# Patient Record
Sex: MALE | Race: WHITE | Employment: OTHER | URBAN - METROPOLITAN AREA
[De-identification: names, ages, dates, MRNs, and addresses within clinical notes are randomized per-mention and may not be internally consistent; named-entity substitution may affect disease eponyms.]

---

## 2018-10-16 ENCOUNTER — HOSPITAL ENCOUNTER (EMERGENCY)
Age: 48
Discharge: HOME | End: 2018-10-17
Attending: EMERGENCY MEDICINE
Payer: MEDICARE

## 2018-10-16 DIAGNOSIS — S01.81XA FACIAL LACERATION, INITIAL ENCOUNTER: ICD-10-CM

## 2018-10-16 DIAGNOSIS — S09.90XA INJURY OF HEAD, INITIAL ENCOUNTER: Primary | ICD-10-CM

## 2018-10-16 DIAGNOSIS — F10.920 ACUTE ALCOHOLIC INTOXICATION WITHOUT COMPLICATION (HCC): ICD-10-CM

## 2018-10-16 PROCEDURE — 99285 EMERGENCY DEPT VISIT HI MDM: CPT

## 2018-10-16 PROCEDURE — 12013 RPR F/E/E/N/L/M 2.6-5.0 CM: CPT

## 2018-10-16 PROCEDURE — G0480 DRUG TEST DEF 1-7 CLASSES: HCPCS

## 2018-10-16 ASSESSMENT — PAIN DESCRIPTION - LOCATION: LOCATION: HEAD

## 2018-10-16 ASSESSMENT — PAIN DESCRIPTION - PAIN TYPE: TYPE: ACUTE PAIN

## 2018-10-16 ASSESSMENT — PAIN SCALES - GENERAL: PAINLEVEL_OUTOF10: 2

## 2018-10-17 ENCOUNTER — APPOINTMENT (OUTPATIENT)
Dept: CT IMAGING | Age: 48
End: 2018-10-17
Payer: MEDICARE

## 2018-10-17 VITALS
DIASTOLIC BLOOD PRESSURE: 91 MMHG | BODY MASS INDEX: 27.47 KG/M2 | HEIGHT: 67 IN | RESPIRATION RATE: 15 BRPM | HEART RATE: 76 BPM | WEIGHT: 175 LBS | TEMPERATURE: 99.2 F | OXYGEN SATURATION: 98 % | SYSTOLIC BLOOD PRESSURE: 145 MMHG

## 2018-10-17 LAB
ETHANOL PERCENT: 0.33 %
ETHANOL: 326 MG/DL

## 2018-10-17 PROCEDURE — 90715 TDAP VACCINE 7 YRS/> IM: CPT | Performed by: STUDENT IN AN ORGANIZED HEALTH CARE EDUCATION/TRAINING PROGRAM

## 2018-10-17 PROCEDURE — 70450 CT HEAD/BRAIN W/O DYE: CPT

## 2018-10-17 PROCEDURE — 90471 IMMUNIZATION ADMIN: CPT | Performed by: STUDENT IN AN ORGANIZED HEALTH CARE EDUCATION/TRAINING PROGRAM

## 2018-10-17 PROCEDURE — 72125 CT NECK SPINE W/O DYE: CPT

## 2018-10-17 PROCEDURE — 6360000002 HC RX W HCPCS: Performed by: STUDENT IN AN ORGANIZED HEALTH CARE EDUCATION/TRAINING PROGRAM

## 2018-10-17 RX ADMIN — TETANUS TOXOID, REDUCED DIPHTHERIA TOXOID AND ACELLULAR PERTUSSIS VACCINE, ADSORBED 0.5 ML: 5; 2.5; 8; 8; 2.5 SUSPENSION INTRAMUSCULAR at 00:15

## 2018-10-17 ASSESSMENT — ENCOUNTER SYMPTOMS
ABDOMINAL PAIN: 0
NAUSEA: 0
VOMITING: 0
SHORTNESS OF BREATH: 0

## 2018-10-17 NOTE — ED NOTES
Pt resting comfortably on stretcher  Resp noted as even and non labored, pt in NAD  Call light within reach, will continue to monitor     Sridhar Rey RN  10/17/18 8217

## 2018-10-17 NOTE — ED PROVIDER NOTES
[facility] ED  Emergency Department  Faculty Sign-Out Addendum     Care of Carlos Valladares was assumed from previous attending and is being seen for Fall (at home, from chair, hit head on cinder blocks, per family +loc ) and Alcohol Intoxication  . The patient's initial evaluation and plan have been discussed with the prior provider who initially evaluated the patient. EMERGENCY DEPARTMENT COURSE / MEDICAL DECISION MAKING:       MEDICATIONS GIVEN:  Orders Placed This Encounter   Medications    Tetanus-Diphth-Acell Pertussis (BOOSTRIX) injection 0.5 mL       LABS / RADIOLOGY:     Labs Reviewed   ETHANOL - Abnormal; Notable for the following:        Result Value    Ethanol 326 (*)     All other components within normal limits       Ct Head Wo Contrast    Result Date: 10/17/2018  EXAMINATION: CT OF THE HEAD WITHOUT CONTRAST  10/17/2018 12:28 am TECHNIQUE: CT of the head was performed without the administration of intravenous contrast. Dose modulation, iterative reconstruction, and/or weight based adjustment of the mA/kV was utilized to reduce the radiation dose to as low as reasonably achievable. COMPARISON: None. HISTORY: ORDERING SYSTEM PROVIDED HISTORY: fall TECHNOLOGIST PROVIDED HISTORY: FINDINGS: BRAIN/VENTRICLES: There is no acute intracranial hemorrhage, mass effect or midline shift. No abnormal extra-axial fluid collection. The gray-white differentiation is maintained without evidence of an acute infarct. There is prominence of the ventricles and sulci due to global parenchymal volume loss. There are nonspecific areas of hypoattenuation within the periventricular and subcortical white matter, which likely represent chronic microvascular ischemic change. ORBITS: The visualized portion of the orbits demonstrate no acute abnormality. SINUSES: Mild mucosal thickening within paranasal sinuses. No evidence of air-fluid levels. The mastoid air cells are clear.  SOFT TISSUES/SKULL: No acute
occasionally words are mis-transcribed.  Whenever words are used in this note in any gender, they shall be construed as though they were used in the gender appropriate to the circumstances; and whenever words are used in this note in the singular or plural form, they shall be construed as though they were used in the form appropriate to the circumstances.)    MD Garry Martel  Attending Emergency Medicine Physician           Alejandrina Moody MD  10/16/18 Veronica Wiggins MD  10/17/18 7042

## 2018-12-15 ENCOUNTER — HOSPITAL ENCOUNTER (EMERGENCY)
Age: 48
Discharge: PSYCHIATRIC OR SUBSTANCE USE FACILITY | End: 2018-12-15
Attending: EMERGENCY MEDICINE
Payer: MEDICARE

## 2018-12-15 ENCOUNTER — HOSPITAL ENCOUNTER (INPATIENT)
Age: 48
LOS: 2 days | Discharge: HOME | DRG: 753 | End: 2018-12-17
Attending: PSYCHIATRY & NEUROLOGY | Admitting: PSYCHIATRY & NEUROLOGY
Payer: MEDICARE

## 2018-12-15 VITALS
BODY MASS INDEX: 24.99 KG/M2 | RESPIRATION RATE: 18 BRPM | SYSTOLIC BLOOD PRESSURE: 123 MMHG | HEART RATE: 81 BPM | HEIGHT: 65 IN | DIASTOLIC BLOOD PRESSURE: 79 MMHG | WEIGHT: 150 LBS | OXYGEN SATURATION: 94 % | TEMPERATURE: 97 F

## 2018-12-15 DIAGNOSIS — R45.851 SUICIDAL IDEATION: Primary | ICD-10-CM

## 2018-12-15 DIAGNOSIS — F10.929 ACUTE ALCOHOLIC INTOXICATION WITH COMPLICATION (HCC): ICD-10-CM

## 2018-12-15 PROBLEM — F33.9 RECURRENT DEPRESSION (HCC): Status: ACTIVE | Noted: 2018-12-15

## 2018-12-15 LAB
ETHANOL PERCENT: 0.35 %
ETHANOL: 353 MG/DL

## 2018-12-15 PROCEDURE — 99285 EMERGENCY DEPT VISIT HI MDM: CPT

## 2018-12-15 PROCEDURE — G0480 DRUG TEST DEF 1-7 CLASSES: HCPCS

## 2018-12-15 PROCEDURE — 6370000000 HC RX 637 (ALT 250 FOR IP): Performed by: PSYCHIATRY & NEUROLOGY

## 2018-12-15 PROCEDURE — 1240000000 HC EMOTIONAL WELLNESS R&B

## 2018-12-15 RX ORDER — DIVALPROEX SODIUM 500 MG/1
500 TABLET, DELAYED RELEASE ORAL 2 TIMES DAILY
Status: DISCONTINUED | OUTPATIENT
Start: 2018-12-15 | End: 2018-12-17 | Stop reason: HOSPADM

## 2018-12-15 RX ORDER — ACETAMINOPHEN 325 MG/1
650 TABLET ORAL EVERY 4 HOURS PRN
Status: DISCONTINUED | OUTPATIENT
Start: 2018-12-15 | End: 2018-12-17 | Stop reason: HOSPADM

## 2018-12-15 RX ORDER — HYDROXYZINE HYDROCHLORIDE 25 MG/1
25 TABLET, FILM COATED ORAL 3 TIMES DAILY PRN
Status: DISCONTINUED | OUTPATIENT
Start: 2018-12-15 | End: 2018-12-17 | Stop reason: HOSPADM

## 2018-12-15 RX ORDER — MAGNESIUM HYDROXIDE/ALUMINUM HYDROXICE/SIMETHICONE 120; 1200; 1200 MG/30ML; MG/30ML; MG/30ML
20 SUSPENSION ORAL 3 TIMES DAILY PRN
Status: DISCONTINUED | OUTPATIENT
Start: 2018-12-15 | End: 2018-12-17 | Stop reason: HOSPADM

## 2018-12-15 RX ORDER — ZOLPIDEM TARTRATE 10 MG/1
10 TABLET ORAL NIGHTLY PRN
Status: ON HOLD | COMMUNITY
End: 2018-12-17 | Stop reason: HOSPADM

## 2018-12-15 RX ORDER — BENZTROPINE MESYLATE 1 MG/ML
2 INJECTION INTRAMUSCULAR; INTRAVENOUS DAILY PRN
Status: DISCONTINUED | OUTPATIENT
Start: 2018-12-15 | End: 2018-12-17 | Stop reason: HOSPADM

## 2018-12-15 RX ORDER — DIVALPROEX SODIUM 500 MG/1
500 TABLET, EXTENDED RELEASE ORAL 2 TIMES DAILY
Status: ON HOLD | COMMUNITY
End: 2018-12-17

## 2018-12-15 RX ORDER — ZOLPIDEM TARTRATE 10 MG/1
10 TABLET ORAL NIGHTLY PRN
Status: DISCONTINUED | OUTPATIENT
Start: 2018-12-15 | End: 2018-12-17 | Stop reason: HOSPADM

## 2018-12-15 RX ADMIN — HYDROXYZINE HYDROCHLORIDE 25 MG: 25 TABLET, FILM COATED ORAL at 21:42

## 2018-12-15 RX ADMIN — ZOLPIDEM TARTRATE 10 MG: 10 TABLET, FILM COATED ORAL at 21:42

## 2018-12-15 RX ADMIN — DIVALPROEX SODIUM 500 MG: 500 TABLET, DELAYED RELEASE ORAL at 21:39

## 2018-12-15 ASSESSMENT — PAIN - FUNCTIONAL ASSESSMENT: PAIN_FUNCTIONAL_ASSESSMENT: 0-10

## 2018-12-15 ASSESSMENT — ENCOUNTER SYMPTOMS
SHORTNESS OF BREATH: 0
CHEST TIGHTNESS: 0
VOMITING: 0
DIARRHEA: 0

## 2018-12-15 ASSESSMENT — SLEEP AND FATIGUE QUESTIONNAIRES
SLEEP PATTERN: DIFFICULTY FALLING ASLEEP;DISTURBED/INTERRUPTED SLEEP
DO YOU USE A SLEEP AID: YES
DIFFICULTY FALLING ASLEEP: YES
DIFFICULTY ARISING: NO
DO YOU HAVE DIFFICULTY SLEEPING: YES
AVERAGE NUMBER OF SLEEP HOURS: 3
DIFFICULTY STAYING ASLEEP: YES
RESTFUL SLEEP: NO

## 2018-12-15 ASSESSMENT — PAIN SCALES - GENERAL: PAINLEVEL_OUTOF10: 0

## 2018-12-15 ASSESSMENT — LIFESTYLE VARIABLES: HISTORY_ALCOHOL_USE: YES

## 2018-12-15 NOTE — ED PROVIDER NOTES
is alert. Skin: Skin is warm. He is not diaphoretic. Psychiatric: He exhibits a depressed mood. He expresses suicidal ideation. He expresses suicidal plans. Nursing note and vitals reviewed. DIFFERENTIAL  DIAGNOSIS     PLAN (LABS / IMAGING / EKG):  Orders Placed This Encounter   Procedures    ETHANOL    Suicide precautions       MEDICATIONS ORDERED:  No orders of the defined types were placed in this encounter. DDX: alcohol intoxication, SI    DIAGNOSTIC RESULTS / EMERGENCY DEPARTMENT COURSE / MDM     LABS:  Results for orders placed or performed during the hospital encounter of 12/15/18   ETHANOL   Result Value Ref Range    Ethanol 353 (HH) <10 mg/dL    Ethanol percent 0.353 %       IMPRESSION: Patient presents with suicide ideations with plan to jump off a window. Has had a prior attempt in the past.  We'll check an ethanol level and if normal will medically clear the patient for transfer. RADIOLOGY:  None    EKG  None    All EKG's are interpreted by the Emergency Department Physician who either signs or Co-signs this chart in the absence of a cardiologist.    EMERGENCY DEPARTMENT COURSE:  Ethanol 353. PROCEDURES:  None    CONSULTS:  None    CRITICAL CARE:  None    FINAL IMPRESSION      1. Suicidal ideation    2. Acute alcoholic intoxication with complication ([facility] [address]          DISPOSITION / PLAN     DISPOSITION Decision To Transfer 12/15/2018 03:19:26 AM      PATIENT REFERRED TO:  No follow-up provider specified. DISCHARGE MEDICATIONS:  New Prescriptions    No medications on file       Brianne Noble DO  Emergency Medicine Resident    (Please note that portions of thisnote were completed with a voice recognition program.  Efforts were made to edit the dictations but occasionally words are mis-transcribed. )       Brianne Noble DO  Resident  12/15/18 1335
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
## 2018-12-15 NOTE — ED NOTES
Patient resting on cot even respirations unlabored no signs of distress. Patient denies pain currently. No questions or concerns updated on plan will continue to monitor.      [id number] Momo Mosqueda RN  12/15/18 0254

## 2018-12-15 NOTE — ED NOTES
Patient was given a box lunch and isaiah mist about an hour ago. Ate about 1/2 of sandwich repeatedly asking \"how much longer. \"      Jing Parra RN  12/15/18 0764

## 2018-12-16 PROBLEM — F33.9 MAJOR DEPRESSION, RECURRENT (HCC): Status: ACTIVE | Noted: 2018-12-16

## 2018-12-16 PROCEDURE — 6370000000 HC RX 637 (ALT 250 FOR IP): Performed by: PSYCHIATRY & NEUROLOGY

## 2018-12-16 PROCEDURE — 1240000000 HC EMOTIONAL WELLNESS R&B

## 2018-12-16 RX ADMIN — ZOLPIDEM TARTRATE 10 MG: 10 TABLET, FILM COATED ORAL at 21:42

## 2018-12-16 RX ADMIN — NICOTINE POLACRILEX 2 MG: 2 GUM, CHEWING BUCCAL at 14:28

## 2018-12-16 RX ADMIN — DIVALPROEX SODIUM 500 MG: 500 TABLET, DELAYED RELEASE ORAL at 21:42

## 2018-12-16 RX ADMIN — DIVALPROEX SODIUM 500 MG: 500 TABLET, DELAYED RELEASE ORAL at 09:26

## 2018-12-16 RX ADMIN — HYDROXYZINE HYDROCHLORIDE 25 MG: 25 TABLET, FILM COATED ORAL at 21:42

## 2018-12-16 ASSESSMENT — ENCOUNTER SYMPTOMS
ABDOMINAL DISTENTION: 0
RHINORRHEA: 0
EYE DISCHARGE: 0
CONSTIPATION: 0
EYE PAIN: 0
BACK PAIN: 1
SHORTNESS OF BREATH: 0
COUGH: 0
EYE REDNESS: 0
ALLERGIC/IMMUNOLOGIC NEGATIVE: 1
SINUS PRESSURE: 0
NAUSEA: 0

## 2018-12-16 ASSESSMENT — SLEEP AND FATIGUE QUESTIONNAIRES
DIFFICULTY STAYING ASLEEP: YES
DO YOU HAVE DIFFICULTY SLEEPING: YES
AVERAGE NUMBER OF SLEEP HOURS: 3
DIFFICULTY ARISING: YES
RESTFUL SLEEP: NO
SLEEP PATTERN: DIFFICULTY FALLING ASLEEP;DISTURBED/INTERRUPTED SLEEP;EARLY AWAKENING;RESTLESSNESS;INSOMNIA
DIFFICULTY FALLING ASLEEP: YES
DO YOU USE A SLEEP AID: YES

## 2018-12-16 ASSESSMENT — LIFESTYLE VARIABLES: HISTORY_ALCOHOL_USE: YES

## 2018-12-16 ASSESSMENT — PATIENT HEALTH QUESTIONNAIRE - PHQ9: SUM OF ALL RESPONSES TO PHQ QUESTIONS 1-9: 15
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
## 2018-12-16 NOTE — PLAN OF CARE
Problem: Altered Mood, Depressive Behavior:  Goal: Able to verbalize and/or display a decrease in depressive symptoms  Able to verbalize and/or display a decrease in depressive symptoms   Outcome: Ongoing  Pt did not attend Community Meeting at 0900 d/t resting in room despite staff invitation to attend.

## 2018-12-16 NOTE — PLAN OF CARE
Problem: Altered Mood, Depressive Behavior:  Goal: Able to verbalize and/or display a decrease in depressive symptoms  Able to verbalize and/or display a decrease in depressive symptoms   Outcome: Ongoing  Patient is medication compliant and in behavioral control. Patient has attended groups and been social with peers in the common areas of the unit. Patient admits to fleeting suicidal ideation. Patient denies auditory and visual hallucinations. Patient has engaged in ADL's. Patient has consumed meals and snacks this shift. Patient reports improved sleep. Patient has remained free from harm. Every 15 minute and spontaneous safety checks have been maintained. Goal: Absence of self-harm  Absence of self-harm   Outcome: Ongoing  Patient has remained free from harm. Every 15 minute and spontaneous safety checks have been maintained.   Goal: Participates in care planning  Participates in care planning   Outcome: Ongoing
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
## 2018-12-16 NOTE — PLAN OF CARE
Problem: Altered Mood, Depressive Behavior:  Goal: Able to verbalize and/or display a decrease in depressive symptoms  Able to verbalize and/or display a decrease in depressive symptoms   Outcome: Ongoing  Pt did not attend RT group at 1330 d/t resting in room despite staff invitation to attend.

## 2018-12-17 VITALS
DIASTOLIC BLOOD PRESSURE: 87 MMHG | SYSTOLIC BLOOD PRESSURE: 133 MMHG | TEMPERATURE: 98.4 F | RESPIRATION RATE: 14 BRPM | HEIGHT: 67 IN | WEIGHT: 162.5 LBS | BODY MASS INDEX: 25.51 KG/M2 | HEART RATE: 71 BPM

## 2018-12-17 PROCEDURE — 6370000000 HC RX 637 (ALT 250 FOR IP): Performed by: PSYCHIATRY & NEUROLOGY

## 2018-12-17 PROCEDURE — 5130000000 HC BRIDGE APPOINTMENT

## 2018-12-17 RX ORDER — DIVALPROEX SODIUM 500 MG/1
500 TABLET, EXTENDED RELEASE ORAL 2 TIMES DAILY
Qty: 60 TABLET | Refills: 0 | Status: ON HOLD | OUTPATIENT
Start: 2018-12-17 | End: 2019-06-26 | Stop reason: SDUPTHER

## 2018-12-17 RX ORDER — HYDROXYZINE HYDROCHLORIDE 25 MG/1
25 TABLET, FILM COATED ORAL 2 TIMES DAILY PRN
Qty: 60 TABLET | Refills: 0 | Status: ON HOLD | OUTPATIENT
Start: 2018-12-17 | End: 2019-06-26 | Stop reason: SDUPTHER

## 2018-12-17 RX ADMIN — NICOTINE POLACRILEX 2 MG: 2 GUM, CHEWING BUCCAL at 09:45

## 2018-12-17 RX ADMIN — DIVALPROEX SODIUM 500 MG: 500 TABLET, DELAYED RELEASE ORAL at 09:21

## 2018-12-17 ASSESSMENT — PAIN SCALES - GENERAL: PAINLEVEL_OUTOF10: 0
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
## 2018-12-17 NOTE — BH NOTE
Patient given tobacco quitline number 72850901809 at this time, refusing to call at this time, states \" I just dont want to quit now\"- patient given information as to the dangers of long term tobacco use. Continue to reinforce the importance of tobacco cessation.

## 2018-12-17 NOTE — PLAN OF CARE
Problem: Altered Mood, Depressive Behavior:  Goal: Able to verbalize and/or display a decrease in depressive symptoms  Able to verbalize and/or display a decrease in depressive symptoms   Outcome: Ongoing  Patient voices depression because of life stressors  Goal: Absence of self-harm  Absence of self-harm   Outcome: Ongoing  Pt denies thoughts of self harm and is agreeable to seeking out should thoughts of self harm arise. Safe environment maintained. Q15 minute checks for safety cont per unit policy. Will cont to monitor for safety and provides support and reassurance as needed.

## 2019-01-03 NOTE — PROGRESS NOTES
Presenting Evaluation:  Vero Finch is a 50 y.o. male who was admitted from the ED where he presented intoxicated, reported that he was feeling suicidal with a plan to jump out of a window. He reports that his ex-gf [place] and now has a new boyfriend which is triggering patient's agitation, anxiety. He reports that he has been drinking more, recognizes this as unhealthy numbing but denies any withdrawal symptoms. He states that he feels much better today, no longer having SI. States he has been partially compliant with Depakote. Denies hallucinations or paranoia    Diagnostic Impression     Bipolar affective disorder without psychosis. Alcohol abuse      After discussion with patient about potential risks, benefits, side effects, decided to order Depakote, Vistaril prn. He was doing fairly well at the time of discharge and was not in any distress. Thought process was organized and showed insight into compliance with treatment. Patient had been making rational and realistic plans so he was discharged. Patient had been bright, reactive, and interacting appropriately with staff and peers. There had been multiple consecutive days without any thoughts of suicide or other safety concerns. Patient was tolerating medication changes without any adverse side effects. He will follow up at [facility]. Medication List      CONTINUE taking these medications    divalproex 500 MG extended release tablet  Commonly known as:  DEPAKOTE ER  Take 1 tablet by mouth 2 times daily  Notes to patient: To help stabilize mood     hydrOXYzine 25 MG tablet  Commonly known as:  ATARAX  Take 1 tablet by mouth 2 times daily as needed for Anxiety  Notes to patient:   To help with anxiety        STOP taking these medications    diphenhydrAMINE 50 MG tablet  Commonly known as:  BENADRYL     lurasidone 40 MG Tabs tablet  Commonly known as:  LATUDA     traZODone 100 MG tablet  Commonly known as:  [place] Notice

## 2019-06-23 ENCOUNTER — APPOINTMENT (OUTPATIENT)
Dept: GENERAL RADIOLOGY | Age: 49
DRG: 753 | End: 2019-06-23
Payer: MEDICARE

## 2019-06-23 ENCOUNTER — HOSPITAL ENCOUNTER (INPATIENT)
Age: 49
LOS: 4 days | Discharge: HOME | DRG: 753 | End: 2019-06-27
Attending: EMERGENCY MEDICINE | Admitting: PSYCHIATRY & NEUROLOGY
Payer: MEDICARE

## 2019-06-23 DIAGNOSIS — F10.929 ALCOHOLIC INTOXICATION WITH COMPLICATION (HCC): ICD-10-CM

## 2019-06-23 DIAGNOSIS — F32.A DEPRESSION WITH SUICIDAL IDEATION: Primary | ICD-10-CM

## 2019-06-23 DIAGNOSIS — F31.31 BIPOLAR AFFECTIVE DISORDER, CURRENTLY DEPRESSED, MILD (HCC): ICD-10-CM

## 2019-06-23 DIAGNOSIS — R45.851 DEPRESSION WITH SUICIDAL IDEATION: Primary | ICD-10-CM

## 2019-06-23 LAB
ABSOLUTE EOS #: 0.2 K/UL (ref 0–0.4)
ABSOLUTE IMMATURE GRANULOCYTE: ABNORMAL K/UL (ref 0–0.3)
ABSOLUTE LYMPH #: 2 K/UL (ref 1–4.8)
ABSOLUTE MONO #: 0.6 K/UL (ref 0.1–1.3)
ACETAMINOPHEN LEVEL: <5 UG/ML (ref 10–30)
ALBUMIN SERPL-MCNC: 4.6 G/DL (ref 3.5–5.2)
ALBUMIN/GLOBULIN RATIO: ABNORMAL (ref 1–2.5)
ALP BLD-CCNC: 79 U/L (ref 40–129)
ALT SERPL-CCNC: 76 U/L (ref 5–41)
AMPHETAMINE SCREEN URINE: NEGATIVE
ANION GAP SERPL CALCULATED.3IONS-SCNC: 15 MMOL/L (ref 9–17)
AST SERPL-CCNC: 68 U/L
BARBITURATE SCREEN URINE: NEGATIVE
BASOPHILS # BLD: 1 % (ref 0–2)
BASOPHILS ABSOLUTE: 0.1 K/UL (ref 0–0.2)
BENZODIAZEPINE SCREEN, URINE: NEGATIVE
BILIRUB SERPL-MCNC: 0.21 MG/DL (ref 0.3–1.2)
BUN BLDV-MCNC: 10 MG/DL (ref 6–20)
BUN/CREAT BLD: ABNORMAL (ref 9–20)
BUPRENORPHINE URINE: ABNORMAL
CALCIUM SERPL-MCNC: 9.1 MG/DL (ref 8.6–10.4)
CANNABINOID SCREEN URINE: POSITIVE
CHLORIDE BLD-SCNC: 104 MMOL/L (ref 98–107)
CO2: 21 MMOL/L (ref 20–31)
COCAINE METABOLITE, URINE: NEGATIVE
CREAT SERPL-MCNC: 0.8 MG/DL (ref 0.7–1.2)
DIFFERENTIAL TYPE: ABNORMAL
EOSINOPHILS RELATIVE PERCENT: 4 % (ref 0–4)
ETHANOL PERCENT: 0.29 %
ETHANOL: 287 MG/DL
GFR AFRICAN AMERICAN: >60 ML/MIN
GFR NON-AFRICAN AMERICAN: >60 ML/MIN
GFR SERPL CREATININE-BSD FRML MDRD: ABNORMAL ML/MIN/{1.73_M2}
GFR SERPL CREATININE-BSD FRML MDRD: ABNORMAL ML/MIN/{1.73_M2}
GLUCOSE BLD-MCNC: 107 MG/DL (ref 70–99)
HCT VFR BLD CALC: 47.1 % (ref 41–53)
HEMOGLOBIN: 16 G/DL (ref 13.5–17.5)
IMMATURE GRANULOCYTES: ABNORMAL %
LYMPHOCYTES # BLD: 40 % (ref 24–44)
MCH RBC QN AUTO: 35 PG (ref 26–34)
MCHC RBC AUTO-ENTMCNC: 34 G/DL (ref 31–37)
MCV RBC AUTO: 103 FL (ref 80–100)
MDMA URINE: ABNORMAL
METHADONE SCREEN, URINE: NEGATIVE
METHAMPHETAMINE, URINE: ABNORMAL
MONOCYTES # BLD: 12 % (ref 1–7)
NRBC AUTOMATED: ABNORMAL PER 100 WBC
OPIATES, URINE: NEGATIVE
OXYCODONE SCREEN URINE: NEGATIVE
PDW BLD-RTO: 13.8 % (ref 11.5–14.9)
PHENCYCLIDINE, URINE: NEGATIVE
PLATELET # BLD: 245 K/UL (ref 150–450)
PLATELET ESTIMATE: ABNORMAL
PMV BLD AUTO: 7.3 FL (ref 6–12)
POTASSIUM SERPL-SCNC: 4.1 MMOL/L (ref 3.7–5.3)
PROPOXYPHENE, URINE: ABNORMAL
RBC # BLD: 4.57 M/UL (ref 4.5–5.9)
RBC # BLD: ABNORMAL 10*6/UL
SALICYLATE LEVEL: <1 MG/DL (ref 3–10)
SEG NEUTROPHILS: 43 % (ref 36–66)
SEGMENTED NEUTROPHILS ABSOLUTE COUNT: 2.1 K/UL (ref 1.3–9.1)
SODIUM BLD-SCNC: 140 MMOL/L (ref 135–144)
TEST INFORMATION: ABNORMAL
TOTAL PROTEIN: 8.2 G/DL (ref 6.4–8.3)
TOXIC TRICYCLIC SC,BLOOD: ABNORMAL
TRICYCLIC ANTIDEP,URINE: NEGATIVE
TRICYCLIC ANTIDEPRESSANTS, UR: ABNORMAL
WBC # BLD: 4.9 K/UL (ref 3.5–11)
WBC # BLD: ABNORMAL 10*3/UL

## 2019-06-23 PROCEDURE — 85025 COMPLETE CBC W/AUTO DIFF WBC: CPT

## 2019-06-23 PROCEDURE — 80307 DRUG TEST PRSMV CHEM ANLYZR: CPT

## 2019-06-23 PROCEDURE — 71046 X-RAY EXAM CHEST 2 VIEWS: CPT

## 2019-06-23 PROCEDURE — 36415 COLL VENOUS BLD VENIPUNCTURE: CPT

## 2019-06-23 PROCEDURE — G0480 DRUG TEST DEF 1-7 CLASSES: HCPCS

## 2019-06-23 PROCEDURE — 1240000000 HC EMOTIONAL WELLNESS R&B

## 2019-06-23 PROCEDURE — 80053 COMPREHEN METABOLIC PANEL: CPT

## 2019-06-23 PROCEDURE — 6370000000 HC RX 637 (ALT 250 FOR IP): Performed by: PSYCHIATRY & NEUROLOGY

## 2019-06-23 PROCEDURE — 99285 EMERGENCY DEPT VISIT HI MDM: CPT

## 2019-06-23 RX ORDER — ACETAMINOPHEN 325 MG/1
650 TABLET ORAL EVERY 4 HOURS PRN
Status: DISCONTINUED | OUTPATIENT
Start: 2019-06-23 | End: 2019-06-27 | Stop reason: HOSPADM

## 2019-06-23 RX ORDER — HYDROXYZINE HYDROCHLORIDE 25 MG/1
25 TABLET, FILM COATED ORAL 3 TIMES DAILY PRN
Status: DISCONTINUED | OUTPATIENT
Start: 2019-06-23 | End: 2019-06-27 | Stop reason: HOSPADM

## 2019-06-23 RX ORDER — DIVALPROEX SODIUM 500 MG/1
500 TABLET, EXTENDED RELEASE ORAL 2 TIMES DAILY
Status: DISCONTINUED | OUTPATIENT
Start: 2019-06-23 | End: 2019-06-26

## 2019-06-23 RX ORDER — MAGNESIUM HYDROXIDE/ALUMINUM HYDROXICE/SIMETHICONE 120; 1200; 1200 MG/30ML; MG/30ML; MG/30ML
20 SUSPENSION ORAL EVERY 6 HOURS PRN
Status: DISCONTINUED | OUTPATIENT
Start: 2019-06-23 | End: 2019-06-27 | Stop reason: HOSPADM

## 2019-06-23 RX ORDER — NICOTINE 21 MG/24HR
1 PATCH, TRANSDERMAL 24 HOURS TRANSDERMAL DAILY
Status: DISCONTINUED | OUTPATIENT
Start: 2019-06-23 | End: 2019-06-27 | Stop reason: HOSPADM

## 2019-06-23 RX ORDER — TRAZODONE HYDROCHLORIDE 50 MG/1
50 TABLET ORAL NIGHTLY PRN
Status: DISCONTINUED | OUTPATIENT
Start: 2019-06-23 | End: 2019-06-27 | Stop reason: HOSPADM

## 2019-06-23 RX ORDER — BENZTROPINE MESYLATE 1 MG/ML
2 INJECTION INTRAMUSCULAR; INTRAVENOUS 2 TIMES DAILY PRN
Status: DISCONTINUED | OUTPATIENT
Start: 2019-06-23 | End: 2019-06-27 | Stop reason: HOSPADM

## 2019-06-23 RX ADMIN — DIVALPROEX SODIUM 500 MG: 500 TABLET, EXTENDED RELEASE ORAL at 22:16

## 2019-06-23 RX ADMIN — HYDROXYZINE HYDROCHLORIDE 25 MG: 25 TABLET, FILM COATED ORAL at 22:16

## 2019-06-23 RX ADMIN — TRAZODONE HYDROCHLORIDE 50 MG: 50 TABLET ORAL at 22:16

## 2019-06-23 SDOH — HEALTH STABILITY: MENTAL HEALTH: HOW OFTEN DO YOU HAVE A DRINK CONTAINING ALCOHOL?: MONTHLY OR LESS

## 2019-06-23 SDOH — HEALTH STABILITY: MENTAL HEALTH: HOW MANY STANDARD DRINKS CONTAINING ALCOHOL DO YOU HAVE ON A TYPICAL DAY?: 10 OR MORE

## 2019-06-23 ASSESSMENT — SLEEP AND FATIGUE QUESTIONNAIRES
SLEEP PATTERN: DIFFICULTY FALLING ASLEEP;DIFFICULTY ARISING;DISTURBED/INTERRUPTED SLEEP;RESTLESSNESS;EARLY AWAKENING
DIFFICULTY ARISING: YES
DIFFICULTY FALLING ASLEEP: YES
DIFFICULTY STAYING ASLEEP: YES
DO YOU HAVE DIFFICULTY SLEEPING: YES
SLEEP PATTERN: DIFFICULTY FALLING ASLEEP;DIFFICULTY ARISING
AVERAGE NUMBER OF SLEEP HOURS: 3
DIFFICULTY ARISING: YES
RESTFUL SLEEP: NO
RESTFUL SLEEP: NO
DO YOU USE A SLEEP AID: NO
DO YOU USE A SLEEP AID: NO
DO YOU HAVE DIFFICULTY SLEEPING: YES
AVERAGE NUMBER OF SLEEP HOURS: 5
DIFFICULTY FALLING ASLEEP: YES
DIFFICULTY STAYING ASLEEP: YES

## 2019-06-23 ASSESSMENT — LIFESTYLE VARIABLES
HISTORY_ALCOHOL_USE: YES
HISTORY_ALCOHOL_USE: NO

## 2019-06-23 ASSESSMENT — ENCOUNTER SYMPTOMS
NAUSEA: 0
ABDOMINAL PAIN: 0
VOMITING: 0
COUGH: 0

## 2019-06-23 ASSESSMENT — PATIENT HEALTH QUESTIONNAIRE - PHQ9: SUM OF ALL RESPONSES TO PHQ QUESTIONS 1-9: 0

## 2019-06-23 ASSESSMENT — PAIN SCALES - GENERAL
PAINLEVEL_OUTOF10: 0
PAINLEVEL_OUTOF10: 3

## 2019-06-23 ASSESSMENT — PAIN DESCRIPTION - PAIN TYPE: TYPE: CHRONIC PAIN

## 2019-06-23 NOTE — ED NOTES
Pt escorted to the [facility] via [facility] staff. Pt belongings went with him.

## 2019-06-23 NOTE — BH NOTE
Patient given tobacco quitline number 71221808871 at this time, refusing to call at this time, states \" I just dont want to quit now\"- patient given information as to the dangers of long term tobacco use. Continue to reinforce the importance of tobacco cessation.
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
## 2019-06-23 NOTE — ED NOTES
Voluntary and bed request faxed to the [facility] and the [facility]. Admission RN notified.
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
## 2019-06-23 NOTE — ED NOTES
Patient is a 45-year-old  male who was brought to the [facility] emergency center on a voluntary status by [facility] with concerns for his increase in homicidal and suicidal ideation. Patient reports that his homicidal thoughts towards every [facility]. He reports the trigger was someone said they didnt like him and doors slamming. Patient reports suicidal ideation. Patient states, his plan is to walk into traffic. Patient is unable identify specific triggers to an increase in his suicidal symptoms; he repeatedly states that everything is building up.  Patient reports that he has been compliant with his medication. Patient reports that he sleeps 3 hours a night. Patient denies any changes in his appetite. Patient denies auditory and visual hallucinations. Patient describes himself as an alcoholic. He reports drinking twice a week. Patient reports, drinking 3 tall boy beers with his last drink 4 hours ago. Patient will be assessed after result of blood alcohol level.

## 2019-06-23 NOTE — BH NOTE
Patient arrived to unit ambulatory from the [facility] AN [facility]FFILIATE OF [facility] with all belongings.

## 2019-06-24 PROCEDURE — 6370000000 HC RX 637 (ALT 250 FOR IP): Performed by: PSYCHIATRY & NEUROLOGY

## 2019-06-24 PROCEDURE — 1240000000 HC EMOTIONAL WELLNESS R&B

## 2019-06-24 RX ORDER — PROPRANOLOL HYDROCHLORIDE 20 MG/1
10 TABLET ORAL 3 TIMES DAILY
Status: DISCONTINUED | OUTPATIENT
Start: 2019-06-24 | End: 2019-06-27 | Stop reason: HOSPADM

## 2019-06-24 RX ORDER — ZOLPIDEM TARTRATE 10 MG/1
10 TABLET ORAL NIGHTLY
Status: DISCONTINUED | OUTPATIENT
Start: 2019-06-24 | End: 2019-06-27 | Stop reason: HOSPADM

## 2019-06-24 RX ORDER — DULOXETIN HYDROCHLORIDE 60 MG/1
60 CAPSULE, DELAYED RELEASE ORAL DAILY
Status: DISCONTINUED | OUTPATIENT
Start: 2019-06-24 | End: 2019-06-27 | Stop reason: HOSPADM

## 2019-06-24 RX ADMIN — DIVALPROEX SODIUM 500 MG: 500 TABLET, EXTENDED RELEASE ORAL at 08:01

## 2019-06-24 RX ADMIN — PROPRANOLOL HYDROCHLORIDE 10 MG: 20 TABLET ORAL at 22:02

## 2019-06-24 RX ADMIN — PROPRANOLOL HYDROCHLORIDE 10 MG: 20 TABLET ORAL at 14:58

## 2019-06-24 RX ADMIN — DIVALPROEX SODIUM 500 MG: 500 TABLET, EXTENDED RELEASE ORAL at 22:02

## 2019-06-24 RX ADMIN — ZOLPIDEM TARTRATE 10 MG: 10 TABLET ORAL at 22:02

## 2019-06-24 RX ADMIN — HYDROXYZINE HYDROCHLORIDE 25 MG: 25 TABLET, FILM COATED ORAL at 22:02

## 2019-06-24 RX ADMIN — DULOXETINE HYDROCHLORIDE 60 MG: 60 CAPSULE, DELAYED RELEASE ORAL at 14:59

## 2019-06-24 ASSESSMENT — ENCOUNTER SYMPTOMS
SINUS PAIN: 0
SHORTNESS OF BREATH: 1
EYE REDNESS: 0
EYE PAIN: 0
EYE DISCHARGE: 0
COLOR CHANGE: 0
GASTROINTESTINAL NEGATIVE: 1
PHOTOPHOBIA: 0
RHINORRHEA: 0

## 2019-06-24 ASSESSMENT — LIFESTYLE VARIABLES: HISTORY_ALCOHOL_USE: COMMENT

## 2019-06-24 NOTE — GROUP NOTE
Group Therapy Note    Date: June 24    Group Start Time: 0900  Group End Time: 0930  Group Topic: Community Meeting    [address]    Patient did not attend Goal Setting and [employer] after encouragement from staff. 1:1 talk time offered but patient refused.       Signature:  Anum Vanegas

## 2019-06-24 NOTE — PLAN OF CARE
1:1 with pt x ten minutes. Pt encouraged to attend unit programming and interact with peers and staff. Pt also encouraged to tend to hygiene and ADLs. Pt encouraged to discuss feelings with staff and feedback and reassurance provided. Pt denies thoughts of self harm and is agreeable to seeking out should thoughts of self harm arise. Safe environment maintained. Q15 minute checks for safety cont per unit policy. Will cont to monitor for safety and provides support and reassurance as needed. Pt admits to having depression and some anxiety. Pt is compliant with medications and shows no side effects. Refuses groups and is seclusive to self.

## 2019-06-24 NOTE — GROUP NOTE
Group Therapy Note    Date: June 24    Group Start Time: 1430  Group End Time: 4351  Group Topic: Recovery    [facility]    BRIAN Fabian, JORDANW    patient refused to attend Recovery group at 2:30 pm after encouragement from staff.   1:1 talk time provided as alternative to group session      Signature:  BRIAN Fabian, LISSETH

## 2019-06-24 NOTE — GROUP NOTE
Group Therapy Note    Date: June 24    Group Start Time: 1000  Group End Time: 5847  Group Topic: Recreational    [address], [place]    Patient refused to attend Recreational Therapy Group at 1000 after encouragement from staff. 1:1 talk time offered but patient refused.      Signature:  Darvin Cline

## 2019-06-24 NOTE — H&P
Social History     Socioeconomic History    Marital status:      Spouse name: None    Number of children: None    Years of education: None    Highest education level: None   Occupational History    None   Social Needs    Financial resource strain: None    Food insecurity:     Worry: None     Inability: None    Transportation needs:     Medical: None     Non-medical: None   Tobacco Use    Smoking status: Current Some Day Smoker     Packs/day: 1.00     Types: Cigarettes    Smokeless tobacco: Never Used    Tobacco comment: pt accepting of nicotine replacement   Substance and Sexual Activity    Alcohol use: Yes     Alcohol/week: 3.6 oz     Types: 6 Cans of beer per week     Frequency: Monthly or less     Drinks per session: 10 or more     Binge frequency: Less than monthly    Drug use: Yes     Types: Marijuana     Comment: patient denies recent use but tox screen is positive for marijuana    Sexual activity: Not Currently     Partners: Female   Lifestyle    Physical activity:     Days per week: None     Minutes per session: None    Stress: None   Relationships    Social connections:     Talks on phone: None     Gets together: None     Attends Rastafari service: None     Active member of club or organization: None     Attends meetings of clubs or organizations: None     Relationship status: None    Intimate partner violence:     Fear of current or ex partner: None     Emotionally abused: None     Physically abused: None     Forced sexual activity: None   Other Topics Concern    None   Social History Narrative    None           REVIEW OF SYSTEMS      Review of Systems   Constitutional: Negative for activity change, chills and diaphoresis. HENT: Negative for ear pain, rhinorrhea and sinus pain. Eyes: Negative for photophobia, pain, discharge and redness. Respiratory: Positive for shortness of breath. Lots of wheezing and coughing   Gastrointestinal: Negative.     Endocrine: Smokes PPD or more,  Does not use aerosols at home   Abdominal: Soft. Bowel sounds are normal. He exhibits no mass. There is no guarding. Musculoskeletal: Normal range of motion. Remote femur fx with ORIF   Neurological: He is alert. He displays normal reflexes. A sensory deficit is present. No cranial nerve deficit. He exhibits normal muscle tone. Coordination normal.   Skin: Skin is warm. Capillary refill takes less than 2 seconds. Psychiatric:   Depressed     See HPI. Depression   Aniscoria  Depression   PROVISIONAL DIAGNOSES:    Depresson  Anisoria   Active Problems:    Depression, acute  Resolved Problems:    * No resolved hospital problems.  MIKE Ambrosio - CNP on 6/24/2019 at 1:34 PM
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
## 2019-06-24 NOTE — GROUP NOTE
Group Therapy Note    Date: June 24    Group Start Time: 1330  Group End Time: 6185  Group Topic: Cognitive Skills    RENEE HAYES    [place], [place]    Patient refused to attend Recreational Therapy Group at 1330 after encouragement from staff. 1:1 talk time offered but patient refused.      Signature:  Darvin Cline

## 2019-06-24 NOTE — GROUP NOTE
Group Therapy Note    Date: June 24    Group Start Time: 1600  Group End Time: 1640  Group Topic: Healthy Living/Wellness    RENEE Millard        Group Therapy Note    Attendees: 12/18         Patient's Goal:  Communication Skills    Notes:   Pt was attentive and actively participated throughout group    Status After Intervention:  Improved    Participation Level:  Active Listener and Interactive    Participation Quality: Appropriate, Attentive and Sharing      Speech:  normal      Thought Process/Content: Logical      Affective Functioning: Congruent      Mood: appropriate      Level of consciousness:  Alert, Oriented x4 and Attentive      Response to Learning: Able to verbalize current knowledge/experience and Progressing to goal      Endings: None Reported    Modes of Intervention: Support, Socialization, Exploration and Activity      Discipline Responsible: [address], [employer]      Signature:  Pradeep Millard

## 2019-06-24 NOTE — GROUP NOTE
Group Therapy Note    Date: June 24    Group Start Time: 1600  Group End Time: 1640  Group Topic: Healthy Living/Wellness    [facility]        Group Therapy Note    Attendees: 12/18         Pt did not attend group despite encouragement from staff. Pt offered 1:1 talk time as alternative.  Pt declined      Discipline Responsible: [address], [facility]      Signature:  Romie Galvez

## 2019-06-24 NOTE — PLAN OF CARE
Pt remains in bed throughout evening, Denies SI HI and hallucinations. Pt displays flat affect and does not verbalize depressive symptoms. Pt states this is a miss understanding he had simply drank to much. Pt appears tremulous and sweaty.

## 2019-06-25 PROCEDURE — 6370000000 HC RX 637 (ALT 250 FOR IP): Performed by: PSYCHIATRY & NEUROLOGY

## 2019-06-25 PROCEDURE — 1240000000 HC EMOTIONAL WELLNESS R&B

## 2019-06-25 RX ADMIN — PROPRANOLOL HYDROCHLORIDE 10 MG: 20 TABLET ORAL at 21:25

## 2019-06-25 RX ADMIN — DULOXETINE HYDROCHLORIDE 60 MG: 60 CAPSULE, DELAYED RELEASE ORAL at 09:30

## 2019-06-25 RX ADMIN — PROPRANOLOL HYDROCHLORIDE 10 MG: 20 TABLET ORAL at 09:29

## 2019-06-25 RX ADMIN — DIVALPROEX SODIUM 500 MG: 500 TABLET, EXTENDED RELEASE ORAL at 21:27

## 2019-06-25 RX ADMIN — DIVALPROEX SODIUM 500 MG: 500 TABLET, EXTENDED RELEASE ORAL at 09:30

## 2019-06-25 RX ADMIN — HYDROXYZINE HYDROCHLORIDE 25 MG: 25 TABLET, FILM COATED ORAL at 21:26

## 2019-06-25 RX ADMIN — PROPRANOLOL HYDROCHLORIDE 10 MG: 20 TABLET ORAL at 15:13

## 2019-06-25 RX ADMIN — ZOLPIDEM TARTRATE 10 MG: 10 TABLET ORAL at 21:26

## 2019-06-25 NOTE — GROUP NOTE
Group Therapy Note    Date: June 25    Group Start Time: 1000  Group End Time: 3778  Group Topic: Recreational    [address] [place], [place]    Patient refused to attend Recreational Therapy Group at 1000 after encouragement from staff. 1:1 talk time offered but patient refused.      Signature:  Rosa Maria Byrd

## 2019-06-25 NOTE — GROUP NOTE
Group Therapy Note    Date: June 25    Group Start Time: 1330  Group End Time: 4433  Group Topic: Cognitive Skills    RENEE Torres, CTRS    Patient did not attend Cognitive Skills Group after encouragement from staff. 1:1 talk time offered but patient refused.       Signature:  Tu Rodriguez

## 2019-06-25 NOTE — PLAN OF CARE
Preoccupations  Hallucinations: None  Delusions: No  Memory:Normal: Yes  Insight and Judgment: No  Insight and Judgment: Poor Insight  Present Suicidal Ideation: No  Present Homicidal Ideation: No    Daily Assessment Last Entry:             Patient Currently in Pain: Denies       Patient Monitoring:  Frequency of Checks: 4 times per hour, close    Psychiatric Symptoms:   Depression Symptoms  Depression Symptoms: Feelings of hopelessess, Feelings of helplessness  Anxiety Symptoms  Anxiety Symptoms: Generalized  Mare Symptoms  Mare Symptoms: No problems reported or observed. Psychosis Symptoms  Delusion Type: No problems reported or observed. Suicide Risk CSSR-S:  1) Within the past month, have you wished you were dead or wished you could go to sleep and not wake up? : Yes  2) Have you actually had any thoughts of killing yourself? : No  3) Have you been thinking about how you might kill yourself? : No  5) Have you started to work out or worked out the details of how to kill yourself? Do you intend to carry out this plan? : No  6) Have you ever done anything, started to do anything, or prepared to do anything to end your life?: No  Change in Result:  no Change in Plan of care:  no      EDUCATION:   Learner Progress Toward Treatment Goals:   Reviewed results and recommendations of this team, Reviewed group plan and strategies, Reviewed signs, symptoms and risk of self harm and violent behavior, Reviewed goals and plan of care    Method:  small group, individual verbal education    Outcome:   Verbalized by patient but needs reinforcement to obtain goals    PATIENT GOALS:  Short term:  Stabilize on medications  Long term:   Follow up with [facility], stay on medications     PLAN/TREATMENT RECOMMENDATIONS UPDATE:  continue with group therapies, increased socialization, continue planning for after discharge goals, continue with medication compliance    SHORT-TERM GOALS UPDATE:   Time frame for Short-Term Goals:  5-7 days    LONG-TERM GOALS UPDATE:   Time frame for Long-Term Goals:  6 months    Members Present in Team Meeting:     Abelardo May, [address]
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
## 2019-06-25 NOTE — PLAN OF CARE
Problem: Altered Mood, Depressive Behavior:  Goal: Able to verbalize acceptance of life and situations over which he or she has no control  Description  Able to verbalize acceptance of life and situations over which he or she has no control  Outcome: Ongoing     Problem: Altered Mood, Depressive Behavior:  Goal: Absence of self-harm  Description  Absence of self-harm  Outcome: Ongoing     Pt remains free from self harm this shift. Pt denies wanting to cause harm to self or others at this time. Pt encouraged to seek nursing staff at anytime if he felt at danger to themselves or others. Pt states understanding. Patient is complaining of anxiety at this time. Stating that they feel restless and are having trouble sleeping and claming down in order to rest this evening. Medication was given as prescribed for increased anxiety. Pt denies suicidal ideations at this time. Pt agreed to seek staff at anytime he felt like any urges to harm self would arise. Safety checks maintained [id number]. Pt remained aloof most of the evening. Denies SI/HI. Reports generalized depression and anxiety rating them both at a 7 out of 10. Pt states that life in general and generalized stressors are keeping him stressed. Nurse offered positive coping skills to deal with increased anxiety and depression at this time. Pt accepting of information. Pt did not attend evening groups. Pt is medication compliant this evening.

## 2019-06-25 NOTE — GROUP NOTE
Group Therapy Note    Date: June 25    Group Start Time: 1600  Group End Time: 1640  Group Topic: Healthy Living/Wellness    [facility]    Veldon Mantle        Group Therapy Note    Attendees: 7/17         Patient did not attend group despite encouragement from staff. 1:1 talk time offered as alternative, pt refused.        Discipline Responsible: Behavorial Health Tech      Signature:  Zulema Dent

## 2019-06-25 NOTE — GROUP NOTE
Group Therapy Note    Date: June 25    Group Start Time: 1100  Group End Time: 9664  Group Topic: Psychotherapy    Via Sommer Payne, MSW, JORDANW        Group Therapy Note    Attendees: 6/9    Pt declined to attend psychotherapy at 1100 am despite encouragement. Pt offered 1:1 and refused. Signature:   BRIAN Nevarez, LISSETH

## 2019-06-25 NOTE — PLAN OF CARE
Problem: Suicide risk  Goal: Provide patient with safe environment  Description  Provide patient with safe environment  6/25/2019 1001 by LAURY Trevino  Outcome: Ongoing  Pt states that he feels safe on the unit. 15 min rounds are being done and the patient states that he will inform the staff if he begins to feel unsafe. Problem: Altered Mood, Depressive Behavior:  Goal: Able to verbalize and/or display a decrease in depressive symptoms  Description  Able to verbalize and/or display a decrease in depressive symptoms  6/25/2019 1001 by LAURY Trevino  Outcome: Ongoing   Pt does still admit to some depression but is eager to learn new ways to cope with everyday stressors that cause his stress. Problem: Altered Mood, Depressive Behavior:  Goal: Absence of self-harm  Description  Absence of self-harm  6/25/2019 1001 by LAURY Trevino  Outcome: Ongoing   Pt denies thoughts of self harm and is agreeable to seeking out staff should thoughts of self harm arise. Safe environment maintained. 15 minute checks for safety continued per unit policy. Will continue to monitor for safety and provide support and reassurance as needed.

## 2019-06-26 PROBLEM — F31.9 BIPOLAR AFFECTIVE DISORDER (HCC): Status: ACTIVE | Noted: 2019-06-23

## 2019-06-26 LAB
VALPROIC ACID LEVEL: 47 UG/ML (ref 50–125)
VALPROIC DATE LAST DOSE: ABNORMAL
VALPROIC DOSE AMOUNT: ABNORMAL
VALPROIC TIME LAST DOSE: 2127

## 2019-06-26 PROCEDURE — 36415 COLL VENOUS BLD VENIPUNCTURE: CPT

## 2019-06-26 PROCEDURE — 5130000000 HC BRIDGE APPOINTMENT

## 2019-06-26 PROCEDURE — 1240000000 HC EMOTIONAL WELLNESS R&B

## 2019-06-26 PROCEDURE — 6370000000 HC RX 637 (ALT 250 FOR IP): Performed by: PSYCHIATRY & NEUROLOGY

## 2019-06-26 PROCEDURE — 80164 ASSAY DIPROPYLACETIC ACD TOT: CPT

## 2019-06-26 RX ORDER — DIVALPROEX SODIUM 500 MG/1
TABLET, EXTENDED RELEASE ORAL
Qty: 90 TABLET | Refills: 0 | Status: ON HOLD | OUTPATIENT
Start: 2019-06-26 | End: 2019-10-26 | Stop reason: SDUPTHER

## 2019-06-26 RX ORDER — PROPRANOLOL HYDROCHLORIDE 10 MG/1
10 TABLET ORAL 3 TIMES DAILY
Qty: 90 TABLET | Refills: 0 | Status: ON HOLD | OUTPATIENT
Start: 2019-06-26 | End: 2019-10-26 | Stop reason: SDUPTHER

## 2019-06-26 RX ORDER — DULOXETIN HYDROCHLORIDE 60 MG/1
60 CAPSULE, DELAYED RELEASE ORAL DAILY
Qty: 30 CAPSULE | Refills: 0 | Status: ON HOLD | OUTPATIENT
Start: 2019-06-27 | End: 2019-10-26 | Stop reason: SDUPTHER

## 2019-06-26 RX ORDER — ZOLPIDEM TARTRATE 10 MG/1
10 TABLET ORAL NIGHTLY
Qty: 30 TABLET | Refills: 0 | Status: SHIPPED | OUTPATIENT
Start: 2019-06-26 | End: 2019-07-26

## 2019-06-26 RX ORDER — DIVALPROEX SODIUM 500 MG/1
1000 TABLET, EXTENDED RELEASE ORAL NIGHTLY
Status: DISCONTINUED | OUTPATIENT
Start: 2019-06-26 | End: 2019-06-27 | Stop reason: HOSPADM

## 2019-06-26 RX ORDER — DIVALPROEX SODIUM 500 MG/1
500 TABLET, EXTENDED RELEASE ORAL DAILY
Status: DISCONTINUED | OUTPATIENT
Start: 2019-06-27 | End: 2019-06-27 | Stop reason: HOSPADM

## 2019-06-26 RX ORDER — HYDROXYZINE HYDROCHLORIDE 25 MG/1
25 TABLET, FILM COATED ORAL 2 TIMES DAILY PRN
Qty: 60 TABLET | Refills: 0 | Status: ON HOLD | OUTPATIENT
Start: 2019-06-26 | End: 2019-10-26 | Stop reason: SDUPTHER

## 2019-06-26 RX ADMIN — DIVALPROEX SODIUM 500 MG: 500 TABLET, EXTENDED RELEASE ORAL at 08:25

## 2019-06-26 RX ADMIN — ZOLPIDEM TARTRATE 10 MG: 10 TABLET ORAL at 21:44

## 2019-06-26 RX ADMIN — PROPRANOLOL HYDROCHLORIDE 10 MG: 20 TABLET ORAL at 13:16

## 2019-06-26 RX ADMIN — DIVALPROEX SODIUM 1000 MG: 500 TABLET, EXTENDED RELEASE ORAL at 21:44

## 2019-06-26 RX ADMIN — HYDROXYZINE HYDROCHLORIDE 25 MG: 25 TABLET, FILM COATED ORAL at 21:44

## 2019-06-26 RX ADMIN — DULOXETINE HYDROCHLORIDE 60 MG: 60 CAPSULE, DELAYED RELEASE ORAL at 08:26

## 2019-06-26 RX ADMIN — PROPRANOLOL HYDROCHLORIDE 10 MG: 20 TABLET ORAL at 08:27

## 2019-06-26 RX ADMIN — PROPRANOLOL HYDROCHLORIDE 10 MG: 20 TABLET ORAL at 21:45

## 2019-06-26 RX ADMIN — HYDROXYZINE HYDROCHLORIDE 25 MG: 25 TABLET, FILM COATED ORAL at 08:31

## 2019-06-26 RX ADMIN — TRAZODONE HYDROCHLORIDE 50 MG: 50 TABLET ORAL at 21:45

## 2019-06-26 ASSESSMENT — PAIN SCALES - GENERAL: PAINLEVEL_OUTOF10: 0

## 2019-06-26 NOTE — PLAN OF CARE
Problem: Suicide risk  Goal: Provide patient with safe environment  Description  Provide patient with safe environment  Outcome: Ongoing   Pt states that he feel safe on the unit. 15 min checks are being sone and will be continued. Pt also states that he will inform staff if he begins to feel unsafe. Problem: Altered Mood, Depressive Behavior:  Goal: Able to verbalize and/or display a decrease in depressive symptoms  Description  Able to verbalize and/or display a decrease in depressive symptoms  6/26/2019 1036 by LAURY Trevino  Outcome: Ongoing  Pt states that he is always depressed and that he feels that his depression is at his baseline at this moment. He states that he feels better and is ready to be discharged. Problem: Altered Mood, Depressive Behavior:  Goal: Absence of self-harm  Description  Absence of self-harm  6/26/2019 1036 by LAURY Trevino  Outcome: Ongoing   Pt denies thoughts of self harm and is agreeable to seeking out staff should thoughts of self harm arise. Safe environment maintained. 15 minute checks for safety continued per unit policy. Will continue to monitor for safety and provide support and reassurance as needed.

## 2019-06-26 NOTE — GROUP NOTE
Group Therapy Note    Date: June 26    Group Start Time: 1330  Group End Time: 8788  Group Topic: Psychoeducation    RENEE MOREI ABIGAIL Queen    Patient refused to attend cognitive skills/ wellness education group at 1330 after encouragement from staff. 1:1 talk time provided as alternative to group session.      Signature:  David Queen

## 2019-06-26 NOTE — GROUP NOTE
Group Therapy Note    Date: June 26    Group Start Time: 0900  Group End Time: 9160  Group Topic: Community Meeting    RENEE HAYES    Emily Otto    Patient's Goal:  To demonstrate increased interpersonal interaction     Notes:      Status After Intervention:  Improved    Participation Level:  Active Listener and Interactive    Participation Quality: Appropriate, Attentive and Sharing      Speech:  normal      Thought Process/Content: Logical  Linear      Affective Functioning: Congruent      Mood: euthymic      Level of consciousness:  Alert, Oriented x4 and Attentive      Response to Learning: Able to verbalize current knowledge/experience, Able to verbalize/acknowledge new learning, Able to retain information and Progressing to goal      Endings: None Reported    Modes of Intervention: Education, Support, Socialization, Exploration, Clarifying and Problem-solving      Discipline Responsible: Psychoeducational Specialist      Signature:  Emilyedouard Otto

## 2019-06-26 NOTE — GROUP NOTE
Group Therapy Note    Date: June 26    Group Start Time: 1100  Group End Time: 9791  Group Topic: Psychotherapy    Via BRIAN Carlos LSW        Group Therapy Note    Attendees: 5/9    Patient declined to attend 11:00 am psychotherapy group even when encouraged by clinician. Signature:   BRIAN Golden LSW

## 2019-06-27 VITALS
TEMPERATURE: 98 F | DIASTOLIC BLOOD PRESSURE: 82 MMHG | HEART RATE: 52 BPM | SYSTOLIC BLOOD PRESSURE: 119 MMHG | OXYGEN SATURATION: 99 % | RESPIRATION RATE: 14 BRPM | BODY MASS INDEX: 27.47 KG/M2 | WEIGHT: 175 LBS | HEIGHT: 67 IN

## 2019-06-27 PROCEDURE — 5130000000 HC BRIDGE APPOINTMENT

## 2019-06-27 PROCEDURE — 6370000000 HC RX 637 (ALT 250 FOR IP): Performed by: PSYCHIATRY & NEUROLOGY

## 2019-06-27 RX ADMIN — PROPRANOLOL HYDROCHLORIDE 10 MG: 20 TABLET ORAL at 08:22

## 2019-06-27 RX ADMIN — DULOXETINE HYDROCHLORIDE 60 MG: 60 CAPSULE, DELAYED RELEASE ORAL at 08:23

## 2019-06-27 RX ADMIN — DIVALPROEX SODIUM 500 MG: 500 TABLET, EXTENDED RELEASE ORAL at 08:22

## 2019-06-27 NOTE — PLAN OF CARE
Problem: Altered Mood, Depressive Behavior:  Goal: Able to verbalize and/or display a decrease in depressive symptoms  Description  Able to verbalize and/or display a decrease in depressive symptoms  6/26/2019 2222 by Marixa Lorenz LPN  Outcome: Ongoing     Problem: Altered Mood, Depressive Behavior:  Goal: Absence of self-harm  Description  Absence of self-harm  6/26/2019 2222 by Marixa Lorenz LPN  Outcome: Ongoing   Denies SI and remains free from harm att. Reports continued depression improving. Isolates to room t/o evening. States is ready for discharge.

## 2019-06-27 NOTE — BH NOTE
[facility]  Discharge Note    Pt discharged with followings belongings:   Dentures: None  Vision - Corrective Lenses: None  Hearing Aid: None  Jewelry: None  Clothing: Footwear, Pants, Shirt, Sweater, Socks, Other (Comment)(tank tops)  Were All Patient Medications Collected?: Not Applicable  Other Valuables: Cell phone, Money (Comment), Ruddy Naas, Other (Comment)(5.00; lighter)   Valuables sent home with patient. Valuables retrieved from safe, Security envelope number:  [id number] and returned to patient. Patient left department with Departure Mode: By self, In cab via Mobility at Departure: Ambulatory, discharged to Discharged to: Private Residence. Patient education on aftercare instructions: yes  Information faxed to [facility] by rn Patient verbalize understanding of AVS:  yes.     Status EXAM upon discharge:  Status and Exam  Normal: No  Facial Expression: Flat  Affect: Appropriate  Level of Consciousness: Alert  Mood:Normal: No  Mood: Depressed  Motor Activity:Normal: Yes  Motor Activity: Decreased  Interview Behavior: Cooperative  Preception: [place] to Person, [place]in [place] to Time, [place] to Place, [place] to Situation  Attention:Normal: Yes  Attention: Distractible  Thought Processes: Circumstantial  Thought Content:Normal: Yes  Thought Content: Poverty of Content  Hallucinations: None  Delusions: No  Memory:Normal: Yes  Insight and Judgment: Yes  Insight and Judgment: Poor Judgment, Poor Insight  Present Suicidal Ideation: No  Present Homicidal Ideation: No      Metabolic Screening:    No results found for: LABA1C    No results found for: CHOL  No results found for: TRIG  No results found for: HDL  No components found for: LDLCAL  No results found for: Mariama Guidry RN

## 2019-06-27 NOTE — CARE COORDINATION
Name: Carlene Estrada    : 1970    Discharge Date: 2019    Primary Auth/Cert #: [id number]    Discharge Medications:      Medication List      START taking these medications    DULoxetine 60 MG extended release capsule  Commonly known as:  CYMBALTA  Take 1 capsule by mouth daily  Notes to patient:  For depression     propranolol 10 MG tablet  Commonly known as:  INDERAL  Take 1 tablet by mouth 3 times daily  Notes to patient:  For high blood pressure     zolpidem 10 MG tablet  Commonly known as:  AMBIEN  Take 1 tablet by mouth nightly for 30 days. Notes to patient:  For sleep        CHANGE how you take these medications    divalproex 500 MG extended release tablet  Commonly known as:  DEPAKOTE ER  Take one(500 mg) PO qam and two(1000 mg) PO qhs  What changed:    · how much to take  · how to take this  · when to take this  · additional instructions  Notes to patient: For mood         CONTINUE taking these medications    hydrOXYzine 25 MG tablet  Commonly known as:  ATARAX  Take 1 tablet by mouth 2 times daily as needed for Anxiety           Where to Get Your Medications      You can get these medications from any pharmacy    Bring a paper prescription for each of these medications  · divalproex 500 MG extended release tablet  · DULoxetine 60 MG extended release capsule  · hydrOXYzine 25 MG tablet  · propranolol 10 MG tablet  · zolpidem 10 MG tablet         Follow Up Appointment: [facility] [place] [address] [facility] [facility] [place] [address]  [phone]    Go on 2019  Hospital discharge @ 1:00 pm with Isaura Jones

## 2019-06-27 NOTE — BH NOTE
Patient given tobacco quitline number 52423559208 at this time, refusing to call at this time, states \" I just dont want to quit now\"- patient given information as to the dangers of long term tobacco use. Continue to reinforce the importance of tobacco cessation.

## 2019-07-29 ENCOUNTER — HOSPITAL ENCOUNTER (INPATIENT)
Age: 49
LOS: 3 days | Discharge: HOME | DRG: 753 | End: 2019-08-01
Attending: EMERGENCY MEDICINE | Admitting: PSYCHIATRY & NEUROLOGY
Payer: MEDICARE

## 2019-07-29 DIAGNOSIS — F10.920 ACUTE ALCOHOLIC INTOXICATION WITHOUT COMPLICATION (HCC): Primary | ICD-10-CM

## 2019-07-29 DIAGNOSIS — F31.31 BIPOLAR AFFECTIVE DISORDER, CURRENTLY DEPRESSED, MILD (HCC): ICD-10-CM

## 2019-07-29 LAB
ABSOLUTE EOS #: 0.11 K/UL (ref 0–0.4)
ABSOLUTE IMMATURE GRANULOCYTE: ABNORMAL K/UL (ref 0–0.3)
ABSOLUTE LYMPH #: 1.54 K/UL (ref 1–4.8)
ABSOLUTE MONO #: 0.42 K/UL (ref 0.1–1.3)
ACETAMINOPHEN LEVEL: <5 UG/ML (ref 10–30)
ALBUMIN SERPL-MCNC: 4.2 G/DL (ref 3.5–5.2)
ALBUMIN/GLOBULIN RATIO: ABNORMAL (ref 1–2.5)
ALP BLD-CCNC: 71 U/L (ref 40–129)
ALT SERPL-CCNC: 36 U/L (ref 5–41)
AMPHETAMINE SCREEN URINE: NEGATIVE
ANION GAP SERPL CALCULATED.3IONS-SCNC: 16 MMOL/L (ref 9–17)
AST SERPL-CCNC: 33 U/L
BARBITURATE SCREEN URINE: NEGATIVE
BASOPHILS # BLD: 2 % (ref 0–2)
BASOPHILS ABSOLUTE: 0.11 K/UL (ref 0–0.2)
BENZODIAZEPINE SCREEN, URINE: NEGATIVE
BILIRUB SERPL-MCNC: 0.2 MG/DL (ref 0.3–1.2)
BUN BLDV-MCNC: 11 MG/DL (ref 6–20)
BUN/CREAT BLD: ABNORMAL (ref 9–20)
BUPRENORPHINE URINE: ABNORMAL
CALCIUM SERPL-MCNC: 9.2 MG/DL (ref 8.6–10.4)
CANNABINOID SCREEN URINE: POSITIVE
CHLORIDE BLD-SCNC: 104 MMOL/L (ref 98–107)
CO2: 18 MMOL/L (ref 20–31)
COCAINE METABOLITE, URINE: NEGATIVE
CREAT SERPL-MCNC: 0.96 MG/DL (ref 0.7–1.2)
DIFFERENTIAL TYPE: ABNORMAL
EOSINOPHILS RELATIVE PERCENT: 2 % (ref 0–4)
ETHANOL PERCENT: 0.17 %
ETHANOL: 172 MG/DL
GFR AFRICAN AMERICAN: >60 ML/MIN
GFR NON-AFRICAN AMERICAN: >60 ML/MIN
GFR SERPL CREATININE-BSD FRML MDRD: ABNORMAL ML/MIN/{1.73_M2}
GFR SERPL CREATININE-BSD FRML MDRD: ABNORMAL ML/MIN/{1.73_M2}
GLUCOSE BLD-MCNC: 83 MG/DL (ref 70–99)
HCT VFR BLD CALC: 44.2 % (ref 41–53)
HEMOGLOBIN: 15.3 G/DL (ref 13.5–17.5)
IMMATURE GRANULOCYTES: ABNORMAL %
LYMPHOCYTES # BLD: 29 % (ref 24–44)
MCH RBC QN AUTO: 35.6 PG (ref 26–34)
MCHC RBC AUTO-ENTMCNC: 34.7 G/DL (ref 31–37)
MCV RBC AUTO: 102.7 FL (ref 80–100)
MDMA URINE: ABNORMAL
METHADONE SCREEN, URINE: NEGATIVE
METHAMPHETAMINE, URINE: ABNORMAL
MONOCYTES # BLD: 8 % (ref 1–7)
MORPHOLOGY: NORMAL
MYELOCYTES ABSOLUTE COUNT: 0.11 K/UL
MYELOCYTES: 2 %
NRBC AUTOMATED: ABNORMAL PER 100 WBC
OPIATES, URINE: NEGATIVE
OXYCODONE SCREEN URINE: NEGATIVE
PDW BLD-RTO: 14.8 % (ref 11.5–14.9)
PHENCYCLIDINE, URINE: NEGATIVE
PLATELET # BLD: 285 K/UL (ref 150–450)
PLATELET ESTIMATE: ABNORMAL
PMV BLD AUTO: 7.2 FL (ref 6–12)
POTASSIUM SERPL-SCNC: 3.7 MMOL/L (ref 3.7–5.3)
PROPOXYPHENE, URINE: ABNORMAL
RBC # BLD: 4.31 M/UL (ref 4.5–5.9)
RBC # BLD: ABNORMAL 10*6/UL
SALICYLATE LEVEL: <1 MG/DL (ref 3–10)
SEG NEUTROPHILS: 57 % (ref 36–66)
SEGMENTED NEUTROPHILS ABSOLUTE COUNT: 3.01 K/UL (ref 1.3–9.1)
SODIUM BLD-SCNC: 138 MMOL/L (ref 135–144)
TEST INFORMATION: ABNORMAL
TOTAL PROTEIN: 7.4 G/DL (ref 6.4–8.3)
TOXIC TRICYCLIC SC,BLOOD: ABNORMAL
TRICYCLIC ANTIDEP,URINE: NEGATIVE
TRICYCLIC ANTIDEPRESSANTS, UR: ABNORMAL
WBC # BLD: 5.3 K/UL (ref 3.5–11)
WBC # BLD: ABNORMAL 10*3/UL

## 2019-07-29 PROCEDURE — 99285 EMERGENCY DEPT VISIT HI MDM: CPT

## 2019-07-29 PROCEDURE — 36415 COLL VENOUS BLD VENIPUNCTURE: CPT

## 2019-07-29 PROCEDURE — G0480 DRUG TEST DEF 1-7 CLASSES: HCPCS

## 2019-07-29 PROCEDURE — 85025 COMPLETE CBC W/AUTO DIFF WBC: CPT

## 2019-07-29 PROCEDURE — 1240000000 HC EMOTIONAL WELLNESS R&B

## 2019-07-29 PROCEDURE — 80053 COMPREHEN METABOLIC PANEL: CPT

## 2019-07-29 PROCEDURE — 80307 DRUG TEST PRSMV CHEM ANLYZR: CPT

## 2019-07-29 ASSESSMENT — PAIN SCALES - GENERAL: PAINLEVEL_OUTOF10: 5

## 2019-07-29 ASSESSMENT — PAIN DESCRIPTION - DESCRIPTORS: DESCRIPTORS: ACHING

## 2019-07-29 ASSESSMENT — PAIN DESCRIPTION - LOCATION: LOCATION: FACE

## 2019-07-29 ASSESSMENT — PAIN DESCRIPTION - FREQUENCY: FREQUENCY: INTERMITTENT

## 2019-07-29 ASSESSMENT — PAIN DESCRIPTION - ORIENTATION: ORIENTATION: RIGHT

## 2019-07-29 ASSESSMENT — PAIN DESCRIPTION - PAIN TYPE: TYPE: CHRONIC PAIN

## 2019-07-29 NOTE — ED PROVIDER NOTES
reports that he drinks about 6.0 standard drinks of alcohol per week. He reports that he has current or past drug history. Drug: Marijuana. Family History: Noncontributory at this time  Psychiatric History: See PMH  Allergies:is allergic to tomato. PHYSICAL EXAM     INITIAL VITALS: BP: 123/78  Pulse: 83  Resp: 16  Temp: 98.1 °F (36.7 °C) SpO2: 98 %     Constitutional:  Well developed, no acute distress   Eyes:  Pupils equal and readily reactive to light  HENT:  Atraumatic, external ears normal, nose normal, oropharynx moist. Neck- supple    Respiratory:  Clear to auscultation bilaterally with good air exchange  Cardiovascular:  RRR with normal S1 and S2  GI:  Soft, nondistended and nontender   Musculoskeletal:  No edema, no tenderness, no deformities  Integument:  No rash  Neurologic:  Alert & oriented x 4, no focal deficits noted   Psychiatric: Normal mood and affect      EMERGENCY DEPARTMENT COURSE     57-year-old male presents with suicidal ideations with a plan to jump out of window. He is afebrile and nontoxic. Vital signs are normal.  He is not in any acute distress. Patient does acknowledge drinking several beers today. We will check blood work including ethanol level. Will reevaluate again when patient is sober. FINAL IMPRESSION     1. Acute alcoholic intoxication without complication ([facility] [address]          DISPOSITION:  DISPOSITION      Pending lab work, reevaluation    PATIENT REFERRED TO:  No follow-up provider specified. DISCHARGE MEDICATIONS:  New Prescriptions    No medications on file       (Please note that portions of this note were completed with a voice recognition program. Efforts were made to edit the dictations but occasionally words are mis-transcribed.  Whenever words are used in this note in any gender, they shall be construed as though they were used in the gender appropriate to the circumstances; and whenever words are used in this note in the singular or plural form,

## 2019-07-30 PROBLEM — F31.9 BIPOLAR DISORDER (HCC): Status: ACTIVE | Noted: 2019-07-30

## 2019-07-30 LAB
BILIRUBIN URINE: NEGATIVE
COLOR: YELLOW
COMMENT UA: NORMAL
GLUCOSE URINE: NEGATIVE
KETONES, URINE: NEGATIVE
LEUKOCYTE ESTERASE, URINE: NEGATIVE
NITRITE, URINE: NEGATIVE
PH UA: 5 (ref 5–8)
PROTEIN UA: NEGATIVE
SPECIFIC GRAVITY UA: 1.02 (ref 1–1.03)
TURBIDITY: CLEAR
URINE HGB: NEGATIVE
UROBILINOGEN, URINE: NORMAL

## 2019-07-30 PROCEDURE — 1240000000 HC EMOTIONAL WELLNESS R&B

## 2019-07-30 PROCEDURE — 81003 URINALYSIS AUTO W/O SCOPE: CPT

## 2019-07-30 PROCEDURE — 6370000000 HC RX 637 (ALT 250 FOR IP): Performed by: PSYCHIATRY & NEUROLOGY

## 2019-07-30 RX ORDER — DIVALPROEX SODIUM 500 MG/1
1000 TABLET, EXTENDED RELEASE ORAL NIGHTLY
Status: DISCONTINUED | OUTPATIENT
Start: 2019-07-30 | End: 2019-08-01 | Stop reason: HOSPADM

## 2019-07-30 RX ORDER — DIPHENHYDRAMINE HCL 25 MG
25 TABLET ORAL NIGHTLY PRN
Status: DISCONTINUED | OUTPATIENT
Start: 2019-07-30 | End: 2019-07-30

## 2019-07-30 RX ORDER — HYDROXYZINE HYDROCHLORIDE 25 MG/1
25 TABLET, FILM COATED ORAL 2 TIMES DAILY PRN
Status: DISCONTINUED | OUTPATIENT
Start: 2019-07-30 | End: 2019-08-01 | Stop reason: HOSPADM

## 2019-07-30 RX ORDER — TRAZODONE HYDROCHLORIDE 50 MG/1
50 TABLET ORAL NIGHTLY PRN
Status: DISCONTINUED | OUTPATIENT
Start: 2019-07-30 | End: 2019-07-30

## 2019-07-30 RX ORDER — ACETAMINOPHEN 325 MG/1
650 TABLET ORAL EVERY 4 HOURS PRN
Status: DISCONTINUED | OUTPATIENT
Start: 2019-07-30 | End: 2019-08-01 | Stop reason: HOSPADM

## 2019-07-30 RX ORDER — DULOXETIN HYDROCHLORIDE 60 MG/1
60 CAPSULE, DELAYED RELEASE ORAL DAILY
Status: DISCONTINUED | OUTPATIENT
Start: 2019-07-30 | End: 2019-08-01 | Stop reason: HOSPADM

## 2019-07-30 RX ORDER — PROPRANOLOL HYDROCHLORIDE 20 MG/1
10 TABLET ORAL 3 TIMES DAILY
Status: DISCONTINUED | OUTPATIENT
Start: 2019-07-30 | End: 2019-08-01 | Stop reason: HOSPADM

## 2019-07-30 RX ORDER — MAGNESIUM HYDROXIDE/ALUMINUM HYDROXICE/SIMETHICONE 120; 1200; 1200 MG/30ML; MG/30ML; MG/30ML
30 SUSPENSION ORAL PRN
Status: DISCONTINUED | OUTPATIENT
Start: 2019-07-30 | End: 2019-08-01 | Stop reason: HOSPADM

## 2019-07-30 RX ORDER — NICOTINE 21 MG/24HR
1 PATCH, TRANSDERMAL 24 HOURS TRANSDERMAL DAILY
Status: DISCONTINUED | OUTPATIENT
Start: 2019-07-30 | End: 2019-08-01 | Stop reason: HOSPADM

## 2019-07-30 RX ORDER — ZOLPIDEM TARTRATE 10 MG/1
10 TABLET ORAL NIGHTLY PRN
Status: DISCONTINUED | OUTPATIENT
Start: 2019-07-30 | End: 2019-08-01 | Stop reason: HOSPADM

## 2019-07-30 RX ORDER — DIVALPROEX SODIUM 500 MG/1
500 TABLET, EXTENDED RELEASE ORAL DAILY
Status: DISCONTINUED | OUTPATIENT
Start: 2019-07-30 | End: 2019-08-01 | Stop reason: HOSPADM

## 2019-07-30 RX ORDER — BENZTROPINE MESYLATE 1 MG/ML
2 INJECTION INTRAMUSCULAR; INTRAVENOUS 2 TIMES DAILY PRN
Status: DISCONTINUED | OUTPATIENT
Start: 2019-07-30 | End: 2019-08-01 | Stop reason: HOSPADM

## 2019-07-30 RX ADMIN — HYDROXYZINE HYDROCHLORIDE 25 MG: 25 TABLET, FILM COATED ORAL at 21:32

## 2019-07-30 RX ADMIN — DIVALPROEX SODIUM 500 MG: 500 TABLET, FILM COATED, EXTENDED RELEASE ORAL at 14:37

## 2019-07-30 RX ADMIN — DULOXETINE 60 MG: 60 CAPSULE, DELAYED RELEASE ORAL at 14:41

## 2019-07-30 RX ADMIN — PROPRANOLOL HYDROCHLORIDE 10 MG: 20 TABLET ORAL at 14:37

## 2019-07-30 RX ADMIN — DIVALPROEX SODIUM 1000 MG: 500 TABLET, FILM COATED, EXTENDED RELEASE ORAL at 21:31

## 2019-07-30 RX ADMIN — ZOLPIDEM TARTRATE 10 MG: 10 TABLET ORAL at 21:33

## 2019-07-30 ASSESSMENT — SLEEP AND FATIGUE QUESTIONNAIRES
DO YOU USE A SLEEP AID: NO
DIFFICULTY ARISING: YES
RESTFUL SLEEP: NO
SLEEP PATTERN: DIFFICULTY FALLING ASLEEP;INSOMNIA
DIFFICULTY STAYING ASLEEP: YES
AVERAGE NUMBER OF SLEEP HOURS: 3
DIFFICULTY FALLING ASLEEP: YES
DO YOU HAVE DIFFICULTY SLEEPING: YES

## 2019-07-30 ASSESSMENT — PAIN - FUNCTIONAL ASSESSMENT: PAIN_FUNCTIONAL_ASSESSMENT: 0-10

## 2019-07-30 ASSESSMENT — PAIN SCALES - GENERAL: PAINLEVEL_OUTOF10: 0

## 2019-07-30 ASSESSMENT — PATIENT HEALTH QUESTIONNAIRE - PHQ9: SUM OF ALL RESPONSES TO PHQ QUESTIONS 1-9: 20

## 2019-07-30 ASSESSMENT — LIFESTYLE VARIABLES: HISTORY_ALCOHOL_USE: NO

## 2019-07-30 NOTE — ED TRIAGE NOTES
Patient's personal belongings secured and patient changed into blue gown by [employer] Patient ambulated to bathroom without assistance or difficulty; gait even and steady. C= \"Have you ever felt that you should Cut down on your drinking? \"  No  A= \"Have people Annoyed you by criticizing your drinking? \"  No  G= \"Have you ever felt bad or Guilty about your drinking? \"  Yes  E= \"Have you ever had a drink as an Eye-opener first thing in the morning to steady your nerves or to help a hangover? \"  No      Deferred []      Reason for deferring: N/A    *If yes to two or more: probable alcohol abuse. *

## 2019-07-30 NOTE — BH NOTE
Patient given tobacco quitline number 28577558301 at this time, refusing to call at this time, states \" I just dont want to quit now\"- patient given information as to the dangers of long term tobacco use. Continue to reinforce the importance of tobacco cessation.
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
## 2019-07-30 NOTE — GROUP NOTE
Date: July 30    Group Start Time: 1600  Group End Time: 1630  Group Topic: Healthy Living/Wellness    [facility]    Suha Moura LPN               Patient refused to attend group at 1600 after encouragement from staff. 1:1 talk time offered but refused.     Signature:  Suha Moura LPN
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
## 2019-07-31 PROCEDURE — 6370000000 HC RX 637 (ALT 250 FOR IP): Performed by: PSYCHIATRY & NEUROLOGY

## 2019-07-31 PROCEDURE — 1240000000 HC EMOTIONAL WELLNESS R&B

## 2019-07-31 RX ORDER — ZOLPIDEM TARTRATE 10 MG/1
10 TABLET ORAL NIGHTLY PRN
Qty: 30 TABLET | Refills: 0
Start: 2019-07-31 | End: 2019-08-30

## 2019-07-31 RX ADMIN — PROPRANOLOL HYDROCHLORIDE 10 MG: 20 TABLET ORAL at 21:10

## 2019-07-31 RX ADMIN — DIVALPROEX SODIUM 500 MG: 500 TABLET, FILM COATED, EXTENDED RELEASE ORAL at 08:17

## 2019-07-31 RX ADMIN — HYDROXYZINE HYDROCHLORIDE 25 MG: 25 TABLET, FILM COATED ORAL at 21:10

## 2019-07-31 RX ADMIN — PROPRANOLOL HYDROCHLORIDE 10 MG: 20 TABLET ORAL at 08:17

## 2019-07-31 RX ADMIN — ZOLPIDEM TARTRATE 10 MG: 10 TABLET ORAL at 21:10

## 2019-07-31 RX ADMIN — DULOXETINE 60 MG: 60 CAPSULE, DELAYED RELEASE ORAL at 08:17

## 2019-07-31 RX ADMIN — DIVALPROEX SODIUM 1000 MG: 500 TABLET, FILM COATED, EXTENDED RELEASE ORAL at 21:10

## 2019-07-31 ASSESSMENT — PAIN SCALES - GENERAL: PAINLEVEL_OUTOF10: 0

## 2019-07-31 NOTE — PLAN OF CARE
Problem: Depressive Behavior With or Without Suicide Precautions:  Goal: Absence of self-harm  Description  STG Absence of self-harm   7/30/2019 2153 by Zheng Augustine LPN  Outcome: Ongoing  Note:   Patient denies suicidal ideation. No self-harm at this time. 15 minute visual safety checks maintained. Staff will continue to monitor patient and provide support as needed.

## 2019-07-31 NOTE — GROUP NOTE
Group Therapy Note    Date:  7/31/19     Group Start Time: 900AM  Group End Time: 925am    Group Topic: Community Meeting    RENEE Ibarra, CTRS        Group Therapy Note    Pt did not participate in Community Meeting/Goals Group at 900am when encouraged by RT. Pt was up in day room and polite on approach but declined to attend group or engage in talk time offered as alternative .  Pt will be encouraged to attend next group at 1000am     Discipline Responsible: Psychoeducational Specialist      Signature:  Christiano Denis

## 2019-08-01 VITALS
HEIGHT: 66 IN | RESPIRATION RATE: 14 BRPM | BODY MASS INDEX: 28.61 KG/M2 | WEIGHT: 178 LBS | HEART RATE: 66 BPM | TEMPERATURE: 98.9 F | OXYGEN SATURATION: 98 % | SYSTOLIC BLOOD PRESSURE: 131 MMHG | DIASTOLIC BLOOD PRESSURE: 77 MMHG

## 2019-08-01 PROCEDURE — 5130000000 HC BRIDGE APPOINTMENT

## 2019-08-01 PROCEDURE — 6370000000 HC RX 637 (ALT 250 FOR IP): Performed by: PSYCHIATRY & NEUROLOGY

## 2019-08-01 RX ADMIN — DIVALPROEX SODIUM 500 MG: 500 TABLET, FILM COATED, EXTENDED RELEASE ORAL at 08:33

## 2019-08-01 RX ADMIN — PROPRANOLOL HYDROCHLORIDE 10 MG: 20 TABLET ORAL at 08:33

## 2019-08-01 RX ADMIN — DULOXETINE 60 MG: 60 CAPSULE, DELAYED RELEASE ORAL at 08:33

## 2019-08-01 NOTE — PLAN OF CARE
Problem: Depressive Behavior With or Without Suicide Precautions:  Goal: Able to verbalize and/or display a decrease in depressive symptoms  Description  LTG Able to verbalize and/or display a decrease in depressive symptoms   8/1/2019 0859 by Nasir Olivo RN  Outcome: Ongoing     Problem: Depressive Behavior With or Without Suicide Precautions:  Goal: Absence of self-harm  Description  STG Absence of self-harm   8/1/2019 0859 by Nasir Olivo RN  Outcome: Ongoing    Patient absent of self harm. Patient denies depression. Patient denies any thougths of self harm or to harm others. Patient cooperative on the unit. Patient remains brightened and ready for discharge. Patient denies any hallucinations.
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
## 2019-10-02 ENCOUNTER — APPOINTMENT (OUTPATIENT)
Dept: GENERAL RADIOLOGY | Age: 49
End: 2019-10-02
Payer: MEDICARE

## 2019-10-02 ENCOUNTER — HOSPITAL ENCOUNTER (EMERGENCY)
Age: 49
Discharge: HOME | End: 2019-10-02
Attending: EMERGENCY MEDICINE
Payer: MEDICARE

## 2019-10-02 VITALS
HEIGHT: 66 IN | BODY MASS INDEX: 25.71 KG/M2 | HEART RATE: 85 BPM | RESPIRATION RATE: 16 BRPM | WEIGHT: 160 LBS | OXYGEN SATURATION: 95 % | DIASTOLIC BLOOD PRESSURE: 78 MMHG | TEMPERATURE: 97.8 F | SYSTOLIC BLOOD PRESSURE: 131 MMHG

## 2019-10-02 DIAGNOSIS — M79.89 LEG SWELLING: Primary | ICD-10-CM

## 2019-10-02 PROCEDURE — 99283 EMERGENCY DEPT VISIT LOW MDM: CPT

## 2019-10-02 PROCEDURE — 73590 X-RAY EXAM OF LOWER LEG: CPT

## 2019-10-02 ASSESSMENT — ENCOUNTER SYMPTOMS
EYE REDNESS: 0
RHINORRHEA: 0
COUGH: 0
WHEEZING: 0
TROUBLE SWALLOWING: 0
BLOOD IN STOOL: 0
BACK PAIN: 0
EYE DISCHARGE: 0
FACIAL SWELLING: 0
SORE THROAT: 0
SINUS PRESSURE: 0
EYE PAIN: 0
NAUSEA: 0
VOMITING: 0
DIARRHEA: 0
CHEST TIGHTNESS: 0
ABDOMINAL PAIN: 0
COLOR CHANGE: 0
CONSTIPATION: 0
SHORTNESS OF BREATH: 0

## 2019-10-19 ENCOUNTER — HOSPITAL ENCOUNTER (INPATIENT)
Age: 49
LOS: 8 days | Discharge: HOME | DRG: 753 | End: 2019-10-28
Attending: EMERGENCY MEDICINE | Admitting: PSYCHIATRY & NEUROLOGY
Payer: MEDICARE

## 2019-10-19 DIAGNOSIS — F32.A DEPRESSION, UNSPECIFIED DEPRESSION TYPE: Primary | ICD-10-CM

## 2019-10-19 PROCEDURE — 99285 EMERGENCY DEPT VISIT HI MDM: CPT

## 2019-10-20 PROBLEM — F33.9 DEPRESSION, MAJOR, RECURRENT (HCC): Status: ACTIVE | Noted: 2019-10-20

## 2019-10-20 LAB
AMPHETAMINE SCREEN URINE: NEGATIVE
BARBITURATE SCREEN URINE: NEGATIVE
BENZODIAZEPINE SCREEN, URINE: NEGATIVE
BILIRUBIN URINE: NEGATIVE
BUPRENORPHINE URINE: ABNORMAL
CANNABINOID SCREEN URINE: POSITIVE
COCAINE METABOLITE, URINE: NEGATIVE
COLOR: YELLOW
COMMENT UA: NORMAL
GLUCOSE URINE: NEGATIVE
KETONES, URINE: NEGATIVE
LEUKOCYTE ESTERASE, URINE: NEGATIVE
MDMA URINE: ABNORMAL
METHADONE SCREEN, URINE: NEGATIVE
METHAMPHETAMINE, URINE: ABNORMAL
NITRITE, URINE: NEGATIVE
OPIATES, URINE: NEGATIVE
OXYCODONE SCREEN URINE: NEGATIVE
PH UA: 5 (ref 5–8)
PHENCYCLIDINE, URINE: NEGATIVE
PROPOXYPHENE, URINE: ABNORMAL
PROTEIN UA: NEGATIVE
SPECIFIC GRAVITY UA: 1.02 (ref 1–1.03)
TEST INFORMATION: ABNORMAL
TRICYCLIC ANTIDEPRESSANTS, UR: ABNORMAL
TURBIDITY: CLEAR
URINE HGB: NEGATIVE
UROBILINOGEN, URINE: NORMAL

## 2019-10-20 PROCEDURE — 80307 DRUG TEST PRSMV CHEM ANLYZR: CPT

## 2019-10-20 PROCEDURE — 6370000000 HC RX 637 (ALT 250 FOR IP): Performed by: PSYCHIATRY & NEUROLOGY

## 2019-10-20 PROCEDURE — 81003 URINALYSIS AUTO W/O SCOPE: CPT

## 2019-10-20 PROCEDURE — 1240000000 HC EMOTIONAL WELLNESS R&B

## 2019-10-20 RX ORDER — HYDROXYZINE HYDROCHLORIDE 25 MG/1
25 TABLET, FILM COATED ORAL 2 TIMES DAILY PRN
Status: DISCONTINUED | OUTPATIENT
Start: 2019-10-20 | End: 2019-10-28 | Stop reason: HOSPADM

## 2019-10-20 RX ORDER — NICOTINE 21 MG/24HR
1 PATCH, TRANSDERMAL 24 HOURS TRANSDERMAL DAILY
Status: DISCONTINUED | OUTPATIENT
Start: 2019-10-20 | End: 2019-10-28 | Stop reason: HOSPADM

## 2019-10-20 RX ORDER — DULOXETIN HYDROCHLORIDE 60 MG/1
60 CAPSULE, DELAYED RELEASE ORAL DAILY
Status: DISCONTINUED | OUTPATIENT
Start: 2019-10-20 | End: 2019-10-28 | Stop reason: HOSPADM

## 2019-10-20 RX ORDER — RISPERIDONE 0.5 MG/1
0.5 TABLET, FILM COATED ORAL DAILY
Status: ON HOLD | COMMUNITY
End: 2019-10-26 | Stop reason: HOSPADM

## 2019-10-20 RX ORDER — DIVALPROEX SODIUM 500 MG/1
500 TABLET, EXTENDED RELEASE ORAL DAILY
Status: DISCONTINUED | OUTPATIENT
Start: 2019-10-20 | End: 2019-10-28 | Stop reason: HOSPADM

## 2019-10-20 RX ORDER — ACETAMINOPHEN 325 MG/1
650 TABLET ORAL EVERY 4 HOURS PRN
Status: DISCONTINUED | OUTPATIENT
Start: 2019-10-20 | End: 2019-10-28 | Stop reason: HOSPADM

## 2019-10-20 RX ORDER — PROPRANOLOL HYDROCHLORIDE 20 MG/1
10 TABLET ORAL 3 TIMES DAILY
Status: DISCONTINUED | OUTPATIENT
Start: 2019-10-20 | End: 2019-10-26

## 2019-10-20 RX ORDER — RISPERIDONE 1 MG/1
1 TABLET, FILM COATED ORAL NIGHTLY
Status: ON HOLD | COMMUNITY
End: 2019-10-26 | Stop reason: HOSPADM

## 2019-10-20 RX ORDER — DIPHENHYDRAMINE HCL 25 MG
25 TABLET ORAL NIGHTLY PRN
Status: DISCONTINUED | OUTPATIENT
Start: 2019-10-20 | End: 2019-10-28 | Stop reason: HOSPADM

## 2019-10-20 RX ORDER — TRAZODONE HYDROCHLORIDE 150 MG/1
150 TABLET ORAL NIGHTLY PRN
Status: ON HOLD | COMMUNITY
End: 2019-10-26 | Stop reason: SDUPTHER

## 2019-10-20 RX ORDER — TRAZODONE HYDROCHLORIDE 150 MG/1
150 TABLET ORAL NIGHTLY PRN
Status: DISCONTINUED | OUTPATIENT
Start: 2019-10-20 | End: 2019-10-28 | Stop reason: HOSPADM

## 2019-10-20 RX ORDER — MAGNESIUM HYDROXIDE/ALUMINUM HYDROXICE/SIMETHICONE 120; 1200; 1200 MG/30ML; MG/30ML; MG/30ML
30 SUSPENSION ORAL PRN
Status: DISCONTINUED | OUTPATIENT
Start: 2019-10-20 | End: 2019-10-28 | Stop reason: HOSPADM

## 2019-10-20 RX ORDER — BENZTROPINE MESYLATE 1 MG/ML
2 INJECTION INTRAMUSCULAR; INTRAVENOUS 2 TIMES DAILY PRN
Status: DISCONTINUED | OUTPATIENT
Start: 2019-10-20 | End: 2019-10-28 | Stop reason: HOSPADM

## 2019-10-20 RX ORDER — DIVALPROEX SODIUM 500 MG/1
1000 TABLET, EXTENDED RELEASE ORAL NIGHTLY
Status: DISCONTINUED | OUTPATIENT
Start: 2019-10-20 | End: 2019-10-28 | Stop reason: HOSPADM

## 2019-10-20 RX ADMIN — DULOXETINE HYDROCHLORIDE 60 MG: 60 CAPSULE, DELAYED RELEASE ORAL at 14:22

## 2019-10-20 RX ADMIN — PROPRANOLOL HYDROCHLORIDE 10 MG: 20 TABLET ORAL at 14:22

## 2019-10-20 RX ADMIN — DIVALPROEX SODIUM 1000 MG: 500 TABLET, FILM COATED, EXTENDED RELEASE ORAL at 21:56

## 2019-10-20 RX ADMIN — HYDROXYZINE HYDROCHLORIDE 25 MG: 25 TABLET, FILM COATED ORAL at 21:57

## 2019-10-20 RX ADMIN — DIVALPROEX SODIUM 500 MG: 500 TABLET, FILM COATED, EXTENDED RELEASE ORAL at 14:22

## 2019-10-20 RX ADMIN — TRAZODONE HYDROCHLORIDE 150 MG: 150 TABLET ORAL at 21:57

## 2019-10-20 RX ADMIN — PROPRANOLOL HYDROCHLORIDE 10 MG: 20 TABLET ORAL at 21:56

## 2019-10-20 ASSESSMENT — SLEEP AND FATIGUE QUESTIONNAIRES
SLEEP PATTERN: DIFFICULTY FALLING ASLEEP;DISTURBED/INTERRUPTED SLEEP;RESTLESSNESS
DO YOU USE A SLEEP AID: YES
AVERAGE NUMBER OF SLEEP HOURS: 5
AVERAGE NUMBER OF SLEEP HOURS: 5
DIFFICULTY FALLING ASLEEP: YES
DO YOU USE A SLEEP AID: YES
RESTFUL SLEEP: NO
DIFFICULTY FALLING ASLEEP: YES
DIFFICULTY ARISING: NO
DIFFICULTY STAYING ASLEEP: YES
RESTFUL SLEEP: NO
SLEEP PATTERN: DIFFICULTY FALLING ASLEEP;DISTURBED/INTERRUPTED SLEEP
DIFFICULTY STAYING ASLEEP: YES
DO YOU HAVE DIFFICULTY SLEEPING: YES
DO YOU HAVE DIFFICULTY SLEEPING: YES
DIFFICULTY ARISING: NO

## 2019-10-20 ASSESSMENT — LIFESTYLE VARIABLES
HISTORY_ALCOHOL_USE: NO
HISTORY_ALCOHOL_USE: YES

## 2019-10-20 ASSESSMENT — PATIENT HEALTH QUESTIONNAIRE - PHQ9
SUM OF ALL RESPONSES TO PHQ QUESTIONS 1-9: 16
SUM OF ALL RESPONSES TO PHQ QUESTIONS 1-9: 16

## 2019-10-21 LAB
ABSOLUTE EOS #: 0.13 K/UL (ref 0–0.4)
ABSOLUTE IMMATURE GRANULOCYTE: ABNORMAL K/UL (ref 0–0.3)
ABSOLUTE LYMPH #: 1.19 K/UL (ref 1–4.8)
ABSOLUTE MONO #: 0.48 K/UL (ref 0.1–1.3)
ALBUMIN SERPL-MCNC: 3.6 G/DL (ref 3.5–5.2)
ALBUMIN/GLOBULIN RATIO: ABNORMAL (ref 1–2.5)
ALP BLD-CCNC: 58 U/L (ref 40–129)
ALT SERPL-CCNC: 25 U/L (ref 5–41)
ANION GAP SERPL CALCULATED.3IONS-SCNC: 10 MMOL/L (ref 9–17)
AST SERPL-CCNC: 17 U/L
BASOPHILS # BLD: 0 % (ref 0–2)
BASOPHILS ABSOLUTE: 0 K/UL (ref 0–0.2)
BILIRUB SERPL-MCNC: 0.26 MG/DL (ref 0.3–1.2)
BUN BLDV-MCNC: 14 MG/DL (ref 6–20)
BUN/CREAT BLD: ABNORMAL (ref 9–20)
CALCIUM SERPL-MCNC: 9.3 MG/DL (ref 8.6–10.4)
CHLORIDE BLD-SCNC: 107 MMOL/L (ref 98–107)
CHOLESTEROL/HDL RATIO: 3.7
CHOLESTEROL: 184 MG/DL
CO2: 23 MMOL/L (ref 20–31)
CREAT SERPL-MCNC: 0.9 MG/DL (ref 0.7–1.2)
DIFFERENTIAL TYPE: ABNORMAL
EOSINOPHILS RELATIVE PERCENT: 3 % (ref 0–4)
GFR AFRICAN AMERICAN: >60 ML/MIN
GFR NON-AFRICAN AMERICAN: >60 ML/MIN
GFR SERPL CREATININE-BSD FRML MDRD: ABNORMAL ML/MIN/{1.73_M2}
GFR SERPL CREATININE-BSD FRML MDRD: ABNORMAL ML/MIN/{1.73_M2}
GLUCOSE BLD-MCNC: 98 MG/DL (ref 70–99)
HCT VFR BLD CALC: 44.9 % (ref 41–53)
HDLC SERPL-MCNC: 50 MG/DL
HEMOGLOBIN: 15.1 G/DL (ref 13.5–17.5)
IMMATURE GRANULOCYTES: ABNORMAL %
LDL CHOLESTEROL: 116 MG/DL (ref 0–130)
LYMPHOCYTES # BLD: 27 % (ref 24–44)
MCH RBC QN AUTO: 34.8 PG (ref 26–34)
MCHC RBC AUTO-ENTMCNC: 33.6 G/DL (ref 31–37)
MCV RBC AUTO: 103.7 FL (ref 80–100)
MONOCYTES # BLD: 11 % (ref 1–7)
MORPHOLOGY: ABNORMAL
NRBC AUTOMATED: ABNORMAL PER 100 WBC
PDW BLD-RTO: 13.7 % (ref 11.5–14.9)
PLATELET # BLD: 333 K/UL (ref 150–450)
PLATELET ESTIMATE: ABNORMAL
PMV BLD AUTO: 7.2 FL (ref 6–12)
POTASSIUM SERPL-SCNC: 4.8 MMOL/L (ref 3.7–5.3)
RBC # BLD: 4.33 M/UL (ref 4.5–5.9)
RBC # BLD: ABNORMAL 10*6/UL
SEG NEUTROPHILS: 59 % (ref 36–66)
SEGMENTED NEUTROPHILS ABSOLUTE COUNT: 2.6 K/UL (ref 1.3–9.1)
SODIUM BLD-SCNC: 140 MMOL/L (ref 135–144)
TOTAL PROTEIN: 6.8 G/DL (ref 6.4–8.3)
TRIGL SERPL-MCNC: 90 MG/DL
VLDLC SERPL CALC-MCNC: NORMAL MG/DL (ref 1–30)
WBC # BLD: 4.4 K/UL (ref 3.5–11)
WBC # BLD: ABNORMAL 10*3/UL

## 2019-10-21 PROCEDURE — G0008 ADMIN INFLUENZA VIRUS VAC: HCPCS | Performed by: PSYCHIATRY & NEUROLOGY

## 2019-10-21 PROCEDURE — 80053 COMPREHEN METABOLIC PANEL: CPT

## 2019-10-21 PROCEDURE — 6360000002 HC RX W HCPCS: Performed by: PSYCHIATRY & NEUROLOGY

## 2019-10-21 PROCEDURE — 6370000000 HC RX 637 (ALT 250 FOR IP): Performed by: PSYCHIATRY & NEUROLOGY

## 2019-10-21 PROCEDURE — 36415 COLL VENOUS BLD VENIPUNCTURE: CPT

## 2019-10-21 PROCEDURE — 85025 COMPLETE CBC W/AUTO DIFF WBC: CPT

## 2019-10-21 PROCEDURE — 80061 LIPID PANEL: CPT

## 2019-10-21 PROCEDURE — 1240000000 HC EMOTIONAL WELLNESS R&B

## 2019-10-21 PROCEDURE — 90686 IIV4 VACC NO PRSV 0.5 ML IM: CPT | Performed by: PSYCHIATRY & NEUROLOGY

## 2019-10-21 RX ADMIN — DIVALPROEX SODIUM 1000 MG: 500 TABLET, FILM COATED, EXTENDED RELEASE ORAL at 21:47

## 2019-10-21 RX ADMIN — DIVALPROEX SODIUM 500 MG: 500 TABLET, FILM COATED, EXTENDED RELEASE ORAL at 08:46

## 2019-10-21 RX ADMIN — PROPRANOLOL HYDROCHLORIDE 10 MG: 20 TABLET ORAL at 21:47

## 2019-10-21 RX ADMIN — INFLUENZA A VIRUS A/BRISBANE/02/2018 IVR-190 (H1N1) ANTIGEN (PROPIOLACTONE INACTIVATED), INFLUENZA A VIRUS A/KANSAS/14/2017 X-327 (H3N2) ANTIGEN (PROPIOLACTONE INACTIVATED), INFLUENZA B VIRUS B/MARYLAND/15/2016 ANTIGEN (PROPIOLACTONE INACTIVATED), INFLUENZA B VIRUS B/PHUKET/3073/2013 BVR-1B ANTIGEN (PROPIOLACTONE INACTIVATED) 0.5 ML: 15; 15; 15; 15 INJECTION, SUSPENSION INTRAMUSCULAR at 09:35

## 2019-10-21 RX ADMIN — MAGNESIUM HYDROXIDE 30 ML: 400 SUSPENSION ORAL at 09:34

## 2019-10-21 RX ADMIN — PROPRANOLOL HYDROCHLORIDE 10 MG: 20 TABLET ORAL at 14:25

## 2019-10-21 RX ADMIN — TRAZODONE HYDROCHLORIDE 150 MG: 150 TABLET ORAL at 21:48

## 2019-10-21 RX ADMIN — HYDROXYZINE HYDROCHLORIDE 25 MG: 25 TABLET, FILM COATED ORAL at 21:47

## 2019-10-21 RX ADMIN — DIPHENHYDRAMINE HCL 25 MG: 25 TABLET ORAL at 21:48

## 2019-10-21 RX ADMIN — DULOXETINE HYDROCHLORIDE 60 MG: 60 CAPSULE, DELAYED RELEASE ORAL at 08:46

## 2019-10-21 RX ADMIN — PROPRANOLOL HYDROCHLORIDE 10 MG: 20 TABLET ORAL at 08:46

## 2019-10-22 PROCEDURE — 1240000000 HC EMOTIONAL WELLNESS R&B

## 2019-10-22 PROCEDURE — 6370000000 HC RX 637 (ALT 250 FOR IP): Performed by: PSYCHIATRY & NEUROLOGY

## 2019-10-22 PROCEDURE — 99253 IP/OBS CNSLTJ NEW/EST LOW 45: CPT | Performed by: PHYSICIAN ASSISTANT

## 2019-10-22 RX ADMIN — DIVALPROEX SODIUM 1000 MG: 500 TABLET, FILM COATED, EXTENDED RELEASE ORAL at 20:39

## 2019-10-22 RX ADMIN — DIVALPROEX SODIUM 500 MG: 500 TABLET, FILM COATED, EXTENDED RELEASE ORAL at 08:30

## 2019-10-22 RX ADMIN — TRAZODONE HYDROCHLORIDE 150 MG: 150 TABLET ORAL at 20:38

## 2019-10-22 RX ADMIN — PROPRANOLOL HYDROCHLORIDE 10 MG: 20 TABLET ORAL at 20:39

## 2019-10-22 RX ADMIN — PROPRANOLOL HYDROCHLORIDE 10 MG: 20 TABLET ORAL at 13:21

## 2019-10-22 RX ADMIN — DULOXETINE HYDROCHLORIDE 60 MG: 60 CAPSULE, DELAYED RELEASE ORAL at 08:30

## 2019-10-22 RX ADMIN — PROPRANOLOL HYDROCHLORIDE 10 MG: 20 TABLET ORAL at 08:30

## 2019-10-22 RX ADMIN — MAGNESIUM HYDROXIDE 30 ML: 400 SUSPENSION ORAL at 21:44

## 2019-10-22 RX ADMIN — DIPHENHYDRAMINE HCL 25 MG: 25 TABLET ORAL at 20:38

## 2019-10-23 LAB
VALPROIC ACID LEVEL: 53 UG/ML (ref 50–125)
VALPROIC DATE LAST DOSE: NORMAL
VALPROIC DOSE AMOUNT: NORMAL
VALPROIC TIME LAST DOSE: 2039

## 2019-10-23 PROCEDURE — 6370000000 HC RX 637 (ALT 250 FOR IP): Performed by: PSYCHIATRY & NEUROLOGY

## 2019-10-23 PROCEDURE — 80164 ASSAY DIPROPYLACETIC ACD TOT: CPT

## 2019-10-23 PROCEDURE — 1240000000 HC EMOTIONAL WELLNESS R&B

## 2019-10-23 PROCEDURE — 36415 COLL VENOUS BLD VENIPUNCTURE: CPT

## 2019-10-23 RX ADMIN — DULOXETINE HYDROCHLORIDE 60 MG: 60 CAPSULE, DELAYED RELEASE ORAL at 08:38

## 2019-10-23 RX ADMIN — PROPRANOLOL HYDROCHLORIDE 10 MG: 20 TABLET ORAL at 20:44

## 2019-10-23 RX ADMIN — DIPHENHYDRAMINE HCL 25 MG: 25 TABLET ORAL at 20:44

## 2019-10-23 RX ADMIN — TRAZODONE HYDROCHLORIDE 150 MG: 150 TABLET ORAL at 20:44

## 2019-10-23 RX ADMIN — DIVALPROEX SODIUM 500 MG: 500 TABLET, FILM COATED, EXTENDED RELEASE ORAL at 08:38

## 2019-10-23 RX ADMIN — PROPRANOLOL HYDROCHLORIDE 10 MG: 20 TABLET ORAL at 16:18

## 2019-10-23 RX ADMIN — PROPRANOLOL HYDROCHLORIDE 10 MG: 20 TABLET ORAL at 08:37

## 2019-10-23 RX ADMIN — DIVALPROEX SODIUM 1000 MG: 500 TABLET, FILM COATED, EXTENDED RELEASE ORAL at 20:44

## 2019-10-24 PROCEDURE — 1240000000 HC EMOTIONAL WELLNESS R&B

## 2019-10-24 PROCEDURE — 6370000000 HC RX 637 (ALT 250 FOR IP): Performed by: PSYCHIATRY & NEUROLOGY

## 2019-10-24 RX ADMIN — HYDROXYZINE HYDROCHLORIDE 25 MG: 25 TABLET, FILM COATED ORAL at 08:27

## 2019-10-24 RX ADMIN — DIVALPROEX SODIUM 500 MG: 500 TABLET, FILM COATED, EXTENDED RELEASE ORAL at 08:27

## 2019-10-24 RX ADMIN — HYDROXYZINE HYDROCHLORIDE 25 MG: 25 TABLET, FILM COATED ORAL at 20:59

## 2019-10-24 RX ADMIN — DULOXETINE HYDROCHLORIDE 60 MG: 60 CAPSULE, DELAYED RELEASE ORAL at 08:27

## 2019-10-24 RX ADMIN — PROPRANOLOL HYDROCHLORIDE 10 MG: 20 TABLET ORAL at 20:58

## 2019-10-24 RX ADMIN — DIVALPROEX SODIUM 1000 MG: 500 TABLET, FILM COATED, EXTENDED RELEASE ORAL at 20:58

## 2019-10-24 RX ADMIN — PROPRANOLOL HYDROCHLORIDE 10 MG: 20 TABLET ORAL at 08:27

## 2019-10-24 RX ADMIN — TRAZODONE HYDROCHLORIDE 150 MG: 150 TABLET ORAL at 20:59

## 2019-10-24 RX ADMIN — PROPRANOLOL HYDROCHLORIDE 10 MG: 20 TABLET ORAL at 15:00

## 2019-10-24 RX ADMIN — LURASIDONE HYDROCHLORIDE 40 MG: 40 TABLET, FILM COATED ORAL at 17:55

## 2019-10-25 PROCEDURE — 6370000000 HC RX 637 (ALT 250 FOR IP): Performed by: PSYCHIATRY & NEUROLOGY

## 2019-10-25 PROCEDURE — 1240000000 HC EMOTIONAL WELLNESS R&B

## 2019-10-25 RX ADMIN — HYDROXYZINE HYDROCHLORIDE 25 MG: 25 TABLET, FILM COATED ORAL at 20:36

## 2019-10-25 RX ADMIN — DIVALPROEX SODIUM 1000 MG: 500 TABLET, FILM COATED, EXTENDED RELEASE ORAL at 20:36

## 2019-10-25 RX ADMIN — LURASIDONE HYDROCHLORIDE 40 MG: 40 TABLET, FILM COATED ORAL at 17:33

## 2019-10-25 RX ADMIN — HYDROXYZINE HYDROCHLORIDE 25 MG: 25 TABLET, FILM COATED ORAL at 08:47

## 2019-10-25 RX ADMIN — DIVALPROEX SODIUM 500 MG: 500 TABLET, FILM COATED, EXTENDED RELEASE ORAL at 08:47

## 2019-10-25 RX ADMIN — PROPRANOLOL HYDROCHLORIDE 10 MG: 20 TABLET ORAL at 20:36

## 2019-10-25 RX ADMIN — DULOXETINE HYDROCHLORIDE 60 MG: 60 CAPSULE, DELAYED RELEASE ORAL at 08:47

## 2019-10-25 RX ADMIN — TRAZODONE HYDROCHLORIDE 150 MG: 150 TABLET ORAL at 20:36

## 2019-10-26 PROCEDURE — 6370000000 HC RX 637 (ALT 250 FOR IP): Performed by: PSYCHIATRY & NEUROLOGY

## 2019-10-26 PROCEDURE — 1240000000 HC EMOTIONAL WELLNESS R&B

## 2019-10-26 RX ORDER — TRAZODONE HYDROCHLORIDE 150 MG/1
150 TABLET ORAL NIGHTLY PRN
Qty: 30 TABLET | Refills: 0 | Status: ON HOLD | OUTPATIENT
Start: 2019-10-26 | End: 2020-09-18 | Stop reason: HOSPADM

## 2019-10-26 RX ORDER — DULOXETIN HYDROCHLORIDE 60 MG/1
60 CAPSULE, DELAYED RELEASE ORAL DAILY
Qty: 30 CAPSULE | Refills: 0 | Status: ON HOLD | OUTPATIENT
Start: 2019-10-26 | End: 2020-09-18 | Stop reason: HOSPADM

## 2019-10-26 RX ORDER — DIVALPROEX SODIUM 500 MG/1
TABLET, EXTENDED RELEASE ORAL
Qty: 90 TABLET | Refills: 0 | Status: ON HOLD | OUTPATIENT
Start: 2019-10-26 | End: 2020-09-18 | Stop reason: HOSPADM

## 2019-10-26 RX ORDER — PROPRANOLOL HYDROCHLORIDE 10 MG/1
10 TABLET ORAL 3 TIMES DAILY
Qty: 90 TABLET | Refills: 0 | Status: ON HOLD | OUTPATIENT
Start: 2019-10-26 | End: 2022-07-27 | Stop reason: SDUPTHER

## 2019-10-26 RX ORDER — HYDROXYZINE HYDROCHLORIDE 25 MG/1
25 TABLET, FILM COATED ORAL 2 TIMES DAILY PRN
Qty: 60 TABLET | Refills: 0 | Status: ON HOLD | OUTPATIENT
Start: 2019-10-26 | End: 2022-07-27 | Stop reason: SDUPTHER

## 2019-10-26 RX ADMIN — HYDROXYZINE HYDROCHLORIDE 25 MG: 25 TABLET, FILM COATED ORAL at 08:34

## 2019-10-26 RX ADMIN — TRAZODONE HYDROCHLORIDE 150 MG: 150 TABLET ORAL at 20:52

## 2019-10-26 RX ADMIN — DULOXETINE HYDROCHLORIDE 60 MG: 60 CAPSULE, DELAYED RELEASE ORAL at 08:34

## 2019-10-26 RX ADMIN — DIVALPROEX SODIUM 500 MG: 500 TABLET, FILM COATED, EXTENDED RELEASE ORAL at 08:34

## 2019-10-26 RX ADMIN — DIVALPROEX SODIUM 1000 MG: 500 TABLET, FILM COATED, EXTENDED RELEASE ORAL at 20:51

## 2019-10-26 RX ADMIN — HYDROXYZINE HYDROCHLORIDE 25 MG: 25 TABLET, FILM COATED ORAL at 20:52

## 2019-10-27 PROCEDURE — 1240000000 HC EMOTIONAL WELLNESS R&B

## 2019-10-27 PROCEDURE — 6370000000 HC RX 637 (ALT 250 FOR IP): Performed by: PSYCHIATRY & NEUROLOGY

## 2019-10-27 RX ADMIN — TRAZODONE HYDROCHLORIDE 150 MG: 150 TABLET ORAL at 20:33

## 2019-10-27 RX ADMIN — HYDROXYZINE HYDROCHLORIDE 25 MG: 25 TABLET, FILM COATED ORAL at 20:33

## 2019-10-27 RX ADMIN — DIPHENHYDRAMINE HCL 25 MG: 25 TABLET ORAL at 20:33

## 2019-10-27 RX ADMIN — LURASIDONE HYDROCHLORIDE 40 MG: 40 TABLET, FILM COATED ORAL at 17:23

## 2019-10-27 RX ADMIN — DIVALPROEX SODIUM 1000 MG: 500 TABLET, FILM COATED, EXTENDED RELEASE ORAL at 20:34

## 2019-10-27 RX ADMIN — DIVALPROEX SODIUM 500 MG: 500 TABLET, FILM COATED, EXTENDED RELEASE ORAL at 08:13

## 2019-10-27 RX ADMIN — DULOXETINE HYDROCHLORIDE 60 MG: 60 CAPSULE, DELAYED RELEASE ORAL at 08:13

## 2019-10-28 VITALS
DIASTOLIC BLOOD PRESSURE: 81 MMHG | OXYGEN SATURATION: 98 % | HEART RATE: 68 BPM | HEIGHT: 66 IN | SYSTOLIC BLOOD PRESSURE: 119 MMHG | TEMPERATURE: 97.6 F | WEIGHT: 160 LBS | RESPIRATION RATE: 14 BRPM | BODY MASS INDEX: 25.71 KG/M2

## 2019-10-28 PROCEDURE — 6370000000 HC RX 637 (ALT 250 FOR IP): Performed by: PSYCHIATRY & NEUROLOGY

## 2019-10-28 RX ADMIN — DULOXETINE HYDROCHLORIDE 60 MG: 60 CAPSULE, DELAYED RELEASE ORAL at 08:02

## 2019-10-28 RX ADMIN — DIVALPROEX SODIUM 500 MG: 500 TABLET, FILM COATED, EXTENDED RELEASE ORAL at 08:03

## 2020-03-14 ENCOUNTER — HOSPITAL ENCOUNTER (EMERGENCY)
Age: 50
Discharge: HOME | End: 2020-03-15
Attending: EMERGENCY MEDICINE
Payer: MEDICARE

## 2020-03-14 ENCOUNTER — APPOINTMENT (OUTPATIENT)
Dept: GENERAL RADIOLOGY | Age: 50
End: 2020-03-14
Payer: MEDICARE

## 2020-03-14 VITALS
TEMPERATURE: 98.2 F | DIASTOLIC BLOOD PRESSURE: 92 MMHG | HEART RATE: 113 BPM | SYSTOLIC BLOOD PRESSURE: 137 MMHG | OXYGEN SATURATION: 96 % | RESPIRATION RATE: 16 BRPM

## 2020-03-14 LAB
DIRECT EXAM: NORMAL
Lab: NORMAL
SPECIMEN DESCRIPTION: NORMAL

## 2020-03-14 PROCEDURE — 87804 INFLUENZA ASSAY W/OPTIC: CPT

## 2020-03-14 PROCEDURE — 71046 X-RAY EXAM CHEST 2 VIEWS: CPT

## 2020-03-14 PROCEDURE — 0100U HC RESPIRPTHGN MULT REV TRANS & AMP PRB TECH 21 TRGT: CPT

## 2020-03-14 PROCEDURE — 99285 EMERGENCY DEPT VISIT HI MDM: CPT

## 2020-03-14 PROCEDURE — 93005 ELECTROCARDIOGRAM TRACING: CPT | Performed by: EMERGENCY MEDICINE

## 2020-03-14 ASSESSMENT — PAIN SCALES - GENERAL: PAINLEVEL_OUTOF10: 5

## 2020-03-15 LAB
ABSOLUTE EOS #: 0.2 K/UL (ref 0–0.4)
ABSOLUTE IMMATURE GRANULOCYTE: ABNORMAL K/UL (ref 0–0.3)
ABSOLUTE LYMPH #: 1.9 K/UL (ref 1–4.8)
ABSOLUTE MONO #: 0.8 K/UL (ref 0.1–1.3)
ADENOVIRUS PCR: NOT DETECTED
ALBUMIN SERPL-MCNC: 4.2 G/DL (ref 3.5–5.2)
ALBUMIN/GLOBULIN RATIO: ABNORMAL (ref 1–2.5)
ALP BLD-CCNC: 79 U/L (ref 40–129)
ALT SERPL-CCNC: 45 U/L (ref 5–41)
ANION GAP SERPL CALCULATED.3IONS-SCNC: 15 MMOL/L (ref 9–17)
AST SERPL-CCNC: 43 U/L
BASOPHILS # BLD: 1 % (ref 0–2)
BASOPHILS ABSOLUTE: 0.1 K/UL (ref 0–0.2)
BILIRUB SERPL-MCNC: 0.23 MG/DL (ref 0.3–1.2)
BNP INTERPRETATION: NORMAL
BORDETELLA PARAPERTUSSIS: NOT DETECTED
BORDETELLA PERTUSSIS PCR: NOT DETECTED
BUN BLDV-MCNC: 8 MG/DL (ref 6–20)
BUN/CREAT BLD: ABNORMAL (ref 9–20)
CALCIUM SERPL-MCNC: 9.1 MG/DL (ref 8.6–10.4)
CHLAMYDIA PNEUMONIAE BY PCR: NOT DETECTED
CHLORIDE BLD-SCNC: 102 MMOL/L (ref 98–107)
CO2: 22 MMOL/L (ref 20–31)
CORONAVIRUS 229E PCR: NOT DETECTED
CORONAVIRUS HKU1 PCR: NOT DETECTED
CORONAVIRUS NL63 PCR: NOT DETECTED
CORONAVIRUS OC43 PCR: NOT DETECTED
CREAT SERPL-MCNC: 0.84 MG/DL (ref 0.7–1.2)
D-DIMER QUANTITATIVE: 0.52 MG/L FEU (ref 0–0.59)
DIFFERENTIAL TYPE: ABNORMAL
EOSINOPHILS RELATIVE PERCENT: 2 % (ref 0–4)
ETHANOL PERCENT: 0.28 %
ETHANOL: 280 MG/DL
GFR AFRICAN AMERICAN: >60 ML/MIN
GFR NON-AFRICAN AMERICAN: >60 ML/MIN
GFR SERPL CREATININE-BSD FRML MDRD: ABNORMAL ML/MIN/{1.73_M2}
GFR SERPL CREATININE-BSD FRML MDRD: ABNORMAL ML/MIN/{1.73_M2}
GLUCOSE BLD-MCNC: 86 MG/DL (ref 70–99)
HCT VFR BLD CALC: 45.4 % (ref 41–53)
HEMOGLOBIN: 15.8 G/DL (ref 13.5–17.5)
HUMAN METAPNEUMOVIRUS PCR: NOT DETECTED
IMMATURE GRANULOCYTES: ABNORMAL %
INFLUENZA A BY PCR: NOT DETECTED
INFLUENZA A H1 (2009) PCR: NORMAL
INFLUENZA A H1 PCR: NORMAL
INFLUENZA A H3 PCR: NORMAL
INFLUENZA B BY PCR: NOT DETECTED
LYMPHOCYTES # BLD: 31 % (ref 24–44)
MCH RBC QN AUTO: 34.8 PG (ref 26–34)
MCHC RBC AUTO-ENTMCNC: 34.8 G/DL (ref 31–37)
MCV RBC AUTO: 100 FL (ref 80–100)
MONOCYTES # BLD: 12 % (ref 1–7)
MYCOPLASMA PNEUMONIAE PCR: NOT DETECTED
NRBC AUTOMATED: ABNORMAL PER 100 WBC
PARAINFLUENZA 1 PCR: NOT DETECTED
PARAINFLUENZA 2 PCR: NOT DETECTED
PARAINFLUENZA 3 PCR: NOT DETECTED
PARAINFLUENZA 4 PCR: NOT DETECTED
PDW BLD-RTO: 13.1 % (ref 11.5–14.9)
PLATELET # BLD: 262 K/UL (ref 150–450)
PLATELET ESTIMATE: ABNORMAL
PMV BLD AUTO: 7.3 FL (ref 6–12)
POTASSIUM SERPL-SCNC: 3.9 MMOL/L (ref 3.7–5.3)
PRO-BNP: 34 PG/ML
RBC # BLD: 4.53 M/UL (ref 4.5–5.9)
RBC # BLD: ABNORMAL 10*6/UL
RESP SYNCYTIAL VIRUS PCR: NOT DETECTED
RHINO/ENTEROVIRUS PCR: NOT DETECTED
SEG NEUTROPHILS: 54 % (ref 36–66)
SEGMENTED NEUTROPHILS ABSOLUTE COUNT: 3.4 K/UL (ref 1.3–9.1)
SODIUM BLD-SCNC: 139 MMOL/L (ref 135–144)
SPECIMEN DESCRIPTION: NORMAL
TOTAL PROTEIN: 8 G/DL (ref 6.4–8.3)
TROPONIN INTERP: NORMAL
TROPONIN INTERP: NORMAL
TROPONIN T: NORMAL NG/ML
TROPONIN T: NORMAL NG/ML
TROPONIN, HIGH SENSITIVITY: 7 NG/L (ref 0–22)
TROPONIN, HIGH SENSITIVITY: 9 NG/L (ref 0–22)
WBC # BLD: 6.3 K/UL (ref 3.5–11)
WBC # BLD: ABNORMAL 10*3/UL

## 2020-03-15 PROCEDURE — 84484 ASSAY OF TROPONIN QUANT: CPT

## 2020-03-15 PROCEDURE — 83880 ASSAY OF NATRIURETIC PEPTIDE: CPT

## 2020-03-15 PROCEDURE — 85025 COMPLETE CBC W/AUTO DIFF WBC: CPT

## 2020-03-15 PROCEDURE — G0480 DRUG TEST DEF 1-7 CLASSES: HCPCS

## 2020-03-15 PROCEDURE — U0002 COVID-19 LAB TEST NON-CDC: HCPCS

## 2020-03-15 PROCEDURE — 80053 COMPREHEN METABOLIC PANEL: CPT

## 2020-03-15 PROCEDURE — 36415 COLL VENOUS BLD VENIPUNCTURE: CPT

## 2020-03-15 PROCEDURE — 85379 FIBRIN DEGRADATION QUANT: CPT

## 2020-03-15 RX ORDER — ALBUTEROL SULFATE 90 UG/1
2 AEROSOL, METERED RESPIRATORY (INHALATION) EVERY 6 HOURS PRN
Qty: 1 INHALER | Refills: 3 | Status: ON HOLD | OUTPATIENT
Start: 2020-03-15 | End: 2022-07-27 | Stop reason: SDUPTHER

## 2020-03-15 NOTE — ED PROVIDER NOTES
allergic to tomato. FAMILY HISTORY     He reported the following about his mother: Comitted suicide- jumping from bridge. SOCIAL HISTORY       Social History     Tobacco Use    Smoking status: Current Some Day Smoker     Packs/day: 1.00     Years: 29.00     Pack years: 29.00     Types: Cigarettes    Smokeless tobacco: Never Used    Tobacco comment: pt accepting of nicotine replacement   Substance Use Topics    Alcohol use:  Yes     Alcohol/week: 6.0 standard drinks     Types: 6 Cans of beer per week     Frequency: Monthly or less     Drinks per session: 10 or more     Binge frequency: Less than monthly     Comment: not very often    Drug use: Not Currently     Comment: patient denies recent use but tox screen is positive for marijuana     PHYSICAL EXAM     INITIAL VITALS: BP (!) 137/92   Pulse 113   Temp 98.2 °F (36.8 °C) (Oral)   Resp 16   SpO2 96%    Physical Exam    MEDICAL DECISION MAKING:            Labs Reviewed   CBC WITH AUTO DIFFERENTIAL - Abnormal; Notable for the following components:       Result Value    MCH 34.8 (*)     Monocytes 12 (*)     All other components within normal limits   COMPREHENSIVE METABOLIC PANEL W/ REFLEX TO MG FOR LOW K - Abnormal; Notable for the following components:    ALT 45 (*)     AST 43 (*)     Total Bilirubin 0.23 (*)     All other components within normal limits   ETHANOL - Abnormal; Notable for the following components:    Ethanol 280 (*)     All other components within normal limits   RAPID INFLUENZA A/B ANTIGENS   RESPIRATORY VIRUS PCR PANEL   BRAIN NATRIURETIC PEPTIDE   TROPONIN   D-DIMER, QUANTITATIVE   TROPONIN   COVID-19, PCR     EMERGENCY DEPARTMENTCOURSE:         Vitals:    Vitals:    03/14/20 2323 03/14/20 2325   BP:  (!) 137/92   Pulse: 113    Resp: 16    Temp: 98.2 °F (36.8 °C)    TempSrc: Oral    SpO2: 96%        The patient was given the following medications while in the emergency department:  No orders of the defined types were placed in this

## 2020-03-16 LAB
EKG ATRIAL RATE: 104 BPM
EKG P AXIS: 56 DEGREES
EKG P-R INTERVAL: 148 MS
EKG Q-T INTERVAL: 332 MS
EKG QRS DURATION: 88 MS
EKG QTC CALCULATION (BAZETT): 436 MS
EKG R AXIS: -9 DEGREES
EKG T AXIS: 52 DEGREES
EKG VENTRICULAR RATE: 104 BPM

## 2020-03-16 PROCEDURE — 93010 ELECTROCARDIOGRAM REPORT: CPT | Performed by: INTERNAL MEDICINE

## 2020-03-23 LAB
SARS-COV-2, NAA: NOT DETECTED
SOURCE: NORMAL

## 2020-09-12 ENCOUNTER — HOSPITAL ENCOUNTER (INPATIENT)
Age: 50
LOS: 5 days | Discharge: HOME | DRG: 753 | End: 2020-09-18
Attending: STUDENT IN AN ORGANIZED HEALTH CARE EDUCATION/TRAINING PROGRAM
Payer: MEDICARE

## 2020-09-12 PROCEDURE — G0480 DRUG TEST DEF 1-7 CLASSES: HCPCS

## 2020-09-12 PROCEDURE — 36415 COLL VENOUS BLD VENIPUNCTURE: CPT

## 2020-09-12 PROCEDURE — 99285 EMERGENCY DEPT VISIT HI MDM: CPT

## 2020-09-13 PROBLEM — F33.9 MAJOR DEPRESSIVE DISORDER, RECURRENT (HCC): Status: ACTIVE | Noted: 2020-09-13

## 2020-09-13 LAB
ETHANOL PERCENT: 0.19 %
ETHANOL: 189 MG/DL
SARS-COV-2, RAPID: NOT DETECTED
SARS-COV-2: NORMAL
SARS-COV-2: NORMAL
SOURCE: NORMAL

## 2020-09-13 PROCEDURE — 99223 1ST HOSP IP/OBS HIGH 75: CPT | Performed by: PSYCHIATRY & NEUROLOGY

## 2020-09-13 PROCEDURE — 6370000000 HC RX 637 (ALT 250 FOR IP): Performed by: PSYCHIATRY & NEUROLOGY

## 2020-09-13 PROCEDURE — 6370000000 HC RX 637 (ALT 250 FOR IP): Performed by: STUDENT IN AN ORGANIZED HEALTH CARE EDUCATION/TRAINING PROGRAM

## 2020-09-13 PROCEDURE — U0002 COVID-19 LAB TEST NON-CDC: HCPCS

## 2020-09-13 PROCEDURE — 1240000000 HC EMOTIONAL WELLNESS R&B

## 2020-09-13 RX ORDER — PROPRANOLOL HYDROCHLORIDE 20 MG/1
10 TABLET ORAL 3 TIMES DAILY
Status: DISCONTINUED | OUTPATIENT
Start: 2020-09-13 | End: 2020-09-18 | Stop reason: HOSPADM

## 2020-09-13 RX ORDER — TRAZODONE HYDROCHLORIDE 50 MG/1
50 TABLET ORAL NIGHTLY PRN
Status: DISCONTINUED | OUTPATIENT
Start: 2020-09-13 | End: 2020-09-13

## 2020-09-13 RX ORDER — ACETAMINOPHEN 325 MG/1
650 TABLET ORAL EVERY 4 HOURS PRN
Status: DISCONTINUED | OUTPATIENT
Start: 2020-09-13 | End: 2020-09-18 | Stop reason: HOSPADM

## 2020-09-13 RX ORDER — HYDROXYZINE 50 MG/1
50 TABLET, FILM COATED ORAL 3 TIMES DAILY PRN
Status: DISCONTINUED | OUTPATIENT
Start: 2020-09-13 | End: 2020-09-18 | Stop reason: HOSPADM

## 2020-09-13 RX ORDER — IBUPROFEN 400 MG/1
400 TABLET ORAL EVERY 6 HOURS PRN
Status: DISCONTINUED | OUTPATIENT
Start: 2020-09-13 | End: 2020-09-18 | Stop reason: HOSPADM

## 2020-09-13 RX ORDER — POLYETHYLENE GLYCOL 3350 17 G/17G
17 POWDER, FOR SOLUTION ORAL DAILY
Status: DISCONTINUED | OUTPATIENT
Start: 2020-09-13 | End: 2020-09-18 | Stop reason: HOSPADM

## 2020-09-13 RX ORDER — ALBUTEROL SULFATE 2.5 MG/3ML
2.5 SOLUTION RESPIRATORY (INHALATION) EVERY 6 HOURS PRN
Status: DISCONTINUED | OUTPATIENT
Start: 2020-09-13 | End: 2020-09-18 | Stop reason: HOSPADM

## 2020-09-13 RX ORDER — NICOTINE 21 MG/24HR
1 PATCH, TRANSDERMAL 24 HOURS TRANSDERMAL DAILY
Status: DISCONTINUED | OUTPATIENT
Start: 2020-09-13 | End: 2020-09-13

## 2020-09-13 RX ORDER — MAGNESIUM HYDROXIDE/ALUMINUM HYDROXICE/SIMETHICONE 120; 1200; 1200 MG/30ML; MG/30ML; MG/30ML
30 SUSPENSION ORAL EVERY 6 HOURS PRN
Status: DISCONTINUED | OUTPATIENT
Start: 2020-09-13 | End: 2020-09-18 | Stop reason: HOSPADM

## 2020-09-13 RX ORDER — DULOXETIN HYDROCHLORIDE 60 MG/1
60 CAPSULE, DELAYED RELEASE ORAL DAILY
Status: DISCONTINUED | OUTPATIENT
Start: 2020-09-13 | End: 2020-09-18 | Stop reason: HOSPADM

## 2020-09-13 RX ORDER — TRAZODONE HYDROCHLORIDE 150 MG/1
150 TABLET ORAL NIGHTLY PRN
Status: DISCONTINUED | OUTPATIENT
Start: 2020-09-13 | End: 2020-09-14

## 2020-09-13 RX ORDER — NICOTINE 21 MG/24HR
1 PATCH, TRANSDERMAL 24 HOURS TRANSDERMAL ONCE
Status: COMPLETED | OUTPATIENT
Start: 2020-09-13 | End: 2020-09-14

## 2020-09-13 RX ADMIN — LURASIDONE HYDROCHLORIDE 60 MG: 60 TABLET, FILM COATED ORAL at 17:37

## 2020-09-13 RX ADMIN — DULOXETINE 60 MG: 60 CAPSULE, DELAYED RELEASE ORAL at 08:52

## 2020-09-13 RX ADMIN — PROPRANOLOL HYDROCHLORIDE 10 MG: 20 TABLET ORAL at 22:02

## 2020-09-13 RX ADMIN — PROPRANOLOL HYDROCHLORIDE 10 MG: 20 TABLET ORAL at 08:52

## 2020-09-13 RX ADMIN — PROPRANOLOL HYDROCHLORIDE 10 MG: 20 TABLET ORAL at 14:29

## 2020-09-13 ASSESSMENT — ENCOUNTER SYMPTOMS
COUGH: 0
BACK PAIN: 0
FACIAL SWELLING: 0
EYE REDNESS: 0
VOMITING: 0
SHORTNESS OF BREATH: 0
RHINORRHEA: 0
SORE THROAT: 0
NAUSEA: 0
ABDOMINAL PAIN: 0
DIARRHEA: 0
EYE PAIN: 0
COLOR CHANGE: 0

## 2020-09-13 ASSESSMENT — SLEEP AND FATIGUE QUESTIONNAIRES
DO YOU USE A SLEEP AID: NO
AVERAGE NUMBER OF SLEEP HOURS: 7
DO YOU HAVE DIFFICULTY SLEEPING: NO

## 2020-09-13 ASSESSMENT — LIFESTYLE VARIABLES
HISTORY_ALCOHOL_USE: YES
HISTORY_ALCOHOL_USE: NO

## 2020-09-13 ASSESSMENT — PAIN SCALES - GENERAL
PAINLEVEL_OUTOF10: 0
PAINLEVEL_OUTOF10: 0

## 2020-09-13 ASSESSMENT — PATIENT HEALTH QUESTIONNAIRE - PHQ9: SUM OF ALL RESPONSES TO PHQ QUESTIONS 1-9: 9
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
## 2020-09-13 NOTE — GROUP NOTE
Group Therapy Note    Date: 9/13/2020    Group Start Time: 1000  Group End Time: 1890  Group Topic: Group Therapy    [facility] G    BRAIN Sampson, LISSETH    Pt declined to attend psychotherapy at 1000 am despite encouragement. Pt offered 1:1 and refused.       Group Therapy Note    Attendees: 7           Signature:  BRIAN Sampson LSW

## 2020-09-13 NOTE — BH NOTE
Patient given tobacco quitline number 48562490569 at this time, refusing to call at this time, states \" I just dont want to quit now\"- patient given information as to the dangers of long term tobacco use. Continue to reinforce the importance of tobacco cessation.
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
## 2020-09-13 NOTE — BH NOTE
After much encouragement, pt did not attend Health and wellness at 1600 due to lack of interest; Patient refused 1:1 talk time, remained isolative to self.

## 2020-09-13 NOTE — CARE COORDINATION
BHI Biopsychosocial Assessment    Current Level of Psychosocial Functioning     Independent: xx  Dependent:    Minimal Assist:       Psychosocial High Risk Factors (check all that apply)    Unable to obtain meds:    Chronic illness/pain:     Substance abuse: xx  Lack of Family Support: xx  Financial stress: xx  Isolation: xx  Inadequate Community Resources: xx  Suicide attempt(s):    Not taking medications: xx   Victim of crime: xx  Developmental Delay:    Unable to manage personal needs:    Age 72 or older:    Homeless:    No transportation:    Readmission within 30 days:    Unemployment:    Traumatic Event:      Psychiatric Advanced Directives: None reported    Family to Involve in Treatment: None reported    Sexual Orientation: EDNA    Patient Strengths: Patient is linked with [employer]    Patient Barriers: alcohol use, non compliance with medication     Opiate Education: patient denies substance use    CMHC/mental health history:     Plan of Care   medication management, group/individual therapies, family meetings, psycho -education, treatment team meetings to assist with stabilization    Initial Discharge Plan: Patient to stabilize with medication, return home, continue outpatient treatment       Clinical Summary:      Pt is a 48year old  male who presented to the [facility] via [facility] ED with reported suicidal ideation. Patient reports an increase in depressive thoughts and suicidal ideation over the past 1-2 weeks. Patient reports he stopped taking medication about a week ago. Patient lives on his own, is linked with [facility]. Patient reports alcohol use, denies need for treatment at this time. Patient reports no suicidal ideation, no homicidal ideation at this time. Patient denies auditory or visual hallucinations at this time. Destiny denies self harm behavior, reports past suicide attempt 17 years ago.
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
## 2020-09-13 NOTE — GROUP NOTE
Group Therapy Note    Date: 9/13/2020    Group Start Time: 1330  Group End Time: 8732  Group Topic: Recreational    [facility]    Robbin Day    patient refused to attend recreational therapy group at 1330 after encouragement from staff.   1:1 talk time provided as alternative to group session       Signature:  Fred Newby

## 2020-09-13 NOTE — PLAN OF CARE
Problem: Depressive Behavior With or Without Suicide Precautions:  Goal: Ability to disclose and discuss suicidal ideas will improve  Description: Ability to disclose and discuss suicidal ideas will improve  9/13/2020 1939 by Chayo Zacarias RN  Outcome: Ongoing  Note: Patient stated he was not suicidal at this time     Problem: Substance Abuse:  Goal: Absence of drug withdrawal signs and symptoms  Description: Absence of drug withdrawal signs and symptoms  9/13/2020 1939 by Chayo Zacarias RN  Outcome: Ongoing  Note: Patient did not voice withdrawal symptoms
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
## 2020-09-14 PROCEDURE — 99232 SBSQ HOSP IP/OBS MODERATE 35: CPT | Performed by: PSYCHIATRY & NEUROLOGY

## 2020-09-14 PROCEDURE — 1240000000 HC EMOTIONAL WELLNESS R&B

## 2020-09-14 PROCEDURE — 6370000000 HC RX 637 (ALT 250 FOR IP): Performed by: PSYCHIATRY & NEUROLOGY

## 2020-09-14 RX ORDER — NICOTINE 21 MG/24HR
1 PATCH, TRANSDERMAL 24 HOURS TRANSDERMAL DAILY
Status: DISCONTINUED | OUTPATIENT
Start: 2020-09-14 | End: 2020-09-18 | Stop reason: HOSPADM

## 2020-09-14 RX ORDER — TRAZODONE HYDROCHLORIDE 100 MG/1
200 TABLET ORAL NIGHTLY PRN
Status: DISCONTINUED | OUTPATIENT
Start: 2020-09-14 | End: 2020-09-16

## 2020-09-14 RX ADMIN — PROPRANOLOL HYDROCHLORIDE 10 MG: 20 TABLET ORAL at 14:49

## 2020-09-14 RX ADMIN — PROPRANOLOL HYDROCHLORIDE 10 MG: 20 TABLET ORAL at 21:05

## 2020-09-14 RX ADMIN — DULOXETINE 60 MG: 60 CAPSULE, DELAYED RELEASE ORAL at 08:29

## 2020-09-14 RX ADMIN — PROPRANOLOL HYDROCHLORIDE 10 MG: 20 TABLET ORAL at 08:29

## 2020-09-14 RX ADMIN — LURASIDONE HYDROCHLORIDE 60 MG: 60 TABLET, FILM COATED ORAL at 17:54

## 2020-09-14 NOTE — PROGRESS NOTES
Smoker     Packs/day: 1.00     Years: 29.00     Pack years: 29.00     Types: Cigarettes    Smokeless tobacco: Never Used    Tobacco comment: pt accepting of nicotine replacement   Substance and Sexual Activity    Alcohol use: Yes     Alcohol/week: 6.0 standard drinks     Types: 6 Cans of beer per week     Frequency: Monthly or less     Drinks per session: 10 or more     Binge frequency: Less than monthly     Comment: not very often    Drug use: Not Currently     Comment: patient denies recent use but tox screen is positive for marijuana    Sexual activity: Not Currently     Partners: Female   Lifestyle    Physical activity     Days per week: Not on file     Minutes per session: Not on file    Stress: Not on file   Relationships    Social connections     Talks on phone: Not on file     Gets together: Not on file     Attends Christian service: Not on file     Active member of club or organization: Not on file     Attends meetings of clubs or organizations: Not on file     Relationship status: Not on file    Intimate partner violence     Fear of current or ex partner: Not on file     Emotionally abused: Not on file     Physically abused: Not on file     Forced sexual activity: Not on file   Other Topics Concern    Not on file   Social History Narrative    Not on file           ROS:  [x] All negative/unchanged except if checked.  Explain positive(checked items) below:  [] Constitutional  [] Eyes  [] Ear/Nose/Mouth/Throat  [] Respiratory  [] CV  [] GI  []   [] Musculoskeletal  [] Skin/Breast  [] Neurological  [] Endocrine  [] Heme/Lymph  [] Allergic/Immunologic    Explanation:     MEDICATIONS:    Current Facility-Administered Medications:     nicotine (NICODERM CQ) 14 MG/24HR 1 patch, 1 patch, Transdermal, Daily, Jonatan Lora MD    traZODone (DESYREL) tablet 200 mg, 200 mg, Oral, Nightly PRN, Jonatan Lora MD    acetaminophen (TYLENOL) tablet 650 mg, 650 mg, Oral, Q4H PRN, MD Shakeel Daugherty aluminum & magnesium hydroxide-simethicone (MAALOX) 200-200-20 MG/5ML suspension 30 mL, 30 mL, Oral, Q6H PRN, Nelly Hood MD    hydrOXYzine (ATARAX) tablet 50 mg, 50 mg, Oral, TID PRN, Nelly Hood MD    ibuprofen (ADVIL;MOTRIN) tablet 400 mg, 400 mg, Oral, Q6H PRN, Nelly Hood MD    polyethylene glycol (GLYCOLAX) packet 17 g, 17 g, Oral, Daily, Nelly Hood MD    albuterol (PROVENTIL) nebulizer solution 2.5 mg, 2.5 mg, Nebulization, Q6H PRN, Nelly Hood MD    DULoxetine (CYMBALTA) extended release capsule 60 mg, 60 mg, Oral, Daily, Nelly Hood MD, 60 mg at 09/14/20 0829    propranolol (INDERAL) tablet 10 mg, 10 mg, Oral, TID, Nelly Hood MD, 10 mg at 09/14/20 1449    lurasidone (LATUDA) tablet 60 mg, 60 mg, Oral, Dinner, Nelly Hood MD, 60 mg at 09/14/20 1754      Examination:  /83   Pulse 65   Temp 97.9 °F (36.6 °C) (Oral)   Resp 14   Ht 5' 4\" (1.626 m)   Wt 175 lb (79.4 kg)   SpO2 97%   BMI 30.04 kg/m²   Gait - steady  Medication side effects(SE): none    Mental Status Examination:    Level of consciousness:  within normal limits   Appearance:  fair grooming and fair hygiene  Behavior/Motor:  no abnormalities noted  Attitude toward examiner:  cooperative  Speech:  spontaneous   Mood: anxious and depressed  Affect:  anxious  Thought processes:  linear, goal directed and coherent   Thought content:  Homicidal ideation - none  Suicidal Ideation:  Active  Delusions:  no evidence of delusions  Perceptual Disturbance:  denies any perceptual disturbance  Cognition:  oriented to person, place, and time   Concentration intact  Insight fair   Judgement fair     ASSESSMENT:   Patient symptoms are:  [] Well controlled  [x] Improving  [] Worsening  [] No change      Diagnosis:   Active Problems:    Major depressive disorder[facility] ([facility]  Resolved Problems:    * No resolved hospital problems.  *      LABS:    No results for input(s): WBC, HGB, PLT in the last 72 hours. No results for input(s): NA, K, CL, CO2, BUN, CREATININE, GLUCOSE in the last 72 hours. No results for input(s): BILITOT, ALKPHOS, AST, ALT in the last 72 hours. Lab Results   Component Value Date    BARBSCNU NEGATIVE 10/20/2019    LABBENZ NEGATIVE 10/20/2019    LABMETH NEGATIVE 10/20/2019    PPXUR NOT REPORTED 10/20/2019     No results found for: TSH, FREET4  No results found for: LITHIUM  Lab Results   Component Value Date    VALPROATE 53 10/23/2019       RISK ASSESSMENT: Moderate risk of suicide. Harm to others    Treatment Plan:  ttrazodone increased to 200mg qhs. Risks, benefits, side effects, drug-to-drug interactions and alternatives to treatment were discussed. The patient  consented to treatment. Encourage patient to attend group and other milieu activities. Discharge planning discussed with the patient and treatment team.    PSYCHOTHERAPY/COUNSELING:  [] Therapeutic interview  [x] Supportive  [] CBT  [] Ongoing  [] Other    [x] Patient continues to need, on a daily basis, active treatment furnished directly by or requiring the supervision of inpatient psychiatric personnel      Anticipated Length of stay: 4 to 6 days                                         Rachelle Schwab is a 48 y.o. male being evaluated face to face.     --Irma Velez MD on 9/14/2020 at 6:32 PM    An electronic signature was used to authenticate this note. **This report has been created using voice recognition software. It may contain minor errors which are inherent in voice recognition technology. **

## 2020-09-14 NOTE — PLAN OF CARE
Problem: Depressive Behavior With or Without Suicide Precautions:  Goal: Ability to disclose and discuss suicidal ideas will improve  Description: Ability to disclose and discuss suicidal ideas will improve  Outcome: Ongoing     Problem: Depressive Behavior With or Without Suicide Precautions:  Goal: Absence of self-harm  Description: Absence of self-harm  Outcome: Ongoing   Patient denies thoughts of self harm and is agreeable to seeking out staff should thoughts of self harm arise. Safe environment maintained. 15 minute checks for safety continued per unit policy. Patient is also able to disclose suicidal ideations which he currently denies , Will continue to monitor for safety and provide support and reassurance as needed.
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
## 2020-09-14 NOTE — GROUP NOTE
Group Therapy Note    Date: 9/14/2020    Group Start Time: 1000  Group End Time: 3475  Group Topic: Psychotherapy    [address], L[place]    patient refused to attend psychotherapy group at [facility] after encouragement from staff.   1:1 talk time provided as alternative to group session

## 2020-09-14 NOTE — GROUP NOTE
Group Therapy Note    Date: 9/14/2020    Group Start Time: 4903  Group End Time: 8240  Group Topic: Community Meeting    [address] Deisi Deloris        Group Therapy Note    Attendees: 5/22      patient refused to attend community meeting and goal setting  group at 1004-3251 after encouragement from staff. 1:1 talk time provided as alternative to group session.        Signature:  Deloris Moreno

## 2020-09-14 NOTE — GROUP NOTE
Group Therapy Note    Date: 9/14/2020    Group Start Time: 1330  Group End Time: 3259  Group Topic: Music Therapy    RENEE Cantrell        Group Therapy Note    Pt did not attend music therapy group d/t resting in room despite staff invitation to attend. 1:1 talk time offered as alternative to group session, pt declined.

## 2020-09-15 PROCEDURE — 6370000000 HC RX 637 (ALT 250 FOR IP): Performed by: INTERNAL MEDICINE

## 2020-09-15 PROCEDURE — 99222 1ST HOSP IP/OBS MODERATE 55: CPT | Performed by: INTERNAL MEDICINE

## 2020-09-15 PROCEDURE — 6370000000 HC RX 637 (ALT 250 FOR IP): Performed by: PSYCHIATRY & NEUROLOGY

## 2020-09-15 PROCEDURE — 99232 SBSQ HOSP IP/OBS MODERATE 35: CPT | Performed by: PSYCHIATRY & NEUROLOGY

## 2020-09-15 PROCEDURE — 1240000000 HC EMOTIONAL WELLNESS R&B

## 2020-09-15 RX ORDER — FAMOTIDINE 20 MG/1
20 TABLET, FILM COATED ORAL 2 TIMES DAILY
Status: DISCONTINUED | OUTPATIENT
Start: 2020-09-15 | End: 2020-09-18 | Stop reason: HOSPADM

## 2020-09-15 RX ADMIN — TRAZODONE HYDROCHLORIDE 200 MG: 100 TABLET ORAL at 21:20

## 2020-09-15 RX ADMIN — LURASIDONE HYDROCHLORIDE 60 MG: 60 TABLET, FILM COATED ORAL at 18:01

## 2020-09-15 RX ADMIN — PROPRANOLOL HYDROCHLORIDE 10 MG: 20 TABLET ORAL at 10:24

## 2020-09-15 RX ADMIN — PROPRANOLOL HYDROCHLORIDE 10 MG: 20 TABLET ORAL at 13:52

## 2020-09-15 RX ADMIN — DULOXETINE 60 MG: 60 CAPSULE, DELAYED RELEASE ORAL at 10:26

## 2020-09-15 RX ADMIN — FAMOTIDINE 20 MG: 20 TABLET, FILM COATED ORAL at 13:54

## 2020-09-15 RX ADMIN — PROPRANOLOL HYDROCHLORIDE 10 MG: 20 TABLET ORAL at 21:19

## 2020-09-15 RX ADMIN — POLYETHYLENE GLYCOL 3350 17 G: 17 POWDER, FOR SOLUTION ORAL at 10:27

## 2020-09-15 RX ADMIN — FAMOTIDINE 20 MG: 20 TABLET, FILM COATED ORAL at 21:20

## 2020-09-15 NOTE — PLAN OF CARE
Problem: Depressive Behavior With or Without Suicide Precautions:  Goal: Ability to disclose and discuss suicidal ideas will improve  Description: Ability to disclose and discuss suicidal ideas will improve  9/15/2020 1221 by Ish Rosas  Outcome: Ongoing     Problem: Depressive Behavior With or Without Suicide Precautions:  Goal: Absence of self-harm  Description: Absence of self-harm  9/15/2020 1221 by Aryan Aldana  Outcome: Ongoing     Problem: Substance Abuse:  Goal: Absence of drug withdrawal signs and symptoms  Description: Absence of drug withdrawal signs and symptoms  9/15/2020 1221 by Aryan Aldana  Outcome: Ongoing   Pt currently denies any thoughts of self harm reports normal sleep and appetite reports meds are working for elevated blood pressure selectively social out in the milieu calm controlled cooperative with treatment.

## 2020-09-15 NOTE — GROUP NOTE
Group Therapy Note    Date: 9/15/2020    Group Start Time: 1330  Group End Time: 2038  Group Topic: Music Therapy    [facility]    Wash Late        Group Therapy Note    Pt did not attend music and emotions group d/t resting in room despite staff invitation to attend. 1:1 talk time offered as alternative to group session, pt declined.

## 2020-09-15 NOTE — PLAN OF CARE
Problem: Depressive Behavior With or Without Suicide Precautions:  Goal: Ability to disclose and discuss suicidal ideas will improve  Description: Ability to disclose and discuss suicidal ideas will improve  Outcome: Ongoing     Pt denies suicidal ideation at this time. Pt reports feelings of depression related to financial and housing issues. Pt is medication compliant this shift. Problem: Depressive Behavior With or Without Suicide Precautions:  Goal: Absence of self-harm  Description: Absence of self-harm  Outcome: Ongoing     Pt denies thoughts of self harm and is agreeable to seeking out should thoughts of self harm arise. Safe environment maintained. Q15 minute checks for safety cont per unit policy. Will cont to monitor for safety and provide support and reassurance as needed. Problem: Substance Abuse:  Goal: Absence of drug withdrawal signs and symptoms  Description: Absence of drug withdrawal signs and symptoms  Outcome: Ongoing     Pt denies withdrawal symptoms at this time.

## 2020-09-15 NOTE — GROUP NOTE
Group Therapy Note    Date: 9/15/2020    Group Start Time: 9674  Group End Time: 3988  Group Topic: Psychotherapy    [facility], LSW    patient refused to attend psychotherapy group at [address] [place] [address] after encouragement from staff.   1:1 talk time provided as alternative to group session

## 2020-09-15 NOTE — PROGRESS NOTES
BUN, CREATININE, GLUCOSE in the last 72 hours. No results for input(s): BILITOT, ALKPHOS, AST, ALT in the last 72 hours. Lab Results   Component Value Date    BARBSCNU NEGATIVE 10/20/2019    LABBENZ NEGATIVE 10/20/2019    LABMETH NEGATIVE 10/20/2019    PPXUR NOT REPORTED 10/20/2019     No results found for: TSH, FREET4  No results found for: LITHIUM  Lab Results   Component Value Date    VALPROATE 53 10/23/2019       RISK ASSESSMENT: Moderate risk of suicide. Harm to others    Treatment Plan:  No changes to medications listed above  Risks, benefits, side effects, drug-to-drug interactions and alternatives to treatment were discussed. The patient  consented to treatment. Encourage patient to attend group and other milieu activities. Discharge planning discussed with the patient and treatment team.    PSYCHOTHERAPY/COUNSELING:  [] Therapeutic interview  [x] Supportive  [] CBT  [] Ongoing  [] Other    [x] Patient continues to need, on a daily basis, active treatment furnished directly by or requiring the supervision of inpatient psychiatric personnel      Anticipated Length of stay: 2-3 days                                         Caryle Pair is a 48 y.o. male being evaluated face to face.     --Kevin Mccullough MD on 9/15/2020 at 5:27 PM    An electronic signature was used to authenticate this note. **This report has been created using voice recognition software. It may contain minor errors which are inherent in voice recognition technology. **

## 2020-09-15 NOTE — GROUP NOTE
Group Therapy Note    Date: 9/15/2020    Group Start Time: 0900  Group End Time: 6381  Group Topic: [id number] [facility]        Group Therapy Note    Pt did not attend community meeting group d/t resting in room despite staff invitation to attend. 1:1 talk time offered as alternative to group session, pt declined.

## 2020-09-15 NOTE — GROUP NOTE
Group Therapy Note    Date: 9/15/2020    Group Start Time: 1430  Group End Time: 1245  Group Topic: Healthy Living/Wellness    [address]        Group Therapy Note    Attendees: 6/19        patient refused to attend self esteem and coping skills group at [phone] after encouragement from staff. 1:1 talk time provided as alternative to group session.        Signature:  Estefani Sullivan [place]

## 2020-09-16 PROCEDURE — 6370000000 HC RX 637 (ALT 250 FOR IP): Performed by: PSYCHIATRY & NEUROLOGY

## 2020-09-16 PROCEDURE — 1240000000 HC EMOTIONAL WELLNESS R&B

## 2020-09-16 PROCEDURE — 6370000000 HC RX 637 (ALT 250 FOR IP): Performed by: INTERNAL MEDICINE

## 2020-09-16 PROCEDURE — 99232 SBSQ HOSP IP/OBS MODERATE 35: CPT | Performed by: PSYCHIATRY & NEUROLOGY

## 2020-09-16 RX ORDER — MIRTAZAPINE 15 MG/1
15 TABLET, FILM COATED ORAL NIGHTLY
Status: DISCONTINUED | OUTPATIENT
Start: 2020-09-16 | End: 2020-09-18 | Stop reason: HOSPADM

## 2020-09-16 RX ADMIN — MIRTAZAPINE 15 MG: 15 TABLET, FILM COATED ORAL at 20:50

## 2020-09-16 RX ADMIN — PROPRANOLOL HYDROCHLORIDE 10 MG: 20 TABLET ORAL at 08:58

## 2020-09-16 RX ADMIN — LURASIDONE HYDROCHLORIDE 60 MG: 60 TABLET, FILM COATED ORAL at 18:19

## 2020-09-16 RX ADMIN — PROPRANOLOL HYDROCHLORIDE 10 MG: 20 TABLET ORAL at 20:50

## 2020-09-16 RX ADMIN — FAMOTIDINE 20 MG: 20 TABLET, FILM COATED ORAL at 08:58

## 2020-09-16 RX ADMIN — FAMOTIDINE 20 MG: 20 TABLET, FILM COATED ORAL at 20:50

## 2020-09-16 RX ADMIN — DULOXETINE 60 MG: 60 CAPSULE, DELAYED RELEASE ORAL at 08:58

## 2020-09-16 RX ADMIN — PROPRANOLOL HYDROCHLORIDE 10 MG: 20 TABLET ORAL at 14:34

## 2020-09-16 RX ADMIN — POLYETHYLENE GLYCOL 3350 17 G: 17 POWDER, FOR SOLUTION ORAL at 20:50

## 2020-09-16 RX ADMIN — HYDROXYZINE HYDROCHLORIDE 50 MG: 50 TABLET, FILM COATED ORAL at 20:50

## 2020-09-16 NOTE — GROUP NOTE
Group Therapy Note    Date: 9/16/2020    Group Start Time: 0900  Group End Time: 0920  Group Topic: Community Meeting    [facility]    Patient refused to attend community meeting and goal setting group at 0900 after encouragement from staff. 1:1 talk time offered by staff as alternative to group session.

## 2020-09-16 NOTE — GROUP NOTE
Group Therapy Note    Date: 9/16/2020    Group Start Time: 1330  Group End Time: 6964  Group Topic: Cognitive Skills    RENEE Senior    Patient refused to attend leisure/ cognitive skills group at 1330 after encouragement from staff. 1:1 talk time provided by staff.        Signature:  Antony Senior
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
## 2020-09-16 NOTE — PLAN OF CARE
Problem: Depressive Behavior With or Without Suicide Precautions:  Goal: Ability to disclose and discuss suicidal ideas will improve  Description: Ability to disclose and discuss suicidal ideas will improve  Outcome: Ongoing  Pt states that he is feeling better. He denied current suicidal ideations. Problem: Depressive Behavior With or Without Suicide Precautions:  Goal: Absence of self-harm  Description: Absence of self-harm  Outcome: Ongoing  He did not have any episodes of self harm this shift. Problem: Pain:  Goal: Pain level will decrease  Description: Pain level will decrease  Outcome: Ongoing  Pt denies current pain. Problem: Pain:  Goal: Control of acute pain  Description: Control of acute pain  Outcome: Ongoing  Pt denies current pain. Problem: Pain:  Goal: Control of chronic pain  Description: Control of chronic pain  Outcome: Ongoing   Pt denies current pain.

## 2020-09-16 NOTE — GROUP NOTE
Group Therapy Note    Date: 9/16/2020    Group Start Time: 1100  Group End Time: 1479  Group Topic: Psychoeducation    PATI Burns    Patient refused to attend socialization skills group at 1100 after encouragement from staff. 1:1 talk time offered by staff as alternative to group session.

## 2020-09-16 NOTE — PLAN OF CARE
Problem: Depressive Behavior With or Without Suicide Precautions:  Goal: Ability to disclose and discuss suicidal ideas will improve  Description: Ability to disclose and discuss suicidal ideas will improve  9/15/2020 2231 by Lianne Black LPN  Outcome: Ongoing  Note: Patient denies suicidal ideation at this time. Problem: Depressive Behavior With or Without Suicide Precautions:  Goal: Absence of self-harm  Description: Absence of self-harm  9/15/2020 2231 by Lianne Black LPN  Outcome: Ongoing  Note: Patient denies thoughts of self-harm at this time. Problem: Substance Abuse:  Goal: Absence of drug withdrawal signs and symptoms  Description: Absence of drug withdrawal signs and symptoms  9/15/2020 2231 by Lianne Black LPN  Outcome: Ongoing  Note: Patient has no drug withdrawal signs and symptoms at this time. Problem: Pain:  Goal: Pain level will decrease  Description: Pain level will decrease  Outcome: Ongoing  Note: Patient admits that her pain level has decreased at this time.

## 2020-09-17 PROCEDURE — 6370000000 HC RX 637 (ALT 250 FOR IP): Performed by: PSYCHIATRY & NEUROLOGY

## 2020-09-17 PROCEDURE — 99232 SBSQ HOSP IP/OBS MODERATE 35: CPT | Performed by: PSYCHIATRY & NEUROLOGY

## 2020-09-17 PROCEDURE — 99231 SBSQ HOSP IP/OBS SF/LOW 25: CPT | Performed by: INTERNAL MEDICINE

## 2020-09-17 PROCEDURE — 1240000000 HC EMOTIONAL WELLNESS R&B

## 2020-09-17 PROCEDURE — 6370000000 HC RX 637 (ALT 250 FOR IP): Performed by: INTERNAL MEDICINE

## 2020-09-17 RX ORDER — OLANZAPINE 5 MG/1
2.5 TABLET ORAL NIGHTLY
Status: DISCONTINUED | OUTPATIENT
Start: 2020-09-17 | End: 2020-09-18 | Stop reason: HOSPADM

## 2020-09-17 RX ADMIN — PROPRANOLOL HYDROCHLORIDE 10 MG: 20 TABLET ORAL at 13:26

## 2020-09-17 RX ADMIN — MIRTAZAPINE 15 MG: 15 TABLET, FILM COATED ORAL at 23:21

## 2020-09-17 RX ADMIN — OLANZAPINE 2.5 MG: 5 TABLET, FILM COATED ORAL at 23:21

## 2020-09-17 RX ADMIN — HYDROXYZINE HYDROCHLORIDE 50 MG: 50 TABLET, FILM COATED ORAL at 23:21

## 2020-09-17 RX ADMIN — FAMOTIDINE 20 MG: 20 TABLET, FILM COATED ORAL at 08:24

## 2020-09-17 RX ADMIN — DULOXETINE 60 MG: 60 CAPSULE, DELAYED RELEASE ORAL at 08:24

## 2020-09-17 RX ADMIN — PROPRANOLOL HYDROCHLORIDE 10 MG: 20 TABLET ORAL at 08:24

## 2020-09-17 RX ADMIN — PROPRANOLOL HYDROCHLORIDE 10 MG: 20 TABLET ORAL at 23:22

## 2020-09-17 RX ADMIN — FAMOTIDINE 20 MG: 20 TABLET, FILM COATED ORAL at 23:21

## 2020-09-17 RX ADMIN — LURASIDONE HYDROCHLORIDE 60 MG: 60 TABLET, FILM COATED ORAL at 17:44
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
## 2020-09-17 NOTE — PLAN OF CARE
Pediatric Well Child Exam: 11-12 Years of age    Chief Complaint   Patient presents with   • Well Adolescent     12 yr wce       The past medical, surgical, and family histories, problem list, immunization records, recent labs, medications, and allergies were all reviewed carefully prior to the visit.     SUBJECTIVE:  Dennys Flores is a 12 year old male who presents for a well child exam.  Patient presents  with Mother.    CONCERNS RAISED TODAY: football and basketball.    Diagnosed with ADHD in Dec 2014 but has not been treated with medication.   The answers to the Preparticipation Physical Evaluation History Form were reviewed and were all noncontributory or addressed below.      DIET/GI:  Calcium Source: 1/d   Servings of fruits and vegetables: no veggies, some fruit  Eats meat regularly: Yes   Beverages other than water: Yes  Juice 3 x 16 oz / d   Problems with bowel movements: Yes problems if he drinks milk or has cheese  If BMI is > 85 th percentile, frequency of eating outside the home: in the past year, was about daily but in the past month, have been cutting back     NEED FOR LIPID SCREENING:   Criteria for full fasting lipid panel:  Family Hx: (First- and second-degree relatives include parents, sibling, aunts, uncles, and grandparents) of premature coronary artery disease (ie, heart attack, treated angina, interventions for coronary artery disease, stroke, or sudden cardiac death) in a first- or second-degree male relative <55 years or female relative <65 years, or known dyslipidemia (eg, familial hypercholesterolemia) or total cholesterol (TC) >240 mg/dL (6.2 mmol/L) in either parent): No  Personal Risk Factors (Hypertension, BMI > 95 th percentile, Kawasaki disease,  Chronic inflammatory disease (eg, systemic lupus erythematosus, juvenile idiopathic arthritis), HIV infection, Diabetes (type 1 or type 2), Chronic kidney disease/end-stage renal disease/post-renal transplant, Post-orthotopic heart  Problem: Depressive Behavior With or Without Suicide Precautions:  Goal: Ability to disclose and discuss suicidal ideas will improve  Description: Ability to disclose and discuss suicidal ideas will improve  Outcome: Ongoing  Patient denies suicidal ideations. Agreeable to talking with staff if thoughts to harm self arise. Q15min checks maintained for safety. Problem: Depressive Behavior With or Without Suicide Precautions:  Goal: Absence of self-harm  Description: Absence of self-harm  Outcome: Ongoing  Safe environment maintained and patient remains free from self-harm. Agreeable to seeking staff should thoughts to harm self or others arise. Q15min checks for safety. transplant, or Nephrotic syndrome): No    PHYSICAL ACTIVITY        Playtime (60 min/day):Yes         Electronic Screen time more than 2 hours per day? Yes 3 - 4 hrs/ d      SLEEP PATTERN:  (recommended sleep duration for this age = 9 - 11 hrs/ night according to the National Sleep Foundation)  Usual bedtime is 10 pm (on school days).  Problems falling asleep?: Yes but the ears are ringing a lot since approximately 12/2016, takes 20 minutes to fall asleep, TV is on when falling asleep (recommended that this be out of the room)  Usually wakes up at 6 am (on school days).  Taking naps: Yes 2 hours daily  Snoring: No    DENTAL:   Drinking fluoridated water: No  - (I recommended using bottled water that contains fluoride)   Brushing teeth: once every 2 weeks  Has seen dentist in the last year: No 2 years ago     SCHOOL HISTORY:  Facility Type: Attending Public School - .  Grade in school: Seventh Grade    ATHLETIC SCREENING:   Family history of sudden death under the age of 50 yrs: No  Chest pain, palpitations or syncope during exercise: No  History of Concussion/ Head trauma: No  History of Fracture or severe sprain: No    VISION SCREENING:   No exam data present     ANTICIPATORY GUIDANCE:       Topics discussed at this visit:   Seatbelt use: [x]  YES     []  NO  Wears sometimes only  Helmet use: [x]  YES     []  NO  Smoke & Carbon Monoxide detectors in the home: [x]  YES     [] NO  Guns in the home locked up: [x]  YES     []  NO  Trampoline use: [x]  YES     []  NO  Diet: [x]  YES     []  NO  Dental care: [x]  YES     []  NO  Sleep hygiene/ quantity: [x]  YES     []  NO  Appropriate screen time: [x]  YES     []  NO  Adequate exercise: [x]  YES     []  NO  Obesity prevention: [x]  YES     []  NO  Protection against the Sun: []  YES     [x]  NO    DEVELOPMENT:   [x]  YES    []  NO      []  UNKNOWN    Has success in school?  [x]  YES    []  NO      []  UNKNOWN    Has friends/does well socially?  [x]  YES    []  NO      []   UNKNOWN    Participates in after school/outside activities?  [x]  YES    []  NO      []  UNKNOWN    Gets along with family?  []  YES    [x]  NO      []  UNKNOWN    Does chores when asked?  Needs to be asked several times  [x]  YES    []  NO      []  UNKNOWN    Given chances to make own decisions?  [x]  YES    []  NO      []  UNKNOWN    Feels good about self/generally happy?    MEDICATIONS:  none      No outpatient prescriptions have been marked as taking for the 17 encounter (Office Visit) with Soniya Hdez MD.       PROVIDER ONLY QUESTIONS:     Substance use:   Smoking: No  Alcohol: No  Illicit Drugs: No    Sex Activity: No    Suicide/Mental Health:   Dennys rates his mood as a \"10\"on a 1 - 10 scale, 10 being \"the happiest person in the world\" and 1 being \"the saddest person in the world.\"   Suicidal ideation: No  Bullied or abused: No  Problems making or keeping friends: No     PHYSICAL EXAM:    VITAL SIGNS:   Visit Vitals  /72 (BP Location: [facility], Patient Position: Sitting, Cuff Size: Regular)   Pulse 108   Temp 98.2 °F (36.8 °C) (Oral)   Resp 18   Ht 5' 7.5\" (1.715 m)   Wt 67.8 kg   BMI 23.06 kg/m²    97 %ile (Z= 1.83) based on CDC 2-20 Years weight-for-age data using vitals from 2017.   Blood pressure percentiles are 77 % systolic and 74 % diastolic based on NHBPEP's 4th Report. Blood pressure percentile targets: 90: 126/80, 95: 130/84, 99 + 5 mmH/97.   General: Healthy appearing.  Normally nourished.  No excessive body fat  Eyes: Conjunctivae without discharge or erythema.  Lids appear normal.  Gaze is conjugate.  Normal red reflex. Alternate cover testing is normal.    Ears:  Right: External structures are normal.  Canal without erythema or swelling.    TM is normal. No middle ear effusion.    Left: External structures are normal.  Canal without erythema or swelling.    TM is normal. No middle ear effusion.   Nose: No drainage or obstruction.   Oral cavity: Mucosa is normal.  Pharynx  is normal. Teeth are in good condition.  Neck: No restriction of movement.  No thyromegaly.   Lymph nodes: Supraclavicular nodes are not palpable. Cervical nodes are normal in size and are not tender.   Breasts: Rod stage I.  Lungs: No stridor.  No retractions.  Normal respiratory effort.  Breath sounds are equal and clear to auscultation bilaterally.    Heart: Regular rate and rhythm, S1 and S2 are normal.  No click, rub, gallop or other abnormal sounds.  No murmur.  Pedal pulse is palpable.   Abdomen:  No hepatosplenomegaly.  Soft and nontender.  No masses.   Skin: No rashes or lesions.  No abnormal nevi.  Neurologic: Active, alert and interacting with examiner appropriately for age.  Symmetrical muscle mass, strength all extremities.  Appropriate affect. Normal speech.  Musculoskeletal: No swollen, warm or deformed joints.  No limitation of movement with normal activity. NL sports examination.    Genitourinary:  4 - 5     Testes are down bilaterally. I asked Dennys if he would prefer to have a parent or nurse in the room for the genital examination and he asked to have his mother return to the room.   Back: Spine straight.  Permission was obtained, from the child, prior to examination of the entire skin, breasts, buttocks & pubic region and before lifting the shirt for the cardiac and lung examination.      Assessment/ PLAN:     1. Encounter for routine child health examination with abnormal findings  Normal growth and development.  See above for the anticipatory guidance and other issues that were discussed.  The Bright Futures handout from the American Academy of Pediatrics was provided.    - CHOLESTEROL; Future  - CHOLESTEROL HDL; Future    2. Need for vaccination  - HPV VACCINE 9 VALENT; Standing  - HPV VACCINE 9 VALENT    3. BMI (body mass index), pediatric, 85% to less than 95% for age  I discussed that the BMI was elevated but this was not due to excessive body fat.      4. Tinnitus of both ears  This is  quite an unusual complaint for this age and should be evaluated.  I asked them to keep a detailed diary of the occurrences prior to the ENT visit.    - SERVICE TO ENT    5. Inappropriate diet and eating habits  His eating and lifestyle habits are not healthy so I provided the family with the \"Eat at Least Five Fruits and Vegetables, Limit Recreational Screen time to Two Hours or Less, Get at Least One Hour of Physical Activity Every Day, and 0 Sugary drinks...Drink Water\" brochures from the \"[employer] Let's Go!\" web site.  The handout \"Do You Think That Juice is a Healthy Choice or an Occasional Treat?\" handout from the \"[employer] Let's Go!\" web site was given and The \"Calcium and You\" handout from the American Academy of Pediatrics was provided.      6. Attention deficit hyperactivity disorder (ADHD), unspecified ADHD type  Not currently treated and is reportedly doing well in school.      Soniya Hdez MD

## 2020-09-17 NOTE — GROUP NOTE
Group Therapy Note    Date: 9/17/2020    Group Start Time: 1005  Group End Time: 3150  Group Topic: Psychoeducation    RENEE Etienne, CTRS    Patient refused to attend creative expression skills group at 1005 after encouragement from staff. 1:1 talk time offered by staff as alternative to group session.
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
## 2020-09-17 NOTE — GROUP NOTE
Group Therapy Note    Date: 9/17/2020    Group Start Time: 1430  Group End Time: 1520  Group Topic: Recreational    [address] [facility]      Patient declined to attend recreation group at [phone] despite encouragement from staff. 1:1 talk time offered by staff as alternative to group session.       Signature:  Johan Pereyra

## 2020-09-17 NOTE — GROUP NOTE
Wellness Group Note  Group Topic: Positive Coping Skills    Date: September 17, 2020  [address] [facility]    Patient declined to attend wellness group despite staff encouragement. 1:1 talk time was offered by other staff as an alternative. Staff will continue to encourage patient to attend unit programming.     Signature:  Reine Jeans

## 2020-09-17 NOTE — PLAN OF CARE
Problem: Depressive Behavior With or Without Suicide Precautions:  Goal: Ability to disclose and discuss suicidal ideas will improve  Description: Ability to disclose and discuss suicidal ideas will improve  9/16/2020 2157 by Sourav Crespo LPN  Outcome: Ongoing  Note: Patient denies suicidal ideations at this time. Patient agrees to seek out staff if they begin having suicidal ideations or need to talk. Q15min safety checks continue      Problem: Depressive Behavior With or Without Suicide Precautions:  Goal: Absence of self-harm  Description: Absence of self-harm  9/16/2020 2157 by Sourav Crespo LPN  Outcome: Ongoing  Note: Patient denies thoughts of self harm at this time. Patient agrees to seek out staff if they begin having thoughts of harming self or they need to talk. He reports increased depression and anxiety 8/10. He has been out in the milieu, selectively social and calm.  Q15min safety checks continue
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
## 2020-09-17 NOTE — GROUP NOTE
Group Therapy Note    Date: 9/17/2020    Group Start Time: 1330  Group End Time: 4067  Group Topic: Psychoeducation    RENEE Martinez    Patient declined to attend coping skills group at 1330 despite encouragement from staff. 1:1 talk time offered by staff as alternative to group session.       Signature:  Gisselle Saunders

## 2020-09-17 NOTE — GROUP NOTE
Group Therapy Note    Date: 9/17/2020    Group Start Time: 0900  Group End Time: 0915  Group Topic: Community Meeting    [address] [facility]    Patient refused to attend community meeting and goal setting group at 0900 after encouragement from staff. 1:1 talk time offered by staff as alternative to group session.

## 2020-09-18 VITALS
SYSTOLIC BLOOD PRESSURE: 123 MMHG | TEMPERATURE: 97.6 F | OXYGEN SATURATION: 99 % | HEART RATE: 60 BPM | WEIGHT: 175 LBS | HEIGHT: 64 IN | BODY MASS INDEX: 29.88 KG/M2 | RESPIRATION RATE: 14 BRPM | DIASTOLIC BLOOD PRESSURE: 86 MMHG

## 2020-09-18 PROCEDURE — 6370000000 HC RX 637 (ALT 250 FOR IP): Performed by: PSYCHIATRY & NEUROLOGY

## 2020-09-18 PROCEDURE — 6370000000 HC RX 637 (ALT 250 FOR IP): Performed by: INTERNAL MEDICINE

## 2020-09-18 PROCEDURE — 99239 HOSP IP/OBS DSCHRG MGMT >30: CPT | Performed by: PSYCHIATRY & NEUROLOGY

## 2020-09-18 RX ORDER — MIRTAZAPINE 15 MG/1
15 TABLET, FILM COATED ORAL NIGHTLY
Qty: 30 TABLET | Refills: 3 | Status: ON HOLD | OUTPATIENT
Start: 2020-09-18 | End: 2022-07-27 | Stop reason: HOSPADM

## 2020-09-18 RX ORDER — DULOXETIN HYDROCHLORIDE 60 MG/1
60 CAPSULE, DELAYED RELEASE ORAL DAILY
Qty: 30 CAPSULE | Refills: 3 | Status: ON HOLD | OUTPATIENT
Start: 2020-09-19 | End: 2022-07-27 | Stop reason: HOSPADM

## 2020-09-18 RX ORDER — FAMOTIDINE 20 MG/1
20 TABLET, FILM COATED ORAL 2 TIMES DAILY
Qty: 60 TABLET | Refills: 3 | Status: ON HOLD | OUTPATIENT
Start: 2020-09-18 | End: 2022-07-27 | Stop reason: SDUPTHER

## 2020-09-18 RX ORDER — OLANZAPINE 2.5 MG/1
2.5 TABLET ORAL NIGHTLY
Qty: 30 TABLET | Refills: 3 | Status: ON HOLD | OUTPATIENT
Start: 2020-09-18 | End: 2022-07-27 | Stop reason: HOSPADM

## 2020-09-18 RX ADMIN — DULOXETINE 60 MG: 60 CAPSULE, DELAYED RELEASE ORAL at 08:11

## 2020-09-18 RX ADMIN — PROPRANOLOL HYDROCHLORIDE 10 MG: 20 TABLET ORAL at 14:00

## 2020-09-18 RX ADMIN — FAMOTIDINE 20 MG: 20 TABLET, FILM COATED ORAL at 08:06

## 2020-09-18 RX ADMIN — PROPRANOLOL HYDROCHLORIDE 10 MG: 20 TABLET ORAL at 08:06

## 2020-09-18 ASSESSMENT — PAIN SCALES - GENERAL: PAINLEVEL_OUTOF10: 7

## 2020-09-18 NOTE — GROUP NOTE
Group Therapy Note    Date: 9/18/2020    Group Start Time: 0900  Group End Time: 0920  Group Topic: Community Meeting    [facility]    Patient refused to attend community meeting and goal setting group at 0900 after encouragement from staff. 1:1 talk time offered by staff as alternative to group session.

## 2020-09-18 NOTE — GROUP NOTE
Group Therapy Note    Date: 9/18/2020    Group Start Time: 1000  Group End Time: 1020  Group Topic: Psychotherapy    [facility]    BRIAN Broussard, LISSETH        Group Therapy Note    patient refused to attend psychotherapy group at 10am after encouragement from staff.          Signature:  BRIAN Broussard, LISSETH

## 2020-09-18 NOTE — DISCHARGE INSTR - COC
Continuity of Care Form    Patient Name: Shiva Lambert   :  1970  MRN:  [id number]    Admit date:  2020  Discharge date:  ***    Code Status Order: Full Code   Advance Directives:   Advance Care Flowsheet Documentation     Date/Time Healthcare Directive Type of Healthcare Directive Copy in [address] Agent's Name Healthcare Agent's Phone Number    20 [phone]  No, patient does not have an advance directive for healthcare treatment -- -- -- -- --          Admitting Physician:  No admitting provider for patient encounter. PCP: No primary care provider on file.     Discharging Nurse: [facility] Unit/Room#: 0128/0128-02  Discharging Unit Phone Number: ***    Emergency Contact:   Extended Emergency Contact Information  Primary Emergency Contact: None,None   [address] Phone: [phone]  Relation: Other    Past Surgical History:  Past Surgical History:   Procedure Laterality Date    FRACTURE SURGERY         Immunization History:   Immunization History   Administered Date(s) Administered    Influenza, Quadv, IM, PF (6 mo and older Fluzone, Flulaval, Fluarix, and 3 yrs and older Afluria) 10/21/2019    Tdap (Boostrix, Adacel) 10/17/2018       Active Problems:  Patient Active Problem List   Diagnosis Code    Recurrent depression ([facility] F33.9    Major depression, recurrent ([facility] F33.9    Bipolar affective disorder ([address] F31.9    Bipolar disorder ([facility] F31.9    Depression, major, recurrent ([facility] F33.9    Major depressive disorder, recurrent ([facility] F33.9       Isolation/Infection:   Isolation          No Isolation        Patient Infection Status     Infection Onset Added Last Indicated Last Indicated By Review Planned Expiration Resolved Resolved By    None active    Resolved    COVID-19 Rule Out 20 COVID-19 (Ordered)   20 Rule-Out Test Resulted          Nurse Assessment:  Last Vital Signs: /86   Pulse 60 Temp 97.6 °F (36.4 °C) (Oral)   Resp 14   Ht 5' 4\" (1.626 m)   Wt 175 lb (79.4 kg)   SpO2 99%   BMI 30.04 kg/m²     Last documented pain score (0-10 scale): Pain Level: 7  Last Weight:   Wt Readings from Last 1 Encounters:   20 175 lb (79.4 kg)     Mental Status:  {IP PT MENTAL STATUS:}    IV Access:  { SOFY IV ACCESS:274076269}    Nursing Mobility/ADLs:  Walking   {CHP DME XPOP:955551381}  Transfer  {CHP DME HMKE:696641250}  Bathing  {CHP DME SXSU:383875095}  Dressing  {CHP DME USIA:631871741}  Toileting  {CHP DME LTDZ:380566500}  Feeding  {CHP DME GOIO:838913917}  Med Admin  {[facility] DME LUVC:501814192}  Med Delivery   {[facility] MED Delivery:417193962}    Wound Care Documentation and Therapy:        Elimination:  Continence:   · Bowel: {YES / ZF:97971}  · Bladder: {YES / LR:20326}  Urinary Catheter: {Urinary Catheter:611526567}   Colostomy/Ileostomy/Ileal Conduit: {YES / HB:19291}       Date of Last BM: ***  No intake or output data in the 24 hours ending 20 1429  No intake/output data recorded.     Safety Concerns:     [address] Safety Concerns:059855481}    Impairments/Disabilities:      [address] Impairments/Disabilities:754110271}    Nutrition Therapy:  Current Nutrition Therapy:   [address] Diet List:636296865}    Routes of Feeding: {CHP DME Other Feedings:039315914}  Liquids: {Slp liquid thickness:97717}  Daily Fluid Restriction: {CHP DME Yes amt example:136168640}  Last Modified Barium Swallow with Video (Video Swallowing Test): {Done Not Done YRHX:443170996}    Treatments at the Time of Hospital Discharge:   Respiratory Treatments: ***  Oxygen Therapy:  {Therapy; copd oxygen:95452}  Ventilator:    { CC Vent NANJ:648520951}    Rehab Therapies: {THERAPEUTIC INTERVENTION:8801498137}  Weight Bearing Status/Restrictions: [address]A Weight Bearin}  Other Medical Equipment (for information only, NOT a DME order):  {EQUIPMENT:845489329}  Other Treatments: ***    Patient's personal belongings (please select all that are sent with patient):  {CHTHOMAS DME Belongings:866836147}    RN SIGNATURE:  {Esignature:055478841}    CASE MANAGEMENT/SOCIAL WORK SECTION    Inpatient Status Date: ***    Readmission Risk Assessment Score:  Readmission Risk              Risk of Unplanned Readmission:        6           Discharging to Facility/ Agency   · Name:   · Address:  · Phone:  · Fax:    Dialysis Facility (if applicable)   · Name:  · Address:  · Dialysis Schedule:  · Phone:  · Fax:    / signature: {Esignature:955957742}    PHYSICIAN SECTION    Prognosis: {Prognosis:5836009504}    Condition at Discharge: [address] [place] [address] Patient Condition:672979846}    Rehab Potential (if transferring to Rehab): {Prognosis:1059716305}    Recommended Labs or Other Treatments After Discharge: ***    Physician Certification: I certify the above information and transfer of Isaias Reed  is necessary for the continuing treatment of the diagnosis listed and that he requires {Admit to Appropriate Level of Care:38299} for {GREATER/LESS:133630548} 30 days.      Update Admission H&P: {CHP DME Changes in KIW:573995826}    PHYSICIAN SIGNATURE:  {Esignature:253700958}

## 2020-09-18 NOTE — GROUP NOTE
Group Therapy Note    Date: 9/18/2020    Group Start Time: 1330  Group End Time: 8700  Group Topic: Psychoeducation    RENEE Hill, CTRS    Patient refused to attend socialization group at 1330 after encouragement from staff. 1:1 talk time offered by staff as alternative to group session.

## 2020-09-18 NOTE — GROUP NOTE
Group Therapy Note    Date: 9/18/2020    Group Start Time: 1430  Group End Time: 9732  Group Topic: Recreational    [facility] MAYUR Worley, CTRS        Group Therapy Note    Attendees: 8/17    patient refused to attend decision making group at  after encouragement from staff. 1:1 talk time provided as alternative to group session.            Signature:  Anupama Worley, [address]

## 2020-09-18 NOTE — BH NOTE
Patient given tobacco quitline number 56896719225 at this time, refusing to call at this time, states \" I just dont want to quit now\"- patient given information as to the dangers of long term tobacco use. Continue to reinforce the importance of tobacco cessation.

## 2020-09-18 NOTE — PLAN OF CARE
Problem: Depressive Behavior With or Without Suicide Precautions:  Goal: Ability to disclose and discuss suicidal ideas will improve  Description: Ability to disclose and discuss suicidal ideas will improve  9/17/2020 2136 by Rea Oshea  Outcome: Ongoing  Note: Patient has denied suicidal ideations this evening, patient admits to depression and anxiety. Patient denied visual and auditory hallucination at this time. Patient has been isolated to room. Patient has been calm, cooperative this evening. Q 15 minutes' safety checks maintained for safety. Problem: Depressive Behavior With or Without Suicide Precautions:  Goal: Absence of self-harm  Description: Absence of self-harm  9/17/2020 2136 by Rea Oshea  Outcome: Ongoing  Note: Patient has denied suicidal ideations this evening, patient admits to depression and anxiety. Patient denied visual and auditory hallucination at this time. Patient has been isolated to room. Patient has been calm, cooperative this evening. Q 15 minutes' safety checks maintained for safety.

## 2020-09-22 NOTE — CARE COORDINATION
Name: Patricia Mayen    : 1970    Discharge Date: 2020    Primary Auth/Cert #: [id number]    Discharge Medications:      Medication List      START taking these medications    famotidine 20 MG tablet  Commonly known as:  PEPCID  Take 1 tablet by mouth 2 times daily  Notes to patient: For reflux     mirtazapine 15 MG tablet  Commonly known as:  REMERON  Take 1 tablet by mouth nightly  Notes to patient: For mood/sleep     OLANZapine 2.5 MG tablet  Commonly known as:  ZYPREXA  Take 1 tablet by mouth nightly  Notes to patient: To help clear thoughts         CHANGE how you take these medications    lurasidone 60 MG Tabs tablet  Commonly known as:  LATUDA  Take 1 tablet by mouth Daily with supper  What changed:    · medication strength  · how much to take  Notes to patient: To help stabilize mood         CONTINUE taking these medications    albuterol sulfate  (90 Base) MCG/ACT inhaler  Commonly known as:  Ventolin HFA  Inhale 2 puffs into the lungs every 6 hours as needed for Wheezing  Notes to patient:   For wheezing     DULoxetine 60 MG extended release capsule  Commonly known as:  CYMBALTA  Take 1 capsule by mouth daily  Notes to patient:  For depression     hydrOXYzine 25 MG tablet  Commonly known as:  ATARAX  Take 1 tablet by mouth 2 times daily as needed for Anxiety  Notes to patient:  For anxiety      propranolol 10 MG tablet  Commonly known as:  INDERAL  Take 1 tablet by mouth 3 times daily  Notes to patient:  For high blood pressure        STOP taking these medications    divalproex 500 MG extended release tablet  Commonly known as:  DEPAKOTE ER     traZODone 150 MG tablet  Commonly known as:  DESYREL           Where to Get Your Medications      These medications were sent to [address] [address]    Phone:  [phone]   · DULoxetine 60 MG extended release capsule  · famotidine 20 MG tablet  · lurasidone 60 MG Tabs tablet  · mirtazapine 15 MG tablet  · OLANZapine 2.5 MG tablet         Follow Up Appointment: [address] [facility]   [address]  [address]  [phone]    Go on 9/25/2020  9:15am with Martina Canela (please wear a mask into the office)

## 2022-07-22 ENCOUNTER — HOSPITAL ENCOUNTER (INPATIENT)
Age: 52
LOS: 5 days | Discharge: HOME | DRG: 885 | End: 2022-07-27
Attending: EMERGENCY MEDICINE | Admitting: PSYCHIATRY & NEUROLOGY
Payer: MEDICARE

## 2022-07-22 DIAGNOSIS — F32.A DEPRESSION WITH SUICIDAL IDEATION: Primary | ICD-10-CM

## 2022-07-22 DIAGNOSIS — R45.851 DEPRESSION WITH SUICIDAL IDEATION: Primary | ICD-10-CM

## 2022-07-22 LAB
ABSOLUTE EOS #: 0.1 K/UL (ref 0–0.4)
ABSOLUTE LYMPH #: 1 K/UL (ref 1–4.8)
ABSOLUTE MONO #: 0.7 K/UL (ref 0.1–1.3)
ACETAMINOPHEN LEVEL: <5 UG/ML (ref 10–30)
ALBUMIN SERPL-MCNC: 3.9 G/DL (ref 3.5–5.2)
ALP BLD-CCNC: 78 U/L (ref 40–129)
ALT SERPL-CCNC: 44 U/L (ref 5–41)
ANION GAP SERPL CALCULATED.3IONS-SCNC: 9 MMOL/L (ref 9–17)
AST SERPL-CCNC: 32 U/L
BASOPHILS # BLD: 2 % (ref 0–2)
BASOPHILS ABSOLUTE: 0.1 K/UL (ref 0–0.2)
BILIRUB SERPL-MCNC: 0.34 MG/DL (ref 0.3–1.2)
BUN BLDV-MCNC: 11 MG/DL (ref 6–20)
CALCIUM SERPL-MCNC: 10 MG/DL (ref 8.6–10.4)
CHLORIDE BLD-SCNC: 98 MMOL/L (ref 98–107)
CO2: 26 MMOL/L (ref 20–31)
CREAT SERPL-MCNC: 0.86 MG/DL (ref 0.7–1.2)
EOSINOPHILS RELATIVE PERCENT: 1 % (ref 0–4)
ETHANOL PERCENT: <0.01 %
ETHANOL: <10 MG/DL
GFR AFRICAN AMERICAN: >60 ML/MIN
GFR NON-AFRICAN AMERICAN: >60 ML/MIN
GFR SERPL CREATININE-BSD FRML MDRD: ABNORMAL ML/MIN/{1.73_M2}
GLUCOSE BLD-MCNC: 97 MG/DL (ref 70–99)
HCT VFR BLD CALC: 46.8 % (ref 41–53)
HEMOGLOBIN: 15.9 G/DL (ref 13.5–17.5)
LYMPHOCYTES # BLD: 15 % (ref 24–44)
MCH RBC QN AUTO: 34.7 PG (ref 26–34)
MCHC RBC AUTO-ENTMCNC: 33.9 G/DL (ref 31–37)
MCV RBC AUTO: 102.3 FL (ref 80–100)
MONOCYTES # BLD: 11 % (ref 1–7)
PDW BLD-RTO: 13.8 % (ref 11.5–14.9)
PLATELET # BLD: 323 K/UL (ref 150–450)
PMV BLD AUTO: 6.8 FL (ref 6–12)
POTASSIUM SERPL-SCNC: 4.7 MMOL/L (ref 3.7–5.3)
RBC # BLD: 4.57 M/UL (ref 4.5–5.9)
SALICYLATE LEVEL: <1 MG/DL (ref 3–10)
SEG NEUTROPHILS: 71 % (ref 36–66)
SEGMENTED NEUTROPHILS ABSOLUTE COUNT: 4.8 K/UL (ref 1.3–9.1)
SODIUM BLD-SCNC: 133 MMOL/L (ref 135–144)
TOTAL PROTEIN: 7.3 G/DL (ref 6.4–8.3)
WBC # BLD: 6.7 K/UL (ref 3.5–11)

## 2022-07-22 PROCEDURE — 80143 DRUG ASSAY ACETAMINOPHEN: CPT

## 2022-07-22 PROCEDURE — 6370000000 HC RX 637 (ALT 250 FOR IP): Performed by: EMERGENCY MEDICINE

## 2022-07-22 PROCEDURE — 80179 DRUG ASSAY SALICYLATE: CPT

## 2022-07-22 PROCEDURE — 99285 EMERGENCY DEPT VISIT HI MDM: CPT

## 2022-07-22 PROCEDURE — 6370000000 HC RX 637 (ALT 250 FOR IP): Performed by: PSYCHIATRY & NEUROLOGY

## 2022-07-22 PROCEDURE — 6370000000 HC RX 637 (ALT 250 FOR IP)

## 2022-07-22 PROCEDURE — 80053 COMPREHEN METABOLIC PANEL: CPT

## 2022-07-22 PROCEDURE — 36415 COLL VENOUS BLD VENIPUNCTURE: CPT

## 2022-07-22 PROCEDURE — 85025 COMPLETE CBC W/AUTO DIFF WBC: CPT

## 2022-07-22 PROCEDURE — G0480 DRUG TEST DEF 1-7 CLASSES: HCPCS

## 2022-07-22 PROCEDURE — 1240000000 HC EMOTIONAL WELLNESS R&B

## 2022-07-22 RX ORDER — LORAZEPAM 1 MG/1
2 TABLET ORAL EVERY 6 HOURS PRN
Status: DISCONTINUED | OUTPATIENT
Start: 2022-07-22 | End: 2022-07-27 | Stop reason: HOSPADM

## 2022-07-22 RX ORDER — TRAZODONE HYDROCHLORIDE 50 MG/1
50 TABLET ORAL NIGHTLY PRN
Status: DISCONTINUED | OUTPATIENT
Start: 2022-07-22 | End: 2022-07-27 | Stop reason: HOSPADM

## 2022-07-22 RX ORDER — PROPRANOLOL HYDROCHLORIDE 20 MG/1
TABLET ORAL
Status: COMPLETED
Start: 2022-07-22 | End: 2022-07-22

## 2022-07-22 RX ORDER — FAMOTIDINE 20 MG/1
20 TABLET, FILM COATED ORAL 2 TIMES DAILY
Status: DISCONTINUED | OUTPATIENT
Start: 2022-07-22 | End: 2022-07-27 | Stop reason: HOSPADM

## 2022-07-22 RX ORDER — MAGNESIUM HYDROXIDE/ALUMINUM HYDROXICE/SIMETHICONE 120; 1200; 1200 MG/30ML; MG/30ML; MG/30ML
30 SUSPENSION ORAL EVERY 6 HOURS PRN
Status: DISCONTINUED | OUTPATIENT
Start: 2022-07-22 | End: 2022-07-27 | Stop reason: HOSPADM

## 2022-07-22 RX ORDER — HYDROXYZINE 50 MG/1
50 TABLET, FILM COATED ORAL 3 TIMES DAILY PRN
Status: DISCONTINUED | OUTPATIENT
Start: 2022-07-22 | End: 2022-07-27 | Stop reason: HOSPADM

## 2022-07-22 RX ORDER — ALBUTEROL SULFATE 90 UG/1
2 AEROSOL, METERED RESPIRATORY (INHALATION) EVERY 6 HOURS PRN
Status: DISCONTINUED | OUTPATIENT
Start: 2022-07-22 | End: 2022-07-27 | Stop reason: HOSPADM

## 2022-07-22 RX ORDER — IBUPROFEN 400 MG/1
400 TABLET ORAL EVERY 6 HOURS PRN
Status: DISCONTINUED | OUTPATIENT
Start: 2022-07-22 | End: 2022-07-27 | Stop reason: HOSPADM

## 2022-07-22 RX ORDER — POLYETHYLENE GLYCOL 3350 17 G/17G
17 POWDER, FOR SOLUTION ORAL DAILY PRN
Status: DISCONTINUED | OUTPATIENT
Start: 2022-07-22 | End: 2022-07-27 | Stop reason: HOSPADM

## 2022-07-22 RX ORDER — PROPRANOLOL HYDROCHLORIDE 20 MG/1
10 TABLET ORAL 3 TIMES DAILY
Status: DISCONTINUED | OUTPATIENT
Start: 2022-07-22 | End: 2022-07-27 | Stop reason: HOSPADM

## 2022-07-22 RX ORDER — DULOXETIN HYDROCHLORIDE 60 MG/1
60 CAPSULE, DELAYED RELEASE ORAL DAILY
Status: DISCONTINUED | OUTPATIENT
Start: 2022-07-22 | End: 2022-07-27 | Stop reason: HOSPADM

## 2022-07-22 RX ORDER — NICOTINE 21 MG/24HR
1 PATCH, TRANSDERMAL 24 HOURS TRANSDERMAL DAILY
Status: DISCONTINUED | OUTPATIENT
Start: 2022-07-22 | End: 2022-07-27 | Stop reason: HOSPADM

## 2022-07-22 RX ORDER — MIRTAZAPINE 15 MG/1
15 TABLET, FILM COATED ORAL NIGHTLY
Status: DISCONTINUED | OUTPATIENT
Start: 2022-07-22 | End: 2022-07-24

## 2022-07-22 RX ORDER — ACETAMINOPHEN 325 MG/1
650 TABLET ORAL EVERY 6 HOURS PRN
Status: DISCONTINUED | OUTPATIENT
Start: 2022-07-22 | End: 2022-07-27 | Stop reason: HOSPADM

## 2022-07-22 RX ORDER — OLANZAPINE 5 MG/1
2.5 TABLET ORAL NIGHTLY
Status: DISCONTINUED | OUTPATIENT
Start: 2022-07-22 | End: 2022-07-24

## 2022-07-22 RX ORDER — HALOPERIDOL 5 MG
5 TABLET ORAL EVERY 6 HOURS PRN
Status: DISCONTINUED | OUTPATIENT
Start: 2022-07-22 | End: 2022-07-27 | Stop reason: HOSPADM

## 2022-07-22 RX ADMIN — PROPRANOLOL HYDROCHLORIDE 10 MG: 20 TABLET ORAL at 20:39

## 2022-07-22 RX ADMIN — OLANZAPINE 2.5 MG: 5 TABLET, FILM COATED ORAL at 20:37

## 2022-07-22 RX ADMIN — DULOXETINE 60 MG: 60 CAPSULE, DELAYED RELEASE ORAL at 17:16

## 2022-07-22 RX ADMIN — FAMOTIDINE 20 MG: 20 TABLET ORAL at 20:38

## 2022-07-22 RX ADMIN — MIRTAZAPINE 15 MG: 15 TABLET, FILM COATED ORAL at 20:38

## 2022-07-22 RX ADMIN — LURASIDONE HYDROCHLORIDE 60 MG: 60 TABLET, FILM COATED ORAL at 17:16

## 2022-07-22 ASSESSMENT — LIFESTYLE VARIABLES
HOW OFTEN DO YOU HAVE A DRINK CONTAINING ALCOHOL: PATIENT DECLINED
HOW MANY STANDARD DRINKS CONTAINING ALCOHOL DO YOU HAVE ON A TYPICAL DAY: PATIENT DOES NOT DRINK
HOW MANY STANDARD DRINKS CONTAINING ALCOHOL DO YOU HAVE ON A TYPICAL DAY: PATIENT DECLINED
HOW OFTEN DO YOU HAVE A DRINK CONTAINING ALCOHOL: NEVER

## 2022-07-22 ASSESSMENT — ENCOUNTER SYMPTOMS
ABDOMINAL PAIN: 0
SHORTNESS OF BREATH: 0
COUGH: 0

## 2022-07-22 ASSESSMENT — SLEEP AND FATIGUE QUESTIONNAIRES
SLEEP PATTERN: RESTLESSNESS
DO YOU HAVE DIFFICULTY SLEEPING: YES
AVERAGE NUMBER OF SLEEP HOURS: 6
DO YOU USE A SLEEP AID: NO

## 2022-07-22 ASSESSMENT — PATIENT HEALTH QUESTIONNAIRE - PHQ9: SUM OF ALL RESPONSES TO PHQ QUESTIONS 1-9: 15
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
## 2022-07-22 NOTE — CARE COORDINATION
BHI Biopsychosocial Assessment    Current Level of Psychosocial Functioning      Independent   Dependent  X  Minimal Assist      Comments:   Patient has a provisional diagnosis of Depression with suicidal ideations, which is the condition established to be chiefly responsible for the admission until more information is gathered during assessments, treatment teams, and 1:1 meetings. Psychosocial High-Risk Factors (check all that apply)     Unable to obtain meds   Chronic illness/pain    Not taking medications X  Substance abuse X  Lack of Family Support X  Addictive Behaviors X  Financial stress   Isolation X  Inadequate Community Resources X  Intentional suicide  Suicidal ideations X  Homicidal ideations  Self-mutilation  Victim of crime   Legal issues  Developmental Delay  Unable to manage personal needs    Age 72 or older   Homeless  No transportation   Readmission within 30 days   Unemployment X  Traumatic Event X    Psychiatric Advanced Directives:   Nothing reported     Family to Involve in Treatment:  Family not involved in treatment     Sexual Orientation:   Single     Patient Strengths:  Housing, SSI income, health insurance, linked     Patient Barriers:   Alcohol abuse, isolates    Trauma and Abuse/History of Violence:  Nothing reported     Opiate/AOD Referral and/or Education Provided:   Long history of alcohol abuse     CMHC/mental health history:   [facility]     Plan of Care:  Medication management, group/individual therapies, family meetings, psychoeducation, 1:1 counseling, treatment team meetings to assist with stabilization      Initial Discharge Plan:  Stabilize mood and medications; explore social support systems within community to increase socialization; provide 24/7 local and national hotline numbers for additional support; safety plan to be completed; disclose/discuss suicidal ideas will improve, decrease depressive symptoms, absence of self-harm.     Discharge Location:  D/C to apartment and address on face-sheet; follow-up with [facility]; provide information on AA meetings and IOP/PCP offered at [address] [place] [address] Way Summary:   Harry Aparicio is a 77-year-old male who presents to the ED for suicidal ideation with a plan to obtain a gun and shoot himself. Patient states his good friend shot and killed himself about 1 week ago. Patient reports increase in depression and suicidal thoughts since then. Patient states \"I have had enough of life. I can't blame my friend because I get it. \" Patient states he has been off his medications for the last week. Patient states he has not left his house for the last week and states he has been isolating himself and crying every day. Patient repots poor appetite. Patient linked with mental health services at [facility]. Patient displays no abnormal movements, speech rate, or tone. Articulations were within normal limits. Patient's memory was intact. Thought form was linear. Patient denies obsessions or compulsions. Patient denies homicidal ideation. Patient denies auditory and visual hallucinations. Patient states his mother  by suicide when pt was 3years old, states his sister then raised him, and sister  a few years ago after she had a seizure then fell down stairs. Patient reports he is isolated [place] lot,\" states he has 1 brother with whom he has minimal contact and no extended relatives. Patient reports he has a positive relationship with his  who assisted him in obtaining social security and supportive/subsidized housing at [facility].

## 2022-07-22 NOTE — BH NOTE
RT unable to meet with pt during this shift to complete assessment due to pt meeting with another staff member. RT will seek out pt to complete assessment during next shift on 7/23/2022.
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
## 2022-07-22 NOTE — ED NOTES
Safeguard in [facility] AN AFFILIATE OF [facility] for patient watch. Safeguard informed that they need to stay with the patient at all time, must be present in the room and if they need a break or relief to let the nurse know so they can be replaced. Safeguard verbalizes understanding. Belongings and patient checked by security. Belongings locked up. Pt in blue gown.       Frederick Resendez RN  07/22/22 3415

## 2022-07-22 NOTE — BH NOTE
Patient given tobacco quitline number [phone] at this time. With nurse observation patient called number for information and follow up. Continue to reinforce the dangers of long term tobacco use and why tobacco cessation is important to patient.
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
## 2022-07-22 NOTE — BH NOTE
Pt arrived to [facility] room #226-2 at 1420 ambulatory with 2 [facility] staff.

## 2022-07-22 NOTE — ED TRIAGE NOTES
Mode of arrival (squad #, walk in, police, etc) : walk in        Chief complaint(s): Suicidal Ideation        Arrival Note (brief scenario, treatment PTA, etc). : Pt states he has been thinking about killing himself, pt states his plan is to shoot himself. C= \"Have you ever felt that you should Cut down on your drinking? \"  No  A= \"Have people Annoyed you by criticizing your drinking? \"  No  G= \"Have you ever felt bad or Guilty about your drinking? \"  No  E= \"Have you ever had a drink as an Eye-opener first thing in the morning to steady your nerves or to help a hangover? \"  No      Deferred []      Reason for deferring: N/A    *If yes to two or more: probable alcohol abuse. *

## 2022-07-22 NOTE — BH NOTE
On call provider, Dr. Yasmeen Gaytan, notified of best practice advisory suggesting to place patient on suicide precautions.  Provider to discontinue the order as patient does not meet criteria for suicide precautions at this time

## 2022-07-23 PROBLEM — F32.A DEPRESSION WITH SUICIDAL IDEATION: Status: ACTIVE | Noted: 2018-12-15

## 2022-07-23 PROBLEM — F33.2 SEVERE RECURRENT MAJOR DEPRESSION WITHOUT PSYCHOTIC FEATURES (HCC): Status: ACTIVE | Noted: 2022-07-23

## 2022-07-23 PROBLEM — R45.851 DEPRESSION WITH SUICIDAL IDEATION: Status: ACTIVE | Noted: 2018-12-15

## 2022-07-23 PROCEDURE — 99223 1ST HOSP IP/OBS HIGH 75: CPT | Performed by: PSYCHIATRY & NEUROLOGY

## 2022-07-23 PROCEDURE — 6370000000 HC RX 637 (ALT 250 FOR IP): Performed by: PSYCHIATRY & NEUROLOGY

## 2022-07-23 PROCEDURE — 1240000000 HC EMOTIONAL WELLNESS R&B

## 2022-07-23 PROCEDURE — 6370000000 HC RX 637 (ALT 250 FOR IP)

## 2022-07-23 PROCEDURE — APPSS60 APP SPLIT SHARED TIME 46-60 MINUTES: Performed by: PSYCHIATRY & NEUROLOGY

## 2022-07-23 PROCEDURE — 6370000000 HC RX 637 (ALT 250 FOR IP): Performed by: EMERGENCY MEDICINE

## 2022-07-23 RX ORDER — OLANZAPINE 5 MG/1
TABLET ORAL
Status: COMPLETED
Start: 2022-07-23 | End: 2022-07-23

## 2022-07-23 RX ORDER — PROPRANOLOL HYDROCHLORIDE 20 MG/1
TABLET ORAL
Status: COMPLETED
Start: 2022-07-23 | End: 2022-07-23

## 2022-07-23 RX ADMIN — PROPRANOLOL HYDROCHLORIDE 10 MG: 20 TABLET ORAL at 20:48

## 2022-07-23 RX ADMIN — OLANZAPINE 2.5 MG: 5 TABLET, FILM COATED ORAL at 20:47

## 2022-07-23 RX ADMIN — PROPRANOLOL HYDROCHLORIDE 10 MG: 20 TABLET ORAL at 08:26

## 2022-07-23 RX ADMIN — FAMOTIDINE 20 MG: 20 TABLET ORAL at 08:26

## 2022-07-23 RX ADMIN — FAMOTIDINE 20 MG: 20 TABLET ORAL at 20:49

## 2022-07-23 RX ADMIN — LURASIDONE HYDROCHLORIDE 60 MG: 60 TABLET, FILM COATED ORAL at 21:28

## 2022-07-23 RX ADMIN — PROPRANOLOL HYDROCHLORIDE 10 MG: 20 TABLET ORAL at 15:35

## 2022-07-23 RX ADMIN — DULOXETINE 60 MG: 60 CAPSULE, DELAYED RELEASE ORAL at 08:26

## 2022-07-23 NOTE — GROUP NOTE
Group Therapy Note    Date: 7/23/2022    Group Start Time: 0900  Group End Time: 0915  Group Topic: Community Meeting    [address] [place] [address] Meeting Group Note        Date: July 23, 2022 Start Time: 9am  End Time: 10am      Number of Participants in Group & Unit Census:  18/18    Topic: Goals Group    Goal of Group: To establish a goal for the day. Comments:     Patient did not participate in [employer] group, despite staff encouragement and explanation of benefits. Patient remain seclusive to self. Q15 minute safety checks maintained for patient safety and will continue to encourage patient to attend unit programming.         Discipline Responsible: Behavorial Health Tech      Signature:  Nola Garrison
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
## 2022-07-23 NOTE — PLAN OF CARE
Problem: Depression  Goal: Will be euthymic at discharge  Description: INTERVENTIONS:  1. Administer medication as ordered  2. Provide emotional support via 1:1 interaction with staff  3. Encourage involvement in milieu/groups/activities  4. Monitor for social isolation  7/23/2022 1044 by Sulma Hyman  Outcome: Progressing   Pt isolative to room only out for medication, requests, and meals. Pt was cooperative and friendly during assessment.

## 2022-07-23 NOTE — GROUP NOTE
Group Therapy Note    Date: 7/23/2022    Group Start Time: 1335  Group End Time: 2124  Group Topic: Cognitive Skills    [facility]    Jonatan Carroll    Cognitive Skills Group Note        Date: July 23, 2022 Start Time:  1335   End Time:  7698      Number of Participants in [address]:  5/18    Topic: cognitive skills     Goal of Group: improve decision making skills by participating in activity       Comments:     Patient did not participate in Cognitive Skills group, despite staff encouragement and explanation of benefits. Patient remain seclusive to self. Q15 minute safety checks maintained for patient safety and will continue to encourage patient to attend unit programming.          Signature:  Jonatan Carroll
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
## 2022-07-23 NOTE — PLAN OF CARE
Problem: Self Harm/Suicidality  Goal: Will have no self-injury during hospital stay  Description: INTERVENTIONS:  1. Q 30 MINUTES: Routine safety checks  2. Q SHIFT & PRN: Assess risk to determine if routine checks are adequate to maintain patient safety  Outcome: Progressing     Problem: Depression  Goal: Will be euthymic at discharge  Description: INTERVENTIONS:  1. Administer medication as ordered  2. Provide emotional support via 1:1 interaction with staff  3. Encourage involvement in milieu/groups/activities  4. Monitor for social isolation  Outcome: Progressing     Patient remains free from self harm and injury. Patient denies active suicidal thoughts during assessment and interview. Patient verbally contracts for safety. Patient encouraged to seek out staff if thoughts of self harm arise. A safe environment and Q 15 min checks maintained. Patient endorses feeling depressed. Patient encouraged to attend unit programming. Patient encouraged to participate in ADLs. Patient did perform oral care. Patient has been in behavioral control and compliant. Writer will continue to provide emotional support. Artem Dobbins will continue to monitor the patients physical and mental status. Writer will continue to provide presence and reassurance.

## 2022-07-23 NOTE — GROUP NOTE
Group Therapy Note    Date: 7/22/2022    Group Start Time: 2030  Group End Time: 2050  Group Topic: Relaxation    [facility]    Fara Jackson RN        Group Therapy Note    Attendees: 7/16       Status After Intervention:  Improved    Participation Level:  Active Listener    Participation Quality: Appropriate      Speech:  normal      Thought Process/Content: Logical      Affective Functioning: Flat      Mood: anxious      Level of consciousness:  Alert      Response to Learning: Able to verbalize current knowledge/experience      Endings: None Reported    Modes of Intervention: Education      Discipline Responsible: Behavorial Health Tech      Signature:  Fara Jackson RN

## 2022-07-24 PROCEDURE — 99232 SBSQ HOSP IP/OBS MODERATE 35: CPT | Performed by: PSYCHIATRY & NEUROLOGY

## 2022-07-24 PROCEDURE — 6370000000 HC RX 637 (ALT 250 FOR IP): Performed by: PSYCHIATRY & NEUROLOGY

## 2022-07-24 PROCEDURE — 6370000000 HC RX 637 (ALT 250 FOR IP)

## 2022-07-24 PROCEDURE — 6370000000 HC RX 637 (ALT 250 FOR IP): Performed by: INTERNAL MEDICINE

## 2022-07-24 PROCEDURE — 6370000000 HC RX 637 (ALT 250 FOR IP): Performed by: EMERGENCY MEDICINE

## 2022-07-24 PROCEDURE — 1240000000 HC EMOTIONAL WELLNESS R&B

## 2022-07-24 PROCEDURE — 99223 1ST HOSP IP/OBS HIGH 75: CPT | Performed by: INTERNAL MEDICINE

## 2022-07-24 RX ORDER — LISINOPRIL 20 MG/1
20 TABLET ORAL DAILY
Status: DISCONTINUED | OUTPATIENT
Start: 2022-07-24 | End: 2022-07-27 | Stop reason: HOSPADM

## 2022-07-24 RX ORDER — PROPRANOLOL HYDROCHLORIDE 20 MG/1
TABLET ORAL
Status: COMPLETED
Start: 2022-07-24 | End: 2022-07-24

## 2022-07-24 RX ADMIN — FAMOTIDINE 20 MG: 20 TABLET ORAL at 20:59

## 2022-07-24 RX ADMIN — DULOXETINE 60 MG: 60 CAPSULE, DELAYED RELEASE ORAL at 07:46

## 2022-07-24 RX ADMIN — PROPRANOLOL HYDROCHLORIDE 10 MG: 20 TABLET ORAL at 07:46

## 2022-07-24 RX ADMIN — FAMOTIDINE 20 MG: 20 TABLET ORAL at 07:46

## 2022-07-24 RX ADMIN — PROPRANOLOL HYDROCHLORIDE 10 MG: 20 TABLET ORAL at 21:00

## 2022-07-24 RX ADMIN — LURASIDONE HYDROCHLORIDE 60 MG: 60 TABLET, FILM COATED ORAL at 17:27

## 2022-07-24 RX ADMIN — PROPRANOLOL HYDROCHLORIDE 10 MG: 20 TABLET ORAL at 15:14

## 2022-07-24 RX ADMIN — LISINOPRIL 20 MG: 20 TABLET ORAL at 17:27

## 2022-07-24 NOTE — GROUP NOTE
Group Therapy Note    Date: 7/23/2022    Group Start Time: 2030  Group End Time: 2100  Group Topic: Wrap-Up    [facility]    [facility]    Wrap-Up and Relaxation Group Note        Date: July 23, 2022 Start Time:  2030   End Time:  2100      Number of Participants in [address]:  6/16    Topic: Wrap-Up and Relaxation    Goal of Group:Patients completed evening wrap-up and relaxation group. Organic coping skills to cope with hospitalization and other stressors were discussed such as television, coloring, puzzles, and reaching out to supportive friends and family members. Comments:     Patient did not participate in Wrap-Up and Relaxation group, despite staff encouragement and explanation of benefits. Patient remain seclusive to self. Q15 minute safety checks maintained for patient safety and will continue to encourage patient to attend unit programming.        Signature:  [facility]

## 2022-07-24 NOTE — GROUP NOTE
Group Therapy Note    Date: 7/24/2022    Group Start Time: 7910  Group End Time: 4495  Group Topic: Cognitive Skills    [facility]    Karyle Crook, [place]    Cognitive Skills Group Note        Date: July 24, 2022 Start Time: 1:45 pm  End Time: 2:20 pm      Number of Participants in Group & Unit Census:  3/15    Topic: Cognitive skills    Goal of Group: To increase cognitive thinking, concentration and decision making skill as evidenced by participating in the group activity. To increase interpersonal interactions through communicating with peers. Comments:     Patient did not participate in Cognitive Skills group, despite staff encouragement and explanation of benefits. Patient remain seclusive to self. Q15 minute safety checks maintained for patient safety and will continue to encourage patient to attend unit programming.         Signature:  Karyle Crook, [place]

## 2022-07-24 NOTE — GROUP NOTE
Group Start Time: 0900  Group End Time: 0915  Group Topic: [address] [facility]    [id number] [facility] [address] Meeting Group Note        Date: July 24, 2022 Start Time: 9am  End Time: 10am      Number of Participants in Group & Unit Census:  13/17    Topic: Goals Group    Goal of Group: To establish a goal for the day. Comments:     Patient did not participate in [employer] group, despite staff encouragement and explanation of benefits. Patient remain seclusive to self. Q15 minute safety checks maintained for patient safety and will continue to encourage patient to attend unit programming.               Discipline Responsible: Behavorial Health Tech      Signature:  Clark Chávez

## 2022-07-24 NOTE — PLAN OF CARE
Problem: Self Harm/Suicidality  Goal: Will have no self-injury during hospital stay  Description: INTERVENTIONS:  1. Q 30 MINUTES: Routine safety checks  2. Q SHIFT & PRN: Assess risk to determine if routine checks are adequate to maintain patient safety  7/24/2022 1222 by Ama Denson  Outcome: Progressing    Pt denies suicidal ideations and thoughts of self harm. Pt isolative to room, out for requests, meals and medications. Cooperative with staff and medication compliant. Poor ADLs, encouraged patient to shower.

## 2022-07-24 NOTE — PROGRESS NOTES
Daily Progress Note  7/24/2022    Patient Name: Shantel Santana: Depression with suicidal ideation and plan to shoot self         SUBJECTIVE:    Nursing staff report the patient has maintained medication adherence and has not exhibited acute behavioral changes since his admission yesterday. Seema Can is agreeable to assessment in the privacy of [employer] where he reports his mood as \"very nervous\". He explains that he is aware that his blood pressure has been somewhat elevated and is concerned that he too may have a heart attack just as his father did when he was young. We reviewed his historical documentation since admission regarding his vital signs and he is aware that internal medicine will complete a history and physical as well. At this time patient denies headache or dizziness but does report some numbness to his left hand that has been present several months on and off particularly when he is laying in bed. We discussed the possibility of a pinched nerve and he confirms some neck discomfort and is encouraged to discuss with internal medicine team.    Patient continues to endorse suicidal ideation as it relates to his recent loss of a friend who completed suicide. He has remained isolative overall today and witer encouraged patient to attend groups on the unit however he reports he is very uncomfortable around strangers. He is offered assurance that he may leave the group programming at any time if it becomes difficult for him to manage. At this time, the patient is not appropriate for a lower level of care. There is risk of decompensation and patient warrants further hospitalization for safety and stabilization.     Appetite:  [] Normal/Adequate/Unchanged  [] Increased  [x] Decreased      Sleep:       [] Normal/Adequate/Unchanged  [x] Fair  [] Poor      Group Attendance on Unit:   [] Yes  [] Selectively    [x] No    Medication Side Effects: Patient denies any medication side effects at the time of assessment. Mental Status Exam  Level of consciousness: Alert and awake. Appearance: Appropriate attire for setting, seated in chair, with poor grooming and hygiene. Behavior/Motor: Approachable, no psychomotor abnormalities. Attitude toward examiner: Cooperative, attentive, good eye contact. Speech: Normal rate, normal volume, normal tone. Mood:  Patient reports \" down and nervous\". Affect: Congruent, blunted  Thought processes: Linear coherent and slow. Thought content: Denies homicidal ideation. Remains very focused on the death of a friend  Suicidal Ideation: Endorses active suicidal ideations, with a current plan or intent, contracts for safety on the unit. Delusions: No evidence of delusions endorses paranoia. Perceptual Disturbance: Patient does not appear to be responding to internal stimuli. Denies auditory hallucinations. Denies visual hallucinations. Cognition: Oriented to self location and situation. Memory: Impaired currently. Insight & Judgement: Fair as he is seeking help. Data   height is 5' 7\" (1.702 m) and weight is 170 lb (77.1 kg). His oral temperature is 97.7 °F (36.5 °C). His blood pressure is 130/92 (abnormal) and his pulse is 50. His respiration is 14 and oxygen saturation is 98%.    Labs:   Admission on 07/22/2022   Component Date Value Ref Range Status    WBC 07/22/2022 6.7  3.5 - 11.0 k/uL Final    RBC 07/22/2022 4.57  4.5 - 5.9 m/uL Final    Hemoglobin 07/22/2022 15.9  13.5 - 17.5 g/dL Final    Hematocrit 07/22/2022 46.8  41 - 53 % Final    MCV 07/22/2022 102.3 (A) 80 - 100 fL Final    MCH 07/22/2022 34.7 (A) 26 - 34 pg Final    MCHC 07/22/2022 33.9  31 - 37 g/dL Final    RDW 07/22/2022 13.8  11.5 - 14.9 % Final    Platelets 06/04/0336 323  150 - 450 k/uL Final    MPV 07/22/2022 6.8  6.0 - 12.0 fL Final    Seg Neutrophils 07/22/2022 71 (A) 36 - 66 % Final    Lymphocytes 07/22/2022 15 (A) 24 - 44 % Final    Monocytes 07/22/2022 11 (A) 1 - 7 % Final    Eosinophils % 07/22/2022 1  0 - 4 % Final    Basophils 07/22/2022 2  0 - 2 % Final    Segs Absolute 07/22/2022 4.80  1.3 - 9.1 k/uL Final    Absolute Lymph # 07/22/2022 1.00  1.0 - 4.8 k/uL Final    Absolute Mono # 07/22/2022 0.70  0.1 - 1.3 k/uL Final    Absolute Eos # 07/22/2022 0.10  0.0 - 0.4 k/uL Final    Basophils Absolute 07/22/2022 0.10  0.0 - 0.2 k/uL Final    Glucose 07/22/2022 97  70 - 99 mg/dL Final    BUN 07/22/2022 11  6 - 20 mg/dL Final    Creatinine 07/22/2022 0.86  0.70 - 1.20 mg/dL Final    Calcium 07/22/2022 10.0  8.6 - 10.4 mg/dL Final    Sodium 07/22/2022 133 (A) 135 - 144 mmol/L Final    Potassium 07/22/2022 4.7  3.7 - 5.3 mmol/L Final    Chloride 07/22/2022 98  98 - 107 mmol/L Final    CO2 07/22/2022 26  20 - 31 mmol/L Final    Anion Gap 07/22/2022 9  9 - 17 mmol/L Final    Alkaline Phosphatase 07/22/2022 78  40 - 129 U/L Final    ALT 07/22/2022 44 (A) 5 - 41 U/L Final    AST 07/22/2022 32  <40 U/L Final    Total Bilirubin 07/22/2022 0.34  0.3 - 1.2 mg/dL Final    Total Protein 07/22/2022 7.3  6.4 - 8.3 g/dL Final    Albumin 07/22/2022 3.9  3.5 - 5.2 g/dL Final    GFR Non- 07/22/2022 >60  >60 mL/min Final    GFR  07/22/2022 >60  >60 mL/min Final    GFR Comment 07/22/2022        Final    Comment: Average GFR for 52-63 years old:   80 mL/min/1.73sq m  Chronic Kidney Disease:   <60 mL/min/1.73sq m  Kidney failure:   <15 mL/min/1.73sq m              eGFR calculated using average adult body mass. Additional eGFR calculator available at:        [facility]            Ethanol 07/22/2022 <10  <10 mg/dL Final    Ethanol percent 07/22/2022 <0.010  % Final    Acetaminophen Level 07/22/2022 <5 (A) 10 - 30 ug/mL Final    Salicylate Lvl 09/14/8871 <1 (A) 3 - 10 mg/dL Final         Reviewed patient's current plan of care and vital signs with nursing staff.     Labs reviewed: [x] Yes  Last EKG in EMR reviewed: [x] Yes  QTc: 436    Medications  Current Facility-Administered Medications: nicotine (NICODERM CQ) 21 MG/24HR 1 patch, 1 patch, TransDERmal, Daily  propranolol (INDERAL) tablet 10 mg, 10 mg, Oral, TID  albuterol sulfate HFA (PROVENTIL;VENTOLIN;PROAIR) 108 (90 Base) MCG/ACT inhaler 2 puff, 2 puff, Inhalation, Q6H PRN  lurasidone (LATUDA) tablet 60 mg, 60 mg, Oral, Dinner  famotidine (PEPCID) tablet 20 mg, 20 mg, Oral, BID  DULoxetine (CYMBALTA) extended release capsule 60 mg, 60 mg, Oral, Daily  acetaminophen (TYLENOL) tablet 650 mg, 650 mg, Oral, Q6H PRN  ibuprofen (ADVIL;MOTRIN) tablet 400 mg, 400 mg, Oral, Q6H PRN  hydrOXYzine HCl (ATARAX) tablet 50 mg, 50 mg, Oral, TID PRN  traZODone (DESYREL) tablet 50 mg, 50 mg, Oral, Nightly PRN  polyethylene glycol (GLYCOLAX) packet 17 g, 17 g, Oral, Daily PRN  aluminum & magnesium hydroxide-simethicone (MAALOX) 200-200-20 MG/5ML suspension 30 mL, 30 mL, Oral, Q6H PRN  haloperidol (HALDOL) tablet 5 mg, 5 mg, Oral, Q6H PRN **AND** LORazepam (ATIVAN) tablet 2 mg, 2 mg, Oral, Q6H PRN    ASSESSMENT  Severe recurrent major depression without psychotic features ([facility] [address]         HANDOFF  Patient symptoms remain unstable  Medications as determined by attending physician  Pending internal medicine H&P with requests for evaluation of hypertension  Encourage participation in groups and milieu. Probable discharge is to be determined by MD    Electronically signed by MIKE Hazel CNP on 7/24/2022 at 4:18 PM    **This report has been created using voice recognition software. It may contain minor errors which are inherent in voice recognition technology. **  I independently saw and evaluated the patient. I reviewed the  documentation above. Any additional comments or changes to the   documentation are stated below otherwise agree with assessment. The patient reports getting palpitations after taking his nighttime medication. He is reporting worsening anxiety with this.   We will stop his nighttime olanzapine. PLAN  Medications as noted above  Attempt to develop insight  Psycho-education conducted. Estimated Length of Stay is 3-5 days  Supportive Therapy conducted.   Follow-up daily while on inpatient unit    Electronically signed by Gissel Lee MD on 7/24/22 at 4:42 PM EDT
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
## 2022-07-24 NOTE — GROUP NOTE
Group Therapy Note    Date: 7/24/2022    Group Start Time: 1025  Group End Time: 1030  Group Topic: Psychoeducation    [facility]    BRIAN Sanchez, LISSETH        Group Therapy Note    Attendees:0/17     Patient refused to attend psychoeducation group at 10:25 am after encouragement from staff. 1:1 talk time provided as alternative to group session.       Discipline Responsible: /Counselor      Signature:  BRIAN Sanchez, LISSETH
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
## 2022-07-24 NOTE — PLAN OF CARE
Problem: Self Harm/Suicidality  Goal: Will have no self-injury during hospital stay  Description: INTERVENTIONS:  1. Q 30 MINUTES: Routine safety checks  2. Q SHIFT & PRN: Assess risk to determine if routine checks are adequate to maintain patient safety  7/23/2022 2105 by Parvin Navas RN  Outcome: Progressing     Problem: Depression  Goal: Will be euthymic at discharge  Description: INTERVENTIONS:  1. Administer medication as ordered  2. Provide emotional support via 1:1 interaction with staff  3. Encourage involvement in milieu/groups/activities  4. Monitor for social isolation  7/23/2022 2105 by Parvin Navas RN  Outcome: Progressing   Patient remains free from self harm and injury. Patient denies active suicidal thoughts during assessment and interview. Patient verbally contracts for safety. Patient encouraged to seek out staff if thoughts of self harm arise. A safe environment and Q 15 min checks maintained. Patient endorses feeling depressed. Patient encouraged to attend unit programming. Patient has been in behavioral control and compliant. Patient does report having nightmares yesterday evening and refused to take his Remeron. Patient believes the Remeron was the cause of the nightmares. Writer will continue to provide emotional support. Amrit Rapp will continue to monitor the patients physical and mental status. Writer will continue to provide presence and reassurance.

## 2022-07-25 PROCEDURE — 99232 SBSQ HOSP IP/OBS MODERATE 35: CPT | Performed by: PSYCHIATRY & NEUROLOGY

## 2022-07-25 PROCEDURE — 6370000000 HC RX 637 (ALT 250 FOR IP): Performed by: INTERNAL MEDICINE

## 2022-07-25 PROCEDURE — 6370000000 HC RX 637 (ALT 250 FOR IP): Performed by: PSYCHIATRY & NEUROLOGY

## 2022-07-25 PROCEDURE — 6370000000 HC RX 637 (ALT 250 FOR IP): Performed by: EMERGENCY MEDICINE

## 2022-07-25 PROCEDURE — 1240000000 HC EMOTIONAL WELLNESS R&B

## 2022-07-25 PROCEDURE — APPSS30 APP SPLIT SHARED TIME 16-30 MINUTES: Performed by: PSYCHIATRY & NEUROLOGY

## 2022-07-25 RX ADMIN — FAMOTIDINE 20 MG: 20 TABLET ORAL at 21:03

## 2022-07-25 RX ADMIN — PROPRANOLOL HYDROCHLORIDE 10 MG: 20 TABLET ORAL at 08:23

## 2022-07-25 RX ADMIN — PROPRANOLOL HYDROCHLORIDE 10 MG: 20 TABLET ORAL at 21:02

## 2022-07-25 RX ADMIN — PROPRANOLOL HYDROCHLORIDE 10 MG: 20 TABLET ORAL at 13:42

## 2022-07-25 RX ADMIN — FAMOTIDINE 20 MG: 20 TABLET ORAL at 08:23

## 2022-07-25 RX ADMIN — LISINOPRIL 20 MG: 20 TABLET ORAL at 08:24

## 2022-07-25 RX ADMIN — DULOXETINE 60 MG: 60 CAPSULE, DELAYED RELEASE ORAL at 08:23

## 2022-07-25 RX ADMIN — LURASIDONE HYDROCHLORIDE 60 MG: 60 TABLET, FILM COATED ORAL at 16:58

## 2022-07-25 ASSESSMENT — LIFESTYLE VARIABLES
HOW MANY STANDARD DRINKS CONTAINING ALCOHOL DO YOU HAVE ON A TYPICAL DAY: PATIENT DECLINED
HOW OFTEN DO YOU HAVE A DRINK CONTAINING ALCOHOL: PATIENT DECLINED

## 2022-07-25 NOTE — PLAN OF CARE
Problem: Self Harm/Suicidality  Goal: Will have no self-injury during hospital stay  Outcome: Progressing Towards Goal     Problem: Depression  Goal: Will be euthymic at discharge  Outcome: Progressing Towards Goal     Pt is calm, controlled upon approach, cooperative with treatment. Pt attends groups, is social and pleasant within the milieu. Pt is med compliant, completes ADLs without prompting, and reports good sleep patterns as well as a good appetite. Denies SI, HI, AVH at this time. Q15min checks maintained for safety.

## 2022-07-25 NOTE — GROUP NOTE
Group Therapy Note    Date: 7/25/2022    Group Start Time: 1430  Group End Time: 8006  Group Topic: Cognitive Skills    [facility]    Ana Krishnamurthy, [address]        Group Therapy Note    Attendees: 0/15     Cognitive Skills Group Note     Date: 7/25/2022          Start Time: 14:30            End Time: 15:15     Number of Participants in Group & Unit Census:   0/17        Topic: Self Esteem Boosters and Busters: explore positive personal qualities, skills, activities to maintain positive self esteem. Goal of Group: To increase social interaction and to practice self expression, explore positive personal qualities, skills, activities to maintain positive self esteem. Discussion to examine boosters/ways to maintain positive self esteem, and limit self esteem busters. .       Comments: Patient did not participate in Cognitive Skills group, despite staff encouragement and explanation of benefits. Patient remains seclusive to self. Q15 minute safety checks maintained for patient safety and will continue to encourage patient to attend unit programming. RT did round x3 and offer 1:1 discussion r/t topic, and waited in group area to provide group for any late arrivals for 30 mins after approaching pt's in rooms.         Discipline Responsible: Psychoeducational Specialist        Signature:  An Marte

## 2022-07-25 NOTE — GROUP NOTE
Group Therapy Note    Date: 7/25/2022    Group Start Time: 1100  Group End Time: 1150  Group Topic: Cognitive Skills    [facility]    Jeanette Xavier, [address]        Group Therapy Note    Attendees: 4/17       Cognitive Skills Group Note     Date: 7/25/2022          Start Time: 11:00am                      End Time: 11:50am     Number of Participants in Group & Unit Census:   4/17        Topic: Decision making, weighing pros and cons of choices, and communication skills. Goal of Group: To increase social interaction and to practice decision making, weighing pros and cons of choices, and communication skills. Comments: Patient did not participate in Cognitive Skills group, despite staff encouragement and explanation of benefits. Patient remains seclusive to self. Q15 minute safety checks maintained for patient safety and will continue to encourage patient to attend unit programming.            Discipline Responsible: Psychoeducational Specialist        Signature:  Danika Mcintosh

## 2022-07-25 NOTE — GROUP NOTE
Group Therapy Note    Date: 7/25/2022    Group Start Time: 0900  Group End Time: 0930  Group Topic: Group Documentation    [facility]    Robby Parsons, RN    Community Meeting Group Note        Date: July 25, 2022 Start Time: 9am  End Time: 09:30      Number of Participants in Group & Unit Census:  4/17    Topic: Goal Group-    Goal of Group:to set up long and short term goals       Comments:     Patient did not participate in [employer] group, despite staff encouragement and explanation of benefits. Patient remain seclusive to self. Q15 minute safety checks maintained for patient safety and will continue to encourage patient to attend unit programming.

## 2022-07-25 NOTE — PLAN OF CARE
Problem: Self Harm/Suicidality  Goal: Will have no self-injury during hospital stay  Description: INTERVENTIONS:  1. Q 30 MINUTES: Routine safety checks  2. Q SHIFT & PRN: Assess risk to determine if routine checks are adequate to maintain patient safety  7/24/2022 2352 by Jeanette Grimm RN  Outcome: Progressing     Problem: Depression  Goal: Will be euthymic at discharge  Description: INTERVENTIONS:  1. Administer medication as ordered  2. Provide emotional support via 1:1 interaction with staff  3. Encourage involvement in milieu/groups/activities  4. Monitor for social isolation  7/24/2022 2352 by Jeanette Grimm RN  Outcome: Progressing    Patient remains free from self harm and injury. Patient denies active suicidal thoughts during assessment and interview. Patient verbally contracts for safety. Patient encouraged to seek out staff if thoughts of self harm arise. A safe environment and Q 15 min checks maintained. Patient endorses feeling depressed. Patient encouraged to attend unit programming. Patient has been in behavioral control and compliant. Writer will continue to provide emotional support. Jose Antonio Sanders will continue to monitor the patients physical and mental status. Writer will continue to provide presence and reassurance.

## 2022-07-25 NOTE — GROUP NOTE
Group Therapy Note    Date: 7/25/2022    Group Start Time: 1000  Group End Time: 1030  Group Topic: Psychotherapy    [facility]EL[facility] Alon[facility]        Group Therapy Note         Patient refused to attend psychotherapy group after encouragement from staff. 1:1 talk time offered but refused. Signature:   Khanh Alonzo

## 2022-07-25 NOTE — PROGRESS NOTES
Daily Progress Note  7/25/2022    Patient Name: Lamont Ash: Depression with suicidal ideation and plan to shoot self         SUBJECTIVE:    Nursing staff report the patient has maintained medication adherence and has not exhibited acute behavioral changes since his admission yesterday. Patient is seen for a follow-up assessment. Patient reports he feels depressed. He rates his depression a 9 on a scale of 0-10 scale with 10 being the worst. He reports anxiety as a 9 as well on the same scale. He states he does not feel ready for discharge and reports having fleeting suicidal ideations. He states that his friend shot himself and this makes him feel as if he should do the same. He states his nephew and niece are protective factors and help stop him from acting on it. Patient states he has had good appetite but states it is \"probably out of boredom. \" Patient states he is sleeping [place]. \" Patient denies any homicidal ideations and states \"he would have to be in danger to want to harm someone else\" and continued to state he \"would prefer to harm himself instead of someone else harming him. \" Patient contacts for safety while inpatient but is unable to contact for safety in the community. Patient endorses feeling as if people are talking about him or out to get him and has to remind himself that this is likely not true. Patient denies any medical concerns during assessment and states since his blood pressure was taken care of. Patient reports being in the process of moving but is still on a waiting list. He states paperwork should be mailed to him discussing the status of his new housing. Appetite:  [x] Normal/Adequate/Unchanged  [] Increased  [] Decreased      Sleep:       [x] Normal/Adequate/Unchanged  [] Fair  [] Poor      Group Attendance on Unit:   [] Yes  [] Selectively    [x] No    Medication Side Effects: Patient denies any medication side effects at the time of assessment. Mental Status Exam  Level of consciousness: Alert and awake. Appearance: Appropriate attire for setting, seated in chair, with fair  grooming and hygiene. Behavior/Motor: Approachable, no psychomotor abnormalities. Attitude toward examiner: Cooperative, attentive, fair eye contact. Speech: Normal rate, normal volume, normal tone. Mood:  Patient reports \"depressed\". Affect: Congruent, flat  Thought processes: Linear coherent and slow. Thought content: Denies homicidal ideation. Remains very focused on the death of a friend  Suicidal Ideation: Endorses fleeting suicidal ideations, without a current plan or intent, contracts for safety on the unit. Delusions: No evidence of delusions endorses paranoia. Perceptual Disturbance: Patient does not appear to be responding to internal stimuli. Denies auditory hallucinations. Denies visual hallucinations. Cognition: Oriented to self location and situation. Memory: intact. Insight & Judgement: Fair     Data   height is 5' 7\" (1.702 m) and weight is 170 lb (77.1 kg). His temporal temperature is 97.9 °F (36.6 °C). His blood pressure is 112/78 and his pulse is 98. His respiration is 14 and oxygen saturation is 98%.    Labs:   Admission on 07/22/2022   Component Date Value Ref Range Status    WBC 07/22/2022 6.7  3.5 - 11.0 k/uL Final    RBC 07/22/2022 4.57  4.5 - 5.9 m/uL Final    Hemoglobin 07/22/2022 15.9  13.5 - 17.5 g/dL Final    Hematocrit 07/22/2022 46.8  41 - 53 % Final    MCV 07/22/2022 102.3 (A) 80 - 100 fL Final    MCH 07/22/2022 34.7 (A) 26 - 34 pg Final    MCHC 07/22/2022 33.9  31 - 37 g/dL Final    RDW 07/22/2022 13.8  11.5 - 14.9 % Final    Platelets 73/65/3863 323  150 - 450 k/uL Final    MPV 07/22/2022 6.8  6.0 - 12.0 fL Final    Seg Neutrophils 07/22/2022 71 (A) 36 - 66 % Final    Lymphocytes 07/22/2022 15 (A) 24 - 44 % Final    Monocytes 07/22/2022 11 (A) 1 - 7 % Final    Eosinophils % 07/22/2022 1  0 - 4 % Final    Basophils 07/22/2022 2 0 - 2 % Final    Segs Absolute 07/22/2022 4.80  1.3 - 9.1 k/uL Final    Absolute Lymph # 07/22/2022 1.00  1.0 - 4.8 k/uL Final    Absolute Mono # 07/22/2022 0.70  0.1 - 1.3 k/uL Final    Absolute Eos # 07/22/2022 0.10  0.0 - 0.4 k/uL Final    Basophils Absolute 07/22/2022 0.10  0.0 - 0.2 k/uL Final    Glucose 07/22/2022 97  70 - 99 mg/dL Final    BUN 07/22/2022 11  6 - 20 mg/dL Final    Creatinine 07/22/2022 0.86  0.70 - 1.20 mg/dL Final    Calcium 07/22/2022 10.0  8.6 - 10.4 mg/dL Final    Sodium 07/22/2022 133 (A) 135 - 144 mmol/L Final    Potassium 07/22/2022 4.7  3.7 - 5.3 mmol/L Final    Chloride 07/22/2022 98  98 - 107 mmol/L Final    CO2 07/22/2022 26  20 - 31 mmol/L Final    Anion Gap 07/22/2022 9  9 - 17 mmol/L Final    Alkaline Phosphatase 07/22/2022 78  40 - 129 U/L Final    ALT 07/22/2022 44 (A) 5 - 41 U/L Final    AST 07/22/2022 32  <40 U/L Final    Total Bilirubin 07/22/2022 0.34  0.3 - 1.2 mg/dL Final    Total Protein 07/22/2022 7.3  6.4 - 8.3 g/dL Final    Albumin 07/22/2022 3.9  3.5 - 5.2 g/dL Final    GFR Non- 07/22/2022 >60  >60 mL/min Final    GFR  07/22/2022 >60  >60 mL/min Final    GFR Comment 07/22/2022        Final    Comment: Average GFR for 52-63 years old:   80 mL/min/1.73sq m  Chronic Kidney Disease:   <60 mL/min/1.73sq m  Kidney failure:   <15 mL/min/1.73sq m              eGFR calculated using average adult body mass. Additional eGFR calculator available at:        [employer]            Ethanol 07/22/2022 <10  <10 mg/dL Final    Ethanol percent 07/22/2022 <0.010  % Final    Acetaminophen Level 07/22/2022 <5 (A) 10 - 30 ug/mL Final    Salicylate Lvl 13/78/6257 <1 (A) 3 - 10 mg/dL Final         Reviewed patient's current plan of care and vital signs with nursing staff.     Labs reviewed: [x] Yes  Last EKG in EMR reviewed: [x] Yes  QTc: 436    Medications  Current Facility-Administered Medications: lisinopril (PRINIVIL;ZESTRIL) tablet 20 mg, 20 mg, Oral, Daily  nicotine (NICODERM CQ) 21 MG/24HR 1 patch, 1 patch, TransDERmal, Daily  propranolol (INDERAL) tablet 10 mg, 10 mg, Oral, TID  albuterol sulfate HFA (PROVENTIL;VENTOLIN;PROAIR) 108 (90 Base) MCG/ACT inhaler 2 puff, 2 puff, Inhalation, Q6H PRN  lurasidone (LATUDA) tablet 60 mg, 60 mg, Oral, Dinner  famotidine (PEPCID) tablet 20 mg, 20 mg, Oral, BID  DULoxetine (CYMBALTA) extended release capsule 60 mg, 60 mg, Oral, Daily  acetaminophen (TYLENOL) tablet 650 mg, 650 mg, Oral, Q6H PRN  ibuprofen (ADVIL;MOTRIN) tablet 400 mg, 400 mg, Oral, Q6H PRN  hydrOXYzine HCl (ATARAX) tablet 50 mg, 50 mg, Oral, TID PRN  traZODone (DESYREL) tablet 50 mg, 50 mg, Oral, Nightly PRN  polyethylene glycol (GLYCOLAX) packet 17 g, 17 g, Oral, Daily PRN  aluminum & magnesium hydroxide-simethicone (MAALOX) 200-200-20 MG/5ML suspension 30 mL, 30 mL, Oral, Q6H PRN  haloperidol (HALDOL) tablet 5 mg, 5 mg, Oral, Q6H PRN **AND** LORazepam (ATIVAN) tablet 2 mg, 2 mg, Oral, Q6H PRN    ASSESSMENT  Severe recurrent major depression without psychotic features ([facility] [address]         HANDOFF  Patient symptoms remain unstable  Medications as determined by attending physician  Consider consult for ECT  Encourage participation in groups and milieu. Probable discharge is to be determined by MD    Electronically signed by Maureen MCKEON, PMHNP-BC & [facility], [address] on 7/25/2022 at 2:27 PM    **This report has been created using voice recognition software. It may contain minor errors which are inherent in voice recognition technology. **    I independently saw and evaluated the patient. I reviewed the  documentation above. Any additional comments or changes to the   documentation are stated below otherwise agree with assessment. The patient has slept better last night after his olanzapine was discontinued. This is helped his mood and anxiety a little bit.   He continues to be ruminative and had some suicidal thoughts. PLAN  Medications as noted above  Attempt to develop insight  Psycho-education conducted. Estimated Length of Stay is 2-5 days  Supportive Therapy conducted.   Follow-up daily while on inpatient unit    Electronically signed by Caterina Floyd MD on 7/25/22 at 9:53 PM EDT

## 2022-07-26 PROCEDURE — 99232 SBSQ HOSP IP/OBS MODERATE 35: CPT | Performed by: PSYCHIATRY & NEUROLOGY

## 2022-07-26 PROCEDURE — 6370000000 HC RX 637 (ALT 250 FOR IP): Performed by: INTERNAL MEDICINE

## 2022-07-26 PROCEDURE — 6370000000 HC RX 637 (ALT 250 FOR IP): Performed by: PSYCHIATRY & NEUROLOGY

## 2022-07-26 PROCEDURE — 1240000000 HC EMOTIONAL WELLNESS R&B

## 2022-07-26 PROCEDURE — APPSS15 APP SPLIT SHARED TIME 0-15 MINUTES: Performed by: NURSE PRACTITIONER

## 2022-07-26 PROCEDURE — 6370000000 HC RX 637 (ALT 250 FOR IP): Performed by: EMERGENCY MEDICINE

## 2022-07-26 RX ADMIN — FAMOTIDINE 20 MG: 20 TABLET ORAL at 20:34

## 2022-07-26 RX ADMIN — FAMOTIDINE 20 MG: 20 TABLET ORAL at 08:29

## 2022-07-26 RX ADMIN — LURASIDONE HYDROCHLORIDE 60 MG: 60 TABLET, FILM COATED ORAL at 17:28

## 2022-07-26 RX ADMIN — HYDROXYZINE HYDROCHLORIDE 50 MG: 50 TABLET, FILM COATED ORAL at 08:32

## 2022-07-26 RX ADMIN — PROPRANOLOL HYDROCHLORIDE 10 MG: 20 TABLET ORAL at 08:29

## 2022-07-26 RX ADMIN — LISINOPRIL 20 MG: 20 TABLET ORAL at 08:30

## 2022-07-26 RX ADMIN — DULOXETINE 60 MG: 60 CAPSULE, DELAYED RELEASE ORAL at 08:29

## 2022-07-26 ASSESSMENT — PAIN SCALES - GENERAL: PAINLEVEL_OUTOF10: 0

## 2022-07-26 NOTE — GROUP NOTE
Group Therapy Note    Date: 7/26/2022    Group Start Time: 1100  Group End Time: 0030  Group Topic: Relaxation    PATI Rosario      Relaxation Group Note        Date: 7/26/2022  Start Time: 11am  End Time: 11:45am      Number of Participants in Group & Unit Census:  4/16    Topic: relaxation using art     Goal of Group: to improve relaxation using art      Comments:     Patient did not participate in Relaxation group, despite staff encouragement and explanation of benefits. Patient remain seclusive to self. Q15 minute safety checks maintained for patient safety and will continue to encourage patient to attend unit programming.               Signature:  Maral Irizarry

## 2022-07-26 NOTE — PLAN OF CARE
Problem: Self Harm/Suicidality  Goal: Will have no self-injury during hospital stay  Description: INTERVENTIONS:  1. Q 30 MINUTES: Routine safety checks  2. Q SHIFT & PRN: Assess risk to determine if routine checks are adequate to maintain patient safety  Outcome: Progressing Towards Goal  Note: No self harm noted this shift. Patient denies any thoughts of harm to self or others and denies hallucinations. Patient agrees to seek staff out if negative thoughts arise. Will continue to monitor Q15 minute and intermittently. Problem: Depression  Goal: Will be euthymic at discharge  Description: INTERVENTIONS:  1. Administer medication as ordered  2. Provide emotional support via 1:1 interaction with staff  3. Encourage involvement in milieu/groups/activities  4. Monitor for social isolation  Outcome: Not Met  Note: Patient reports both depression and anxiety at an 8 out of 10. Patient reports poor sleep stating he \"tossed and turned all night\". Patient is isolative to room, compliant with meds and behavior is controlled.

## 2022-07-26 NOTE — GROUP NOTE
Group Therapy Note    Date: 7/26/2022    Group Start Time: 1330  Group End Time: 5151  Group Topic: Healthy Living/Wellness    [facility] Fabio Gottron, [facility]    Health/Wellness Group Note        Date: 7/26/2022  Start Time: 1:30pm  End Time:  230pm      Number of Participants in Group & Unit Census:  2/15      Topic: to improve communication and wellness     Goal of Group:       Comments:     Patient did not participate in Health/Wellness group, despite staff encouragement and explanation of benefits. Patient remain seclusive to self. Q15 minute safety checks maintained for patient safety and will continue to encourage patient to attend unit programming.               Signature:  Claudell Bran

## 2022-07-26 NOTE — BH NOTE
Relaxation Group Note        Date: July 25, 2022 Start Time: 2030  End Time: 2050      Number of Participants in [address]:  10/16     Topic: Relaxation     Goal of Group:Relaxation      Comments:     Patient did not participate in Relaxation group, despite staff encouragement and explanation of benefits. Patient remain seclusive to self. Q15 minute safety checks maintained for patient safety and will continue to encourage patient to attend unit programming.

## 2022-07-26 NOTE — PROGRESS NOTES
Daily Progress Note  7/26/2022    Patient Name: Susy Agent: Depression with suicidal ideation and plan to shoot self         SUBJECTIVE:      Cydney Layton is interviewed today bedside, he remains isolative and reports that he does not like attending group activities. He continues to endorse depression though reports that he is feeling \"a little better\". He reports that he has been struggling with depression for quite some time and plans to follow-up with [facility] outpatient. At present he is tolerating the changes to his medication regimen. He feels improvement in suicidal ideation and plans to return to his apartment upon discharge. He reports that he lives alone though states \"I like it that way\". He is starting to show some stability in symptoms. Appetite:  [x] Normal/Adequate/Unchanged  [] Increased  [] Decreased      Sleep:       [x] Normal/Adequate/Unchanged  [] Fair  [] Poor      Group Attendance on Unit:   [] Yes  [] Selectively    [x] No    Medication Side Effects: Patient denies any medication side effects at the time of assessment. Mental Status Exam  Level of consciousness: Alert and awake. Appearance: Appropriate attire for setting, seated in chair, with fair  grooming and hygiene. Behavior/Motor: Approachable, engages with interviewer  Attitude toward examiner: Cooperative, attentive, fair eye contact. Speech: Normal rate, normal volume, normal tone. Mood:  Patient reports \"depressed\". Affect: Congruent, isolative  Thought processes: Linear coherent and slow. Thought content: Denies homicidal ideation. Suicidal Ideation: Endorses improving suicidal ideations, without a current plan or intent, contracts for safety on the unit. Delusions: No evidence of delusions endorses paranoia. Perceptual Disturbance: Patient does not appear to be responding to internal stimuli. Denies auditory hallucinations. Denies visual hallucinations.    Cognition: Oriented to self location and situation. Memory: intact. Insight & Judgement: Fair     Data   height is 5' 7\" (1.702 m) and weight is 170 lb (77.1 kg). His oral temperature is 98.3 °F (36.8 °C). His blood pressure is 112/78 and his pulse is 59. His respiration is 14 and oxygen saturation is 98%.    Labs:   Admission on 07/22/2022   Component Date Value Ref Range Status    WBC 07/22/2022 6.7  3.5 - 11.0 k/uL Final    RBC 07/22/2022 4.57  4.5 - 5.9 m/uL Final    Hemoglobin 07/22/2022 15.9  13.5 - 17.5 g/dL Final    Hematocrit 07/22/2022 46.8  41 - 53 % Final    MCV 07/22/2022 102.3 (A) 80 - 100 fL Final    MCH 07/22/2022 34.7 (A) 26 - 34 pg Final    MCHC 07/22/2022 33.9  31 - 37 g/dL Final    RDW 07/22/2022 13.8  11.5 - 14.9 % Final    Platelets 87/94/1826 323  150 - 450 k/uL Final    MPV 07/22/2022 6.8  6.0 - 12.0 fL Final    Seg Neutrophils 07/22/2022 71 (A) 36 - 66 % Final    Lymphocytes 07/22/2022 15 (A) 24 - 44 % Final    Monocytes 07/22/2022 11 (A) 1 - 7 % Final    Eosinophils % 07/22/2022 1  0 - 4 % Final    Basophils 07/22/2022 2  0 - 2 % Final    Segs Absolute 07/22/2022 4.80  1.3 - 9.1 k/uL Final    Absolute Lymph # 07/22/2022 1.00  1.0 - 4.8 k/uL Final    Absolute Mono # 07/22/2022 0.70  0.1 - 1.3 k/uL Final    Absolute Eos # 07/22/2022 0.10  0.0 - 0.4 k/uL Final    Basophils Absolute 07/22/2022 0.10  0.0 - 0.2 k/uL Final    Glucose 07/22/2022 97  70 - 99 mg/dL Final    BUN 07/22/2022 11  6 - 20 mg/dL Final    Creatinine 07/22/2022 0.86  0.70 - 1.20 mg/dL Final    Calcium 07/22/2022 10.0  8.6 - 10.4 mg/dL Final    Sodium 07/22/2022 133 (A) 135 - 144 mmol/L Final    Potassium 07/22/2022 4.7  3.7 - 5.3 mmol/L Final    Chloride 07/22/2022 98  98 - 107 mmol/L Final    CO2 07/22/2022 26  20 - 31 mmol/L Final    Anion Gap 07/22/2022 9  9 - 17 mmol/L Final    Alkaline Phosphatase 07/22/2022 78  40 - 129 U/L Final    ALT 07/22/2022 44 (A) 5 - 41 U/L Final    AST 07/22/2022 32  <40 U/L Final    Total Bilirubin 07/22/2022 0.34  0.3 - 1.2 mg/dL Final    Total Protein 07/22/2022 7.3  6.4 - 8.3 g/dL Final    Albumin 07/22/2022 3.9  3.5 - 5.2 g/dL Final    GFR Non- 07/22/2022 >60  >60 mL/min Final    GFR  07/22/2022 >60  >60 mL/min Final    GFR Comment 07/22/2022        Final    Comment: Average GFR for 52-63 years old:   80 mL/min/1.73sq m  Chronic Kidney Disease:   <60 mL/min/1.73sq m  Kidney failure:   <15 mL/min/1.73sq m              eGFR calculated using average adult body mass. Additional eGFR calculator available at:        [employer]            Ethanol 07/22/2022 <10  <10 mg/dL Final    Ethanol percent 07/22/2022 <0.010  % Final    Acetaminophen Level 07/22/2022 <5 (A) 10 - 30 ug/mL Final    Salicylate Lvl 01/71/2373 <1 (A) 3 - 10 mg/dL Final         Reviewed patient's current plan of care and vital signs with nursing staff.     Labs reviewed: [x] Yes  Last EKG in EMR reviewed: [x] Yes  QTc: 436    Medications  Current Facility-Administered Medications: lisinopril (PRINIVIL;ZESTRIL) tablet 20 mg, 20 mg, Oral, Daily  nicotine (NICODERM CQ) 21 MG/24HR 1 patch, 1 patch, TransDERmal, Daily  propranolol (INDERAL) tablet 10 mg, 10 mg, Oral, TID  albuterol sulfate HFA (PROVENTIL;VENTOLIN;PROAIR) 108 (90 Base) MCG/ACT inhaler 2 puff, 2 puff, Inhalation, Q6H PRN  lurasidone (LATUDA) tablet 60 mg, 60 mg, Oral, Dinner  famotidine (PEPCID) tablet 20 mg, 20 mg, Oral, BID  DULoxetine (CYMBALTA) extended release capsule 60 mg, 60 mg, Oral, Daily  acetaminophen (TYLENOL) tablet 650 mg, 650 mg, Oral, Q6H PRN  ibuprofen (ADVIL;MOTRIN) tablet 400 mg, 400 mg, Oral, Q6H PRN  hydrOXYzine HCl (ATARAX) tablet 50 mg, 50 mg, Oral, TID PRN  traZODone (DESYREL) tablet 50 mg, 50 mg, Oral, Nightly PRN  polyethylene glycol (GLYCOLAX) packet 17 g, 17 g, Oral, Daily PRN  aluminum & magnesium hydroxide-simethicone (MAALOX) 200-200-20 MG/5ML suspension 30 mL, 30 mL, Oral, Q6H PRN  haloperidol (HALDOL) tablet 5 mg, 5 mg, Oral, Q6H PRN **AND** LORazepam (ATIVAN) tablet 2 mg, 2 mg, Oral, Q6H PRN    ASSESSMENT  Severe recurrent major depression without psychotic features [facility])         HANDOFF  Patient symptoms slowly improving  Medications as determined by attending physician  Encourage participation in groups and milieu. Probable discharge is to be determined by MD    Electronically signed by Savita Rosa CNP on 7/26/2022 at 2:27 PM    **This report has been created using voice recognition software. It may contain minor errors which are inherent in voice recognition technology. **    I independently saw and evaluated the patient. I reviewed the  documentation above. Any additional comments or changes to the   documentation are stated below otherwise agree with assessment. The patient reports that his mood is stable. He continues to be depressed and has some suicidal thoughts. He has been taking his medication. He has been somewhat isolative and has been spending his time in bed. PLAN  Medications as noted above  Attempt to develop insight  Psycho-education conducted. Estimated Length of Stay is 1-3 days  Supportive Therapy conducted.   Follow-up daily while on inpatient unit    Electronically signed by Em Fields MD on 7/26/22 at 8:51 PM EDT

## 2022-07-27 VITALS
WEIGHT: 170 LBS | TEMPERATURE: 97.7 F | RESPIRATION RATE: 14 BRPM | BODY MASS INDEX: 26.68 KG/M2 | DIASTOLIC BLOOD PRESSURE: 84 MMHG | HEIGHT: 67 IN | SYSTOLIC BLOOD PRESSURE: 109 MMHG | HEART RATE: 57 BPM | OXYGEN SATURATION: 98 %

## 2022-07-27 PROCEDURE — 6370000000 HC RX 637 (ALT 250 FOR IP): Performed by: INTERNAL MEDICINE

## 2022-07-27 PROCEDURE — 99238 HOSP IP/OBS DSCHRG MGMT 30/<: CPT | Performed by: PSYCHIATRY & NEUROLOGY

## 2022-07-27 PROCEDURE — 6370000000 HC RX 637 (ALT 250 FOR IP): Performed by: PSYCHIATRY & NEUROLOGY

## 2022-07-27 PROCEDURE — 6370000000 HC RX 637 (ALT 250 FOR IP): Performed by: EMERGENCY MEDICINE

## 2022-07-27 RX ORDER — PROPRANOLOL HYDROCHLORIDE 10 MG/1
10 TABLET ORAL 3 TIMES DAILY
Qty: 90 TABLET | Refills: 0 | Status: SHIPPED | OUTPATIENT
Start: 2022-07-27

## 2022-07-27 RX ORDER — HYDROXYZINE HYDROCHLORIDE 25 MG/1
25 TABLET, FILM COATED ORAL 2 TIMES DAILY PRN
Qty: 60 TABLET | Refills: 0 | Status: SHIPPED | OUTPATIENT
Start: 2022-07-27

## 2022-07-27 RX ORDER — LISINOPRIL 20 MG/1
20 TABLET ORAL DAILY
Qty: 30 TABLET | Refills: 3 | Status: SHIPPED | OUTPATIENT
Start: 2022-07-28

## 2022-07-27 RX ORDER — FAMOTIDINE 20 MG/1
20 TABLET, FILM COATED ORAL 2 TIMES DAILY
Qty: 60 TABLET | Refills: 3 | Status: SHIPPED | OUTPATIENT
Start: 2022-07-27

## 2022-07-27 RX ORDER — DULOXETIN HYDROCHLORIDE 60 MG/1
60 CAPSULE, DELAYED RELEASE ORAL DAILY
Qty: 30 CAPSULE | Refills: 3 | Status: SHIPPED | OUTPATIENT
Start: 2022-07-28

## 2022-07-27 RX ORDER — ALBUTEROL SULFATE 90 UG/1
2 AEROSOL, METERED RESPIRATORY (INHALATION) EVERY 6 HOURS PRN
Qty: 1 EACH | Refills: 3 | Status: SHIPPED | OUTPATIENT
Start: 2022-07-27

## 2022-07-27 RX ADMIN — FAMOTIDINE 20 MG: 20 TABLET ORAL at 08:03

## 2022-07-27 RX ADMIN — LISINOPRIL 20 MG: 20 TABLET ORAL at 08:03

## 2022-07-27 RX ADMIN — PROPRANOLOL HYDROCHLORIDE 10 MG: 20 TABLET ORAL at 08:03

## 2022-07-27 RX ADMIN — DULOXETINE 60 MG: 60 CAPSULE, DELAYED RELEASE ORAL at 08:03

## 2022-07-27 NOTE — GROUP NOTE
Group Therapy Note    Date: 7/27/2022    Group Start Time: 1000  Group End Time: 1030  Group Topic: Psychotherapy    [facility] DANIELLE Villafana  Patient refused to attend psychotherapy group after encouragement from staff. 1:1 talk time offered but refused. Group Therapy Note    Attendees:4/17       Signature:   Meron Villafana

## 2022-07-27 NOTE — GROUP NOTE
Group Therapy Note    Date: 7/27/2022    Group Start Time: 1100  Group End Time: 1130  Group Topic: Healthy Living/Wellness    [facility] D    PATI Schroeder    Health/Wellness Group Note        Date: July 27, 2022 Start Time: 11am  End Time: 11:30am      Number of Participants in Group & Unit Census:  6/14    Topic: healthy living/ wellness     Goal of Group: to improve decision making skills       Comments:     Patient did not participate in [employer] and Health/Wellness group, despite staff encouragement and explanation of benefits. Patient remain seclusive to self. Q15 minute safety checks maintained for patient safety and will continue to encourage patient to attend unit programming.             Signature:  Chanel Pennington

## 2022-07-27 NOTE — BH NOTE
Community Meeting Group Note        Date: July 22, 2022 Start Time: 9am  End Time: 1920      Number of Participants in Group & Unit Census:  5/17        Goal of Group:To discuss daily goal      Comments:     Patient did not participate in [employer] group, despite staff encouragement and explanation of benefits. Patient remain seclusive to self. Q15 minute safety checks maintained for patient safety and will continue to encourage patient to attend unit programming.

## 2022-07-27 NOTE — PROGRESS NOTES
Patient given tobacco quitline number 6-404-337-708-453-4366 at this time, refusing to call at this time, states \" I just dont want to quit now\"- patient given information as to the dangers of long term tobacco use. Continue to reinforce the importance of tobacco cessation.

## 2022-07-27 NOTE — DISCHARGE INSTRUCTIONS
Information:  Medications:   Medication summary provided   I understand that I should take only the medications on my list.     -why and when I need to take each medicine.     -which side effects to watch for.     -that I should carry my medication information at all times in case of     Emergency situations. I will take all of my medicines to follow up appointments.     -check with my physician or pharmacist before taking any new    Medication, over the counter product or drink alcohol.    -Ask about food, drug or dietary supplement interactions.    -discard old lists and update records with medication providers. Notify Physician:  Notify physician if you notice:   Always call 911 if you feel your life is in danger  In case of an emergency call 911 immediately! If 911 is not available call your local emergency medical system for help    Behavioral Health Follow Up:  Original Referral Source:er  Discharge Diagnosis: Depression with suicidal ideation [F32. A, R45.851]  Recommendations for Level of Care: follow up  Patient status at discharge: stable  My hospital  was: [facility]  Aftercare plan faxed: [facility] [place]   -faxed by: Carmen Wheeler   -date: 7/27/2022   -time: 1200  Prescriptions: escribed to own [facility] ([phone])  What is coronavirus (COVID-19)? COVID-19 is a disease caused by a type of coronavirus. This illness was firstfound in December 2019. It has since spread worldwide. Coronaviruses are a large group of viruses. They cause the common cold. They also cause more serious illnesses like [place] respiratory syndrome (MERS) and severe acute respiratory syndrome (SARS). COVID-19 is caused by a novelcoronavirus. That means it's a new type that has not been seen in people before. What are the symptoms? COVID-19 symptoms may include:  Fever. Cough. Trouble breathing. Chills or repeated shaking with chills. Muscle and body aches. Headache. Sore throat.   New loss of taste or smell. Vomiting. Diarrhea. In severe cases, COVID-19 can cause pneumonia and make it hard to breathewithout help from a machine. It can cause death. How is it diagnosed? COVID-19 is diagnosed with a viral test. This may also be called a PCR test or antigen test. It looks for evidence of the virus in your breathing passages orlungs (respiratory system). The test is most often done on a sample from the nose, throat, or lungs. It's sometimes done on a sample of saliva. One way a sample is collected is byputting a long swab into the back of your nose. If you have questions about COVID-19 testing, ask your doctor or go to cdc.govto use the COVID-19 Viral Testing Tool. How is it treated? Mild cases of COVID-19 can be treated at home. Serious cases need treatment in the hospital. Treatment may include medicines, plus breathing support such as oxygen therapy or a ventilator. Some people may be placed on their belly tohelp their oxygen levels. Treatments that may help people who have COVID-19 include:  Antiviral medicines. These medicines treat viral infections. Immune-based therapy. These medicines help the immune system fight COVID-19. Examples include monoclonal antibodies. Blood thinners. These medicines help prevent blood clots. People with severe illness are at risk for blood clots. How can you protect yourself and others? Stay up to date on your COVID-19 vaccines. Avoid sick people, and stay away from others if you are sick. Stay at least 6 feet away from other people. Avoid crowds, especially inside. Get tested for COVID-19 before you have an indoor visit with people who don't live with you. Improve the airflow when you spend time indoors with people who don't live with you. If you can, open windows and doors. Or you can use a fan to blow air away from people and out a window. Cover your mouth with a tissue when you cough or sneeze.   Wash your hands often, especially after you cough or sneeze. Use soap and water, and scrub for at least 20 seconds. If soap and water aren't available, use an alcohol-based hand . Avoid touching your mouth, nose, and eyes. Check the CDC website at cdc.gov for the most current information on how to protect yourself. And if you have questions, ask your doctor or go to cdc.govto use the COVID-19 Quarantine and Isolation Calculator. Here are some other steps you may need to take. If you are not up to date on your COVID-19 vaccines:  Wear a mask with the best fit, protection, and comfort for you. A mask can protect you even when others aren't wearing one. This might be especially important if you:  Have certain health conditions. Live with someone who has a compromised immune system. Live with someone who is not up to date on their COVID-19 vaccines. If you have been exposed to the virus AND are not up to date on your COVID-19 vaccines:  Talk to your doctor as soon as you can. Your doctor might have you take medicine to help prevent serious illness. Get a COVID-19 test. You may need to be tested more than once. And if your test is positive, follow the instructions below. Stay home. Try to separate from other people where you live. Don't go to school, work, or public areas. Wear a well-fitting mask around other people for a full 10 days. Avoid travel, and stay away from people at high risk for serious illness. Watch for symptoms. If you have been exposed AND either tested positive for COVID-19 in the last 90 days and have recovered or you are up to date on your COVID-19 vaccines:  Talk to your doctor as soon as you can. Your doctor may have you take medicine to help prevent serious illness. Get a COVID-19 test. Wait 5 days after you were last exposed. You may need to be tested more than once. And if your test is positive, follow the instructions below. Wear a well-fitting mask around other people for a full 10 days.   Avoid travel and stay away from people at high risk for serious illness. Watch for symptoms. If you tested positive for COVID-19 in the last 90 days and have not recovered, another COVID-19 test may not be needed. If you're sick or test positive for COVID-19:  Talk to your doctor as soon as you can. Your doctor may have you take medicine to help prevent serious illness. Get a COVID-19 test unless you have already been tested. You may need to be tested more than once. Stay home. Leave only if you need to get medical care. If you were seriously ill or if you have a weakened immune system, you may need to isolate for several weeks. For a full 10 days, wear a well-fitting mask whenever you're around other people. Avoid travel and stay away from people at high risk for serious illness. Limit contact with pets and people in your home. If possible, stay in a separate bedroom and use a separate bathroom. Clean and disinfect your home every day. Use household  and disinfectant wipes or sprays. Take special care to clean things that you touch with your hands. How can you self-isolate when you have COVID-19? If you have COVID-19, there are things you can do to help avoid spreading thevirus to others. Stay home, and avoid contact with other people. Limit contact with people in your home. If possible, stay in a separate bedroom and use a separate bathroom. Wear a well-fitting mask when you are around other people. Avoid contact with pets and other animals. Cover your mouth and nose with a tissue when you cough or sneeze. Then throw it in the trash right away. Wash your hands often, especially after you cough or sneeze. Use soap and water, and scrub for at least 20 seconds. If soap and water aren't available, use an alcohol-based hand . Don't share personal household items. These include bedding, towels, cups and glasses, and eating utensils.   [address] laundry in the warmest water allowed for the fabric type, and dry it completely. It's okay to wash other people's laundry with yours. Clean and disinfect your home. Use household  and disinfectant wipes or sprays. If you have questions, visit cdc.gov to check the Quarantine and IsolationCalculator. When should you call for help? Call 911 anytime you think you may need emergency care. For example, call if you have life-threatening symptoms, such as: You have severe trouble breathing. (You can't talk at all.)     You have constant chest pain or pressure. You are severely dizzy or lightheaded. You are confused or can't think clearly. You have pale, gray, or blue-colored skin or lips. You pass out (lose consciousness) or are very hard to wake up. You have loss of balance or trouble walking. You have trouble seeing out of one or both eyes. You have weakness or drooping on one side of the face. You have weakness or numbness in an arm or a leg. You have trouble speaking. You have a severe headache. You have a seizure. Call your doctor now or seek immediate medical care if:    You have moderate trouble breathing. (You can't speak a full sentence.)     You are coughing up blood. You have signs of low blood pressure. These include feeling lightheaded; being too weak to stand; and having cold, pale, clammy skin. Watch closely for changes in your health, and be sure to contact your doctor if:    Your symptoms get worse. You are not getting better as expected. You have new or worse symptoms of anxiety, depression, nightmares, or flashbacks. Call before you go to the doctor's office. Follow their instructions. And wear a mask. Where can you learn more? Go to [email or web address].[email or web address]. org and sign in to your [employer] account. Enter C008 in the RxApps box to learn more about \"Learning About Coronavirus (COVID-19). \"     If you do not have an account, please click on the \"Sign Up Now\" link.  Current as of: May 28, 2022               Content Version: 13.3  © 2006-2022 Healthwise, [employer]. Care instructions adapted under license by [facility]. If you have questions about a medical condition or this instruction, always ask your healthcare professional. [address] any warranty or liability for your use of this information. Smoking: Quit Smoking. Call the [facility]'s smoking quitline at 0-605-48X-QUIT  Know the signs of a heart attack   If you have any of the following symptoms call 911 immediately, do not wait more    Than five minutes. 1. Pressure, fullness and/ or squeezing in the center of the chest spreading to    The jaw, neck or shoulder. 2. Chest discomfort with light headedness, fainting, sweating, nausea or    Shortness of breath. 3. Upper abdominal pressure or discomfort. 4. Lower chest pain, back pain, unusual fatigue, shortness of breath, nausea   Or dizziness.      General Information:   Questions regarding your bill: Call HELP program [phone]     Suicide Hotline ([place] [address])  (548) 848-4800      Recovery Help line- 401.765.4237      To obtain results of pending studies call Medical Records at: [phone]     For emergencies and 24 hour/7 days a week contact information:  [phone]

## 2022-07-27 NOTE — PROGRESS NOTES
[facility]  Discharge Note    Pt discharged with followings belongings:   Dental Appliances: None  Vision - Corrective Lenses: None  Hearing Aid: None  Jewelry: None  Body Piercings Removed: No  Clothing: Footwear, Pants, Shirt, Shorts, Socks, Undergarments  Other Valuables: Lu Nghia, Lighter/Matches, Cigarettes   Valuables sent home with pt or returned to patient. Patient education on aftercare instructions: yes  Information faxed to [facility] Ambassador Samara Miguel by Brian Kaba  at 12:33 PM .Patient verbalize understanding of AVS:  yes. Status EXAM upon discharge:  Mental Status and Behavioral Exam  Normal: Yes  Level of Assistance: Independent/Self  Facial Expression: Brightened  Affect: Appropriate  Level of Consciousness: Alert  Frequency of Checks: 4 times per hour, close  Mood:Normal: Yes  Mood: Anxious, Depressed  Motor Activity:Normal: Yes  Motor Activity: Decreased  Eye Contact: Good  Observed Behavior: Cooperative  Sexual Misconduct History: Current - no  Preception: [place] to person, [place] to place, [place] to time, [place] to situation  Attention:Normal: Yes  Attention: Distractible  Thought Processes: Other (comment) (linear, coherent)  Thought Content:Normal: Yes  Thought Content: Poverty of content  Depression Symptoms: No problems reported or observed. Anxiety Symptoms: No problems reported or observed. Mare Symptoms: No problems reported or observed.   Hallucinations: None  Delusions: No  Memory:Normal: Yes  Insight and Judgment: Yes  Insight and Judgment: Poor judgment    Tobacco Screening:  Practical Counseling, on admission, ibeth X, if applicable and completed (first 3 are required if patient doesn't refuse):            ( ) Recognizing danger situations (included triggers and roadblocks)                    ( ) Coping skills (new ways to manage stress,relaxation techniques, changing routine, distraction)                                                           ( ) Basic information about quitting (benefits of quitting, techniques in how to quit, available resources  ( ) Referral for counseling faxed to [facility]                                                                                                                   ( X) Patient refused counseling  ( ) Patient refused referral  ( ) Patient refused prescription upon discharge  ( ) Patient has not smoked in the last 30 days    Metabolic Screening:    No results found for: LABA1C    Lab Results   Component Value Date    CHOL 184 10/21/2019     Lab Results   Component Value Date    TRIG 90 10/21/2019     Lab Results   Component Value Date    HDL 50 10/21/2019     No components found for: LDLCAL  No results found for: LABVLDL    Pt discharged to home with own car. All belongings et valuables sent with pt. Medications filled et sent with pt. Pt verbalizes all discharge instructions.      Yvonne Olivia RN
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
## 2022-07-27 NOTE — PROGRESS NOTES
CLINICAL PHARMACY NOTE: MEDS TO BEDS    Total # of Prescriptions Filled: 6   The following medications were delivered to the patient:  Latua 60mg  Hydroxyzine HCL 25mg  Propranolol HCL 10mg  Famotidine 20mg  Albuterol Sulfate    Lisinopril 20mg    Additional Documentation:  Delivered Medication to Nurses Station     Refill(s) Too Soon + Profiled Rx(s)   - Duloxetine 8/17    Short-Filled Rx - All we had in stock    - Propranolol #50

## 2022-07-29 NOTE — CARE COORDINATION
Name: Elijah Gonzales    : 1970    Discharge Date: 2022    Primary Auth/Cert #: [id number]    Destination: Private residence    Discharge Medications:      Medication List        START taking these medications      lisinopril 20 MG tablet  Commonly known as: PRINIVIL;ZESTRIL  Take 1 tablet by mouth in the morning. Notes to patient: Blood pressure            CONTINUE taking these medications      albuterol sulfate  (90 Base) MCG/ACT inhaler  Commonly known as: Ventolin HFA  Inhale 2 puffs into the lungs every 6 hours as needed for Wheezing  Notes to patient: Breathing aid     DULoxetine 60 MG extended release capsule  Commonly known as: CYMBALTA  Take 1 capsule by mouth in the morning. Notes to patient: depression     famotidine 20 MG tablet  Commonly known as: PEPCID  Take 1 tablet by mouth in the morning and 1 tablet before bedtime. Notes to patient: Stomach health     hydrOXYzine HCl 25 MG tablet  Commonly known as: ATARAX  Take 1 tablet by mouth 2 times daily as needed for Anxiety  Notes to patient: anxiety     lurasidone 60 MG Tabs tablet  Commonly known as: LATUDA  Take 1 tablet by mouth Daily with supper  Notes to patient: Clear thoughts     propranolol 10 MG tablet  Commonly known as: INDERAL  Take 1 tablet by mouth in the morning and 1 tablet at noon and 1 tablet before bedtime.   Notes to patient: Anxiety            STOP taking these medications      mirtazapine 15 MG tablet  Commonly known as: REMERON     OLANZapine 2.5 MG tablet  Commonly known as: ZYPREXA               Where to Get Your Medications        These medications were sent to [facility] [address] [address]      Phone: [phone]   albuterol sulfate  (90 Base) MCG/ACT inhaler  DULoxetine 60 MG extended release capsule  famotidine 20 MG tablet  hydrOXYzine HCl 25 MG tablet  lisinopril 20 MG tablet  lurasidone 60 MG Tabs tablet  propranolol 10 MG tablet         Follow Up Appointment: [place] [address] [address], [address]  Phone: [phone]  Fax: [phone]  Go on 8/2/2022  You have a hospital discharge appointment with Jordin Fontanez on Tuesday, August 2 at 9:00 am    Discharge to home address  [address]  Go on 7/27/2022  Please send home by Erica Alva and [facility] service

## 2024-01-16 ENCOUNTER — HOSPITAL ENCOUNTER (INPATIENT)
Age: 54
LOS: 5 days | Discharge: HOME | DRG: 885 | End: 2024-01-22
Attending: EMERGENCY MEDICINE | Admitting: PSYCHIATRY & NEUROLOGY
Payer: MEDICARE

## 2024-01-16 DIAGNOSIS — F10.929 ACUTE ALCOHOLIC INTOXICATION WITH COMPLICATION (HCC): ICD-10-CM

## 2024-01-16 DIAGNOSIS — R45.851 DEPRESSION WITH SUICIDAL IDEATION: Primary | ICD-10-CM

## 2024-01-16 DIAGNOSIS — F32.A DEPRESSION WITH SUICIDAL IDEATION: Primary | ICD-10-CM

## 2024-01-16 LAB
ALBUMIN SERPL-MCNC: 4.4 G/DL (ref 3.5–5.2)
ALP SERPL-CCNC: 92 U/L (ref 40–129)
ALT SERPL-CCNC: 119 U/L (ref 5–41)
AMPHET UR QL SCN: NEGATIVE
ANION GAP SERPL CALCULATED.3IONS-SCNC: 11 MMOL/L (ref 9–17)
APAP SERPL-MCNC: <5 UG/ML (ref 10–30)
AST SERPL-CCNC: 77 U/L
BARBITURATES UR QL SCN: NEGATIVE
BASOPHILS # BLD: 0.1 K/UL (ref 0–0.2)
BASOPHILS NFR BLD: 1 % (ref 0–2)
BENZODIAZ UR QL: NEGATIVE
BILIRUB SERPL-MCNC: 0.2 MG/DL (ref 0.3–1.2)
BUN SERPL-MCNC: 9 MG/DL (ref 6–20)
CALCIUM SERPL-MCNC: 10.2 MG/DL (ref 8.6–10.4)
CANNABINOIDS UR QL SCN: POSITIVE
CHLORIDE SERPL-SCNC: 102 MMOL/L (ref 98–107)
CO2 SERPL-SCNC: 25 MMOL/L (ref 20–31)
COCAINE UR QL SCN: NEGATIVE
CREAT SERPL-MCNC: 0.8 MG/DL (ref 0.7–1.2)
EOSINOPHIL # BLD: 0.1 K/UL (ref 0–0.4)
EOSINOPHILS RELATIVE PERCENT: 2 % (ref 0–4)
ERYTHROCYTE [DISTWIDTH] IN BLOOD BY AUTOMATED COUNT: 13.1 % (ref 11.5–14.9)
ETHANOL PERCENT: 0.2 %
ETHANOLAMINE SERPL-MCNC: 200 MG/DL
FENTANYL UR QL: NEGATIVE
GFR SERPL CREATININE-BSD FRML MDRD: >60 ML/MIN/1.73M2
GLUCOSE SERPL-MCNC: 88 MG/DL (ref 70–99)
HCT VFR BLD AUTO: 47.9 % (ref 41–53)
HGB BLD-MCNC: 16.9 G/DL (ref 13.5–17.5)
LYMPHOCYTES NFR BLD: 1.5 K/UL (ref 1–4.8)
LYMPHOCYTES RELATIVE PERCENT: 24 % (ref 24–44)
MAGNESIUM SERPL-MCNC: 2.3 MG/DL (ref 1.6–2.6)
MCH RBC QN AUTO: 37 PG (ref 26–34)
MCHC RBC AUTO-ENTMCNC: 35.3 G/DL (ref 31–37)
MCV RBC AUTO: 104.8 FL (ref 80–100)
METHADONE UR QL: NEGATIVE
MONOCYTES NFR BLD: 0.6 K/UL (ref 0.1–1.3)
MONOCYTES NFR BLD: 10 % (ref 1–7)
NEUTROPHILS NFR BLD: 63 % (ref 36–66)
NEUTS SEG NFR BLD: 3.9 K/UL (ref 1.3–9.1)
OPIATES UR QL SCN: NEGATIVE
OXYCODONE UR QL SCN: NEGATIVE
PCP UR QL SCN: NEGATIVE
PLATELET # BLD AUTO: 252 K/UL (ref 150–450)
PMV BLD AUTO: 7.2 FL (ref 6–12)
POTASSIUM SERPL-SCNC: 4 MMOL/L (ref 3.7–5.3)
PROT SERPL-MCNC: 7.6 G/DL (ref 6.4–8.3)
RBC # BLD AUTO: 4.58 M/UL (ref 4.5–5.9)
SALICYLATES SERPL-MCNC: <1 MG/DL (ref 3–10)
SODIUM SERPL-SCNC: 138 MMOL/L (ref 135–144)
TEST INFORMATION: ABNORMAL
WBC OTHER # BLD: 6.2 K/UL (ref 3.5–11)

## 2024-01-16 PROCEDURE — 83735 ASSAY OF MAGNESIUM: CPT

## 2024-01-16 PROCEDURE — G0480 DRUG TEST DEF 1-7 CLASSES: HCPCS

## 2024-01-16 PROCEDURE — 36415 COLL VENOUS BLD VENIPUNCTURE: CPT

## 2024-01-16 PROCEDURE — 85025 COMPLETE CBC W/AUTO DIFF WBC: CPT

## 2024-01-16 PROCEDURE — 80179 DRUG ASSAY SALICYLATE: CPT

## 2024-01-16 PROCEDURE — 80053 COMPREHEN METABOLIC PANEL: CPT

## 2024-01-16 PROCEDURE — 99285 EMERGENCY DEPT VISIT HI MDM: CPT

## 2024-01-16 PROCEDURE — 80307 DRUG TEST PRSMV CHEM ANLYZR: CPT

## 2024-01-16 PROCEDURE — 80143 DRUG ASSAY ACETAMINOPHEN: CPT

## 2024-01-16 ASSESSMENT — LIFESTYLE VARIABLES
HOW MANY STANDARD DRINKS CONTAINING ALCOHOL DO YOU HAVE ON A TYPICAL DAY: PATIENT DOES NOT DRINK
HOW OFTEN DO YOU HAVE A DRINK CONTAINING ALCOHOL: NEVER

## 2024-01-16 ASSESSMENT — PAIN - FUNCTIONAL ASSESSMENT: PAIN_FUNCTIONAL_ASSESSMENT: NONE - DENIES PAIN

## 2024-01-17 PROCEDURE — 1240000000 HC EMOTIONAL WELLNESS R&B

## 2024-01-17 PROCEDURE — 6370000000 HC RX 637 (ALT 250 FOR IP): Performed by: PSYCHIATRY & NEUROLOGY

## 2024-01-17 PROCEDURE — 6370000000 HC RX 637 (ALT 250 FOR IP): Performed by: INTERNAL MEDICINE

## 2024-01-17 PROCEDURE — APPSS60 APP SPLIT SHARED TIME 46-60 MINUTES

## 2024-01-17 PROCEDURE — 99222 1ST HOSP IP/OBS MODERATE 55: CPT | Performed by: INTERNAL MEDICINE

## 2024-01-17 RX ORDER — HALOPERIDOL 5 MG/1
5 TABLET ORAL EVERY 4 HOURS PRN
Status: DISCONTINUED | OUTPATIENT
Start: 2024-01-17 | End: 2024-01-22 | Stop reason: HOSPADM

## 2024-01-17 RX ORDER — HALOPERIDOL 5 MG/ML
5 INJECTION INTRAMUSCULAR EVERY 4 HOURS PRN
Status: DISCONTINUED | OUTPATIENT
Start: 2024-01-17 | End: 2024-01-22 | Stop reason: HOSPADM

## 2024-01-17 RX ORDER — LORAZEPAM 1 MG/1
2 TABLET ORAL EVERY 4 HOURS PRN
Status: DISCONTINUED | OUTPATIENT
Start: 2024-01-17 | End: 2024-01-22 | Stop reason: HOSPADM

## 2024-01-17 RX ORDER — IBUPROFEN 400 MG/1
400 TABLET ORAL EVERY 6 HOURS PRN
Status: DISCONTINUED | OUTPATIENT
Start: 2024-01-17 | End: 2024-01-22 | Stop reason: HOSPADM

## 2024-01-17 RX ORDER — MAGNESIUM HYDROXIDE/ALUMINUM HYDROXICE/SIMETHICONE 120; 1200; 1200 MG/30ML; MG/30ML; MG/30ML
30 SUSPENSION ORAL EVERY 6 HOURS PRN
Status: DISCONTINUED | OUTPATIENT
Start: 2024-01-17 | End: 2024-01-22 | Stop reason: HOSPADM

## 2024-01-17 RX ORDER — HYDROXYZINE 50 MG/1
50 TABLET, FILM COATED ORAL 3 TIMES DAILY PRN
Status: DISCONTINUED | OUTPATIENT
Start: 2024-01-17 | End: 2024-01-22 | Stop reason: HOSPADM

## 2024-01-17 RX ORDER — POLYETHYLENE GLYCOL 3350 17 G/17G
17 POWDER, FOR SOLUTION ORAL DAILY PRN
Status: DISCONTINUED | OUTPATIENT
Start: 2024-01-17 | End: 2024-01-22 | Stop reason: HOSPADM

## 2024-01-17 RX ORDER — LISINOPRIL 20 MG/1
20 TABLET ORAL DAILY
Status: DISCONTINUED | OUTPATIENT
Start: 2024-01-17 | End: 2024-01-22 | Stop reason: HOSPADM

## 2024-01-17 RX ORDER — ACETAMINOPHEN 325 MG/1
650 TABLET ORAL EVERY 4 HOURS PRN
Status: DISCONTINUED | OUTPATIENT
Start: 2024-01-17 | End: 2024-01-22 | Stop reason: HOSPADM

## 2024-01-17 RX ORDER — NICOTINE 21 MG/24HR
1 PATCH, TRANSDERMAL 24 HOURS TRANSDERMAL DAILY
Status: DISCONTINUED | OUTPATIENT
Start: 2024-01-17 | End: 2024-01-22 | Stop reason: HOSPADM

## 2024-01-17 RX ORDER — DIPHENHYDRAMINE HYDROCHLORIDE 50 MG/ML
50 INJECTION INTRAMUSCULAR; INTRAVENOUS EVERY 4 HOURS PRN
Status: DISCONTINUED | OUTPATIENT
Start: 2024-01-17 | End: 2024-01-22 | Stop reason: HOSPADM

## 2024-01-17 RX ORDER — LORAZEPAM 2 MG/ML
2 INJECTION INTRAMUSCULAR EVERY 4 HOURS PRN
Status: DISCONTINUED | OUTPATIENT
Start: 2024-01-17 | End: 2024-01-22 | Stop reason: HOSPADM

## 2024-01-17 RX ORDER — SERTRALINE HYDROCHLORIDE 25 MG/1
25 TABLET, FILM COATED ORAL DAILY
Status: DISCONTINUED | OUTPATIENT
Start: 2024-01-17 | End: 2024-01-18

## 2024-01-17 RX ORDER — TRAZODONE HYDROCHLORIDE 50 MG/1
50 TABLET ORAL NIGHTLY PRN
Status: DISCONTINUED | OUTPATIENT
Start: 2024-01-17 | End: 2024-01-22 | Stop reason: HOSPADM

## 2024-01-17 RX ORDER — POLYETHYLENE GLYCOL 3350 17 G
2 POWDER IN PACKET (EA) ORAL
Status: DISCONTINUED | OUTPATIENT
Start: 2024-01-17 | End: 2024-01-17

## 2024-01-17 RX ADMIN — SERTRALINE HYDROCHLORIDE 25 MG: 25 TABLET ORAL at 19:20

## 2024-01-17 RX ADMIN — HYDROXYZINE HYDROCHLORIDE 50 MG: 50 TABLET, FILM COATED ORAL at 08:41

## 2024-01-17 RX ADMIN — LISINOPRIL 20 MG: 20 TABLET ORAL at 15:08

## 2024-01-17 ASSESSMENT — PATIENT HEALTH QUESTIONNAIRE - PHQ9
1. LITTLE INTEREST OR PLEASURE IN DOING THINGS: 0
SUM OF ALL RESPONSES TO PHQ QUESTIONS 1-9: 0
2. FEELING DOWN, DEPRESSED OR HOPELESS: 0
SUM OF ALL RESPONSES TO PHQ9 QUESTIONS 1 & 2: 0
SUM OF ALL RESPONSES TO PHQ QUESTIONS 1-9: 0

## 2024-01-17 ASSESSMENT — SLEEP AND FATIGUE QUESTIONNAIRES
DO YOU USE A SLEEP AID: NO
AVERAGE NUMBER OF SLEEP HOURS: 6
DO YOU HAVE DIFFICULTY SLEEPING: NO

## 2024-01-17 ASSESSMENT — LIFESTYLE VARIABLES
HOW OFTEN DO YOU HAVE A DRINK CONTAINING ALCOHOL: 4 OR MORE TIMES A WEEK
HOW MANY STANDARD DRINKS CONTAINING ALCOHOL DO YOU HAVE ON A TYPICAL DAY: 10 OR MORE

## 2024-01-17 NOTE — GROUP NOTE
Group Therapy Note    Date: 1/17/2024    Group Start Time: 1100  Group End Time: 1145  Group Topic: Music Therapy    RENEE MOREGONZALO LESLEE    Levi Beckford    Music Therapy Group Note        Date: 04/16/24 Start Time: 1100  End Time: 1145      Number of Participants in Group & Unit Census:  8/11    Topic: Patients were offered a variety of topics for discussion in music therapy group. Expressed they would prefer to \"see what comes up.\" Patients shared music and were able to share thoughts about they thought was important in their song, as well as answer one question from this writer relating to themes within their music or what they shared about their song.     Goal of Group: Goals to increase sense of community; Increase socialization; Increase self-expression; Demonstrate positive use of time;       Comments:     Patient did not participate in Music Therapy group, despite staff encouragement and explanation of benefits.  Patient remain seclusive to self.  Q15 minute safety checks maintained for patient safety and will continue to encourage patient to attend unit programming.

## 2024-01-17 NOTE — ED NOTES
Pt presents to the ED via [place] [employer]. Pt called stating pt is suicidal and needs to go to the hospital. Pt transported to the ED on a voluntary status. Pt states pt has had a recent increase of depression since pt became homeless and his girlfriend broke up with him. Pt has made suicide attempts in the past. Pt denies HI/AH/VH.     Pt to be reevaluated once pt is legally sober.

## 2024-01-17 NOTE — GROUP NOTE
Group Therapy Note    Date: 1/17/2024    Group Start Time: 0900  Group End Time: 0930  Group Topic: Daily Inventory Group    [facility]    Libertad Burk LPN        Group Therapy Note    Attendees: 7/11   Comments:     Patient did not participate in Community Meeting/Goals group, despite staff encouragement and explanation of benefits.  Patient remain seclusive to self.  Q15 minute safety checks maintained for patient safety and will continue to encourage patient to attend unit programming.      Discipline Responsible: Licensed Practical Nurse      Signature:  Libertad Burk LPN
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
## 2024-01-17 NOTE — ED NOTES
Provisional Diagnosis:   Depression with suicidal ideation.     Psychosocial and Contextual Factors: Pt is homeless.Pt has substance abuse issues. Pt has issues with social enviroment. Pt has issues with relationships.     C-SSRS Summary:    Patient: X    Family:     Agency: X (EPIC)    Present Suicidal Behavior:     Verbal: X    Attempt:     Past Suicidal Behavior:     Verbal: X    Attempt: X    Self- Injurious/ Self-Mutilation:  Pt denies    Trauma History: Pt's mother committed suicide when pt was a child.    Protective Factors: Pt has insurance.     Risk Factors: Pt has poor judgement and coping skills.     Substance Abuse: Alcohol and marijuana.     Clinical Summary:  Padilla Grissom is a 53 year old male who presents to the ED via [employer]. Pt was intoxicated with a BAL of 200. Pt is now legally sober.     Pt is suicidal with a plan to jump off of the bridge. Pt has had previous suicide attempts. Pt denies HI/AH/VH. Pt identifies becoming homeless and his girlfriend breaking up with him within the past month as the trigger to pt's SI. Pt has a previous diagnosis of major depressive disorder. Pt has a history of being linked with [facility]. Pt has been off medications for 2 months. [facility] Pt drinks around a 12 pack of beer daily and smokes marijuana. Pt reports poor sleep and a good appetite.     Level of Care Disposition:.RAEANN consulted with  from psychiatry Pt [facility] and [facility].
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
## 2024-01-17 NOTE — ED TRIAGE NOTES
Mode of arrival (squad #, walk in, police, etc) : police        Chief complaint(s): suicidal         Arrival Note (brief scenario, treatment PTA, etc).: Pt is suicidal and wants to jump off the bridge. Pt reports that he recently has lost apartment and girl friend and that there ius nothing keeping him here. Pt has been drinking a 12 pack daily        C= \"Have you ever felt that you should Cut down on your drinking?\"  No  A= \"Have people Annoyed you by criticizing your drinking?\"  No  G= \"Have you ever felt bad or Guilty about your drinking?\"  No  E= \"Have you ever had a drink as an Eye-opener first thing in the morning to steady your nerves or to help a hangover?\"  No      Deferred []      Reason for deferring: N/A    *If yes to two or more: probable alcohol abuse.*
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
## 2024-01-17 NOTE — ED NOTES
[employer] in [place] for patient watch. Safeguard informed that they need to stay with the patient at all time, must be present in the room and if they need a break or relief to let the nurse know so they can be replaced. Safeguard verbalizes understanding. Belongings and patient checked by security. Belongings locked up. Pt in blue gown. Mode of arrival (squad #, walk in, police, etc) : [employer]        Chief complaint(s): Mental Health Evaluation        Arrival Note (brief scenario, treatment PTA, etc).: Pt brought into the ED by [employer] voluntarily pt is SI with a plan to jump off the bridge. Pt denies HI. Pt has been off his medication the last two months but states he has nothing to live for. Pt states he's lost his girlfriend and his apartment recently and has no motivation to keep going. Pt admits to drinking a 12 pack of beer a day but denies drug use. Pt denies visual and audio hallucinations at this time.        C= \"Have you ever felt that you should Cut down on your drinking?\"  Yes  A= \"Have people Annoyed you by criticizing your drinking?\"  Yes  G= \"Have you ever felt bad or Guilty about your drinking?\"  Yes  E= \"Have you ever had a drink as an Eye-opener first thing in the morning to steady your nerves or to help a hangover?\"  Yes      Deferred []      Reason for deferring: N/A    *If yes to two or more: probable alcohol abuse.*

## 2024-01-17 NOTE — GROUP NOTE
Group Therapy Note    Date: 1/17/2024    Group Start Time: 1000  Group End Time: 1040  Group Topic: Psychoeducation    Vicky Sue, JORDANW        Group Therapy Note    Attendees: 6/10       patient refused to attend psychoeducation group at 10a after encouragement from staff.  1:1 talk time provided as alternative to group session

## 2024-01-18 LAB — HCV AB SERPL QL IA: NONREACTIVE

## 2024-01-18 PROCEDURE — 6370000000 HC RX 637 (ALT 250 FOR IP): Performed by: INTERNAL MEDICINE

## 2024-01-18 PROCEDURE — 99232 SBSQ HOSP IP/OBS MODERATE 35: CPT | Performed by: PSYCHIATRY & NEUROLOGY

## 2024-01-18 PROCEDURE — 36415 COLL VENOUS BLD VENIPUNCTURE: CPT

## 2024-01-18 PROCEDURE — 6370000000 HC RX 637 (ALT 250 FOR IP): Performed by: PSYCHIATRY & NEUROLOGY

## 2024-01-18 PROCEDURE — 1240000000 HC EMOTIONAL WELLNESS R&B

## 2024-01-18 PROCEDURE — 99232 SBSQ HOSP IP/OBS MODERATE 35: CPT | Performed by: INTERNAL MEDICINE

## 2024-01-18 PROCEDURE — 6370000000 HC RX 637 (ALT 250 FOR IP): Performed by: NURSE PRACTITIONER

## 2024-01-18 PROCEDURE — 86803 HEPATITIS C AB TEST: CPT

## 2024-01-18 RX ADMIN — LISINOPRIL 20 MG: 20 TABLET ORAL at 08:34

## 2024-01-18 RX ADMIN — ACETAMINOPHEN 650 MG: 325 TABLET ORAL at 19:56

## 2024-01-18 RX ADMIN — CHLORDIAZEPOXIDE HYDROCHLORIDE 25 MG: 5 CAPSULE ORAL at 20:39

## 2024-01-18 RX ADMIN — TRAZODONE HYDROCHLORIDE 50 MG: 50 TABLET ORAL at 19:56

## 2024-01-18 RX ADMIN — HYDROXYZINE HYDROCHLORIDE 50 MG: 50 TABLET, FILM COATED ORAL at 19:56

## 2024-01-18 RX ADMIN — SERTRALINE HYDROCHLORIDE 25 MG: 25 TABLET ORAL at 08:34

## 2024-01-18 RX ADMIN — HYDROXYZINE HYDROCHLORIDE 50 MG: 50 TABLET, FILM COATED ORAL at 08:35

## 2024-01-18 RX ADMIN — IBUPROFEN 400 MG: 400 TABLET, FILM COATED ORAL at 08:34

## 2024-01-18 ASSESSMENT — PAIN - FUNCTIONAL ASSESSMENT: PAIN_FUNCTIONAL_ASSESSMENT: ACTIVITIES ARE NOT PREVENTED

## 2024-01-18 ASSESSMENT — PAIN DESCRIPTION - DESCRIPTORS: DESCRIPTORS: ACHING

## 2024-01-18 ASSESSMENT — PAIN DESCRIPTION - ORIENTATION: ORIENTATION: OTHER (COMMENT)

## 2024-01-18 ASSESSMENT — LIFESTYLE VARIABLES
HOW MANY STANDARD DRINKS CONTAINING ALCOHOL DO YOU HAVE ON A TYPICAL DAY: 10 OR MORE
HOW OFTEN DO YOU HAVE A DRINK CONTAINING ALCOHOL: 4 OR MORE TIMES A WEEK

## 2024-01-18 NOTE — GROUP NOTE
Group Therapy Note    Date: 1/18/2024    Group Start Time: 1000  Group End Time: 1040  Group Topic: Psychoeducation    Vicky Sue, JORDANW        Group Therapy Note    Attendees: 5/10       patient refused to attend psychoeducation group at 10a after encouragement from staff.  1:1 talk time provided as alternative to group session

## 2024-01-18 NOTE — GROUP NOTE
Group Therapy Note    Date: 1/18/2024    Group Start Time: 1340  Group End Time: 1425  Group Topic: Recreational    Levi Rainey    Recreation Group Note        Date: 1/18/2024   Start Time: 1340  End Time: 1425      Number of Participants in Group & Unit Census:  2/7    Topic: Patients given a pack of papers, with each paper containing a small un-detailed doodle or shape of some kind. Patients each given identical stack of papers, and tasked with developing the small doodle into a  more detailed image. Would spend about 5 minutes on each doodle, then share with each other the images the came up with.     Goal of Group: Goals to increase self-expression; Increase cognitive stimulation; Increase sense of community; Increase socialization      Comments:     Patient did not participate in Recreation group, despite staff encouragement and explanation of benefits.  Patient remain seclusive to self.  Q15 minute safety checks maintained for patient safety and will continue to encourage patient to attend unit programming.

## 2024-01-18 NOTE — GROUP NOTE
Group Therapy Note    Date: 1/18/2024    Group Start Time: 1105  Group End Time: 1205  Group Topic: Music Therapy    Levi Rainey    Music Therapy Group Note        Date: 1/18/2024 Start Time: 1105  End Time: 1205      Number of Participants in Group & Unit Census:  7/10    Topic: Patients worked together to create a play list of music going calm/relaxing music, to gradually becoming more energetic music.     Goal of Group: Goals to elevate mood; Demonstrate positive use of time; Increase self-expression; Increase sense of community      Comments:     Patient did not participate in Music Therapy group, despite staff encouragement and explanation of benefits.  Patient remain seclusive to self.  Q15 minute safety checks maintained for patient safety and will continue to encourage patient to attend unit programming.

## 2024-01-19 LAB
CHOLEST SERPL-MCNC: 168 MG/DL
CHOLESTEROL/HDL RATIO: 2.7
EST. AVERAGE GLUCOSE BLD GHB EST-MCNC: 103 MG/DL
HBA1C MFR BLD: 5.2 % (ref 4–6)
HDLC SERPL-MCNC: 62 MG/DL
LDLC SERPL CALC-MCNC: 88 MG/DL (ref 0–130)
TRIGL SERPL-MCNC: 92 MG/DL
TSH SERPL DL<=0.05 MIU/L-ACNC: 1.71 UIU/ML (ref 0.3–5)

## 2024-01-19 PROCEDURE — 1240000000 HC EMOTIONAL WELLNESS R&B

## 2024-01-19 PROCEDURE — 6370000000 HC RX 637 (ALT 250 FOR IP): Performed by: INTERNAL MEDICINE

## 2024-01-19 PROCEDURE — 99232 SBSQ HOSP IP/OBS MODERATE 35: CPT | Performed by: PSYCHIATRY & NEUROLOGY

## 2024-01-19 PROCEDURE — 36415 COLL VENOUS BLD VENIPUNCTURE: CPT

## 2024-01-19 PROCEDURE — 80061 LIPID PANEL: CPT

## 2024-01-19 PROCEDURE — 6370000000 HC RX 637 (ALT 250 FOR IP)

## 2024-01-19 PROCEDURE — APPSS30 APP SPLIT SHARED TIME 16-30 MINUTES

## 2024-01-19 PROCEDURE — 84443 ASSAY THYROID STIM HORMONE: CPT

## 2024-01-19 PROCEDURE — 99232 SBSQ HOSP IP/OBS MODERATE 35: CPT | Performed by: INTERNAL MEDICINE

## 2024-01-19 PROCEDURE — 83036 HEMOGLOBIN GLYCOSYLATED A1C: CPT

## 2024-01-19 PROCEDURE — 6370000000 HC RX 637 (ALT 250 FOR IP): Performed by: PSYCHIATRY & NEUROLOGY

## 2024-01-19 RX ADMIN — TRAZODONE HYDROCHLORIDE 50 MG: 50 TABLET ORAL at 21:21

## 2024-01-19 RX ADMIN — LISINOPRIL 20 MG: 20 TABLET ORAL at 08:43

## 2024-01-19 RX ADMIN — HYDROXYZINE HYDROCHLORIDE 50 MG: 50 TABLET, FILM COATED ORAL at 08:44

## 2024-01-19 RX ADMIN — HYDROXYZINE HYDROCHLORIDE 50 MG: 50 TABLET, FILM COATED ORAL at 21:21

## 2024-01-19 RX ADMIN — SERTRALINE HYDROCHLORIDE 50 MG: 50 TABLET ORAL at 08:44

## 2024-01-19 ASSESSMENT — PAIN DESCRIPTION - LOCATION: LOCATION: GENERALIZED

## 2024-01-19 NOTE — GROUP NOTE
Group Therapy Note    Date: 1/19/2024    Group Start Time: 1435  Group End Time: 1500  Group Topic: Recreational    Levi Rainey    Recreation Group Note        Date: 1/19/2024 Start Time: 1435  End Time: 1500      Number of Participants in Group & Unit Census:  3/7    Topic: Patients engaged in trivia style game, where there were multiple correct answers patients were attempting to guess.     Goal of Group: Goals to increase cognitive stimulation; Increase sense of community; Demonstrate positive use of time;       Comments:     Patient did not participate in Recreation group, despite staff encouragement and explanation of benefits.  Patient remain seclusive to self.  Q15 minute safety checks maintained for patient safety and will continue to encourage patient to attend unit programming.

## 2024-01-19 NOTE — GROUP NOTE
Group Therapy Note    Date: 1/19/2024    Group Start Time: 1105  Group End Time: 1150  Group Topic: Music Therapy    RENEE PONCE LESLEE    Lvei Beckford    Music Therapy Group Note        Date: 1/19/2024 Start Time: 1105  End Time: 1150      Number of Participants in Group & Unit Census:  6/9    Topic: Patients were asked to share music to dedicate to an important person in their life, and answered questions about these people and relationships as asked by this writer, relating to their sharing or their music selections.     Goal of Group: Goals to reflect on relationships; Increase sense of community; Increase socialization; Increase self-esteem      Comments:     Patient did not participate in Music Therapy group, despite staff encouragement and explanation of benefits.  Patient remain seclusive to self.  Q15 minute safety checks maintained for patient safety and will continue to encourage patient to attend unit programming.

## 2024-01-20 PROCEDURE — 99231 SBSQ HOSP IP/OBS SF/LOW 25: CPT | Performed by: INTERNAL MEDICINE

## 2024-01-20 PROCEDURE — 6370000000 HC RX 637 (ALT 250 FOR IP): Performed by: PSYCHIATRY & NEUROLOGY

## 2024-01-20 PROCEDURE — 6370000000 HC RX 637 (ALT 250 FOR IP)

## 2024-01-20 PROCEDURE — 99232 SBSQ HOSP IP/OBS MODERATE 35: CPT | Performed by: NURSE PRACTITIONER

## 2024-01-20 PROCEDURE — 1240000000 HC EMOTIONAL WELLNESS R&B

## 2024-01-20 PROCEDURE — 6370000000 HC RX 637 (ALT 250 FOR IP): Performed by: INTERNAL MEDICINE

## 2024-01-20 RX ADMIN — HYDROXYZINE HYDROCHLORIDE 50 MG: 50 TABLET, FILM COATED ORAL at 21:06

## 2024-01-20 RX ADMIN — SERTRALINE HYDROCHLORIDE 50 MG: 50 TABLET ORAL at 08:30

## 2024-01-20 RX ADMIN — IBUPROFEN 400 MG: 400 TABLET, FILM COATED ORAL at 08:30

## 2024-01-20 RX ADMIN — TRAZODONE HYDROCHLORIDE 50 MG: 50 TABLET ORAL at 21:06

## 2024-01-20 RX ADMIN — LISINOPRIL 20 MG: 20 TABLET ORAL at 08:30

## 2024-01-20 ASSESSMENT — PAIN DESCRIPTION - LOCATION: LOCATION: GENERALIZED

## 2024-01-20 ASSESSMENT — PAIN SCALES - GENERAL
PAINLEVEL_OUTOF10: 0
PAINLEVEL_OUTOF10: 1

## 2024-01-20 ASSESSMENT — PAIN DESCRIPTION - DESCRIPTORS: DESCRIPTORS: ACHING

## 2024-01-20 NOTE — GROUP NOTE
Group Therapy Note    Date: 1/20/2024    Group Start Time: 1100  Group End Time: 1130  Group Topic: Healthy Living/Wellness    [facility] Celia Chavarria LPN        Group Therapy Note    Attendees: 5/5       Status After Intervention:  Improved    Participation Level: Active Listener    Participation Quality: Appropriate      Speech:  normal      Thought Process/Content: Logical      Affective Functioning: Congruent      Mood: anxious      Level of consciousness:  Alert, Oriented x4, and Attentive      Response to Learning: Progressing to goal      Endings: None Reported    Modes of Intervention: Socialization      Discipline Responsible: Licensed Practical Nurse      Signature:  Celia Painter LPN

## 2024-01-21 LAB
ANION GAP SERPL CALCULATED.3IONS-SCNC: 8 MMOL/L (ref 9–17)
BUN SERPL-MCNC: 21 MG/DL (ref 6–20)
CALCIUM SERPL-MCNC: 9.7 MG/DL (ref 8.6–10.4)
CHLORIDE SERPL-SCNC: 102 MMOL/L (ref 98–107)
CO2 SERPL-SCNC: 27 MMOL/L (ref 20–31)
CREAT SERPL-MCNC: 1 MG/DL (ref 0.7–1.2)
GFR SERPL CREATININE-BSD FRML MDRD: >60 ML/MIN/1.73M2
GLUCOSE SERPL-MCNC: 98 MG/DL (ref 70–99)
POTASSIUM SERPL-SCNC: 4.8 MMOL/L (ref 3.7–5.3)
SODIUM SERPL-SCNC: 137 MMOL/L (ref 135–144)

## 2024-01-21 PROCEDURE — 6370000000 HC RX 637 (ALT 250 FOR IP): Performed by: NURSE PRACTITIONER

## 2024-01-21 PROCEDURE — 1240000000 HC EMOTIONAL WELLNESS R&B

## 2024-01-21 PROCEDURE — 36415 COLL VENOUS BLD VENIPUNCTURE: CPT

## 2024-01-21 PROCEDURE — 99232 SBSQ HOSP IP/OBS MODERATE 35: CPT | Performed by: NURSE PRACTITIONER

## 2024-01-21 PROCEDURE — 6370000000 HC RX 637 (ALT 250 FOR IP): Performed by: INTERNAL MEDICINE

## 2024-01-21 PROCEDURE — 6370000000 HC RX 637 (ALT 250 FOR IP): Performed by: PSYCHIATRY & NEUROLOGY

## 2024-01-21 PROCEDURE — 99231 SBSQ HOSP IP/OBS SF/LOW 25: CPT | Performed by: INTERNAL MEDICINE

## 2024-01-21 PROCEDURE — 80048 BASIC METABOLIC PNL TOTAL CA: CPT

## 2024-01-21 RX ADMIN — LISINOPRIL 20 MG: 20 TABLET ORAL at 08:00

## 2024-01-21 RX ADMIN — SERTRALINE HYDROCHLORIDE 75 MG: 50 TABLET ORAL at 08:00

## 2024-01-21 RX ADMIN — TRAZODONE HYDROCHLORIDE 50 MG: 50 TABLET ORAL at 20:17

## 2024-01-21 RX ADMIN — HYDROXYZINE HYDROCHLORIDE 50 MG: 50 TABLET, FILM COATED ORAL at 20:17

## 2024-01-21 RX ADMIN — HYDROXYZINE HYDROCHLORIDE 50 MG: 50 TABLET, FILM COATED ORAL at 08:00

## 2024-01-21 ASSESSMENT — PAIN SCALES - GENERAL: PAINLEVEL_OUTOF10: 0

## 2024-01-21 NOTE — GROUP NOTE
Group Therapy Note    Date: 1/21/2024    Group Start Time: 1000  Group End Time: 1050  Group Topic: Relaxation    Katia Whelan CTRS    Relaxation Group Note        Date: January 21, 2024 Start Time: 10am  End Time:  1050am      Number of Participants in Group & Unit Census:  4/6    Topic: relaxation group     Goal of Group: pt will identify benefits of using art for coping and relaxation       Comments:     Patient did not participate in Relaxation group, despite staff encouragement and explanation of benefits.  Patient remain seclusive to self.  Q15 minute safety checks maintained for patient safety and will continue to encourage patient to attend unit programming.              Signature:  PATI ANDRADE

## 2024-01-22 VITALS
TEMPERATURE: 98.1 F | SYSTOLIC BLOOD PRESSURE: 115 MMHG | HEART RATE: 66 BPM | WEIGHT: 180 LBS | RESPIRATION RATE: 14 BRPM | HEIGHT: 67 IN | OXYGEN SATURATION: 98 % | BODY MASS INDEX: 28.25 KG/M2 | DIASTOLIC BLOOD PRESSURE: 84 MMHG

## 2024-01-22 PROCEDURE — 6370000000 HC RX 637 (ALT 250 FOR IP): Performed by: PSYCHIATRY & NEUROLOGY

## 2024-01-22 PROCEDURE — 99239 HOSP IP/OBS DSCHRG MGMT >30: CPT | Performed by: PSYCHIATRY & NEUROLOGY

## 2024-01-22 PROCEDURE — 6370000000 HC RX 637 (ALT 250 FOR IP): Performed by: INTERNAL MEDICINE

## 2024-01-22 PROCEDURE — 6370000000 HC RX 637 (ALT 250 FOR IP): Performed by: NURSE PRACTITIONER

## 2024-01-22 RX ORDER — TRAZODONE HYDROCHLORIDE 50 MG/1
50 TABLET ORAL NIGHTLY PRN
Qty: 30 TABLET | Refills: 0 | Status: SHIPPED | OUTPATIENT
Start: 2024-01-22

## 2024-01-22 RX ORDER — HYDROXYZINE HYDROCHLORIDE 25 MG/1
25 TABLET, FILM COATED ORAL 3 TIMES DAILY PRN
Qty: 30 TABLET | Refills: 0 | Status: SHIPPED | OUTPATIENT
Start: 2024-01-22 | End: 2024-02-01

## 2024-01-22 RX ORDER — LISINOPRIL 20 MG/1
20 TABLET ORAL DAILY
Qty: 30 TABLET | Refills: 0 | Status: SHIPPED | OUTPATIENT
Start: 2024-01-22

## 2024-01-22 RX ORDER — SERTRALINE HYDROCHLORIDE 100 MG/1
100 TABLET, FILM COATED ORAL DAILY
Qty: 30 TABLET | Refills: 0 | Status: SHIPPED | OUTPATIENT
Start: 2024-01-23

## 2024-01-22 RX ADMIN — LISINOPRIL 20 MG: 20 TABLET ORAL at 09:26

## 2024-01-22 RX ADMIN — SERTRALINE HYDROCHLORIDE 75 MG: 50 TABLET ORAL at 09:26

## 2024-01-22 ASSESSMENT — PAIN SCALES - GENERAL: PAINLEVEL_OUTOF10: 0

## 2024-01-22 NOTE — GROUP NOTE
Group Therapy Note    Date: 1/22/2024    Group Start Time: 1330  Group End Time: 1420  Group Topic: Cognitive Skills    Tigist Penny CTRS        Group Therapy Note    Attendees: 2/5    Cognitive Skills Group Note        Date: January 22, 2024 Start Time: 1:30pm  End Time: 2:20pm      Number of Participants in Group & Unit Census:  2/5    Topic:  interpersonal skills, decision-making, concentration     Goal of Group: To improve interpersonal skills and decision-making through collaborating with peers and concentrating on a presented task.       Comments:     Patient did not participate in Cognitive Skills group, despite staff encouragement and explanation of benefits.  Patient remain seclusive to self.  Q15 minute safety checks maintained for patient safety and will continue to encourage patient to attend unit programming.        Signature:  PATI Del Cid
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
## 2024-01-22 NOTE — TRANSITION OF CARE
Plan:   Does the patient have a history of substance/alcohol abuse and requires a referral for treatment? No  Patient referred to the following for substance/alcohol abuse treatment with an appointment? No  Patient was offered medication to assist with alcohol cessation at discharge? No      Patient discharged to Home; discussed with patient/caregiver

## 2024-01-22 NOTE — GROUP NOTE
Group Therapy Note    Date: 1/22/2024    Group Start Time: 1045  Group End Time: 1130  Group Topic: Cognitive Skills    [facility]        Group Therapy Note    Attendees: 3/5    Cognitive Skills Group Note        Date: January 22, 2024 Start Time: 10:45am  End Time: 11:30am      Number of Participants in Group & Unit Census:  3/5    Topic:  interpersonal skills, memory recall, concentration     Goal of Group: To improve interpersonal skills and memory recall through collaborating with peers and concentrating on a presented task.      Comments:     Patient did not participate in Cognitive Skills group, despite staff encouragement and explanation of benefits.  Patient remain seclusive to self.  Q15 minute safety checks maintained for patient safety and will continue to encourage patient to attend unit programming.        Signature:  PATI Del Cid

## 2024-01-22 NOTE — CARE COORDINATION
Social work spoke with pts sister Lizz regarding pts discharge. Lizz reports her son Billy will  the pt and take him to his house, and she will make arrangements for the pt to come to [place] to live with her. Lizz provided social work with [facility] phone number as [phone]. Social work received verbal permission to contact his nephew Billy to discuss discharge.     Social work placed call to Billy and left message asking for return call.
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
## 2024-01-22 NOTE — DISCHARGE INSTRUCTIONS
Information:   Questions regarding your bill: Call HELP program [phone]     Suicide Hotline ([facility] Crisis Care Line)  ([phone] [phone]      Recovery Help line- 503.795.1906      To obtain results of pending studies call Medical Records at: [phone]     For emergencies and 24 hour/7 days a week contact information:  [phone]

## 2024-01-23 NOTE — CARE COORDINATION
Name: Padilla Grissom    : 1970    Auth number: [id number]     Discharge Date: 2024    Destination: home    *If you have any specific discharge questions, please contact the assigned /discharge planner: Vicky ([phone]) or Lauren ([phone])      Discharge Medications:      Medication List        START taking these medications      sertraline 100 MG tablet  Commonly known as: ZOLOFT  Take 1 tablet by mouth daily  Notes to patient: Mood     traZODone 50 MG tablet  Commonly known as: DESYREL  Take 1 tablet by mouth nightly as needed for Sleep  Notes to patient: Sleep            CHANGE how you take these medications      hydrOXYzine HCl 25 MG tablet  Commonly known as: ATARAX  Take 1 tablet by mouth 3 times daily as needed for Anxiety  What changed: when to take this  Notes to patient: Anxiety            CONTINUE taking these medications      lisinopril 20 MG tablet  Commonly known as: PRINIVIL;ZESTRIL  Take 1 tablet by mouth daily  Notes to patient: Blood pressure            STOP taking these medications      albuterol sulfate  (90 Base) MCG/ACT inhaler  Commonly known as: Ventolin HFA     DULoxetine 60 MG extended release capsule  Commonly known as: CYMBALTA     famotidine 20 MG tablet  Commonly known as: PEPCID     lurasidone 60 MG Tabs tablet  Commonly known as: LATUDA     mirtazapine 15 MG tablet  Commonly known as: REMERON     OLANZapine 2.5 MG tablet  Commonly known as: ZYPREXA     propranolol 10 MG tablet  Commonly known as: INDERAL               Where to Get Your Medications        These medications were sent to [id number] - [address] -  [phone] - F [phone]  [address] [place] [id number]      Phone: [phone]   hydrOXYzine HCl 25 MG tablet  lisinopril 20 MG tablet  sertraline 100 MG tablet  traZODone 50 MG tablet         Follow Up Appointment: [facility]  [address] [place] [address]

## 2024-01-23 NOTE — DISCHARGE SUMMARY
Provider Discharge Summary     Patient ID:  Padilla Grissom  [id number]  53 y.o.  1970    Admit date: 1/16/2024    Discharge date and time: January 22, 2024 10:40 AM  Admitting Physician: Benson Perez MD     Discharge Physician: Jono Can MD    Admission Diagnoses: Depression with suicidal ideation [F32.A, R45.851]  Acute alcoholic intoxication with complication (HCC) [F10.929]    Discharge Diagnoses:      Major depressive disorder, recurrent severe without psychotic features (HCC)     Patient Active Problem List   Diagnosis Code    Depression with suicidal ideation F32.A, R45.851    Major depression, recurrent (HCC) F33.9    Bipolar affective disorder (HCC) F31.9    Bipolar disorder (HCC) F31.9    Depression, major, recurrent (HCC) F33.9    Major depressive disorder, recurrent (HCC) F33.9    Major depressive disorder, recurrent severe without psychotic features (HCC) F33.2        Admission Condition: poor    Discharged Condition: stable    Indication for Admission: threat to self    History of Present Illnes (present tense wording is of findings from admission exam and are not necessarily indicative of current findings):   Padilla Grissom is a 53 y.o. male who has a past medical history of mental illness who presents to the ER with increased depression and suicidal ideation with a plan to jump off a bridge.  Padilla does appear to have been admitted to this facility before back in the summer of 2022.  At that time he was discharged on Cymbalta, Zyprexa, and Latuda.  Padilla is agreeable to assessment at bedside today.  When asked about events leading up to hospitalization he reports that he was living in [place] with his girlfriend.  He reports that his girlfriend has to go to a nursing home because she had \"Alzheimers.\"  He reports that he left his apartment and came to [place] to live with his brother in law last week.  He reports that things have not been going well with living with his brother in law.  He states a

## 2024-02-14 NOTE — PLAN OF CARE
Problem: Self Harm/Suicidality  Goal: Will have no self-injury during hospital stay  Description: INTERVENTIONS:  1. Q 30 MINUTES: Routine safety checks  2. Q SHIFT & PRN: Assess risk to determine if routine checks are adequate to maintain patient safety  7/27/2022 0252 by Yovanny Casillas RN  Note:  Pt denies suicidal ideations at this time. Pt agreed to seek staff at anytime he/she felt like any urges to harm self would arise. Safety checks maintained [id number]. Problem: Depression  Goal: Will be euthymic at discharge  Description: INTERVENTIONS:  1. Administer medication as ordered  2. Provide emotional support via 1:1 interaction with staff  3. Encourage involvement in milieu/groups/activities  4. Monitor for social isolation  7/27/2022 0252 by Yovanny Casillas RN  Note: Pt continues to endorse depression and anxiety during this hospital stay. Patient is agreeable to let staff know if his symptoms worsen. Patient is isolative to self, but cooperative during assessment. He reports adequate sleep and appetite. Pt is taking medications as prescribed and has been in behavior control during this shift. Well controlled on current doses of valsartan and metoprolol.  Will continue and encouraged exercise and weight loss.

## 2024-03-19 NOTE — GROUP NOTE
Group Therapy Note    Date: 9/18/2020    Group Start Time: 1100  Group End Time: 1369  Group Topic: Psychoeducation    RENEE Braxton, MOES    Patient refused to attend leisure skills group at 1100 after encouragement from staff. 1:1 talk time offered by staff as alternative to group session. [phone]

## 2024-09-15 ENCOUNTER — HOSPITAL ENCOUNTER (EMERGENCY)
Facility: HOSPITAL | Age: 54
Discharge: OTHER INSTITUTION | End: 2024-09-16
Attending: EMERGENCY MEDICINE
Payer: MEDICARE

## 2024-09-15 ENCOUNTER — APPOINTMENT (OUTPATIENT)
Dept: CARDIOLOGY | Facility: HOSPITAL | Age: 54
End: 2024-09-15
Payer: MEDICARE

## 2024-09-15 DIAGNOSIS — F10.920 ALCOHOLIC INTOXICATION WITHOUT COMPLICATION (CMS-HCC): ICD-10-CM

## 2024-09-15 DIAGNOSIS — R45.89 SUICIDAL BEHAVIOR WITHOUT ATTEMPTED SELF-INJURY: Primary | ICD-10-CM

## 2024-09-15 LAB
ALBUMIN SERPL BCP-MCNC: 3.9 G/DL (ref 3.4–5)
ALP SERPL-CCNC: 82 U/L (ref 33–120)
ALT SERPL W P-5'-P-CCNC: 44 U/L (ref 10–52)
AMPHETAMINES UR QL SCN: NORMAL
ANION GAP SERPL CALC-SCNC: 13 MMOL/L (ref 10–20)
APAP SERPL-MCNC: <10 UG/ML
AST SERPL W P-5'-P-CCNC: 48 U/L (ref 9–39)
BARBITURATES UR QL SCN: NORMAL
BASOPHILS # BLD AUTO: 0.03 X10*3/UL (ref 0–0.1)
BASOPHILS NFR BLD AUTO: 0.4 %
BENZODIAZ UR QL SCN: NORMAL
BILIRUB SERPL-MCNC: 0.5 MG/DL (ref 0–1.2)
BUN SERPL-MCNC: 8 MG/DL (ref 6–23)
BZE UR QL SCN: NORMAL
CALCIUM SERPL-MCNC: 8.9 MG/DL (ref 8.6–10.3)
CANNABINOIDS UR QL SCN: NORMAL
CHLORIDE SERPL-SCNC: 104 MMOL/L (ref 98–107)
CO2 SERPL-SCNC: 23 MMOL/L (ref 21–32)
CREAT SERPL-MCNC: 0.89 MG/DL (ref 0.5–1.3)
EGFRCR SERPLBLD CKD-EPI 2021: >90 ML/MIN/1.73M*2
EOSINOPHIL # BLD AUTO: 0.15 X10*3/UL (ref 0–0.7)
EOSINOPHIL NFR BLD AUTO: 1.9 %
ERYTHROCYTE [DISTWIDTH] IN BLOOD BY AUTOMATED COUNT: 12.7 % (ref 11.5–14.5)
ETHANOL SERPL-MCNC: 263 MG/DL
FENTANYL+NORFENTANYL UR QL SCN: NORMAL
GLUCOSE SERPL-MCNC: 91 MG/DL (ref 74–99)
HCT VFR BLD AUTO: 44 % (ref 41–52)
HGB BLD-MCNC: 15.1 G/DL (ref 13.5–17.5)
IMM GRANULOCYTES # BLD AUTO: 0.02 X10*3/UL (ref 0–0.7)
IMM GRANULOCYTES NFR BLD AUTO: 0.3 % (ref 0–0.9)
LYMPHOCYTES # BLD AUTO: 2.38 X10*3/UL (ref 1.2–4.8)
LYMPHOCYTES NFR BLD AUTO: 30.2 %
MAGNESIUM SERPL-MCNC: 2.02 MG/DL (ref 1.6–2.4)
MCH RBC QN AUTO: 33.3 PG (ref 26–34)
MCHC RBC AUTO-ENTMCNC: 34.3 G/DL (ref 32–36)
MCV RBC AUTO: 97 FL (ref 80–100)
METHADONE UR QL SCN: NORMAL
MONOCYTES # BLD AUTO: 0.93 X10*3/UL (ref 0.1–1)
MONOCYTES NFR BLD AUTO: 11.8 %
NEUTROPHILS # BLD AUTO: 4.36 X10*3/UL (ref 1.2–7.7)
NEUTROPHILS NFR BLD AUTO: 55.4 %
NRBC BLD-RTO: 0 /100 WBCS (ref 0–0)
OPIATES UR QL SCN: NORMAL
OXYCODONE+OXYMORPHONE UR QL SCN: NORMAL
PCP UR QL SCN: NORMAL
PHOSPHATE SERPL-MCNC: 3 MG/DL (ref 2.5–4.9)
PLATELET # BLD AUTO: 169 X10*3/UL (ref 150–450)
POTASSIUM SERPL-SCNC: 3.8 MMOL/L (ref 3.5–5.3)
PROT SERPL-MCNC: 7.3 G/DL (ref 6.4–8.2)
RBC # BLD AUTO: 4.53 X10*6/UL (ref 4.5–5.9)
SALICYLATES SERPL-MCNC: <3 MG/DL
SODIUM SERPL-SCNC: 136 MMOL/L (ref 136–145)
WBC # BLD AUTO: 7.9 X10*3/UL (ref 4.4–11.3)

## 2024-09-15 PROCEDURE — 80143 DRUG ASSAY ACETAMINOPHEN: CPT | Performed by: NURSE PRACTITIONER

## 2024-09-15 PROCEDURE — 80053 COMPREHEN METABOLIC PANEL: CPT | Performed by: NURSE PRACTITIONER

## 2024-09-15 PROCEDURE — 85025 COMPLETE CBC W/AUTO DIFF WBC: CPT | Performed by: NURSE PRACTITIONER

## 2024-09-15 PROCEDURE — 83735 ASSAY OF MAGNESIUM: CPT | Performed by: NURSE PRACTITIONER

## 2024-09-15 PROCEDURE — 84100 ASSAY OF PHOSPHORUS: CPT | Performed by: NURSE PRACTITIONER

## 2024-09-15 PROCEDURE — 2500000002 HC RX 250 W HCPCS SELF ADMINISTERED DRUGS (ALT 637 FOR MEDICARE OP, ALT 636 FOR OP/ED): Performed by: NURSE PRACTITIONER

## 2024-09-15 PROCEDURE — S4991 NICOTINE PATCH NONLEGEND: HCPCS | Performed by: NURSE PRACTITIONER

## 2024-09-15 PROCEDURE — 80307 DRUG TEST PRSMV CHEM ANLYZR: CPT | Performed by: NURSE PRACTITIONER

## 2024-09-15 PROCEDURE — 36415 COLL VENOUS BLD VENIPUNCTURE: CPT | Performed by: NURSE PRACTITIONER

## 2024-09-15 PROCEDURE — 93005 ELECTROCARDIOGRAM TRACING: CPT

## 2024-09-15 PROCEDURE — 99285 EMERGENCY DEPT VISIT HI MDM: CPT

## 2024-09-15 PROCEDURE — 2500000001 HC RX 250 WO HCPCS SELF ADMINISTERED DRUGS (ALT 637 FOR MEDICARE OP): Performed by: NURSE PRACTITIONER

## 2024-09-15 RX ORDER — IBUPROFEN 200 MG
1 TABLET ORAL ONCE
Status: DISCONTINUED | OUTPATIENT
Start: 2024-09-15 | End: 2024-09-16 | Stop reason: HOSPADM

## 2024-09-15 RX ORDER — LORAZEPAM 1 MG/1
2 TABLET ORAL ONCE
Status: COMPLETED | OUTPATIENT
Start: 2024-09-15 | End: 2024-09-15

## 2024-09-15 SDOH — HEALTH STABILITY: MENTAL HEALTH: CONTENT: UNREMARKABLE

## 2024-09-15 SDOH — HEALTH STABILITY: MENTAL HEALTH: BEHAVIORS/MOOD: COOPERATIVE;HYPER-VERBAL;PLEASANT;RESTLESS

## 2024-09-15 SDOH — HEALTH STABILITY: MENTAL HEALTH: NEEDS EXPRESSED: EMOTIONAL

## 2024-09-15 SDOH — HEALTH STABILITY: MENTAL HEALTH

## 2024-09-15 SDOH — SOCIAL STABILITY: SOCIAL INSECURITY: FAMILY BEHAVIORS: UNABLE TO ASSESS

## 2024-09-15 SDOH — HEALTH STABILITY: MENTAL HEALTH: BEHAVIORAL HEALTH(WDL): EXCEPTIONS TO WDL

## 2024-09-15 SDOH — SOCIAL STABILITY: SOCIAL NETWORK: VISITOR BEHAVIORS: UNABLE TO ASSESS

## 2024-09-15 SDOH — SOCIAL STABILITY: SOCIAL NETWORK: EMOTIONAL SUPPORT GIVEN: REASSURE

## 2024-09-15 ASSESSMENT — PAIN DESCRIPTION - PROGRESSION: CLINICAL_PROGRESSION: NOT CHANGED

## 2024-09-15 ASSESSMENT — COLUMBIA-SUICIDE SEVERITY RATING SCALE - C-SSRS
6. HAVE YOU EVER DONE ANYTHING, STARTED TO DO ANYTHING, OR PREPARED TO DO ANYTHING TO END YOUR LIFE?: YES
1. IN THE PAST MONTH, HAVE YOU WISHED YOU WERE DEAD OR WISHED YOU COULD GO TO SLEEP AND NOT WAKE UP?: YES
6. HAVE YOU EVER DONE ANYTHING, STARTED TO DO ANYTHING, OR PREPARED TO DO ANYTHING TO END YOUR LIFE?: YES

## 2024-09-15 ASSESSMENT — LIFESTYLE VARIABLES
HAVE PEOPLE ANNOYED YOU BY CRITICIZING YOUR DRINKING: NO
HAVE YOU EVER FELT YOU SHOULD CUT DOWN ON YOUR DRINKING: NO
EVER FELT BAD OR GUILTY ABOUT YOUR DRINKING: NO
EVER HAD A DRINK FIRST THING IN THE MORNING TO STEADY YOUR NERVES TO GET RID OF A HANGOVER: NO
TOTAL SCORE: 0

## 2024-09-15 ASSESSMENT — PAIN SCALES - GENERAL
PAINLEVEL_OUTOF10: 0 - NO PAIN
PAINLEVEL_OUTOF10: 0 - NO PAIN

## 2024-09-15 ASSESSMENT — PAIN - FUNCTIONAL ASSESSMENT: PAIN_FUNCTIONAL_ASSESSMENT: 0-10

## 2024-09-16 VITALS
TEMPERATURE: 97.9 F | RESPIRATION RATE: 18 BRPM | OXYGEN SATURATION: 96 % | DIASTOLIC BLOOD PRESSURE: 79 MMHG | HEART RATE: 81 BPM | SYSTOLIC BLOOD PRESSURE: 147 MMHG | BODY MASS INDEX: 27.47 KG/M2 | WEIGHT: 175 LBS | HEIGHT: 67 IN

## 2024-09-16 LAB — ETHANOL SERPL-MCNC: 81 MG/DL

## 2024-09-16 PROCEDURE — S4991 NICOTINE PATCH NONLEGEND: HCPCS | Performed by: STUDENT IN AN ORGANIZED HEALTH CARE EDUCATION/TRAINING PROGRAM

## 2024-09-16 PROCEDURE — 36415 COLL VENOUS BLD VENIPUNCTURE: CPT | Performed by: EMERGENCY MEDICINE

## 2024-09-16 PROCEDURE — 2500000001 HC RX 250 WO HCPCS SELF ADMINISTERED DRUGS (ALT 637 FOR MEDICARE OP): Performed by: STUDENT IN AN ORGANIZED HEALTH CARE EDUCATION/TRAINING PROGRAM

## 2024-09-16 PROCEDURE — 82077 ASSAY SPEC XCP UR&BREATH IA: CPT | Performed by: EMERGENCY MEDICINE

## 2024-09-16 PROCEDURE — 2500000002 HC RX 250 W HCPCS SELF ADMINISTERED DRUGS (ALT 637 FOR MEDICARE OP, ALT 636 FOR OP/ED): Performed by: STUDENT IN AN ORGANIZED HEALTH CARE EDUCATION/TRAINING PROGRAM

## 2024-09-16 RX ORDER — IBUPROFEN 200 MG
1 TABLET ORAL DAILY
Status: DISCONTINUED | OUTPATIENT
Start: 2024-09-16 | End: 2024-09-16 | Stop reason: HOSPADM

## 2024-09-16 RX ORDER — LORAZEPAM 1 MG/1
1 TABLET ORAL ONCE
Status: COMPLETED | OUTPATIENT
Start: 2024-09-16 | End: 2024-09-16

## 2024-09-16 SDOH — HEALTH STABILITY: MENTAL HEALTH: SUICIDAL BEHAVIOR (DESCRIPTION): SA VIA HANGING AND “WITH A GUN ATTEMPTED TO SHOOT HIMSELF IN THE HEAD 10 YEARS AGO.”

## 2024-09-16 SDOH — HEALTH STABILITY: MENTAL HEALTH: IN THE PAST FEW WEEKS, HAVE YOU WISHED YOU WERE DEAD?: YES

## 2024-09-16 SDOH — HEALTH STABILITY: MENTAL HEALTH: ARE YOU HAVING THOUGHTS OF KILLING YOURSELF RIGHT NOW?: YES

## 2024-09-16 SDOH — HEALTH STABILITY: MENTAL HEALTH: HAVE YOU EVER TRIED TO KILL YOURSELF?: YES

## 2024-09-16 SDOH — HEALTH STABILITY: MENTAL HEALTH: ANXIETY SYMPTOMS: GENERALIZED

## 2024-09-16 SDOH — HEALTH STABILITY: MENTAL HEALTH: SUICIDAL BEHAVIOR (3 MONTHS): NO

## 2024-09-16 SDOH — HEALTH STABILITY: MENTAL HEALTH: ACTIVE SUICIDAL IDEATION WITH SOME INTENT TO ACT, WITHOUT SPECIFIC PLAN (PAST 1 MONTH): YES

## 2024-09-16 SDOH — HEALTH STABILITY: MENTAL HEALTH: IN THE PAST FEW WEEKS, HAVE YOU FELT THAT YOU OR YOUR FAMILY WOULD BE BETTER OFF IF YOU WERE DEAD?: YES

## 2024-09-16 SDOH — HEALTH STABILITY: MENTAL HEALTH: SUICIDAL BEHAVIOR (LIFETIME): YES

## 2024-09-16 SDOH — HEALTH STABILITY: MENTAL HEALTH: ACTIVE SUICIDAL IDEATION WITH SPECIFIC PLAN AND INTENT (PAST 1 MONTH): NO

## 2024-09-16 SDOH — HEALTH STABILITY: MENTAL HEALTH: WISH TO BE DEAD (PAST 1 MONTH): YES

## 2024-09-16 SDOH — HEALTH STABILITY: MENTAL HEALTH: HOW DID YOU TRY TO KILL YOURSELF?: SA VIA HANGING AND “WITH A GUN ATTEMPTED TO SHOOT HIMSELF IN THE HEAD 10 YEARS AGO.”

## 2024-09-16 SDOH — HEALTH STABILITY: MENTAL HEALTH
DEPRESSION SYMPTOMS: APPETITE CHANGE;CHANGE IN ENERGY LEVEL;FEELINGS OF HELPLESSNESS;ISOLATIVE;LOSS OF INTEREST;SLEEP DISTURBANCE

## 2024-09-16 SDOH — HEALTH STABILITY: MENTAL HEALTH: NON-SPECIFIC ACTIVE SUICIDAL THOUGHTS (PAST 1 MONTH): YES

## 2024-09-16 SDOH — HEALTH STABILITY: MENTAL HEALTH: IN THE PAST WEEK, HAVE YOU BEEN HAVING THOUGHTS ABOUT KILLING YOURSELF?: YES

## 2024-09-16 ASSESSMENT — PAIN SCALES - GENERAL: PAINLEVEL_OUTOF10: 0 - NO PAIN

## 2024-09-16 ASSESSMENT — LIFESTYLE VARIABLES
SUBSTANCE_ABUSE_PAST_12_MONTHS: YES
PRESCIPTION_ABUSE_PAST_12_MONTHS: NO

## 2024-09-16 NOTE — ED PROVIDER NOTES
The patient was seen by the midlevel/resident.  I have personally saw the patient and made/approved the management plan and take responsibility for the patient management.  I reviewed the EKG's (when done) and agree with the interpretation.  I have seen and examined the patient; agree with the workup, evaluation, MDM, and diagnosis.  The care plan has been discussed with the midlevel/resident; I have reviewed the note and agree with the documented findings.     Patient presents for evaluation of suicidal thoughts.  He has been drinking quite a bit alcohol.  He has also had poor compliance with his medication.  He was found to have a very high alcohol level.  Plan is to allow his alcohol level to come down given some Ativan in the interim and have him evaluated by EPAT.     Medically cleared at 0440      ED Course as of 09/16/24 0441   Sun Sep 15, 2024   2121 EKG rate  854 bpm pr interval 130 ms qrs duration 92 ms qt qtc 368/434 ms prt axes 22 -11 36 normal sinus rhythm, personally reviewed by me [PK]   Mon Sep 16, 2024   0440 Endorsed to oncoming physician Dr. Sheets at 0 600 [RZ]      ED Course User Index  [PK] Tricia Cruz, APRN-CNP  [RZ] Laci Marte MD         Diagnoses as of 09/16/24 0441   Suicidal behavior without attempted self-injury   Alcoholic intoxication without complication (CMS-HCC)     MD Laci Barry MD  09/16/24 0441

## 2024-09-16 NOTE — SIGNIFICANT EVENT
Application for Emergency Admission      Ready for Transfer?  Is the patient medically cleared for transfer to inpatient psychiatry: Yes  Has the patient been accepted to an inpatient psychiatric hospital: Yes    Application for Emergency Admission  IN ACCORDANCE WITH [place] 5122.10 [place]  The Chief Clinical Officer of: [place] 9/16/2024 .2:32 PM    Reason for Hospitalization  The undersigned has reason to believe that: Jonny Valenzuela Is a mentally ill person subject to hospitalization by court order under division B Section 5122.01 of the Revised Code, i.e., this person:    1.Yes  Represents a substantial risk of physical harm to self as manifested by evidence of threats of, or attempts at, suicide or serious self-inflicted bodily harm    2.Yes Represents a substantial risk of physical harm to others as manifested by evidence of recent homicidal or other violent behavior, evidence of recent threats that place another in reasonable fear of violent behavior and serious physical harm, or other evidence of present dangerousness    3.Yes Represents a substantial and immediate risk of serious physical impairment or injury to self as manifested by  evidence that the person is unable to provide for and is not providing for the person's basic physical needs because of the person's mental illness and that appropriate provision for those needs cannot be made  immediately available in the community    4.Yes Would benefit from treatment in a hospital for his mental illness and is in need of such treatment as manifested by evidence of behavior that creates a grave and imminent risk to substantial rights of others or  himself.    5.Yes Would benefit from treatment as manifested by evidence of behavior that indicates all of the following:       (a) The person is unlikely to survive safely in the community without supervision, based on a clinical determination.       (b) The person has a history of lack of compliance with  treatment for mental illness and one of the following applies:      (i) At least twice within the thirty-six months prior to the filing of an affidavit seeking court-ordered treatment of the person under section 5122.111 of the Revised Code, the lack of compliance has been a significant factor in necessitating hospitalization in a hospital or receipt of services in a forensic or other mental health unit of a correctional facility, provided that the thirty-six-month period shall be extended by the length of any hospitalization or incarceration of the person that occurred within the thirty-six-month period.      (ii) Within the forty-eight months prior to the filing of an affidavit seeking court-ordered treatment of the person under section 5122.111 of the Revised Code, the lack of compliance resulted in one or more acts of serious violent behavior toward self or others or threats of, or attempts at, serious physical harm to self or others, provided that the forty-eight-month period shall be extended by the length of any hospitalization or incarceration of the person that occurred within the forty-eight-month period.      (c) The person, as a result of mental illness, is unlikely to voluntarily participate in necessary treatment.       (d) In view of the person's treatment history and current behavior, the person is in need of treatment in order to prevent a relapse or deterioration that would be likely to result in substantial risk of serious harm to the person or others.    (e) Represents a substantial risk of physical harm to self or others if allowed to remain at liberty pending examination.    Therefore, it is requested that said person be admitted to the above named facility.    STATEMENT OF BELIEF    Must be filled out by one of the following: a psychiatrist, licensed physician, licensed clinical psychologist, health or ,  or .  (Statement shall include the circumstances under  which the individual was taken into custody and the reason for the person's belief that hospitalization is necessary. The statement shall also include a reference to efforts made to secure the individual's property at his residence if he was taken into custody there. Every reasonable and appropriate effort should be made to take this person into custody in the least conspicuous manner possible.)    Pt presents with SI and plan requiring inpatient admission     Estefanía Lane DO 9/16/2024     _____________________________________________________________   Place of Employment: [employer]    STATEMENT OF OBSERVATION BY PSYCHIATRIST, LICENSED PHYSICIAN, OR LICENSED CLINICAL PSYCHOLOGIST, IF APPLICABLE    Place of Observation (e.g., [facility], [facility] mental health center, general hospital, office, emergency facility)  (If applicable, please complete)    Estefanía Lane DO 9/16/2024    _____________________________________________________________

## 2024-09-16 NOTE — PROGRESS NOTES
Pharmacy Medication History Review    Jonny Valenzuela is a 54 y.o. male admitted for No Principal Problem: There is no principal problem currently on the Problem List. Please update the Problem List and refresh.. Pharmacy reviewed the patient's [id number] medications and allergies for accuracy.    The list below reflectives the updated PTA list. Please review each medication in order reconciliation for additional clarification and justification.  Prior to Admission medications    Not on File        The list below reflectives the updated allergy list. Please review each documented allergy for additional clarification and justification.  Allergies  Reviewed by Riaz Mayo, EMT on 9/15/2024   No Known Allergies         Below are additional concerns with the patient's PTA list:     - Patient states he has a history of taking about 5 prescription medications that he gets filled at a pharmacy in [place]. He does not remember the names of any of these medications  - He states he has not taken any medications in a couple months    Yesika Perera, PharmD   PGY1 Pharmacy Resident

## 2024-09-16 NOTE — ED PROVIDER NOTES
"  [facility]  [facility]  ED  Encounter Note  Admit Date/RoomTime: 9/15/2024  7:59 PM  ED Room: [id number]/[id number]  NAME: Jonny Valenzuela  : 1970  MRN: [id number]     Chief Complaint:  Suicidal (Depressed, Anxious, SI \"wanted to hang himself\" according to PD)    HISTORY OF PRESENT ILLNESS        Jonny Valenzuela is a 54 y.o. male who presents to the ED for evaluation of si. Pt admits to thoughts of wanting to hang self. Admits to drinking alcohol. Has been admitted multiple times in [place], [place] for SA. Goes to [facility] but does  not take the medications \"They don't work.\"     \"I don't want to hurt my family but I am just done with life.\"     Wants something to help calm down.     ROS   Pertinent positives and negatives are stated within HPI, all other systems reviewed and are negative.    Past Medical History:  has no past medical history on file.    Surgical History:  has no past surgical history on file.    Social History:   smokes uses alcohol sometimes denied MJ use    Family History: family history is not on file.     Allergies: Patient has no known allergies.    PHYSICAL EXAM   Oxygen Saturation Interpretation: normal  Physical Exam  Constitutional/General: Alert and oriented x3, well appearing, non toxic  HEENT:  NC/NT. PERRLA.  Airway patent.  Neck: Supple, full ROM. No midline vertebral tenderness or crepitus.   Respiratory: Lung sounds clear to auscultation bilaterally. No wheezes, rhonchi or stridor. Not in respiratory distress.  CV:  Regular rate. Regular rhythm. No murmurs or rubs. 2+ distal pulses.  GI:  Abdomen soft, non-tender, non-distended. +BS. No rebound, guarding, or rigidity. No pulsatile masses.  Musculoskeletal: Moves all extremities x 4. Warm and well perfused. Capillary refill <3 seconds  Integument: Skin warm and dry. No rashes.   Neurologic: Alert and oriented with no focal deficits, symmetric strength 5/5 in the upper and lower extremities bilaterally.  Psychiatric: Normal " affect.    Lab / Imaging Results   (All laboratory and radiology results have been personally reviewed by myself)  Labs:  Results for orders placed or performed during the hospital encounter of 09/15/24   CBC and Auto Differential   Result Value Ref Range    WBC 7.9 4.4 - 11.3 x10*3/uL    nRBC 0.0 0.0 - 0.0 /100 WBCs    RBC 4.53 4.50 - 5.90 x10*6/uL    Hemoglobin 15.1 13.5 - 17.5 g/dL    Hematocrit 44.0 41.0 - 52.0 %    MCV 97 80 - 100 fL    MCH 33.3 26.0 - 34.0 pg    MCHC 34.3 32.0 - 36.0 g/dL    RDW 12.7 11.5 - 14.5 %    Platelets 169 150 - 450 x10*3/uL    Neutrophils % 55.4 40.0 - 80.0 %    Immature Granulocytes %, Automated 0.3 0.0 - 0.9 %    Lymphocytes % 30.2 13.0 - 44.0 %    Monocytes % 11.8 2.0 - 10.0 %    Eosinophils % 1.9 0.0 - 6.0 %    Basophils % 0.4 0.0 - 2.0 %    Neutrophils Absolute 4.36 1.20 - 7.70 x10*3/uL    Immature Granulocytes Absolute, Automated 0.02 0.00 - 0.70 x10*3/uL    Lymphocytes Absolute 2.38 1.20 - 4.80 x10*3/uL    Monocytes Absolute 0.93 0.10 - 1.00 x10*3/uL    Eosinophils Absolute 0.15 0.00 - 0.70 x10*3/uL    Basophils Absolute 0.03 0.00 - 0.10 x10*3/uL   Comprehensive metabolic panel   Result Value Ref Range    Glucose 91 74 - 99 mg/dL    Sodium 136 136 - 145 mmol/L    Potassium 3.8 3.5 - 5.3 mmol/L    Chloride 104 98 - 107 mmol/L    Bicarbonate 23 21 - 32 mmol/L    Anion Gap 13 10 - 20 mmol/L    Urea Nitrogen 8 6 - 23 mg/dL    Creatinine 0.89 0.50 - 1.30 mg/dL    eGFR >90 >60 mL/min/1.73m*2    Calcium 8.9 8.6 - 10.3 mg/dL    Albumin 3.9 3.4 - 5.0 g/dL    Alkaline Phosphatase 82 33 - 120 U/L    Total Protein 7.3 6.4 - 8.2 g/dL    AST 48 (H) 9 - 39 U/L    Bilirubin, Total 0.5 0.0 - 1.2 mg/dL    ALT 44 10 - 52 U/L   Magnesium   Result Value Ref Range    Magnesium 2.02 1.60 - 2.40 mg/dL   Phosphorus   Result Value Ref Range    Phosphorus 3.0 2.5 - 4.9 mg/dL   Drug Screen, Urine   Result Value Ref Range    Amphetamine Screen, Urine Presumptive Negative Presumptive Negative    Barbiturate  Screen, Urine Presumptive Negative Presumptive Negative    Benzodiazepines Screen, Urine Presumptive Negative Presumptive Negative    Cannabinoid Screen, Urine Presumptive Negative Presumptive Negative    Cocaine Metabolite Screen, Urine Presumptive Negative Presumptive Negative    Fentanyl Screen, Urine Presumptive Negative Presumptive Negative    Opiate Screen, Urine Presumptive Negative Presumptive Negative    Oxycodone Screen, Urine Presumptive Negative Presumptive Negative    PCP Screen, Urine Presumptive Negative Presumptive Negative    Methadone Screen, Urine Presumptive Negative Presumptive Negative   Acute Toxicology Panel, Blood   Result Value Ref Range    Acetaminophen <10.0 10.0 - 30.0 ug/mL    Salicylate  <3 4 - 20 mg/dL    Alcohol 263 (H) <=10 mg/dL   Ethanol   Result Value Ref Range    Alcohol 81 (H) <=10 mg/dL   ECG 12 lead   Result Value Ref Range    Ventricular Rate 84 BPM    Atrial Rate 84 BPM    CO Interval 130 ms    QRS Duration 92 ms    QT Interval 368 ms    QTC Calculation(Bazett) 434 ms    P Axis 22 degrees    R Axis -11 degrees    T Axis 36 degrees    QRS Count 13 beats    Q Onset 225 ms    P Onset 160 ms    P Offset 203 ms    T Offset 409 ms    QTC Fredericia 411 ms     Imaging:  All Radiology results interpreted by Radiologist unless otherwise noted.  No orders to display       ED Course / Medical Decision Making     Medications   LORazepam (Ativan) tablet 2 mg (2 mg oral Given 9/15/24 2128)   LORazepam (Ativan) tablet 1 mg (1 mg oral Given 9/16/24 2024)     ED Course as of 09/17/24 1520   Sun Sep 15, 2024   2121 EKG rate  854 bpm pr interval 130 ms qrs duration 92 ms qt qtc 368/434 ms prt axes 22 -11 36 normal sinus rhythm, personally reviewed by me [PK]   Mon Sep 16, 2024   0440 Endorsed to oncoming physician Dr. Sheets at 0 600 [RZ]      ED Course User Index  [PK] Tricia Cruz, APRN-CNP  [RZ] Laci Marte MD         Diagnoses as of 09/17/24 1520   Suicidal behavior  "without attempted self-injury   Alcoholic intoxication without complication (CMS-[facility])     Re-examination:    Patient’s condition stable    Consult(s):   epat        MDM:       Jonny Valenzuela is a 54 y.o. male who presents to the ED for evaluation of si. Pt admits to thoughts of wanting to hang self. Admits to drinking alcohol. Has been admitted multiple times in [place], [place] for SA. Goes to [facility] but does  not take the medications \"They don't work.\"     \"I don't want to hurt my family but I am just done with life.\"     Wants something to help calm down.     ED course stable  Patient received ativan2 mg po   Alcohol elevated at 263  Will wait until 330 am to recheck alcohol and if less than 100; will epat.  Care signed to attending ED physician for final disposition/epat  Ddx: suicidal ideation     Plan of Care/Counseling:  I reviewed today's visit with the patient  in addition to providing specific details for the plan of care and counseling regarding the diagnosis and prognosis.  Questions are answered at this time and are agreeable with the plan.    ASSESSMENT     1. Suicidal behavior without attempted self-injury    2. Alcoholic intoxication without complication (CMS-[facility])      PLAN   Disposition per epat    New Medications     New Medications Ordered This Visit   Medications    LORazepam (Ativan) tablet 2 mg    LORazepam (Ativan) tablet 1 mg     Electronically signed by KAYLENE Mcduffie   **This report was transcribed using voice recognition software. Every effort was made to ensure accuracy; however, inadvertent computerized transcription errors may be present.  END OF ED PROVIDER NOTE     KAYLENE Mcduffie  09/17/24 1522    "
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
## 2024-09-16 NOTE — PROGRESS NOTES
Patient was signed out to me at change of shift by the previous ED team.  Please see previous provider's note for complete history and physical exam.    Briefly, this is a 54-year-old male, presenting to the emergency department for evaluation of suicidal ideation.  Prior to signout, the patient had lab work which was overall grossly unremarkable apart from an elevated alcohol level which resolved on repeat.  At time of signout, the patient is pending evaluation by EPAT, and likely placement for his suicidal ideation.  Medically clear at time of signout.  On reevaluation, the patient is resting comfortably in the bed, no acute distress.  Patient has been ambulatory in the department, and has been tolerating p.o.  He was evaluated by EPAT, who ultimately feel the patient will require admission for inpatient psychiatric care.  Patient was transferred in stable condition to inpatient psychiatric facility at [facility]      Impression: Suicidal ideation  Disposition: Transfer  Condition: Stable    Estefanía Lane DO  Emergency Medicine

## 2024-09-19 LAB
ATRIAL RATE: 84 BPM
P AXIS: 22 DEGREES
P OFFSET: 203 MS
P ONSET: 160 MS
PR INTERVAL: 130 MS
Q ONSET: 225 MS
QRS COUNT: 13 BEATS
QRS DURATION: 92 MS
QT INTERVAL: 368 MS
QTC CALCULATION(BAZETT): 434 MS
QTC FREDERICIA: 411 MS
R AXIS: -11 DEGREES
T AXIS: 36 DEGREES
T OFFSET: 409 MS
VENTRICULAR RATE: 84 BPM

## 2024-10-13 ENCOUNTER — HOSPITAL ENCOUNTER (OUTPATIENT)
Facility: HOSPITAL | Age: 54
Setting detail: OBSERVATION
End: 2024-10-13
Attending: STUDENT IN AN ORGANIZED HEALTH CARE EDUCATION/TRAINING PROGRAM | Admitting: INTERNAL MEDICINE
Payer: MEDICARE

## 2024-10-13 ENCOUNTER — APPOINTMENT (OUTPATIENT)
Dept: CARDIOLOGY | Facility: HOSPITAL | Age: 54
End: 2024-10-13
Payer: MEDICARE

## 2024-10-13 VITALS
HEIGHT: 67 IN | RESPIRATION RATE: 20 BRPM | HEART RATE: 89 BPM | BODY MASS INDEX: 27.47 KG/M2 | SYSTOLIC BLOOD PRESSURE: 138 MMHG | WEIGHT: 175 LBS | TEMPERATURE: 97 F | DIASTOLIC BLOOD PRESSURE: 77 MMHG | OXYGEN SATURATION: 96 %

## 2024-10-13 DIAGNOSIS — R45.851 SUICIDAL IDEATION: ICD-10-CM

## 2024-10-13 DIAGNOSIS — F10.10 ALCOHOL ABUSE: ICD-10-CM

## 2024-10-13 DIAGNOSIS — F10.939 ALCOHOL WITHDRAWAL SYNDROME WITH COMPLICATION (MULTI): ICD-10-CM

## 2024-10-13 DIAGNOSIS — Z00.8 EVALUATION BY PSYCHIATRIC SERVICE REQUIRED: Primary | ICD-10-CM

## 2024-10-13 DIAGNOSIS — R45.850 HOMICIDAL IDEATION: ICD-10-CM

## 2024-10-13 PROBLEM — F10.229: Status: ACTIVE | Noted: 2024-10-13

## 2024-10-13 LAB
ALBUMIN SERPL BCP-MCNC: 4.2 G/DL (ref 3.4–5)
ALP SERPL-CCNC: 77 U/L (ref 33–120)
ALT SERPL W P-5'-P-CCNC: 42 U/L (ref 10–52)
AMPHETAMINES UR QL SCN: NORMAL
ANION GAP SERPL CALC-SCNC: 18 MMOL/L (ref 10–20)
APAP SERPL-MCNC: <10 UG/ML
APPEARANCE UR: CLEAR
AST SERPL W P-5'-P-CCNC: 32 U/L (ref 9–39)
BARBITURATES UR QL SCN: NORMAL
BASOPHILS # BLD AUTO: 0.05 X10*3/UL (ref 0–0.1)
BASOPHILS NFR BLD AUTO: 0.6 %
BENZODIAZ UR QL SCN: NORMAL
BILIRUB SERPL-MCNC: 0.4 MG/DL (ref 0–1.2)
BILIRUB UR STRIP.AUTO-MCNC: NEGATIVE MG/DL
BUN SERPL-MCNC: 8 MG/DL (ref 6–23)
BZE UR QL SCN: NORMAL
CALCIUM SERPL-MCNC: 9.6 MG/DL (ref 8.6–10.3)
CANNABINOIDS UR QL SCN: NORMAL
CHLORIDE SERPL-SCNC: 99 MMOL/L (ref 98–107)
CO2 SERPL-SCNC: 24 MMOL/L (ref 21–32)
COLOR UR: COLORLESS
CREAT SERPL-MCNC: 0.79 MG/DL (ref 0.5–1.3)
EGFRCR SERPLBLD CKD-EPI 2021: >90 ML/MIN/1.73M*2
EOSINOPHIL # BLD AUTO: 0.07 X10*3/UL (ref 0–0.7)
EOSINOPHIL NFR BLD AUTO: 0.9 %
ERYTHROCYTE [DISTWIDTH] IN BLOOD BY AUTOMATED COUNT: 14.6 % (ref 11.5–14.5)
ETHANOL SERPL-MCNC: 199 MG/DL
FENTANYL+NORFENTANYL UR QL SCN: NORMAL
GLUCOSE SERPL-MCNC: 86 MG/DL (ref 74–99)
GLUCOSE UR STRIP.AUTO-MCNC: NORMAL MG/DL
HCT VFR BLD AUTO: 45.1 % (ref 41–52)
HGB BLD-MCNC: 15.5 G/DL (ref 13.5–17.5)
IMM GRANULOCYTES # BLD AUTO: 0.02 X10*3/UL (ref 0–0.7)
IMM GRANULOCYTES NFR BLD AUTO: 0.3 % (ref 0–0.9)
KETONES UR STRIP.AUTO-MCNC: NEGATIVE MG/DL
LEUKOCYTE ESTERASE UR QL STRIP.AUTO: NEGATIVE
LYMPHOCYTES # BLD AUTO: 1.77 X10*3/UL (ref 1.2–4.8)
LYMPHOCYTES NFR BLD AUTO: 22.4 %
MAGNESIUM SERPL-MCNC: 2.04 MG/DL (ref 1.6–2.4)
MCH RBC QN AUTO: 33.5 PG (ref 26–34)
MCHC RBC AUTO-ENTMCNC: 34.4 G/DL (ref 32–36)
MCV RBC AUTO: 98 FL (ref 80–100)
METHADONE UR QL SCN: NORMAL
MONOCYTES # BLD AUTO: 0.87 X10*3/UL (ref 0.1–1)
MONOCYTES NFR BLD AUTO: 11 %
NEUTROPHILS # BLD AUTO: 5.13 X10*3/UL (ref 1.2–7.7)
NEUTROPHILS NFR BLD AUTO: 64.8 %
NITRITE UR QL STRIP.AUTO: NEGATIVE
NRBC BLD-RTO: 0 /100 WBCS (ref 0–0)
OPIATES UR QL SCN: NORMAL
OXYCODONE+OXYMORPHONE UR QL SCN: NORMAL
PCP UR QL SCN: NORMAL
PH UR STRIP.AUTO: 5.5 [PH]
PHOSPHATE SERPL-MCNC: 3.4 MG/DL (ref 2.5–4.9)
PLATELET # BLD AUTO: 282 X10*3/UL (ref 150–450)
POTASSIUM SERPL-SCNC: 4 MMOL/L (ref 3.5–5.3)
PROT SERPL-MCNC: 7.6 G/DL (ref 6.4–8.2)
PROT UR STRIP.AUTO-MCNC: NEGATIVE MG/DL
RBC # BLD AUTO: 4.62 X10*6/UL (ref 4.5–5.9)
RBC # UR STRIP.AUTO: NEGATIVE /UL
SALICYLATES SERPL-MCNC: <3 MG/DL
SARS-COV-2 RNA RESP QL NAA+PROBE: NOT DETECTED
SODIUM SERPL-SCNC: 137 MMOL/L (ref 136–145)
SP GR UR STRIP.AUTO: 1
UROBILINOGEN UR STRIP.AUTO-MCNC: NORMAL MG/DL
WBC # BLD AUTO: 7.9 X10*3/UL (ref 4.4–11.3)

## 2024-10-13 PROCEDURE — 2500000002 HC RX 250 W HCPCS SELF ADMINISTERED DRUGS (ALT 637 FOR MEDICARE OP, ALT 636 FOR OP/ED): Performed by: EMERGENCY MEDICINE

## 2024-10-13 PROCEDURE — 84100 ASSAY OF PHOSPHORUS: CPT | Performed by: NURSE PRACTITIONER

## 2024-10-13 PROCEDURE — 96372 THER/PROPH/DIAG INJ SC/IM: CPT | Performed by: NURSE PRACTITIONER

## 2024-10-13 PROCEDURE — 99222 1ST HOSP IP/OBS MODERATE 55: CPT | Performed by: NURSE PRACTITIONER

## 2024-10-13 PROCEDURE — G0378 HOSPITAL OBSERVATION PER HR: HCPCS

## 2024-10-13 PROCEDURE — 84443 ASSAY THYROID STIM HORMONE: CPT | Performed by: NURSE PRACTITIONER

## 2024-10-13 PROCEDURE — 81003 URINALYSIS AUTO W/O SCOPE: CPT | Performed by: STUDENT IN AN ORGANIZED HEALTH CARE EDUCATION/TRAINING PROGRAM

## 2024-10-13 PROCEDURE — 2500000001 HC RX 250 WO HCPCS SELF ADMINISTERED DRUGS (ALT 637 FOR MEDICARE OP): Performed by: NURSE PRACTITIONER

## 2024-10-13 PROCEDURE — 83735 ASSAY OF MAGNESIUM: CPT | Performed by: NURSE PRACTITIONER

## 2024-10-13 PROCEDURE — 87635 SARS-COV-2 COVID-19 AMP PRB: CPT | Performed by: NURSE PRACTITIONER

## 2024-10-13 PROCEDURE — S4991 NICOTINE PATCH NONLEGEND: HCPCS | Performed by: EMERGENCY MEDICINE

## 2024-10-13 PROCEDURE — 80053 COMPREHEN METABOLIC PANEL: CPT | Performed by: NURSE PRACTITIONER

## 2024-10-13 PROCEDURE — 85025 COMPLETE CBC W/AUTO DIFF WBC: CPT | Performed by: NURSE PRACTITIONER

## 2024-10-13 PROCEDURE — 94760 N-INVAS EAR/PLS OXIMETRY 1: CPT

## 2024-10-13 PROCEDURE — 80307 DRUG TEST PRSMV CHEM ANLYZR: CPT | Performed by: NURSE PRACTITIONER

## 2024-10-13 PROCEDURE — 2500000004 HC RX 250 GENERAL PHARMACY W/ HCPCS (ALT 636 FOR OP/ED): Performed by: NURSE PRACTITIONER

## 2024-10-13 PROCEDURE — 36415 COLL VENOUS BLD VENIPUNCTURE: CPT | Performed by: NURSE PRACTITIONER

## 2024-10-13 PROCEDURE — 80143 DRUG ASSAY ACETAMINOPHEN: CPT | Performed by: NURSE PRACTITIONER

## 2024-10-13 PROCEDURE — 93005 ELECTROCARDIOGRAM TRACING: CPT

## 2024-10-13 PROCEDURE — 99285 EMERGENCY DEPT VISIT HI MDM: CPT | Mod: 25

## 2024-10-13 RX ORDER — ACETAMINOPHEN 160 MG/5ML
650 SOLUTION ORAL EVERY 4 HOURS PRN
Status: DISCONTINUED | OUTPATIENT
Start: 2024-10-13 | End: 2024-10-15 | Stop reason: HOSPADM

## 2024-10-13 RX ORDER — ONDANSETRON 4 MG/1
4 TABLET, FILM COATED ORAL EVERY 8 HOURS PRN
Status: DISCONTINUED | OUTPATIENT
Start: 2024-10-13 | End: 2024-10-15 | Stop reason: HOSPADM

## 2024-10-13 RX ORDER — LORAZEPAM 1 MG/1
1 TABLET ORAL EVERY 2 HOUR PRN
Status: DISCONTINUED | OUTPATIENT
Start: 2024-10-13 | End: 2024-10-15

## 2024-10-13 RX ORDER — TALC
3 POWDER (GRAM) TOPICAL NIGHTLY PRN
Status: DISCONTINUED | OUTPATIENT
Start: 2024-10-13 | End: 2024-10-14

## 2024-10-13 RX ORDER — LORAZEPAM 2 MG/ML
2 INJECTION INTRAMUSCULAR ONCE
Status: DISCONTINUED | OUTPATIENT
Start: 2024-10-13 | End: 2024-10-14

## 2024-10-13 RX ORDER — FOLIC ACID 1 MG/1
1 TABLET ORAL DAILY
Status: DISCONTINUED | OUTPATIENT
Start: 2024-10-13 | End: 2024-10-13

## 2024-10-13 RX ORDER — LORAZEPAM 1 MG/1
2 TABLET ORAL ONCE
Status: COMPLETED | OUTPATIENT
Start: 2024-10-13 | End: 2024-10-13

## 2024-10-13 RX ORDER — ACETAMINOPHEN 325 MG/1
650 TABLET ORAL EVERY 4 HOURS PRN
Status: DISCONTINUED | OUTPATIENT
Start: 2024-10-13 | End: 2024-10-15 | Stop reason: HOSPADM

## 2024-10-13 RX ORDER — ONDANSETRON HYDROCHLORIDE 2 MG/ML
4 INJECTION, SOLUTION INTRAVENOUS EVERY 8 HOURS PRN
Status: DISCONTINUED | OUTPATIENT
Start: 2024-10-13 | End: 2024-10-15 | Stop reason: HOSPADM

## 2024-10-13 RX ORDER — LORAZEPAM 0.5 MG/1
0.5 TABLET ORAL EVERY 2 HOUR PRN
Status: DISCONTINUED | OUTPATIENT
Start: 2024-10-13 | End: 2024-10-15

## 2024-10-13 RX ORDER — IBUPROFEN 200 MG
1 TABLET ORAL DAILY
Status: DISCONTINUED | OUTPATIENT
Start: 2024-10-13 | End: 2024-10-15 | Stop reason: HOSPADM

## 2024-10-13 RX ORDER — FOLIC ACID 1 MG/1
1 TABLET ORAL DAILY
Status: DISCONTINUED | OUTPATIENT
Start: 2024-10-13 | End: 2024-10-15 | Stop reason: HOSPADM

## 2024-10-13 RX ORDER — ENOXAPARIN SODIUM 100 MG/ML
40 INJECTION SUBCUTANEOUS DAILY
Status: DISCONTINUED | OUTPATIENT
Start: 2024-10-13 | End: 2024-10-15 | Stop reason: HOSPADM

## 2024-10-13 RX ORDER — ACETAMINOPHEN 650 MG/1
650 SUPPOSITORY RECTAL EVERY 4 HOURS PRN
Status: DISCONTINUED | OUTPATIENT
Start: 2024-10-13 | End: 2024-10-15 | Stop reason: HOSPADM

## 2024-10-13 RX ORDER — MULTIVIT-MIN/IRON FUM/FOLIC AC 7.5 MG-4
1 TABLET ORAL DAILY
Status: DISCONTINUED | OUTPATIENT
Start: 2024-10-13 | End: 2024-10-15 | Stop reason: HOSPADM

## 2024-10-13 RX ORDER — LANOLIN ALCOHOL/MO/W.PET/CERES
100 CREAM (GRAM) TOPICAL DAILY
Status: DISCONTINUED | OUTPATIENT
Start: 2024-10-13 | End: 2024-10-14

## 2024-10-13 RX ORDER — BISMUTH SUBSALICYLATE 262 MG
1 TABLET,CHEWABLE ORAL DAILY
Status: DISCONTINUED | OUTPATIENT
Start: 2024-10-13 | End: 2024-10-13

## 2024-10-13 RX ADMIN — THIAMINE HCL TAB 100 MG 100 MG: 100 TAB at 19:43

## 2024-10-13 RX ADMIN — Medication 1 TABLET: at 19:43

## 2024-10-13 RX ADMIN — ENOXAPARIN SODIUM 40 MG: 40 INJECTION SUBCUTANEOUS at 20:55

## 2024-10-13 RX ADMIN — FOLIC ACID 1 MG: 1 TABLET ORAL at 19:43

## 2024-10-13 RX ADMIN — NICOTINE 1 PATCH: 21 PATCH, EXTENDED RELEASE TRANSDERMAL at 19:43

## 2024-10-13 RX ADMIN — LORAZEPAM 2 MG: 1 TABLET ORAL at 15:30

## 2024-10-13 SDOH — HEALTH STABILITY: MENTAL HEALTH
HAVE YOU STARTED TO WORK OUT OR WORKED OUT THE DETAILS OF HOW TO KILL YOURSELF? DO YOU INTENT TO CARRY OUT THIS PLAN?: YES

## 2024-10-13 SDOH — HEALTH STABILITY: MENTAL HEALTH: HAVE YOU BEEN THINKING ABOUT HOW YOU MIGHT DO THIS?: YES

## 2024-10-13 SDOH — SOCIAL STABILITY: SOCIAL INSECURITY: WERE YOU ABLE TO COMPLETE ALL THE BEHAVIORAL HEALTH SCREENINGS?: YES

## 2024-10-13 SDOH — HEALTH STABILITY: MENTAL HEALTH: HAVE YOU ACTUALLY HAD ANY THOUGHTS OF KILLING YOURSELF?: YES

## 2024-10-13 SDOH — HEALTH STABILITY: MENTAL HEALTH

## 2024-10-13 SDOH — SOCIAL STABILITY: SOCIAL INSECURITY: ARE THERE ANY APPARENT SIGNS OF INJURIES/BEHAVIORS THAT COULD BE RELATED TO ABUSE/NEGLECT?: NO

## 2024-10-13 SDOH — SOCIAL STABILITY: SOCIAL INSECURITY: ARE YOU OR HAVE YOU BEEN THREATENED OR ABUSED PHYSICALLY, EMOTIONALLY, OR SEXUALLY BY ANYONE?: NO

## 2024-10-13 SDOH — HEALTH STABILITY: MENTAL HEALTH: SUICIDE ASSESSMENT: ADULT (C-SSRS)

## 2024-10-13 SDOH — SOCIAL STABILITY: SOCIAL INSECURITY: DOES ANYONE TRY TO KEEP YOU FROM HAVING/CONTACTING OTHER FRIENDS OR DOING THINGS OUTSIDE YOUR HOME?: NO

## 2024-10-13 SDOH — SOCIAL STABILITY: SOCIAL INSECURITY: HAS ANYONE EVER THREATENED TO HURT YOUR FAMILY OR YOUR PETS?: NO

## 2024-10-13 SDOH — HEALTH STABILITY: MENTAL HEALTH: BEHAVIORS/MOOD: ANXIOUS;CALM;APPROPRIATE FOR AGE;COOPERATIVE

## 2024-10-13 SDOH — HEALTH STABILITY: MENTAL HEALTH: HAVE YOU WISHED YOU WERE DEAD OR WISHED YOU COULD GO TO SLEEP AND NOT WAKE UP?: NO

## 2024-10-13 SDOH — SOCIAL STABILITY: SOCIAL NETWORK: EMOTIONAL SUPPORT GIVEN: REASSURE

## 2024-10-13 SDOH — HEALTH STABILITY: MENTAL HEALTH: HAVE YOU HAD THESE THOUGHTS AND HAD SOME INTENTION OF ACTING ON THEM?: YES

## 2024-10-13 SDOH — SOCIAL STABILITY: SOCIAL INSECURITY: DO YOU FEEL UNSAFE GOING BACK TO THE PLACE WHERE YOU ARE LIVING?: YES

## 2024-10-13 SDOH — HEALTH STABILITY: MENTAL HEALTH: HAVE YOU EVER DONE ANYTHING, STARTED TO DO ANYTHING, OR PREPARED TO DO ANYTHING TO END YOUR LIFE?: NO

## 2024-10-13 SDOH — HEALTH STABILITY: MENTAL HEALTH: CONTENT: UNREMARKABLE

## 2024-10-13 SDOH — SOCIAL STABILITY: SOCIAL INSECURITY: DO YOU FEEL ANYONE HAS EXPLOITED OR TAKEN ADVANTAGE OF YOU FINANCIALLY OR OF YOUR PERSONAL PROPERTY?: NO

## 2024-10-13 SDOH — SOCIAL STABILITY: SOCIAL INSECURITY: ABUSE: ADULT

## 2024-10-13 SDOH — SOCIAL STABILITY: SOCIAL INSECURITY: HAVE YOU HAD THOUGHTS OF HARMING ANYONE ELSE?: NO

## 2024-10-13 SDOH — HEALTH STABILITY: MENTAL HEALTH: NEEDS EXPRESSED: FINANCIAL;PHYSICAL;EMOTIONAL

## 2024-10-13 SDOH — HEALTH STABILITY: MENTAL HEALTH: BEHAVIORAL HEALTH(WDL): EXCEPTIONS TO WDL

## 2024-10-13 SDOH — SOCIAL STABILITY: SOCIAL INSECURITY: HAVE YOU HAD ANY THOUGHTS OF HARMING ANYONE ELSE?: NO

## 2024-10-13 ASSESSMENT — ENCOUNTER SYMPTOMS
COLOR CHANGE: 0
CONFUSION: 1
SEIZURES: 0
ARTHRALGIAS: 0
SORE THROAT: 0
EYE ITCHING: 0
TREMORS: 1
HALLUCINATIONS: 0
PALPITATIONS: 0
DYSPHORIC MOOD: 1
CONSTIPATION: 0
COUGH: 0
BRUISES/BLEEDS EASILY: 0
HEADACHES: 1
DIARRHEA: 0
BACK PAIN: 0
DYSURIA: 0
CHILLS: 0
FEVER: 0
NERVOUS/ANXIOUS: 1
EYE REDNESS: 0
CHEST TIGHTNESS: 0
ABDOMINAL PAIN: 0
VOMITING: 0
NUMBNESS: 0
POLYDIPSIA: 0
DIZZINESS: 0
NAUSEA: 0

## 2024-10-13 ASSESSMENT — LIFESTYLE VARIABLES
HEADACHE, FULLNESS IN HEAD: NOT PRESENT
PAROXYSMAL SWEATS: NO SWEAT VISIBLE
VISUAL DISTURBANCES: NOT PRESENT
HAVE PEOPLE ANNOYED YOU BY CRITICIZING YOUR DRINKING: NO
TOTAL SCORE: 0
ORIENTATION AND CLOUDING OF SENSORIUM: ORIENTED AND CAN DO SERIAL ADDITIONS
ORIENTATION AND CLOUDING OF SENSORIUM: ORIENTED AND CAN DO SERIAL ADDITIONS
PULSE: 92
TREMOR: 2
AUDIT-C TOTAL SCORE: 9
VISUAL DISTURBANCES: NOT PRESENT
NAUSEA AND VOMITING: NO NAUSEA AND NO VOMITING
AUDITORY DISTURBANCES: NOT PRESENT
HOW MANY STANDARD DRINKS CONTAINING ALCOHOL DO YOU HAVE ON A TYPICAL DAY: 10 OR MORE
AGITATION: SOMEWHAT MORE THAN NORMAL ACTIVITY
AGITATION: NORMAL ACTIVITY
TREMOR: NO TREMOR
SKIP TO QUESTIONS 9-10: 0
PAROXYSMAL SWEATS: NO SWEAT VISIBLE
TOTAL SCORE: 5
NAUSEA AND VOMITING: NO NAUSEA AND NO VOMITING
NAUSEA AND VOMITING: MILD NAUSEA WITH NO VOMITING
HAVE YOU EVER FELT YOU SHOULD CUT DOWN ON YOUR DRINKING: NO
AGITATION: NORMAL ACTIVITY
AUDITORY DISTURBANCES: NOT PRESENT
PULSE: 89
PAROXYSMAL SWEATS: NO SWEAT VISIBLE
HOW OFTEN DO YOU HAVE 6 OR MORE DRINKS ON ONE OCCASION: MONTHLY
VISUAL DISTURBANCES: NOT PRESENT
HEADACHE, FULLNESS IN HEAD: NOT PRESENT
TOTAL SCORE: 0
ANXIETY: MILDLY ANXIOUS
ANXIETY: NO ANXIETY, AT EASE
TOTAL SCORE: 0
HEADACHE, FULLNESS IN HEAD: NOT PRESENT
AUDITORY DISTURBANCES: NOT PRESENT
ANXIETY: NO ANXIETY, AT EASE
TREMOR: NO TREMOR
EVER FELT BAD OR GUILTY ABOUT YOUR DRINKING: NO
BLOOD PRESSURE: 134/94
BLOOD PRESSURE: 151/96
EVER HAD A DRINK FIRST THING IN THE MORNING TO STEADY YOUR NERVES TO GET RID OF A HANGOVER: NO
ORIENTATION AND CLOUDING OF SENSORIUM: ORIENTED AND CAN DO SERIAL ADDITIONS
HOW OFTEN DO YOU HAVE A DRINK CONTAINING ALCOHOL: 2-3 TIMES A WEEK
AUDIT-C TOTAL SCORE: 9

## 2024-10-13 ASSESSMENT — COGNITIVE AND FUNCTIONAL STATUS - GENERAL
PATIENT BASELINE BEDBOUND: NO
MOBILITY SCORE: 24
DAILY ACTIVITIY SCORE: 24

## 2024-10-13 ASSESSMENT — COLUMBIA-SUICIDE SEVERITY RATING SCALE - C-SSRS
1. IN THE PAST MONTH, HAVE YOU WISHED YOU WERE DEAD OR WISHED YOU COULD GO TO SLEEP AND NOT WAKE UP?: YES
6. HAVE YOU EVER DONE ANYTHING, STARTED TO DO ANYTHING, OR PREPARED TO DO ANYTHING TO END YOUR LIFE?: NO
1. IN THE PAST MONTH, HAVE YOU WISHED YOU WERE DEAD OR WISHED YOU COULD GO TO SLEEP AND NOT WAKE UP?: YES
6. HAVE YOU EVER DONE ANYTHING, STARTED TO DO ANYTHING, OR PREPARED TO DO ANYTHING TO END YOUR LIFE?: NO
2. HAVE YOU ACTUALLY HAD ANY THOUGHTS OF KILLING YOURSELF?: NO
4. HAVE YOU HAD THESE THOUGHTS AND HAD SOME INTENTION OF ACTING ON THEM?: NO
5. HAVE YOU STARTED TO WORK OUT OR WORKED OUT THE DETAILS OF HOW TO KILL YOURSELF? DO YOU INTEND TO CARRY OUT THIS PLAN?: NO
6. HAVE YOU EVER DONE ANYTHING, STARTED TO DO ANYTHING, OR PREPARED TO DO ANYTHING TO END YOUR LIFE?: YES
4. HAVE YOU HAD THESE THOUGHTS AND HAD SOME INTENTION OF ACTING ON THEM?: NO
5. HAVE YOU STARTED TO WORK OUT OR WORKED OUT THE DETAILS OF HOW TO KILL YOURSELF? DO YOU INTEND TO CARRY OUT THIS PLAN?: YES
2. HAVE YOU ACTUALLY HAD ANY THOUGHTS OF KILLING YOURSELF?: YES
6. HAVE YOU EVER DONE ANYTHING, STARTED TO DO ANYTHING, OR PREPARED TO DO ANYTHING TO END YOUR LIFE?: YES

## 2024-10-13 ASSESSMENT — ACTIVITIES OF DAILY LIVING (ADL)
FEEDING YOURSELF: INDEPENDENT
HEARING - RIGHT EAR: FUNCTIONAL
WALKS IN HOME: INDEPENDENT
TOILETING: INDEPENDENT
ADEQUATE_TO_COMPLETE_ADL: YES
BATHING: INDEPENDENT
HEARING - LEFT EAR: FUNCTIONAL
PATIENT'S MEMORY ADEQUATE TO SAFELY COMPLETE DAILY ACTIVITIES?: YES
JUDGMENT_ADEQUATE_SAFELY_COMPLETE_DAILY_ACTIVITIES: YES
GROOMING: INDEPENDENT
DRESSING YOURSELF: INDEPENDENT

## 2024-10-13 ASSESSMENT — PAIN - FUNCTIONAL ASSESSMENT
PAIN_FUNCTIONAL_ASSESSMENT: 0-10
PAIN_FUNCTIONAL_ASSESSMENT: 0-10

## 2024-10-13 ASSESSMENT — PATIENT HEALTH QUESTIONNAIRE - PHQ9
SUM OF ALL RESPONSES TO PHQ9 QUESTIONS 1 & 2: 6
1. LITTLE INTEREST OR PLEASURE IN DOING THINGS: NEARLY EVERY DAY
2. FEELING DOWN, DEPRESSED OR HOPELESS: NEARLY EVERY DAY

## 2024-10-13 ASSESSMENT — PAIN SCALES - GENERAL
PAINLEVEL_OUTOF10: 0 - NO PAIN
PAINLEVEL_OUTOF10: 0 - NO PAIN

## 2024-10-13 NOTE — ED PROVIDER NOTES
Oncoming physician note from Dr. Laci Marte    I assumed care of the patient on 10/13/24 at 1800    I reviewed the chart, labs and imaging. I talked to the off going physician. I personally saw the patient and made/approved the management plan and take responsibility for the patient management.     Presents emergency room with complaint of suicidal ideation.  Worked up in ED found to be intoxicated as well.  Plan is to wait for alcohol level to come down and have him see EPAT for possible hospitalization.  Patient is stable and cooperative but disheveled.     Patient became more tachycardic and required additional medications for most likely alcohol withdrawal will be treated and hospitalized unable to clear medically at this time.  ED Course as of 10/13/24 2138   Sun Oct 13, 2024   1520 EKG rate 88 bpm, pr interal 142 ms qrs duration 92 ms qt qtc 364/440ms prt axes 30 - 41 41 normal sinus rhythm, left axis deviation. Personally reviewed by me.  [PK]   1558 Alcohol(!): 199 [HD]   1558 Patient is a 54-year-old male presents to the emergency department for suicidal ideation.  He states he just does not want to live this shitty life anymore.  He states he cannot read or write she is a very hard time trying to find a place to rent although he makes the thousand dollars a month from disability.  He states his brother does not care and is not able to help him.  He states that his plan is to hang himself in the woods.  He is currently homeless.  He states the only thing that is stopped him from killing himself and made him come here instead was his niece and nephew because they have to shitty parents and he did not want to make it worse for them.  Patient states whether he is drunk or not he feels suicidal and does not want to do this anymore.  He states he was on antidepressants for 3 weeks and they helped him initially and then they stopped working so he stopped taking them.  I do think patient would benefit from  inpatient psychiatric treatment.  Patient will be evaluated by EPAT to determine disposition. [HD]   1845 Pt remains tachy, with HR ranging from 104 to 119. Seems more anxious. Declined food or drink at this time. Will start ciwa score which is 2.  [PK]   1911 Patient become more tachycardic and he says he drinks usually a 12 pack at a time couple times a week when he stops he gets shaky.  He was given some additional Ativan and nicotine patch.  I believe requires hospitalizations for his alcohol withdrawal. [RZ]      ED Course User Index  [HD] Josie Bergman DO  [PK] Tricia Cruz, APRN-CNP  [RZ] Laci Marte MD         Diagnoses as of 10/13/24 2138   Evaluation by psychiatric service required   Alcohol abuse   Suicidal ideation   Homicidal ideation   Alcohol withdrawal syndrome with complication (Multi)        Laci Marte MD  10/13/24 2138

## 2024-10-13 NOTE — ED TRIAGE NOTES
Pt presented to the ED with c/o SI. Pt called 911 today at a [place] k stating he wanted to hang himself in the [place]. Pt has been living in the woods for an unknown amount of time. Pt came into ED with wet and muddy clothing and camping gear stating it was his home and all he has. Pt has a hx of SI and depression. Pt was recently admitted to [facility] last month and dc with new medication but has not being taking them due to being unable to fill them. Pt drinks about a 12 pack of beer a day and smokes 2 packs of cigarettes a day. Pt states he had 4 cans of beer today. Pt also made statements about not wanting to hurt his niece or nephew but did not elaborate on what he meant by that. Pt changed into gown, all belongings secured, EKG and labs done. Pt is calm and cooperative at this time.

## 2024-10-13 NOTE — ED PROVIDER NOTES
"  [facility]  ED  Encounter Note  Admit Date/RoomTime: 10/13/2024  3:10 PM  [facility]  NAME: Jonny Valenzuela  : 1970  MRN: [id number]     Chief Complaint:  Suicidal    HISTORY OF PRESENT ILLNESS        Jonny Valenzuela is a 54 y.o. male who presents to the ED for evaluation of psychiatric complaint.    Patient was here and admitted a month ago to [facility] for suicidal ideation, increased alcohol use.    Patient stated that he was unable to followup after discharge. He reports lack of support - does not drive - is from [place] and does not read or write to know who he was to followup with.    Patient stated that he feels homicidal towards his niece and nephew but then recants and states that he would not hurt them. He denied access to guns.    He then stated that he tried to hang himself today as he feels the world would be better off without him.     He admits to alcohol use but denied he is drunk. He is dirty dishelved and unshowered for some time. + malodorous.  Drinks 12 beers + per day. Hx of dts and black out periods in the past. He is tearful.     He appears irritated and excalating. He believes that he called the police. \" I didn't want to hurt them.\"         ROS   Pertinent positives and negatives are stated within HPI, all other systems reviewed and are negative.    Past Medical History:  has no past medical history on file.     Surgical History:  has no past surgical history on file.    Social History:  reports that he has been smoking cigarettes. He has never used smokeless tobacco. He reports current alcohol use. He reports that he does not currently use drugs. + smoker + alcohol denied other drugs     Family History: family history is not on file.     Allergies: Patient has no known allergies.    PHYSICAL EXAM   Oxygen Saturation Interpretation: Normal.        Physical Exam  Constitutional/General: Alert and oriented x3, well appearing, non toxic  HEENT:  NC/NT. " PERRLA.  Airway patent.  Neck: Supple, full ROM. No midline vertebral tenderness or crepitus.   Respiratory: Lung sounds clear to auscultation bilaterally. No wheezes, rhonchi or stridor. Not in respiratory distress.  CV:  Regular rate. Regular rhythm. No murmurs or rubs. 2+ distal pulses.  GI:  Abdomen soft, non-tender, non-distended. +BS. No rebound, guarding, or rigidity. No pulsatile masses.  Musculoskeletal: Moves all extremities x 4. Warm and well perfused. Capillary refill <3 seconds  Integument: Skin warm and dry. No rashes.   Neurologic: Alert and oriented with no focal deficits, symmetric strength 5/5 in the upper and lower extremities bilaterally.  Psychiatric: Normal affect.    Lab / Imaging Results   (All laboratory and radiology results have been personally reviewed by myself)  Labs:  Results for orders placed or performed during the hospital encounter of 10/13/24   CBC and Auto Differential   Result Value Ref Range    WBC 7.9 4.4 - 11.3 x10*3/uL    nRBC 0.0 0.0 - 0.0 /100 WBCs    RBC 4.62 4.50 - 5.90 x10*6/uL    Hemoglobin 15.5 13.5 - 17.5 g/dL    Hematocrit 45.1 41.0 - 52.0 %    MCV 98 80 - 100 fL    MCH 33.5 26.0 - 34.0 pg    MCHC 34.4 32.0 - 36.0 g/dL    RDW 14.6 (H) 11.5 - 14.5 %    Platelets 282 150 - 450 x10*3/uL    Neutrophils % 64.8 40.0 - 80.0 %    Immature Granulocytes %, Automated 0.3 0.0 - 0.9 %    Lymphocytes % 22.4 13.0 - 44.0 %    Monocytes % 11.0 2.0 - 10.0 %    Eosinophils % 0.9 0.0 - 6.0 %    Basophils % 0.6 0.0 - 2.0 %    Neutrophils Absolute 5.13 1.20 - 7.70 x10*3/uL    Immature Granulocytes Absolute, Automated 0.02 0.00 - 0.70 x10*3/uL    Lymphocytes Absolute 1.77 1.20 - 4.80 x10*3/uL    Monocytes Absolute 0.87 0.10 - 1.00 x10*3/uL    Eosinophils Absolute 0.07 0.00 - 0.70 x10*3/uL    Basophils Absolute 0.05 0.00 - 0.10 x10*3/uL   Magnesium   Result Value Ref Range    Magnesium 2.04 1.60 - 2.40 mg/dL   Phosphorus   Result Value Ref Range    Phosphorus 3.4 2.5 - 4.9 mg/dL    Comprehensive metabolic panel   Result Value Ref Range    Glucose 86 74 - 99 mg/dL    Sodium 137 136 - 145 mmol/L    Potassium 4.0 3.5 - 5.3 mmol/L    Chloride 99 98 - 107 mmol/L    Bicarbonate 24 21 - 32 mmol/L    Anion Gap 18 10 - 20 mmol/L    Urea Nitrogen 8 6 - 23 mg/dL    Creatinine 0.79 0.50 - 1.30 mg/dL    eGFR >90 >60 mL/min/1.73m*2    Calcium 9.6 8.6 - 10.3 mg/dL    Albumin 4.2 3.4 - 5.0 g/dL    Alkaline Phosphatase 77 33 - 120 U/L    Total Protein 7.6 6.4 - 8.2 g/dL    AST 32 9 - 39 U/L    Bilirubin, Total 0.4 0.0 - 1.2 mg/dL    ALT 42 10 - 52 U/L   Acute Toxicology Panel, Blood   Result Value Ref Range    Acetaminophen <10.0 10.0 - 30.0 ug/mL    Salicylate  <3 4 - 20 mg/dL    Alcohol 199 (H) <=10 mg/dL   Drug Screen, Urine   Result Value Ref Range    Amphetamine Screen, Urine Presumptive Negative Presumptive Negative    Barbiturate Screen, Urine Presumptive Negative Presumptive Negative    Benzodiazepines Screen, Urine Presumptive Negative Presumptive Negative    Cannabinoid Screen, Urine Presumptive Negative Presumptive Negative    Cocaine Metabolite Screen, Urine Presumptive Negative Presumptive Negative    Fentanyl Screen, Urine Presumptive Negative Presumptive Negative    Opiate Screen, Urine Presumptive Negative Presumptive Negative    Oxycodone Screen, Urine Presumptive Negative Presumptive Negative    PCP Screen, Urine Presumptive Negative Presumptive Negative    Methadone Screen, Urine Presumptive Negative Presumptive Negative   Sars-CoV-2 PCR   Result Value Ref Range    Coronavirus 2019, PCR Not Detected Not Detected   Urinalysis with Reflex Microscopic   Result Value Ref Range    Color, Urine Colorless (N) Light-Yellow, Yellow, Dark-Yellow    Appearance, Urine Clear Clear    Specific Gravity, Urine 1.001 (N) 1.005 - 1.035    pH, Urine 5.5 5.0, 5.5, 6.0, 6.5, 7.0, 7.5, 8.0    Protein, Urine NEGATIVE NEGATIVE, 10 (TRACE), 20 (TRACE) mg/dL    Glucose, Urine Normal Normal mg/dL    Blood, Urine  NEGATIVE NEGATIVE    Ketones, Urine NEGATIVE NEGATIVE mg/dL    Bilirubin, Urine NEGATIVE NEGATIVE    Urobilinogen, Urine Normal Normal mg/dL    Nitrite, Urine NEGATIVE NEGATIVE    Leukocyte Esterase, Urine NEGATIVE NEGATIVE     Imaging:  All Radiology results interpreted by Radiologist unless otherwise noted.  No orders to display       ED Course / Medical Decision Making     Medications   folic acid (Folvite) tablet 1 mg (1 mg oral Given 10/13/24 1943)   multivitamin with minerals 1 tablet (1 tablet oral Given 10/13/24 1943)   thiamine (Vitamin B-1) tablet 100 mg (100 mg oral Given 10/13/24 1943)   LORazepam (Ativan) tablet 0.5 mg (has no administration in time range)     Or   LORazepam (Ativan) tablet 1 mg (has no administration in time range)     Or   LORazepam (Ativan) tablet 1 mg (has no administration in time range)   LORazepam (Ativan) injection 2 mg (has no administration in time range)   nicotine (Nicoderm CQ) 21 mg/24 hr patch 1 patch (1 patch transdermal Medication Applied 10/13/24 1943)   LORazepam (Ativan) tablet 2 mg (2 mg oral Given 10/13/24 1530)     ED Course as of 10/13/24 1953   Sun Oct 13, 2024   1520 EKG rate 88 bpm, pr interal 142 ms qrs duration 92 ms qt qtc 364/440ms prt axes 30 - 41 41 normal sinus rhythm, left axis deviation. Personally reviewed by me.  [PK]   1558 Alcohol(!): 199 [HD]   1558 Patient is a 54-year-old male presents to the emergency department for suicidal ideation.  He states he just does not want to live this shitty life anymore.  He states he cannot read or write she is a very hard time trying to find a place to rent although he makes the thousand dollars a month from disability.  He states his brother does not care and is not able to help him.  He states that his plan is to hang himself in the woods.  He is currently homeless.  He states the only thing that is stopped him from killing himself and made him come here instead was his niece and nephew because they have to  shitty parents and he did not want to make it worse for them.  Patient states whether he is drunk or not he feels suicidal and does not want to do this anymore.  He states he was on antidepressants for 3 weeks and they helped him initially and then they stopped working so he stopped taking them.  I do think patient would benefit from inpatient psychiatric treatment.  Patient will be evaluated by [facility] to determine disposition. [HD]   1845 Pt remains tachy, with HR ranging from 104 to 119. Seems more anxious. Declined food or drink at this time. Will start ciwa score which is 2.  [PK]   1911 Patient become more tachycardic and he says he drinks usually a 12 pack at a time couple times a week when he stops he gets shaky.  He was given some additional Ativan and nicotine patch.  I believe requires hospitalizations for his alcohol withdrawal. [RZ]      ED Course User Index  [HD] Josie Bergman DO  [PK] Tricia Cruz, APRN-CNP  [RZ] Laci Marte MD         Diagnoses as of 10/13/24 1953   Evaluation by psychiatric service required   Alcohol abuse   Suicidal ideation   Homicidal ideation   Alcohol withdrawal syndrome with complication (Multi)     Re-examination:    Patient’s condition gradually worsening.      MDM:       Jonny Valenzuela is a 54 y.o. male who presents to the ED for evaluation of psychiatric complaint.    Patient was here and admitted a month ago to [facility] for suicidal ideation, increased alcohol use.    Patient stated that he was unable to followup after discharge. He reports lack of support - does not drive - is from [place] and does not read or write to know who he was to followup with.    Patient stated that he feels homicidal towards his niece and nephew but then recants and states that he would not hurt them. He denied access to guns.    He then stated that he tried to hang himself today as he feels the world would be better off without him.     He admits to alcohol use but denied he is  "drunk. He is dirty dishelved and unshowered for some time. + malodorous.  Drinks 12 beers + per day. Hx of dts and black out periods in the past. He is tearful.     He appears irritated and excalating. He believes that he called the police. \" I didn't want to hurt them.\"     ED course stable  Plan was to epat but over the past two hrs pt with gradual increase in HR ranging from 104 to 119, pt is shaking. He declined food or drink. He received ativan 2 mg po upon arrival. Ciwa score 0 at that time, 2 now. Will start cardiac monitor, ciwa, mvi, thiamine, folate; needs to be admitted medically and then evaluated by psychiatry thereafter.    Admitted to hospitalist  Ddx: alcohol withdrawal     Plan of Care/Counseling:  I reviewed today's visit with the patient  in addition to providing specific details for the plan of care and counseling regarding the diagnosis and prognosis.  Questions are answered at this time and are agreeable with the plan.    ASSESSMENT     1. Evaluation by psychiatric service required    2. Alcohol abuse    3. Suicidal ideation    4. Homicidal ideation    5. Alcohol withdrawal syndrome with complication (Multi)      PLAN   Admit to hospitalist     New Medications     New Medications Ordered This Visit   Medications    LORazepam (Ativan) tablet 2 mg    folic acid (Folvite) tablet 1 mg    multivitamin with minerals 1 tablet    thiamine (Vitamin B-1) tablet 100 mg    OR Linked Order Group     LORazepam (Ativan) tablet 0.5 mg     LORazepam (Ativan) tablet 1 mg     LORazepam (Ativan) tablet 1 mg    LORazepam (Ativan) injection 2 mg    nicotine (Nicoderm CQ) 21 mg/24 hr patch 1 patch     Electronically signed by KAYLENE Mcduffie     **This report was transcribed using voice recognition software. Every effort was made to ensure accuracy; however, inadvertent computerized transcription errors may be present.  END OF ED PROVIDER NOTE     KAYLENE Mcduffie  10/13/24 1956    "

## 2024-10-14 ENCOUNTER — APPOINTMENT (OUTPATIENT)
Dept: RADIOLOGY | Facility: HOSPITAL | Age: 54
End: 2024-10-14
Payer: MEDICARE

## 2024-10-14 LAB
AMMONIA PLAS-SCNC: 29 UMOL/L (ref 16–53)
TSH SERPL-ACNC: 1.51 MIU/L (ref 0.44–3.98)

## 2024-10-14 PROCEDURE — 2500000002 HC RX 250 W HCPCS SELF ADMINISTERED DRUGS (ALT 637 FOR MEDICARE OP, ALT 636 FOR OP/ED): Mod: MUE | Performed by: PHYSICIAN ASSISTANT

## 2024-10-14 PROCEDURE — 94760 N-INVAS EAR/PLS OXIMETRY 1: CPT

## 2024-10-14 PROCEDURE — 36415 COLL VENOUS BLD VENIPUNCTURE: CPT | Performed by: NURSE PRACTITIONER

## 2024-10-14 PROCEDURE — 2500000002 HC RX 250 W HCPCS SELF ADMINISTERED DRUGS (ALT 637 FOR MEDICARE OP, ALT 636 FOR OP/ED): Performed by: EMERGENCY MEDICINE

## 2024-10-14 PROCEDURE — 96372 THER/PROPH/DIAG INJ SC/IM: CPT | Performed by: NURSE PRACTITIONER

## 2024-10-14 PROCEDURE — 96374 THER/PROPH/DIAG INJ IV PUSH: CPT | Mod: 59

## 2024-10-14 PROCEDURE — 82140 ASSAY OF AMMONIA: CPT | Performed by: NURSE PRACTITIONER

## 2024-10-14 PROCEDURE — 70450 CT HEAD/BRAIN W/O DYE: CPT

## 2024-10-14 PROCEDURE — G0378 HOSPITAL OBSERVATION PER HR: HCPCS

## 2024-10-14 PROCEDURE — 2500000001 HC RX 250 WO HCPCS SELF ADMINISTERED DRUGS (ALT 637 FOR MEDICARE OP): Performed by: PHYSICIAN ASSISTANT

## 2024-10-14 PROCEDURE — S4991 NICOTINE PATCH NONLEGEND: HCPCS | Performed by: EMERGENCY MEDICINE

## 2024-10-14 PROCEDURE — 2500000001 HC RX 250 WO HCPCS SELF ADMINISTERED DRUGS (ALT 637 FOR MEDICARE OP): Performed by: NURSE PRACTITIONER

## 2024-10-14 PROCEDURE — 2500000004 HC RX 250 GENERAL PHARMACY W/ HCPCS (ALT 636 FOR OP/ED): Performed by: NURSE PRACTITIONER

## 2024-10-14 RX ORDER — LANOLIN ALCOHOL/MO/W.PET/CERES
500 CREAM (GRAM) TOPICAL 3 TIMES DAILY
Status: DISCONTINUED | OUTPATIENT
Start: 2024-10-14 | End: 2024-10-15 | Stop reason: HOSPADM

## 2024-10-14 RX ORDER — HYDROXYZINE HYDROCHLORIDE 25 MG/1
25 TABLET, FILM COATED ORAL EVERY 6 HOURS PRN
Status: DISCONTINUED | OUTPATIENT
Start: 2024-10-14 | End: 2024-10-15 | Stop reason: HOSPADM

## 2024-10-14 RX ORDER — LANOLIN ALCOHOL/MO/W.PET/CERES
250 CREAM (GRAM) TOPICAL DAILY
Status: DISCONTINUED | OUTPATIENT
Start: 2024-10-21 | End: 2024-10-15 | Stop reason: HOSPADM

## 2024-10-14 RX ORDER — ACETAMINOPHEN 500 MG
5 TABLET ORAL NIGHTLY
Status: DISCONTINUED | OUTPATIENT
Start: 2024-10-14 | End: 2024-10-15 | Stop reason: HOSPADM

## 2024-10-14 RX ORDER — SERTRALINE HYDROCHLORIDE 50 MG/1
100 TABLET, FILM COATED ORAL DAILY
Status: DISCONTINUED | OUTPATIENT
Start: 2024-10-14 | End: 2024-10-15 | Stop reason: HOSPADM

## 2024-10-14 RX ORDER — LANOLIN ALCOHOL/MO/W.PET/CERES
100 CREAM (GRAM) TOPICAL DAILY
Status: DISCONTINUED | OUTPATIENT
Start: 2024-10-26 | End: 2024-10-15 | Stop reason: HOSPADM

## 2024-10-14 RX ADMIN — ACETAMINOPHEN 650 MG: 325 TABLET ORAL at 22:09

## 2024-10-14 RX ADMIN — Medication 5 MG: at 22:05

## 2024-10-14 RX ADMIN — LORAZEPAM 0.5 MG: 0.5 TABLET ORAL at 07:58

## 2024-10-14 RX ADMIN — FOLIC ACID 1 MG: 1 TABLET ORAL at 08:00

## 2024-10-14 RX ADMIN — Medication 3 MG: at 01:48

## 2024-10-14 RX ADMIN — ONDANSETRON 4 MG: 2 INJECTION, SOLUTION INTRAMUSCULAR; INTRAVENOUS at 01:46

## 2024-10-14 RX ADMIN — NICOTINE 1 PATCH: 21 PATCH, EXTENDED RELEASE TRANSDERMAL at 08:00

## 2024-10-14 RX ADMIN — Medication 1 TABLET: at 08:00

## 2024-10-14 RX ADMIN — THIAMINE HCL TAB 100 MG 500 MG: 100 TAB at 08:00

## 2024-10-14 RX ADMIN — LORAZEPAM 0.5 MG: 0.5 TABLET ORAL at 15:11

## 2024-10-14 RX ADMIN — THIAMINE HCL TAB 100 MG 500 MG: 100 TAB at 15:11

## 2024-10-14 RX ADMIN — ENOXAPARIN SODIUM 40 MG: 40 INJECTION SUBCUTANEOUS at 22:05

## 2024-10-14 RX ADMIN — SERTRALINE HYDROCHLORIDE 100 MG: 50 TABLET ORAL at 10:56

## 2024-10-14 RX ADMIN — THIAMINE HCL TAB 100 MG 500 MG: 100 TAB at 22:05

## 2024-10-14 RX ADMIN — HYDROXYZINE HYDROCHLORIDE 25 MG: 25 TABLET ORAL at 22:05

## 2024-10-14 RX ADMIN — HYDROXYZINE HYDROCHLORIDE 25 MG: 25 TABLET ORAL at 10:56

## 2024-10-14 RX ADMIN — LORAZEPAM 0.5 MG: 0.5 TABLET ORAL at 01:48

## 2024-10-14 ASSESSMENT — LIFESTYLE VARIABLES
TOTAL SCORE: 6
TREMOR: 3
VISUAL DISTURBANCES: NOT PRESENT
TREMOR: NOT VISIBLE, BUT CAN BE FELT FINGERTIP TO FINGERTIP
ORIENTATION AND CLOUDING OF SENSORIUM: ORIENTED AND CAN DO SERIAL ADDITIONS
ANXIETY: MILDLY ANXIOUS
NAUSEA AND VOMITING: NO NAUSEA AND NO VOMITING
VISUAL DISTURBANCES: NOT PRESENT
ANXIETY: 2
VISUAL DISTURBANCES: NOT PRESENT
NAUSEA AND VOMITING: MILD NAUSEA WITH NO VOMITING
TOTAL SCORE: 2
PAROXYSMAL SWEATS: NO SWEAT VISIBLE
PAROXYSMAL SWEATS: NO SWEAT VISIBLE
TREMOR: 2
AUDITORY DISTURBANCES: NOT PRESENT
AGITATION: NORMAL ACTIVITY
TREMOR: NOT VISIBLE, BUT CAN BE FELT FINGERTIP TO FINGERTIP
AGITATION: SOMEWHAT MORE THAN NORMAL ACTIVITY
PAROXYSMAL SWEATS: NO SWEAT VISIBLE
AGITATION: NORMAL ACTIVITY
TREMOR: NOT VISIBLE, BUT CAN BE FELT FINGERTIP TO FINGERTIP
HEADACHE, FULLNESS IN HEAD: MILD
TOTAL SCORE: 1
ORIENTATION AND CLOUDING OF SENSORIUM: ORIENTED AND CAN DO SERIAL ADDITIONS
ANXIETY: MILDLY ANXIOUS
ORIENTATION AND CLOUDING OF SENSORIUM: ORIENTED AND CAN DO SERIAL ADDITIONS
PAROXYSMAL SWEATS: NO SWEAT VISIBLE
ANXIETY: MILDLY ANXIOUS
AUDITORY DISTURBANCES: NOT PRESENT
TREMOR: NO TREMOR
ANXIETY: 2
AGITATION: NORMAL ACTIVITY
AUDITORY DISTURBANCES: NOT PRESENT
AGITATION: NORMAL ACTIVITY
PAROXYSMAL SWEATS: NO SWEAT VISIBLE
HEADACHE, FULLNESS IN HEAD: NOT PRESENT
TREMOR: NO TREMOR
NAUSEA AND VOMITING: NO NAUSEA AND NO VOMITING
PAROXYSMAL SWEATS: NO SWEAT VISIBLE
NAUSEA AND VOMITING: NO NAUSEA AND NO VOMITING
NAUSEA AND VOMITING: NO NAUSEA AND NO VOMITING
AGITATION: SOMEWHAT MORE THAN NORMAL ACTIVITY
VISUAL DISTURBANCES: NOT PRESENT
AGITATION: NORMAL ACTIVITY
HEADACHE, FULLNESS IN HEAD: NOT PRESENT
HEADACHE, FULLNESS IN HEAD: NOT PRESENT
ANXIETY: 3
VISUAL DISTURBANCES: NOT PRESENT
TACTILE DISTURBANCES: VERY MILD ITCHING, PINS AND NEEDLES, BURNING OR NUMBNESS
HEADACHE, FULLNESS IN HEAD: NOT PRESENT
VISUAL DISTURBANCES: NOT PRESENT
PAROXYSMAL SWEATS: NO SWEAT VISIBLE
ORIENTATION AND CLOUDING OF SENSORIUM: ORIENTED AND CAN DO SERIAL ADDITIONS
TOTAL SCORE: 6
AUDITORY DISTURBANCES: NOT PRESENT
NAUSEA AND VOMITING: NO NAUSEA AND NO VOMITING
TREMOR: NOT VISIBLE, BUT CAN BE FELT FINGERTIP TO FINGERTIP
TOTAL SCORE: 7
NAUSEA AND VOMITING: 2
AUDITORY DISTURBANCES: NOT PRESENT
ORIENTATION AND CLOUDING OF SENSORIUM: ORIENTED AND CAN DO SERIAL ADDITIONS
TOTAL SCORE: 2
HEADACHE, FULLNESS IN HEAD: NOT PRESENT
NAUSEA AND VOMITING: NO NAUSEA AND NO VOMITING
TOTAL SCORE: 5
AGITATION: NORMAL ACTIVITY
TOTAL SCORE: 2
PAROXYSMAL SWEATS: NO SWEAT VISIBLE
HEADACHE, FULLNESS IN HEAD: NOT PRESENT
ORIENTATION AND CLOUDING OF SENSORIUM: ORIENTED AND CAN DO SERIAL ADDITIONS
NAUSEA AND VOMITING: MILD NAUSEA WITH NO VOMITING
TOTAL SCORE: 0
HEADACHE, FULLNESS IN HEAD: VERY MILD
ANXIETY: NO ANXIETY, AT EASE
HEADACHE, FULLNESS IN HEAD: NOT PRESENT
AUDITORY DISTURBANCES: NOT PRESENT
AUDITORY DISTURBANCES: NOT PRESENT
ANXIETY: MILDLY ANXIOUS
NAUSEA AND VOMITING: NO NAUSEA AND NO VOMITING
PAROXYSMAL SWEATS: NO SWEAT VISIBLE
AUDITORY DISTURBANCES: NOT PRESENT
AUDITORY DISTURBANCES: NOT PRESENT
TOTAL SCORE: 5
VISUAL DISTURBANCES: NOT PRESENT
HEADACHE, FULLNESS IN HEAD: NOT PRESENT
TREMOR: NOT VISIBLE, BUT CAN BE FELT FINGERTIP TO FINGERTIP
ORIENTATION AND CLOUDING OF SENSORIUM: ORIENTED AND CAN DO SERIAL ADDITIONS
VISUAL DISTURBANCES: NOT PRESENT
TREMOR: NO TREMOR
ORIENTATION AND CLOUDING OF SENSORIUM: ORIENTED AND CAN DO SERIAL ADDITIONS
ANXIETY: MILDLY ANXIOUS
VISUAL DISTURBANCES: NOT PRESENT
AUDITORY DISTURBANCES: NOT PRESENT
ORIENTATION AND CLOUDING OF SENSORIUM: ORIENTED AND CAN DO SERIAL ADDITIONS
ORIENTATION AND CLOUDING OF SENSORIUM: ORIENTED AND CAN DO SERIAL ADDITIONS
AGITATION: NORMAL ACTIVITY
AGITATION: SOMEWHAT MORE THAN NORMAL ACTIVITY
PAROXYSMAL SWEATS: NO SWEAT VISIBLE
VISUAL DISTURBANCES: NOT PRESENT
ANXIETY: 3

## 2024-10-14 ASSESSMENT — COGNITIVE AND FUNCTIONAL STATUS - GENERAL
MOBILITY SCORE: 24
DAILY ACTIVITIY SCORE: 24

## 2024-10-14 ASSESSMENT — PAIN SCALES - GENERAL
PAINLEVEL_OUTOF10: 3
PAINLEVEL_OUTOF10: 0 - NO PAIN

## 2024-10-14 ASSESSMENT — PAIN - FUNCTIONAL ASSESSMENT: PAIN_FUNCTIONAL_ASSESSMENT: 0-10

## 2024-10-14 ASSESSMENT — ACTIVITIES OF DAILY LIVING (ADL): LACK_OF_TRANSPORTATION: YES

## 2024-10-14 NOTE — SIGNIFICANT EVENT
"Patient discussed with Loreta ALMANZA and patient to be medically \"cleared\" tomorrow morning, and will be evaluated by psychiatry at that time.  "
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
## 2024-10-14 NOTE — PROGRESS NOTES
10/14/24 1418   Discharge Planning   Living Arrangements Other (Comment)  (homeless)   Support Systems Family members   Assistance Needed A&Ox3, independent with ADLS, no DME, room air at baseline   Type of Residence Homeless   Home or Post Acute Services None   Expected Discharge Disposition Psych   Does the patient need discharge transport arranged? Yes   RoundTrip coordination needed? Yes   Has discharge transport been arranged? No   Financial Resource Strain   How hard is it for you to pay for the very basics like food, housing, medical care, and heating? Hard   Housing Stability   In the last 12 months, was there a time when you were not able to pay the mortgage or rent on time? Y   At any time in the past 12 months, were you homeless or living in a shelter (including now)? Y   Transportation Needs   In the past 12 months, has lack of transportation kept you from medical appointments or from getting medications? yes   In the past 12 months, has lack of transportation kept you from meetings, work, or from getting things needed for daily living? Yes

## 2024-10-14 NOTE — ASSESSMENT & PLAN NOTE
-With Mild Alcohol Withdrawal  CIWA protocol  Thiamine, folic acid and MV daily  SW consult for outpt resources    Encephalopathy  -Added thiamine taper dose for ?wernicke's encephalopathy, suspect most likely cause  CT head-f/U  TSH, Ammonia, thiamine, B12-f/U    SI  Psych consulted  Sitter and suicide precautions    Tachycardia  -D/t anxiety or withdrawal  Encourage po fluids  Monitor for withdrawal  Tele    Nicotine Use Disorder  Smoking cessation education  Nicotine replacements    Homelessness  SW consult

## 2024-10-14 NOTE — PROGRESS NOTES
Jonny Valenzuela is a 54 y.o. male on day 0 of admission presenting with Alcohol intoxication in active alcoholic, with unspecified complication (Multi).      Subjective   Feels anxious and depressed. Endorses SI with plan to hang himself. He wants to get his brain straight but he can't sleep because mind is racing. Denies CP, SOB. Ate breakfast x 2 this morning. Ambulating to BR without issue.         Objective     Last Recorded Vitals  /87 (BP Location: Left arm, Patient Position: Sitting)   Pulse 74   Temp 36.4 °C (97.5 °F) (Temporal)   Resp 18   Wt 79.4 kg (175 lb)   SpO2 100%   Intake/Output last 3 Shifts:    Intake/Output Summary (Last 24 hours) at 10/14/2024 1037  Last data filed at 10/14/2024 0803  Gross per 24 hour   Intake 320 ml   Output --   Net 320 ml       Admission Weight  Weight: 74.8 kg (165 lb) (10/13/24 1533)    Daily Weight  10/13/24 : 79.4 kg (175 lb)    Image Results  CT head wo IV contrast  Narrative: Interpreted By:  Harpreet Lema,   STUDY:  CT HEAD WO IV CONTRAST; 10/14/2024 7:44 am      INDICATION:  Signs/Symptoms:encephalopathy      COMPARISON:  None      ACCESSION NUMBER(S):  [id number]      ORDERING CLINICIAN:  ALFONSO ALEMAN      TECHNIQUE:  Axial images were obtained through the brain. No IV contrast was  administered.      All CT examinations are performed with one or more of the following  dose reduction techniques: Automated Exposure Control, adjustment of  mA and/or kV according to patient size, or use of iterative  reconstruction techniques.      FINDINGS:  The ventricles are normal in size and midline in position.  Patchy periventricular hypodensities are present suggestive of  minimal small vessel ischemic white matter disease. There is no  intracranial hemorrhage. No mass or mass effect is seen.  The osseus structures are unremarkable.  Small, 10 mm sebaceous cyst is again noted in the subcutaneous soft  tissues adjacent to the right zygomatic arch.      Impression: Minimal  "chronic ischemic changes without acute intracranial process.      Signed by: Harpreet Lema 10/14/2024 9:32 AM  Dictation workstation:   [id number]      Physical Exam  Physical Exam  Gen: NAD  Eyes:  EOM intact  ENT: MMM  Neck: No JVD  Respiratory: CTAB, no wheezes/rhonchi  Cardiac: RRR, no murmurs rubs or gallops  Abdomen: soft, NT, +BS  Extremities: no edema or cyanosis  Neuro: No focal deficits, alert and oriented x 3  Psych:  anxious, mild tremors noted    Assessment/Plan      Alcohol use disorder  -last drink was yesterday morning, he had 4 \"tall boys\"  -denies h/o Dts or seizures  -ETOH level 199 at 1500 on 10/13  -continue CIWA protocol with ativan  -0.5 mg ativan given this morning  -thiamine, folic acid, MV daily    Reported confusion  -per admitting provider  -resolved  -likely 2/2 etoh intoxication  -CT head neg for acute findings  -TSH, ammonia WNL    Anxiety/depression with active SI  -endorses plan to hang himself   -continue sitter  -start sertraline 100 mg daily  -start atarax PRN  -psych consult    Tachycardia  -resolved  -tele with SR, rate 80s  -TSH WNL  -utox neg    Nicotine Use Disorder  -nicotine patch    Homelessness  -SW consult    DVT prophy  -lovenox    Dispo: continue to monitor on CIWA protocol, plan to medically clear once no ativan for over 10 hours, will need inpatient psych placement for active SI  D/w Dr. Briceno  "

## 2024-10-15 ENCOUNTER — HOSPITAL ENCOUNTER (INPATIENT)
Facility: HOSPITAL | Age: 54
End: 2024-10-15
Attending: PSYCHIATRY & NEUROLOGY | Admitting: PSYCHIATRY & NEUROLOGY
Payer: MEDICARE

## 2024-10-15 VITALS
HEIGHT: 67 IN | TEMPERATURE: 97.7 F | OXYGEN SATURATION: 98 % | BODY MASS INDEX: 27.47 KG/M2 | SYSTOLIC BLOOD PRESSURE: 134 MMHG | HEART RATE: 72 BPM | RESPIRATION RATE: 18 BRPM | DIASTOLIC BLOOD PRESSURE: 74 MMHG | WEIGHT: 175 LBS

## 2024-10-15 DIAGNOSIS — F41.1 GAD (GENERALIZED ANXIETY DISORDER): ICD-10-CM

## 2024-10-15 DIAGNOSIS — R12 HEARTBURN: ICD-10-CM

## 2024-10-15 DIAGNOSIS — F33.2 MAJOR DEPRESSIVE DISORDER, RECURRENT, SEVERE W/O PSYCHOTIC BEHAVIOR (MULTI): ICD-10-CM

## 2024-10-15 DIAGNOSIS — F17.200 TOBACCO DEPENDENCE: Primary | ICD-10-CM

## 2024-10-15 DIAGNOSIS — F10.20 ALCOHOL USE DISORDER, SEVERE, DEPENDENCE (MULTI): Chronic | ICD-10-CM

## 2024-10-15 PROBLEM — R45.851 DEPRESSION WITH SUICIDAL IDEATION: Status: ACTIVE | Noted: 2024-10-15

## 2024-10-15 PROBLEM — F32.A DEPRESSION WITH SUICIDAL IDEATION: Status: ACTIVE | Noted: 2024-10-15

## 2024-10-15 PROBLEM — F10.229: Status: RESOLVED | Noted: 2024-10-13 | Resolved: 2024-10-15

## 2024-10-15 LAB
ANION GAP SERPL CALC-SCNC: 11 MMOL/L (ref 10–20)
BUN SERPL-MCNC: 13 MG/DL (ref 6–23)
CALCIUM SERPL-MCNC: 8.7 MG/DL (ref 8.6–10.3)
CHLORIDE SERPL-SCNC: 104 MMOL/L (ref 98–107)
CO2 SERPL-SCNC: 27 MMOL/L (ref 21–32)
CREAT SERPL-MCNC: 0.89 MG/DL (ref 0.5–1.3)
EGFRCR SERPLBLD CKD-EPI 2021: >90 ML/MIN/1.73M*2
ERYTHROCYTE [DISTWIDTH] IN BLOOD BY AUTOMATED COUNT: 14.6 % (ref 11.5–14.5)
GLUCOSE SERPL-MCNC: 109 MG/DL (ref 74–99)
HCT VFR BLD AUTO: 44.6 % (ref 41–52)
HGB BLD-MCNC: 15 G/DL (ref 13.5–17.5)
MCH RBC QN AUTO: 33.6 PG (ref 26–34)
MCHC RBC AUTO-ENTMCNC: 33.6 G/DL (ref 32–36)
MCV RBC AUTO: 100 FL (ref 80–100)
NRBC BLD-RTO: 0 /100 WBCS (ref 0–0)
PLATELET # BLD AUTO: 225 X10*3/UL (ref 150–450)
POTASSIUM SERPL-SCNC: 4.7 MMOL/L (ref 3.5–5.3)
RBC # BLD AUTO: 4.46 X10*6/UL (ref 4.5–5.9)
SODIUM SERPL-SCNC: 137 MMOL/L (ref 136–145)
VIT B12 SERPL-MCNC: 597 PG/ML (ref 211–911)
WBC # BLD AUTO: 4.8 X10*3/UL (ref 4.4–11.3)

## 2024-10-15 PROCEDURE — S4991 NICOTINE PATCH NONLEGEND: HCPCS | Performed by: EMERGENCY MEDICINE

## 2024-10-15 PROCEDURE — 80048 BASIC METABOLIC PNL TOTAL CA: CPT | Performed by: PHYSICIAN ASSISTANT

## 2024-10-15 PROCEDURE — 36415 COLL VENOUS BLD VENIPUNCTURE: CPT | Performed by: NURSE PRACTITIONER

## 2024-10-15 PROCEDURE — 84425 ASSAY OF VITAMIN B-1: CPT | Performed by: NURSE PRACTITIONER

## 2024-10-15 PROCEDURE — 2500000002 HC RX 250 W HCPCS SELF ADMINISTERED DRUGS (ALT 637 FOR MEDICARE OP, ALT 636 FOR OP/ED): Performed by: EMERGENCY MEDICINE

## 2024-10-15 PROCEDURE — 94760 N-INVAS EAR/PLS OXIMETRY 1: CPT

## 2024-10-15 PROCEDURE — 2500000002 HC RX 250 W HCPCS SELF ADMINISTERED DRUGS (ALT 637 FOR MEDICARE OP, ALT 636 FOR OP/ED): Performed by: PHYSICIAN ASSISTANT

## 2024-10-15 PROCEDURE — G0378 HOSPITAL OBSERVATION PER HR: HCPCS

## 2024-10-15 PROCEDURE — 2500000002 HC RX 250 W HCPCS SELF ADMINISTERED DRUGS (ALT 637 FOR MEDICARE OP, ALT 636 FOR OP/ED): Performed by: PSYCHIATRY & NEUROLOGY

## 2024-10-15 PROCEDURE — 2500000001 HC RX 250 WO HCPCS SELF ADMINISTERED DRUGS (ALT 637 FOR MEDICARE OP): Performed by: NURSE PRACTITIONER

## 2024-10-15 PROCEDURE — 99239 HOSP IP/OBS DSCHRG MGMT >30: CPT | Performed by: STUDENT IN AN ORGANIZED HEALTH CARE EDUCATION/TRAINING PROGRAM

## 2024-10-15 PROCEDURE — 82607 VITAMIN B-12: CPT | Mod: GEALAB | Performed by: NURSE PRACTITIONER

## 2024-10-15 PROCEDURE — 2500000001 HC RX 250 WO HCPCS SELF ADMINISTERED DRUGS (ALT 637 FOR MEDICARE OP): Performed by: PSYCHIATRY & NEUROLOGY

## 2024-10-15 PROCEDURE — 85027 COMPLETE CBC AUTOMATED: CPT | Performed by: PHYSICIAN ASSISTANT

## 2024-10-15 PROCEDURE — 1240000001 HC SEMI-PRIVATE BH ROOM DAILY

## 2024-10-15 PROCEDURE — 2500000001 HC RX 250 WO HCPCS SELF ADMINISTERED DRUGS (ALT 637 FOR MEDICARE OP): Performed by: PHYSICIAN ASSISTANT

## 2024-10-15 RX ORDER — BISMUTH SUBSALICYLATE 262 MG
1 TABLET,CHEWABLE ORAL DAILY
Status: ON HOLD | COMMUNITY
End: 2024-10-29

## 2024-10-15 RX ORDER — DIPHENHYDRAMINE HCL 50 MG
50 CAPSULE ORAL EVERY 6 HOURS PRN
Status: DISCONTINUED | OUTPATIENT
Start: 2024-10-15 | End: 2024-10-31 | Stop reason: HOSPADM

## 2024-10-15 RX ORDER — IBUPROFEN 600 MG/1
600 TABLET ORAL EVERY 8 HOURS PRN
Status: DISCONTINUED | OUTPATIENT
Start: 2024-10-15 | End: 2024-10-31 | Stop reason: HOSPADM

## 2024-10-15 RX ORDER — LISINOPRIL 20 MG/1
20 TABLET ORAL DAILY
COMMUNITY

## 2024-10-15 RX ORDER — LORAZEPAM 1 MG/1
2 TABLET ORAL EVERY 6 HOURS PRN
Status: DISCONTINUED | OUTPATIENT
Start: 2024-10-15 | End: 2024-10-31 | Stop reason: HOSPADM

## 2024-10-15 RX ORDER — LORAZEPAM 2 MG/ML
2 INJECTION INTRAMUSCULAR EVERY 6 HOURS PRN
Status: DISCONTINUED | OUTPATIENT
Start: 2024-10-15 | End: 2024-10-31 | Stop reason: HOSPADM

## 2024-10-15 RX ORDER — FOLIC ACID 1 MG/1
1 TABLET ORAL DAILY
COMMUNITY

## 2024-10-15 RX ORDER — ALUMINUM HYDROXIDE, MAGNESIUM HYDROXIDE, AND SIMETHICONE 1200; 120; 1200 MG/30ML; MG/30ML; MG/30ML
30 SUSPENSION ORAL EVERY 6 HOURS PRN
Status: DISCONTINUED | OUTPATIENT
Start: 2024-10-15 | End: 2024-10-31 | Stop reason: HOSPADM

## 2024-10-15 RX ORDER — OLANZAPINE 10 MG/1
10 TABLET ORAL NIGHTLY
COMMUNITY
End: 2024-10-31 | Stop reason: HOSPADM

## 2024-10-15 RX ORDER — HALOPERIDOL 5 MG/ML
5 INJECTION INTRAMUSCULAR EVERY 6 HOURS PRN
Status: DISCONTINUED | OUTPATIENT
Start: 2024-10-15 | End: 2024-10-31 | Stop reason: HOSPADM

## 2024-10-15 RX ORDER — HYDROXYZINE PAMOATE 50 MG/1
50 CAPSULE ORAL EVERY 4 HOURS PRN
Status: DISCONTINUED | OUTPATIENT
Start: 2024-10-15 | End: 2024-10-22

## 2024-10-15 RX ORDER — PAROXETINE 30 MG/1
30 TABLET, FILM COATED ORAL DAILY
COMMUNITY
End: 2024-10-31 | Stop reason: HOSPADM

## 2024-10-15 RX ORDER — BACLOFEN 10 MG/1
10 TABLET ORAL 3 TIMES DAILY PRN
COMMUNITY

## 2024-10-15 RX ORDER — DIPHENHYDRAMINE HYDROCHLORIDE 50 MG/ML
50 INJECTION INTRAMUSCULAR; INTRAVENOUS ONCE AS NEEDED
Status: DISCONTINUED | OUTPATIENT
Start: 2024-10-15 | End: 2024-10-31 | Stop reason: HOSPADM

## 2024-10-15 RX ORDER — TALC
6 POWDER (GRAM) TOPICAL NIGHTLY
COMMUNITY
End: 2024-10-31 | Stop reason: HOSPADM

## 2024-10-15 RX ORDER — MICONAZOLE NITRATE 2 %
2 CREAM (GRAM) TOPICAL EVERY 2 HOUR PRN
Status: DISCONTINUED | OUTPATIENT
Start: 2024-10-15 | End: 2024-10-31 | Stop reason: HOSPADM

## 2024-10-15 RX ORDER — ACETAMINOPHEN 500 MG
5 TABLET ORAL NIGHTLY PRN
Status: DISCONTINUED | OUTPATIENT
Start: 2024-10-15 | End: 2024-10-22

## 2024-10-15 RX ORDER — HALOPERIDOL 5 MG/1
5 TABLET ORAL EVERY 6 HOURS PRN
Status: DISCONTINUED | OUTPATIENT
Start: 2024-10-15 | End: 2024-10-31 | Stop reason: HOSPADM

## 2024-10-15 RX ADMIN — NICOTINE 1 PATCH: 21 PATCH, EXTENDED RELEASE TRANSDERMAL at 08:37

## 2024-10-15 RX ADMIN — HYDROXYZINE HYDROCHLORIDE 25 MG: 25 TABLET ORAL at 08:39

## 2024-10-15 RX ADMIN — THIAMINE HCL TAB 100 MG 500 MG: 100 TAB at 08:38

## 2024-10-15 RX ADMIN — FOLIC ACID 1 MG: 1 TABLET ORAL at 08:39

## 2024-10-15 RX ADMIN — Medication 1 TABLET: at 08:39

## 2024-10-15 RX ADMIN — SERTRALINE HYDROCHLORIDE 100 MG: 50 TABLET ORAL at 08:38

## 2024-10-15 SDOH — SOCIAL STABILITY: SOCIAL INSECURITY: ARE THERE ANY APPARENT SIGNS OF INJURIES/BEHAVIORS THAT COULD BE RELATED TO ABUSE/NEGLECT?: NO

## 2024-10-15 SDOH — ECONOMIC STABILITY: HOUSING INSECURITY: IN THE PAST 12 MONTHS, HOW MANY TIMES HAVE YOU MOVED WHERE YOU WERE LIVING?: 1

## 2024-10-15 SDOH — SOCIAL STABILITY: SOCIAL INSECURITY: ARE YOU OR HAVE YOU BEEN THREATENED OR ABUSED PHYSICALLY, EMOTIONALLY, OR SEXUALLY BY ANYONE?: NO

## 2024-10-15 SDOH — ECONOMIC STABILITY: FOOD INSECURITY: WITHIN THE PAST 12 MONTHS, YOU WORRIED THAT YOUR FOOD WOULD RUN OUT BEFORE YOU GOT THE MONEY TO BUY MORE.: NEVER TRUE

## 2024-10-15 SDOH — SOCIAL STABILITY: SOCIAL INSECURITY: DO YOU FEEL ANYONE HAS EXPLOITED OR TAKEN ADVANTAGE OF YOU FINANCIALLY OR OF YOUR PERSONAL PROPERTY?: NO

## 2024-10-15 SDOH — SOCIAL STABILITY: SOCIAL INSECURITY: HAS ANYONE EVER THREATENED TO HURT YOUR FAMILY OR YOUR PETS?: NO

## 2024-10-15 SDOH — HEALTH STABILITY: PHYSICAL HEALTH
HOW OFTEN DO YOU NEED TO HAVE SOMEONE HELP YOU WHEN YOU READ INSTRUCTIONS, PAMPHLETS, OR OTHER WRITTEN MATERIAL FROM YOUR DOCTOR OR PHARMACY?: ALWAYS

## 2024-10-15 SDOH — HEALTH STABILITY: MENTAL HEALTH: HOW OFTEN DO YOU HAVE A DRINK CONTAINING ALCOHOL?: 4 OR MORE TIMES A WEEK

## 2024-10-15 SDOH — ECONOMIC STABILITY: FOOD INSECURITY: HOW HARD IS IT FOR YOU TO PAY FOR THE VERY BASICS LIKE FOOD, HOUSING, MEDICAL CARE, AND HEATING?: VERY HARD

## 2024-10-15 SDOH — SOCIAL STABILITY: SOCIAL INSECURITY: ABUSE: ADULT

## 2024-10-15 SDOH — HEALTH STABILITY: PHYSICAL HEALTH: ON AVERAGE, HOW MANY MINUTES DO YOU ENGAGE IN EXERCISE AT THIS LEVEL?: 0 MIN

## 2024-10-15 SDOH — HEALTH STABILITY: MENTAL HEALTH: HOW MANY DRINKS CONTAINING ALCOHOL DO YOU HAVE ON A TYPICAL DAY WHEN YOU ARE DRINKING?: 10 OR MORE

## 2024-10-15 SDOH — SOCIAL STABILITY: SOCIAL INSECURITY
WITHIN THE LAST YEAR, HAVE YOU BEEN KICKED, HIT, SLAPPED, OR OTHERWISE PHYSICALLY HURT BY YOUR PARTNER OR EX-PARTNER?: NO

## 2024-10-15 SDOH — HEALTH STABILITY: MENTAL HEALTH: EXPERIENCED ANY OF THE FOLLOWING LIFE EVENTS: DEATH OF FAMILY/FRIEND;WITNESS TO INJURY TO OR DEATH OF SOMEONE ELSE

## 2024-10-15 SDOH — ECONOMIC STABILITY: FOOD INSECURITY: WITHIN THE PAST 12 MONTHS, THE FOOD YOU BOUGHT JUST DIDN'T LAST AND YOU DIDN'T HAVE MONEY TO GET MORE.: NEVER TRUE

## 2024-10-15 SDOH — SOCIAL STABILITY: SOCIAL INSECURITY: HAVE YOU HAD ANY THOUGHTS OF HARMING ANYONE ELSE?: NO

## 2024-10-15 SDOH — HEALTH STABILITY: PHYSICAL HEALTH: ON AVERAGE, HOW MANY DAYS PER WEEK DO YOU ENGAGE IN MODERATE TO STRENUOUS EXERCISE (LIKE A BRISK WALK)?: 0 DAYS

## 2024-10-15 SDOH — ECONOMIC STABILITY: HOUSING INSECURITY: IN THE LAST 12 MONTHS, WAS THERE A TIME WHEN YOU WERE NOT ABLE TO PAY THE MORTGAGE OR RENT ON TIME?: YES

## 2024-10-15 SDOH — HEALTH STABILITY: MENTAL HEALTH
DO YOU FEEL STRESS - TENSE, RESTLESS, NERVOUS, OR ANXIOUS, OR UNABLE TO SLEEP AT NIGHT BECAUSE YOUR MIND IS TROUBLED ALL THE TIME - THESE DAYS?: VERY MUCH

## 2024-10-15 SDOH — SOCIAL STABILITY: SOCIAL INSECURITY: HAVE YOU HAD THOUGHTS OF HARMING ANYONE ELSE?: NO

## 2024-10-15 SDOH — SOCIAL STABILITY: SOCIAL INSECURITY: WITHIN THE LAST YEAR, HAVE YOU BEEN HUMILIATED OR EMOTIONALLY ABUSED IN OTHER WAYS BY YOUR PARTNER OR EX-PARTNER?: NO

## 2024-10-15 SDOH — ECONOMIC STABILITY: HOUSING INSECURITY: AT ANY TIME IN THE PAST 12 MONTHS, WERE YOU HOMELESS OR LIVING IN A SHELTER (INCLUDING NOW)?: YES

## 2024-10-15 SDOH — SOCIAL STABILITY: SOCIAL INSECURITY: WERE YOU ABLE TO COMPLETE ALL THE BEHAVIORAL HEALTH SCREENINGS?: YES

## 2024-10-15 SDOH — SOCIAL STABILITY: SOCIAL INSECURITY: DO YOU FEEL UNSAFE GOING BACK TO THE PLACE WHERE YOU ARE LIVING?: NO

## 2024-10-15 SDOH — SOCIAL STABILITY: SOCIAL INSECURITY: DOES ANYONE TRY TO KEEP YOU FROM HAVING/CONTACTING OTHER FRIENDS OR DOING THINGS OUTSIDE YOUR HOME?: NO

## 2024-10-15 SDOH — HEALTH STABILITY: MENTAL HEALTH: HOW OFTEN DO YOU HAVE SIX OR MORE DRINKS ON ONE OCCASION?: DAILY OR ALMOST DAILY

## 2024-10-15 SDOH — SOCIAL STABILITY: SOCIAL INSECURITY: WITHIN THE LAST YEAR, HAVE YOU BEEN AFRAID OF YOUR PARTNER OR EX-PARTNER?: NO

## 2024-10-15 SDOH — ECONOMIC STABILITY: INCOME INSECURITY: IN THE PAST 12 MONTHS HAS THE ELECTRIC, GAS, OIL, OR WATER COMPANY THREATENED TO SHUT OFF SERVICES IN YOUR HOME?: NO

## 2024-10-15 SDOH — ECONOMIC STABILITY: TRANSPORTATION INSECURITY: IN THE PAST 12 MONTHS, HAS LACK OF TRANSPORTATION KEPT YOU FROM MEDICAL APPOINTMENTS OR FROM GETTING MEDICATIONS?: YES

## 2024-10-15 SDOH — SOCIAL STABILITY: SOCIAL INSECURITY
WITHIN THE LAST YEAR, HAVE YOU BEEN RAPED OR FORCED TO HAVE ANY KIND OF SEXUAL ACTIVITY BY YOUR PARTNER OR EX-PARTNER?: NO

## 2024-10-15 ASSESSMENT — PATIENT HEALTH QUESTIONNAIRE - PHQ9
1. LITTLE INTEREST OR PLEASURE IN DOING THINGS: NEARLY EVERY DAY
SUM OF ALL RESPONSES TO PHQ9 QUESTIONS 1 & 2: 6
2. FEELING DOWN, DEPRESSED OR HOPELESS: NEARLY EVERY DAY
1. LITTLE INTEREST OR PLEASURE IN DOING THINGS: NEARLY EVERY DAY
2. FEELING DOWN, DEPRESSED OR HOPELESS: NEARLY EVERY DAY
SUM OF ALL RESPONSES TO PHQ9 QUESTIONS 1 & 2: 6

## 2024-10-15 ASSESSMENT — LIFESTYLE VARIABLES
ORIENTATION AND CLOUDING OF SENSORIUM: ORIENTED AND CAN DO SERIAL ADDITIONS
SKIP TO QUESTIONS 9-10: 0
CIWA TOTAL SCORE: 8
TREMOR: NO TREMOR
SUBSTANCE_ABUSE_PAST_12_MONTHS: NO
AUDITORY DISTURBANCES: NOT PRESENT
HOW OFTEN DURING THE LAST YEAR HAVE YOU FAILED TO DO WHAT WAS NORMALLY EXPECTED FROM YOU BECAUSE OF DRINKING: DAILY OR ALMOST DAILY
AUDIT-C TOTAL SCORE: 12
AGITATION: NORMAL ACTIVITY
TREMOR: 2
PAROXYSMAL SWEATS: NO SWEAT VISIBLE
TOTAL SCORE: 0
PRESCIPTION_ABUSE_PAST_12_MONTHS: NO
AUDITORY DISTURBANCES: NOT PRESENT
ANXIETY: NO ANXIETY, AT EASE
SKIP TO QUESTIONS 9-10: 0
VISUAL DISTURBANCES: NOT PRESENT
AUDITORY DISTURBANCES: NOT PRESENT
AGITATION: NORMAL ACTIVITY
NAUSEA AND VOMITING: NO NAUSEA AND NO VOMITING
AUDIT-C TOTAL SCORE: 12
HEADACHE, FULLNESS IN HEAD: NOT PRESENT
NAUSEA AND VOMITING: NO NAUSEA AND NO VOMITING
TREMOR: NO TREMOR
VISUAL DISTURBANCES: NOT PRESENT
TOTAL SCORE: 0
HOW OFTEN DURING THE LAST YEAR HAVE YOU FOUND THAT YOU WERE NOT ABLE TO STOP DRINKING ONCE YOU HAD STARTED: DAILY OR ALMOST DAILY
AUDIT-C TOTAL SCORE: 12
TREMOR: 3
TOTAL SCORE: 4
HOW OFTEN DO YOU HAVE 6 OR MORE DRINKS ON ONE OCCASION: DAILY OR ALMOST DAILY
HEADACHE, FULLNESS IN HEAD: NOT PRESENT
SUBSTANCE_ABUSE_PAST_12_MONTHS: NO
PAROXYSMAL SWEATS: NO SWEAT VISIBLE
PAROXYSMAL SWEATS: NO SWEAT VISIBLE
NAUSEA AND VOMITING: NO NAUSEA AND NO VOMITING
ANXIETY: NO ANXIETY, AT EASE
HEADACHE, FULLNESS IN HEAD: NOT PRESENT
VISUAL DISTURBANCES: NOT PRESENT
ORIENTATION AND CLOUDING OF SENSORIUM: ORIENTED AND CAN DO SERIAL ADDITIONS
ORIENTATION AND CLOUDING OF SENSORIUM: ORIENTED AND CAN DO SERIAL ADDITIONS
PAROXYSMAL SWEATS: NO SWEAT VISIBLE
HEADACHE, FULLNESS IN HEAD: NOT PRESENT
PRESCIPTION_ABUSE_PAST_12_MONTHS: NO
HOW OFTEN DO YOU HAVE A DRINK CONTAINING ALCOHOL: 4 OR MORE TIMES A WEEK
PULSE: 72
AGITATION: NORMAL ACTIVITY
TOTAL_SCORE: 5
AGITATION: NORMAL ACTIVITY
ANXIETY: 5
ANXIETY: 2
AUDIT TOTAL SCORE: 8
AUDITORY DISTURBANCES: NOT PRESENT
ORIENTATION AND CLOUDING OF SENSORIUM: ORIENTED AND CAN DO SERIAL ADDITIONS
NAUSEA AND VOMITING: NO NAUSEA AND NO VOMITING
VISUAL DISTURBANCES: NOT PRESENT
HOW MANY STANDARD DRINKS CONTAINING ALCOHOL DO YOU HAVE ON A TYPICAL DAY: 10 OR MORE

## 2024-10-15 ASSESSMENT — ACTIVITIES OF DAILY LIVING (ADL)
ADEQUATE_TO_COMPLETE_ADL: YES
BATHING: INDEPENDENT
JUDGMENT_ADEQUATE_SAFELY_COMPLETE_DAILY_ACTIVITIES: YES
DRESSING YOURSELF: INDEPENDENT
WALKS IN HOME: INDEPENDENT
LACK_OF_TRANSPORTATION: YES
GROOMING: INDEPENDENT
PATIENT'S MEMORY ADEQUATE TO SAFELY COMPLETE DAILY ACTIVITIES?: YES
HEARING - RIGHT EAR: FUNCTIONAL
HEARING - LEFT EAR: FUNCTIONAL
TOILETING: INDEPENDENT
LACK_OF_TRANSPORTATION: YES
FEEDING YOURSELF: INDEPENDENT

## 2024-10-15 ASSESSMENT — ENCOUNTER SYMPTOMS
SLEEP DISTURBANCE: 1
FEVER: 0
NERVOUS/ANXIOUS: 1
LIGHT-HEADEDNESS: 0
DYSPHORIC MOOD: 1
DECREASED CONCENTRATION: 1
VOMITING: 0
AGITATION: 0
CONFUSION: 1
HYPERACTIVE: 0
SEIZURES: 0
HALLUCINATIONS: 0
TREMORS: 1
PALPITATIONS: 0
HEADACHES: 1
DIAPHORESIS: 1
DIARRHEA: 0
NAUSEA: 1

## 2024-10-15 ASSESSMENT — PAIN - FUNCTIONAL ASSESSMENT: PAIN_FUNCTIONAL_ASSESSMENT: 0-10

## 2024-10-15 ASSESSMENT — PAIN SCALES - GENERAL
PAINLEVEL_OUTOF10: 0 - NO PAIN
PAINLEVEL_OUTOF10: 0 - NO PAIN
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
## 2024-10-15 NOTE — CARE PLAN
Problem: Coordination of Community Resources Needed  Goal: Coordination of Services will be Obtained  Outcome: Progressing     Problem: Coordination of Community Resources Needed  Goal: Coordination of Services will be Obtained  10/15/2024 1712 by MYRNA Olvera  Outcome: Progressing  10/15/2024 1711 by MYRNA Olvera  Outcome: Progressing  10/15/2024 1710 by MYRNA Olvera  Outcome: Progressing

## 2024-10-15 NOTE — SIGNIFICANT EVENT
Application for Emergency Admission    Is the patient medically cleared for transfer to inpatient psychiatry: Yes  Has the patient been accepted to an inpatient psychiatric hospital: Yes    Application for Emergency Admission  IN ACCORDANCE WITH [place] 5122[place]10 [place]  The Chief Clinical Officer of: [facility] 10/15/2024 .12:05 PM    Reason for Hospitalization  The undersigned has reason to believe that: Jonny Valenzuela Is a mentally ill person subject to hospitalization by court order under division B Section 5122.01 of the Revised Code, i.e., this person:    1.Yes  Represents a substantial risk of physical harm to self as manifested by evidence of threats of, or attempts at, suicide or serious self-inflicted bodily harm    2.No Represents a substantial risk of physical harm to others as manifested by evidence of recent homicidal or other violent behavior, evidence of recent threats that place another in reasonable fear of violent behavior and serious physical harm, or other evidence of present dangerousness    3.Yes Represents a substantial and immediate risk of serious physical impairment or injury to self as manifested by  evidence that the person is unable to provide for and is not providing for the person's basic physical needs because of the person's mental illness and that appropriate provision for those needs cannot be made  immediately available in the community    4.Yes Would benefit from treatment in a hospital for his mental illness and is in need of such treatment as manifested by evidence of behavior that creates a grave and imminent risk to substantial rights of others or  himself.    5.Yes Would benefit from treatment as manifested by evidence of behavior that indicates all of the following:       (a) The person is unlikely to survive safely in the community without supervision, based on a clinical determination.       (b) The person has a history of lack of compliance with treatment  for mental illness and one of the following applies:      (i) At least twice within the thirty-six months prior to the filing of an affidavit seeking court-ordered treatment of the person under section 5122.111 of the Revised Code, the lack of compliance has been a significant factor in necessitating hospitalization in a hospital or receipt of services in a forensic or other mental health unit of a correctional facility, provided that the thirty-six-month period shall be extended by the length of any hospitalization or incarceration of the person that occurred within the thirty-six-month period.      (ii) Within the forty-eight months prior to the filing of an affidavit seeking court-ordered treatment of the person under section 5122.111 of the Revised Code, the lack of compliance resulted in one or more acts of serious violent behavior toward self or others or threats of, or attempts at, serious physical harm to self or others, provided that the forty-eight-month period shall be extended by the length of any hospitalization or incarceration of the person that occurred within the forty-eight-month period.      (c) The person, as a result of mental illness, is unlikely to voluntarily participate in necessary treatment.       (d) In view of the person's treatment history and current behavior, the person is in need of treatment in order to prevent a relapse or deterioration that would be likely to result in substantial risk of serious harm to the person or others.    (e) Represents a substantial risk of physical harm to self or others if allowed to remain at liberty pending examination.    Therefore, it is requested that said person be admitted to the above named facility.    STATEMENT OF BELIEF    Must be filled out by one of the following: a psychiatrist, licensed physician, licensed clinical psychologist, health or ,  or .  (Statement shall include the circumstances under which the  individual was taken into custody and the reason for the person's belief that hospitalization is necessary. The statement shall also include a reference to efforts made to secure the individual's property at his residence if he was taken into custody there. Every reasonable and appropriate effort should be made to take this person into custody in the least conspicuous manner possible.)    Per Psychiatry evaluation:    Diagnostic Impression:  1) Major Depressive Disorder (MDD), recurrent, severe, without psychosis   2) Generalized Anxiety Disorder (SHOSHANA)  3) Alcohol Use Disorder, severe dependence   4) Tobacco dependence   5) Hypertension         Recommendations:  1) Patient meets the criteria for inpatient psychiatric admission   2) Continue 1-to-1 sitter    Loreta Hicks PA-C 10/15/2024     _____________________________________________________________   Place of Employment: [employer] [employer] [facility] [facility]    STATEMENT OF OBSERVATION BY PSYCHIATRIST, LICENSED PHYSICIAN, OR LICENSED CLINICAL PSYCHOLOGIST, IF APPLICABLE    Place of Observation (e.g., [facility] mental health center, general hospital, office, emergency facility)  (If applicable, please complete)    Loreta Hicks PA-C 10/15/2024    _____________________________________________________________

## 2024-10-15 NOTE — PROGRESS NOTES
"Jonny Valenzuela is a 54 y.o. male on day 0 of admission presenting with Alcohol intoxication in active alcoholic, with unspecified complication (Multi).      Subjective   Continues to feel anxious and depressed and endorses SI. He feels atarax is \"taking the edge off.\" He did sleep a bit last night. Denies CP, SOB.       Objective     Last Recorded Vitals  /80 (BP Location: Left arm, Patient Position: Lying)   Pulse 72   Temp 36.7 °C (98.1 °F) (Temporal)   Resp 16   Wt 79.4 kg (175 lb)   SpO2 96%   Intake/Output last 3 Shifts:    Intake/Output Summary (Last 24 hours) at 10/15/2024 0837  Last data filed at 10/15/2024 0834  Gross per 24 hour   Intake 780 ml   Output --   Net 780 ml       Admission Weight  Weight: 74.8 kg (165 lb) (10/13/24 1533)    Daily Weight  10/13/24 : 79.4 kg (175 lb)    Image Results  ECG 12 lead  Normal sinus rhythm  Left axis deviation  Abnormal ECG  When compared with ECG of 15-SEP-2024 21:21,  No significant change was found      Physical Exam  Physical Exam  Gen: NAD  Eyes:  EOM intact  ENT: MMM  Neck: No JVD  Respiratory: CTAB, no wheezes/rhonchi  Cardiac: RRR, no murmurs rubs or gallops  Abdomen: soft, NT, +BS  Extremities: no edema or cyanosis  Neuro: No focal deficits, alert and oriented x 3  Psych:  anxious, mild tremors noted    Assessment/Plan      Alcohol use disorder  -last drink was yesterday morning, he had 4 \"tall boys\"  -denies h/o Dts or seizures  -ETOH level 199 at 1500 on 10/13  -CIWA 0  -thiamine, folic acid, MV daily  -no further ativan needed    Reported confusion  -per admitting provider  -resolved  -likely 2/2 etoh intoxication  -CT head neg for acute findings  -TSH, ammonia WNL    Anxiety/depression with active SI  -endorses plan to hang himself   -continue sitter  -sertraline 100 mg daily  -atarax PRN  -psych consult, to see this morning    Tachycardia  -resolved  -tele with SR, rate 80s  -TSH WNL  -utox neg    Nicotine Use Disorder  -nicotine " patch    Homelessness  -SW consult    DVT prophy  -lovenox    Dispo: medically cleared for psych evaluation, will need IP psych for active SI with plan  D/w Dr. Watson

## 2024-10-15 NOTE — DISCHARGE SUMMARY
"Discharge Diagnosis  Alcohol intoxication in active alcoholic, with unspecified complication (Multi), Major Depressive Disorder (MDD), recurrent, severe, without psychosis, SHOSHANA, SI with plan    Issues Requiring Follow-Up  Discharge to inpatient psych    Discharge Meds     Medication List      You have not been prescribed any medications.       Test Results Pending At Discharge  Pending Labs       Order Current Status    Vitamin B1, whole blood In process    Vitamin B12 In process            Hospital Course   Per HPI:  Jonny Valenzuela is a 54 y.o. male with a pertinent hx of nicotine use disorder, alcohol abuse and anxiety and depression who presented to [facility] ER with SI while intoxicated.  Patient made the comment that \"I just wants to be done with life. \"Patient said that he quit taking his psych meds 3 to 4 days ago and has been taking them for over 2 weeks and they were not working.  Patient also admits that he is currently homeless. He complains of anxiety, depression, headache, confusion, mild tremors and baseline paresthesias in his feet.  He denied delusions, hallucinations, nausea, vomiting, abdominal pain, chest pain, urinary symptoms, heart palpitations.  Pertinent workup in the ER included: Unremarkable BMP and CBC, COVID-negative, UA bland, tox screen negative, aspirin and Tylenol level negative, alcohol level 199 and EKG reviewed without prolonged QT. Monitored on CIWA protocol and medically cleared for dc after no longer needing ativan.  Psych recommended inpatient admission and [place] [facility] [place] On day of discharge, patient denied CP, SOB, n/v/diarrhea/constipation, was tolerating diet and ambulating without issue.  Patient appeared hemodynamically stable at time of discharge. Discussed with attending physician who agreed with discharge plan.  Time spent on discharge including patient evaluation, education, coordination of care with consultants, attending physician, care " coordination and nursing was 35 minutes.        Pertinent Physical Exam At Time of Discharge  Physical Exam  Physical Exam  Gen: NAD  Eyes:  EOM intact  ENT: MMM  Neck: No JVD  Respiratory: CTAB, no wheezes/rhonchi  Cardiac: RRR, no murmurs rubs or gallops  Abdomen: soft, NT, +BS  Extremities: no edema or cyanosis  Neuro: No focal deficits, alert and oriented x 3  Psych:  appropriate mood and behavior    Outpatient Follow-Up  No future appointments.      Loreta Hicks PA-C

## 2024-10-15 NOTE — SIGNIFICANT EVENT
Patient last required ativan at 1500 on 10/14. CIWA score 0. VS and labs reviewed. He is medically cleared for psychiatry evaluation.
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
## 2024-10-15 NOTE — SIGNIFICANT EVENT
"   10/15/24 1650   Discharge Planning   Living Arrangements Other (Comment);Alone  (homeless.)   Support Systems None   Assistance Needed needs to be psychiatrically stabilized; needs drug and alcohol treatment.   Type of Residence Homeless   Who is requesting discharge planning? Provider   Home or Post Acute Services Community services   Expected Discharge Disposition Othe   Does the patient need discharge transport arranged? Yes   RoundTrip coordination needed? Yes   Has discharge transport been arranged? No   Financial Resource Strain   How hard is it for you to pay for the very basics like food, housing, medical care, and heating? Very Hard   Housing Stability   In the last 12 months, was there a time when you were not able to pay the mortgage or rent on time? Y   In the past 12 months, how many times have you moved where you were living? 1   At any time in the past 12 months, were you homeless or living in a shelter (including now)? Y   Transportation Needs   In the past 12 months, has lack of transportation kept you from medical appointments or from getting medications? yes   In the past 12 months, has lack of transportation kept you from meetings, work, or from getting things needed for daily living? Yes   Patient Choice   Patient / Family choosing to utilize agency / facility established prior to hospitalization Yes     This is a 54 year old  white male, homeless, who was admitted for increased depression with plan to kill himself;  He reported history of depression, anxiety and Schizophrenia; history of 2 past inpatient admits (2 weeks ago at [facility] and at [facility] in [place] (cannot recall dates);  He has received outpatient care through [facility]; reports he has been homeless for about 3-4 months, living in the woods in [place], since his brother \"kicked him out/I left\";  He admits he is hopeless: depression 10/10, anxiety 10/10, but is able to contract for safety; " "11th grade education: cannot read or write (can sign his name only);  on disability (Medicare and Medicaid);  presents as polite, poorly groomed wearing a hospital gown;  was , but wife  about 9 years ago from cancer ( 12-13 years), no children, only marriage;  grew up youngest of 5 children, all now  except for him and 1 older brother (with whom he was living with last);  patient's biological mother  by suicide when he was aged 2 (jumped off high level bridge in [place]), biological father  of massive heart attack (patient aged 20, found him , has nightmares of this/traumatized by this);  Admits he was diagnosed Schizophrenic because he is paranoid, often thinks people are talking about him, laughing at him, is very suspicious; stated he does not like groups of people; he enjoys living by himself;  wants to go to [facility] at discharge, states he has already called there and \"they can put me on their waiting list\".  He is calm, cooperative; denies legal history;  drinks daily, 12 pack of beer per day, recently increased his use; states he cannot go to sleep unless he drinks a beer;  denies past drug or alcohol treatment;  Stated he does not want inpatient alcohol treatment, just wants to get an apartment for himself and live alone.  He denied any support system so no SHAAN's signed.  He did sign the Application for Voluntary Admission and the [facility] Medicare letter.  Plan is to stabilize, and coordinate temporary housing, reconnect him with Steven to help him with permanent housing; also connect him with [facility]; discharge him, when stable, to a safe and supportive place. Sw to follow.   "

## 2024-10-16 LAB
25(OH)D3 SERPL-MCNC: 31 NG/ML (ref 30–100)
ATRIAL RATE: 88 BPM
CHOLEST SERPL-MCNC: 203 MG/DL (ref 0–199)
CHOLESTEROL/HDL RATIO: 2.8
GLUCOSE P FAST SERPL-MCNC: 100 MG/DL (ref 74–99)
HDLC SERPL-MCNC: 72.1 MG/DL
LDLC SERPL CALC-MCNC: 112 MG/DL
NON HDL CHOLESTEROL: 131 MG/DL (ref 0–149)
P AXIS: 30 DEGREES
P OFFSET: 185 MS
P ONSET: 141 MS
PR INTERVAL: 142 MS
Q ONSET: 212 MS
QRS COUNT: 15 BEATS
QRS DURATION: 92 MS
QT INTERVAL: 364 MS
QTC CALCULATION(BAZETT): 440 MS
QTC FREDERICIA: 413 MS
R AXIS: -41 DEGREES
T AXIS: 41 DEGREES
T OFFSET: 394 MS
TRIGL SERPL-MCNC: 96 MG/DL (ref 0–149)
VENTRICULAR RATE: 88 BPM
VLDL: 19 MG/DL (ref 0–40)

## 2024-10-16 PROCEDURE — 2500000001 HC RX 250 WO HCPCS SELF ADMINISTERED DRUGS (ALT 637 FOR MEDICARE OP): Performed by: PSYCHIATRY & NEUROLOGY

## 2024-10-16 PROCEDURE — 36415 COLL VENOUS BLD VENIPUNCTURE: CPT | Performed by: PSYCHIATRY & NEUROLOGY

## 2024-10-16 PROCEDURE — 2500000002 HC RX 250 W HCPCS SELF ADMINISTERED DRUGS (ALT 637 FOR MEDICARE OP, ALT 636 FOR OP/ED): Performed by: NURSE PRACTITIONER

## 2024-10-16 PROCEDURE — 82306 VITAMIN D 25 HYDROXY: CPT | Mod: GEALAB | Performed by: PSYCHIATRY & NEUROLOGY

## 2024-10-16 PROCEDURE — 97165 OT EVAL LOW COMPLEX 30 MIN: CPT | Mod: GO

## 2024-10-16 PROCEDURE — 99222 1ST HOSP IP/OBS MODERATE 55: CPT | Performed by: PSYCHIATRY & NEUROLOGY

## 2024-10-16 PROCEDURE — 80061 LIPID PANEL: CPT | Performed by: PSYCHIATRY & NEUROLOGY

## 2024-10-16 PROCEDURE — S4991 NICOTINE PATCH NONLEGEND: HCPCS | Performed by: NURSE PRACTITIONER

## 2024-10-16 PROCEDURE — 2500000002 HC RX 250 W HCPCS SELF ADMINISTERED DRUGS (ALT 637 FOR MEDICARE OP, ALT 636 FOR OP/ED): Performed by: PSYCHIATRY & NEUROLOGY

## 2024-10-16 PROCEDURE — 82947 ASSAY GLUCOSE BLOOD QUANT: CPT | Performed by: PSYCHIATRY & NEUROLOGY

## 2024-10-16 PROCEDURE — 1240000001 HC SEMI-PRIVATE BH ROOM DAILY

## 2024-10-16 PROCEDURE — 99222 1ST HOSP IP/OBS MODERATE 55: CPT | Performed by: NURSE PRACTITIONER

## 2024-10-16 RX ORDER — BACLOFEN 10 MG/1
10 TABLET ORAL 3 TIMES DAILY PRN
Status: DISCONTINUED | OUTPATIENT
Start: 2024-10-16 | End: 2024-10-31 | Stop reason: HOSPADM

## 2024-10-16 RX ORDER — LISINOPRIL 20 MG/1
20 TABLET ORAL DAILY
Status: DISCONTINUED | OUTPATIENT
Start: 2024-10-16 | End: 2024-10-31 | Stop reason: HOSPADM

## 2024-10-16 RX ORDER — IBUPROFEN 200 MG
1 TABLET ORAL DAILY
Status: DISCONTINUED | OUTPATIENT
Start: 2024-10-16 | End: 2024-10-31 | Stop reason: HOSPADM

## 2024-10-16 RX ORDER — FOLIC ACID 1 MG/1
1 TABLET ORAL DAILY
Status: DISCONTINUED | OUTPATIENT
Start: 2024-10-16 | End: 2024-10-31 | Stop reason: HOSPADM

## 2024-10-16 RX ORDER — SERTRALINE HYDROCHLORIDE 100 MG/1
100 TABLET, FILM COATED ORAL DAILY
Status: DISCONTINUED | OUTPATIENT
Start: 2024-10-16 | End: 2024-10-17

## 2024-10-16 ASSESSMENT — ENCOUNTER SYMPTOMS
CONFUSION: 0
NERVOUS/ANXIOUS: 1
DECREASED CONCENTRATION: 1
DYSPHORIC MOOD: 1
DIAPHORESIS: 0
TREMORS: 1
CHILLS: 0
APPETITE CHANGE: 0
SLEEP DISTURBANCE: 1
HYPERACTIVE: 0
HALLUCINATIONS: 0
NAUSEA: 0
FEVER: 0
HEADACHES: 1

## 2024-10-16 ASSESSMENT — PAIN SCALES - GENERAL
PAINLEVEL_OUTOF10: 0 - NO PAIN
PAINLEVEL_OUTOF10: 0 - NO PAIN

## 2024-10-16 NOTE — CARE PLAN
Phoned [facility], p. [phone], spoke to Riaz, who stated he does have patient at the top of their waiting list, as the house is full now and he might not have a bed for a couple of months;  Riaz stressed that [facility] is a drug free, alcohol free facility, and they drug test regularly and will not allow any admittance of intoxicated residents onto their premises, that residents must make a commitment to stay alcohol and drug free for the duration of their stay (3 months is the maximum length of time allowed to stay);  Told Riaz will remind patient of these rules and possibly call him back with an update;  Riaz also recommended [facility], in [place], for residential alcohol and drug treatment.  Will inform the Treatment team and patient of this information asap.  Still stabilizing patient.  Sw to follow.

## 2024-10-16 NOTE — NURSING NOTE
Patient was quiet and withdrawn today. He was help seeking. He was started on Zoloft today and he took it without issue. This am his anxiety was 10 and depression 10. He received Vistaril, which he stated did help some. He denied all other symptoms. Patient spend a good portion of the day in bed. He said he was paranoid that people were watching him and talking about him behind his back. For this reason he did not attend any groups. He was minimally conversationalble. No other complaints today. Will continue to monitor.

## 2024-10-16 NOTE — GROUP NOTE
Group Topic: Leisure Skills   Group Date: 10/16/2024  Start Time: 1600  End Time: 1650  Facilitators: SINDHU BloomS   Department: [facility] REHAB THERAPY VIRTUAL    Number of Participants: 9   Group Focus: Physical/movement, concentration and leisure skills  Treatment Modality: Recreation Therapy  Interventions utilized were: Pling Pong (with trivia), Music, leisure development and mental fitness  Purpose: Leisure Awareness/Education, movement, pleasurable activity, team work, social/cognitive stimulation, coping skills    Name: Jonny Valenzuela YOB: 1970   MR: [id number]      Facilitator: Recreational Therapist  Level of Participation: did not attend  Progress: None  Comments: This session involved a leisure activity called Pling Pong.  The activity involved cognitive tasks, social interactions, healthy competition, leisure awareness, and a pleasurable experience. All participants were encouraged to listen to the rules, ask questions as needed, and participate in the activity to the best of their abilities.    Patient remained in their room throughout the session for unknown reasons.     Plan: continue with services
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
## 2024-10-16 NOTE — CARE PLAN
Problem: Sensory Perceptual Alteration as Evidenced by  Goal: Cooperates with admission process  Outcome: Progressing  Goal: Patient/Family participate in treatment and discharge plans  Outcome: Progressing  Goal: Patient/Family verbalizes awareness of resources  Outcome: Progressing  Goal: Participates in unit activities  Outcome: Progressing  Goal: Discusses signs/symptoms of illness/treatment options  Outcome: Progressing  Goal: Initiates reality-based interactions  Outcome: Progressing  Goal: Able to discuss content of hallucinations/delusions  Outcome: Progressing  Goal: Notifies staff when experiencing hallucinations/delusions  Outcome: Progressing  Goal: Verbalizes reduction in hallucinations/delusions  Outcome: Progressing  Goal: Will not act on psychotic perception  Outcome: Progressing  Goal: Understands least restrictive measures  Outcome: Progressing  Goal: Free from restraint events  Outcome: Progressing     Problem: Potential for Harm to Self or Others  Goal: Cooperates with admission process  Outcome: Progressing  Goal: Participates in unit activities  Outcome: Progressing  Goal: Patient/Family participate in treatment and discharge plans  Outcome: Progressing  Goal: Identifies deescalation techniques  Outcome: Progressing  Goal: Understands least restrictive measures  Outcome: Progressing  Goal: Identifies stressors that lead to harmful behaviors  Outcome: Progressing  Goal: Notifies staff when experiencing harmful thoughts toward self/others  Outcome: Progressing  Goal: Denies harm toward self or others  Outcome: Progressing  Goal: Free from restraint events  Outcome: Progressing     Problem: Ineffective Coping  Goal: Cooperates with admission process  Outcome: Progressing  Goal: Identifies ineffective coping skills  Outcome: Progressing  Goal: Identifies healthy coping skills  Outcome: Progressing  Goal: Demonstrates healthy coping skills  Outcome: Progressing  Goal: Participates in unit  "activities  Outcome: Progressing  Goal: Patient/Family participate in treatment and discharge plans  Outcome: Progressing  Goal: Patient/Family verbalizes awareness of resources  Outcome: Progressing  Goal: Understands least restrictive measures  Outcome: Progressing  Goal: Free from restraint events  Outcome: Progressing   The patient's goals for the shift include \"get some sleep\"    The clinical goals for the shift include Help patient reach his goals  "

## 2024-10-16 NOTE — PROGRESS NOTES
Occupational Therapy     REHAB Therapy Assessment & Treatment    Patient Name: Jonny Valenzuela  MRN: [id number]  Today's Date: 10/16/2024      Activity Assessment:     [place] Setting Group: [id number]  Letter to my Future Self Group: [id number]  Problem Solving and Leisure Interaction Group: [phone]    0/3 Groups attended     Following OT eval and in collaboration with the OTR/L, pt declines to attend any above groups despite encouragement. Pt remains in his room/bed for unknown reasons. No [facility] groups this date. Pt would benefit from continued OT services in order to improve overall self-esteem, personal confidence and positive supports for safe transition at discharge.      Encounter Problems       Encounter Problems (Active)       OT Goals       Pt will demo increased activity level by attending 8-10 groups per week.  (Progressing)       Start:  10/16/24    Expected End:  11/06/24            Pt will explore and ID 1-2 strategies to manage stressors/symptoms of illness/ grief more effectively prior to discharge.  (Progressing)       Start:  10/16/24    Expected End:  11/06/24            Pt will ID/ utilize 1-2 ways to increase balance of activity/ re-involve self in functional daily routines/roles prior to discharge.  (Progressing)       Start:  10/16/24    Expected End:  11/06/24            Pt will explore/ ID 1-2 meaningful leisure activities to improve QOL for post discharge use.  (Progressing)       Start:  10/16/24    Expected End:  11/06/24            Pt will ID 2 community resources/programs to join/attend after D/C to improve their support system (Progressing)       Start:  10/16/24    Expected End:  11/06/24                         Additional Comments:  P[facility] collaborated with patients nurse and charge nurse throughout the day to provide appropriate support and encouragement to attend groups. Pt up on unit when PACHECO left last group of the day. All needs met.

## 2024-10-16 NOTE — PROGRESS NOTES
REHAB Therapy Assessment & Treatment    Patient Name: Jonny Valenzuela  MRN: [id number]  Today's Date: 10/16/2024    Activity Assessment:  Initial Assessment  Attention Span: 5-15 Minutes, 15-30 Miutes  Cognitive Behavior Status/Orientation: Person, Place, Capable, [place], Forgetful (patient reporting he is unable to read/write)  Crisis Triggers: Emotions, Family/friends, Finances, Illness, Groups, Living situation, Mood (homelessness, kicked out of brother's home, groups/crowds of people, wife  x9 years ago)  Emotional Concerns/Mood/Affect: Alert, Anxious, Cooperative, Depressed, Pessimistic (patient reported racing thoughts, anxiety, and depression)  Hearing: Adequate  Memory: Short term (concentration and recall issues per patient)  Motivation Level: Moderate encouragement needed  Negative Coping Skills: Substance use (ETOH)  Speech/Communication/Socialization: Verbal, Responds when approached  Vision: Poor (patient reported R eye blindness and cataracts)  Additional Comments:  (refer to end of assessment additional comments)    Leisure Survey:  [facility]  Activity Preference: 1:1, Independent  Activity Tolerance: Poor 0-15 minutes, Fair 15-30 minutes  At Home ADL Deficits: Grocery Shopping, Managing Money (homelessness)  Barriers to Leisure Participation: Behaviors, Education, Emotions, Mood/affect, Living situation, Substance use, Thought process, No one to participate with, Low self-esteem (illiterate, depressed mood, anxiety, social fears)  Community Resources: Aware of resources however does not utilize resources, Disinterested in becoming aware of commuity resource (Patient identified that he worked with Couple in the past)  Creative Activities:  (denied any at this time)  Education/School:  (11th grade, patient reporting he is illiterate)  Following Directions: Able to follow simple commands  Leisure Interests: Unable to identify interests, Not active with identified  "interests  Living Arrangement: Alone (homelessness, outdoor/tent living)  Motivators for Recreation/Leisure Involvement: Sense of well being/contentment  Outdoor Activities: Fishing, Camping  Passive Games:  (denied any interest at this time)  Patient/Family Education Needs:  (Coping Strategies, Leisure Awareness/Education, Community Safety)  Patient Strengths:  (none reported)  Patient Weakness:  (none identified)  Physial Activity:  (denied enjoying anything at this time)  Social/Group Activities:  (denied enjoying any at this time)  Solitary Activities: Watch/listen television, Music  Special Hobbies:  (Coping Skills - \"I say F/expletive it\", \"it is what it is\", or \"I let it go\")  Spectator Events:  (none reported)  Transportation: Public transportation, Family/friend (non )  Work/Volunteer:  (unemployed, SSI, [employer] in the past)  Additional Comments: Mr. Valenzuela was met with 1:1 in his room while he rested in bed. Patient has a history of depression, anxiety, schizophrenia, and ETOH use. Patient was admitted for suicidal ideations. He describing having anxiety and depression for most of his life. He currently is reporting increased racing thoughts, depressive symptoms, and insomnia. Patient had difficulties identifying areas of interest and lacks coping skill development. Mr. Valenzuela is homeless and doesn’t want to go to a shelter or drug treatment facility. He identified he is talking with “[facility]” and is “waiting for a bed to open up”. Patient identified he has never staid at a shelter or rehabilitation facility in the past. When asked why he didn’t want to give those facilities a chance he stated, “never been to and never want to”. Patient reported goals including “taking pills, feeling better, and getting some sleep”. He was informed of group programming and available independent leisure activities.    Therapeutic Recreation:     Encounter Problems       Encounter Problems (Active)       Emotional "  RT       Mood       Start:  10/16/24    Expected End:  10/30/24               Physical  RT       Participation       Start:  10/16/24    Expected End:  10/30/24               Recreation  RT       Leisure education       Start:  10/16/24    Expected End:  10/30/24

## 2024-10-16 NOTE — CONSULTS
"Inpatient consult to Medicine  Consult performed by: Torri Barbour, APRN-CNP  Consult ordered by: Laci Alvarado MD  Reason for consult: Medical Management          Reason For Consult  Medical Management    History Of Present Illness  Jonny Valenzuela is a 54 y.o. male with a history of anxiety and depression who presented to [facility] ED while intoxicated due to SI. He is currently homeless, living in the woods, and had a plan to hang himself from a tree, in effort to end his life. In the ED, alcohol level was 199. He was admitted to medical floor for management of acute encephalopathy and alcohol intoxication. Evaluated by psychiatry who admitted inpatient psychiatric admission. Stabilized hemodynamically. Transferred to [facility] on 10/15/24. Consulted for medical management. Seen and examined in his room this afternoon. Awake and alert. Doing fine. Asking to have a cyst/lump on the right side of his face removed. No pain or tenderness, or drainage and reports has been there for a \"long time\". He denies chest pain, breathing difficulties, abdominal pain, N/V/D/C, fever, or chills.       Past Medical History  Nicotine Use Disorder  Alcohol Use Disorder  Anxiety/Depression  Right Eye Injury  Cataracts    Surgical History  Titanium Keaton in Lower Extremity     Social History  He reports that he has been smoking cigarettes. He has never used smokeless tobacco. He reports current alcohol use. He reports that he does not currently use drugs.    Family History  Positive for HTN, Hyperlipidemia, MI, and seizures.  Denies cancer, diabetes, or stroke.      Allergies  Patient has no known allergies.    Review of Systems  Constitutional: Denies fever, fatigue, weight loss/gain; + chills  HEENT: Denies ear ache, sore throat, nasal drainage  Eyes: Right eye blurred - baseline  Respiratory: Denies cough, shortness or breath, wheezing  Cardiovascular: Denies chest pain, palpitations, shortness of breath with exertion, edema  GI: Denies " abdominal pain, nausea, vomiting, diarrhea, constipation, bloody stools  : Denies urinary burning, urgency, frequency, hematuria  Musculoskeletal: Denies back, neck, or joint pain; joint redness, swelling, or tenderness  Endocrine: Denies cold/heat intolerance, excessive thirst, excessive hunger  Neuro: Denies dizziness, lightheadedness, seizures, headaches  Psychiatric: Denies anxiety, depression, self-harm  Skin: Denies wounds, rashes. + cyst on right cheek bone  Hematologic: Denies easy bruising, easy bleeding, clotting disorder      Physical Exam  Constitutional: A&O x 3; NAD; calm and cooperative  Eyes: Right eye with enlarged pupil - fixed; disconjugated gaze.   HEENT: Normocephalic, Atraumatic. Oral mucosa moist.   Neck: Supple. No JVD, lymphadenopathy.   Lungs: CTAB with fair air movement. Respirations even and unlabored on room air.   Heart: RRR  Abdomen: Soft, non-tender, non-distended, +BS  MS/Extremities: REYES equally x 4. No edema. Peripheral pulses intact bilaterally.   Neuro: A&O x3; no focal deficits; gross motor and sensation intact.   Skin: Warm and dry. No rashes. Soft, mobile cyst on right facial bone.   Psych: Normal affect.       Last Recorded Vitals  /86 (BP Location: Right arm, Patient Position: Sitting)   Pulse 72   Temp 36.1 °C (97 °F)   Resp 16   Wt 79.4 kg (175 lb)   SpO2 97%     Relevant Results  Results for orders placed or performed during the hospital encounter of 10/15/24 (from the past 96 hours)   Glucose, Fasting   Result Value Ref Range    Glucose, Fasting 100 (H) 74 - 99 mg/dL   Lipid Panel   Result Value Ref Range    Cholesterol 203 (H) 0 - 199 mg/dL    HDL-Cholesterol 72.1 mg/dL    Cholesterol/HDL Ratio 2.8     LDL Calculated 112 (H) <=99 mg/dL    VLDL 19 0 - 40 mg/dL    Triglycerides 96 0 - 149 mg/dL    Non HDL Cholesterol 131 0 - 149 mg/dL   Vitamin D 25-Hydroxy,Total (for eval of Vitamin D levels)   Result Value Ref Range    Vitamin D, 25-Hydroxy, Total 31 30 -  100 ng/mL      Scheduled medications  folic acid, 1 mg, oral, Daily  lisinopril, 20 mg, oral, Daily  QUEtiapine, 150 mg, oral, Nightly  sertraline, 100 mg, oral, Daily      Continuous medications     PRN medications  PRN medications: alum-mag hydroxide-simeth, baclofen, diphenhydrAMINE **OR** diphenhydrAMINE, haloperidol **OR** haloperidol lactate, hydrOXYzine pamoate, ibuprofen, LORazepam **OR** LORazepam, melatonin, nicotine polacrilex, psyllium      Assessment/Plan   54 y.o. male with a history of anxiety and depression who presented to [facility] ED while intoxicated due to SI. In the ED, alcohol level was 199. He was admitted to medical floor for management of acute encephalopathy and alcohol intoxication. Transferred to [facility] on 10/15/24. Consulted for medical management.     Alcohol Use Disorder  -Will continue with thiamine, folate, and MVI  -Cessation advised  -LSW following and will offer outpatient support services    Depression/Anxiety with SI  -Under the care of Dr. Vela and psychiatric team for management  -Calm and cooperative at time of exam.    Tobacco Use Disorder  -Will add nicotine patch as does report smoking 2 PPD  -Cessation advised    Facial Cyst  -No acute concerns of infection, pain, or growth  -Recommend to F/U with dermatology as outpatient for remova.     Disposition  -Plan of care discussed with nursing staff.  -Discharge per attending.   -> 60 minutes spent in coordination of care, including physical examination, review of chart, and discussion with pertinent staff.      Thank you for the consult. Please call/message via secure chat with medical questions.     NAOMIE Torres-CNP

## 2024-10-16 NOTE — NURSING NOTE
Pt took his sleeping medications  Pt then went to bed and slept all night long  Pt had an uneventful night

## 2024-10-16 NOTE — NURSING NOTE
Pt took his night time medications  Pt walked back to his room and laid down  Pt was talking with another patient on the couch  Pt ate his snack

## 2024-10-16 NOTE — NURSING NOTE
"Pt interview in the front lounge  Pt is calm and cooperative  Pt is eating his snack  Pt does seem to have shaky hands from being nervous in the unit  Pt stated his day was \"OK\"   Pt rated his anxiety 10/10  depression 10/10  and 1/10 pain in his lower back  Pt stated his goal \"get sleep\"  his strength \" I fish\"  and his coping skill \" I sleep\"  Pt is appropriate with his answers to the questions at this time   "

## 2024-10-16 NOTE — PROGRESS NOTES
"Occupational Therapy     REHAB Therapy Assessment & Treatment    Patient Name: Jonny Valenzuela  MRN: [id number]  Today's Date: 10/16/2024      Time In: 0909 Time Out: 0919  Date of OT Referral:  10/16/24   Admission Diagnosis: depression with SI   Reason for Referral: psych  General Information   Past Medical History/History of Present Illness: nicotine use disorder, alcohol abuse and anxiety and depression    Pain: 10/10   Precautions: none   Appearance: pt resting comfortably in bed, appears poorly groomed but approachable   Initial Response to Eval: Pt receptive and grateful for information, may require additional encouragement to participate in OT interventions and group sessions   Current Stressors: finances and living situation, medications  Personal History   Marital Status:     Community Support: none identified   Support System: none. Pt with traumatic family history, experienced death of a lot of family members including parents and wife.   Living Situation: currently homeless, living in a tent in the woods   Home Set-Up: N/A   DME Used and/or Owned: none   Prior Level of Function: independent   Level of Education: up to graded 11, pt reports he is illiterate   Employment Status: on disability   Leisure Interests: fishing, outdoor activities  Psychosocial/Cognition Assessment   Orientation: AOx3   Attention:  WFL   Follows Commands: WFL   Mood: Pt appears depressed, states he usually feels this way   Safety Awareness: impaired as evidenced by living situation, SI   Patient Identified Life Roles: none   Patient Identified Goals-Short Term: \"get some sleep\"     Long Term: get an apartment where he can live alone  Perceptual/Communication Skills   Speech (dysarthric, exp/rec aphasia, pressured, etc.): WFL   Vision: WFL   Perception (inattention, delusions, hallucinations, suicidal, etc): pt reports he is fearful of what others think and feel  about him, endorses depression   Reality Based Behavior: " "WFL   Perseverations: none   Social Skills/Behavior: overall pt friendly, cooperative, conversational, asks and answers questions appropriately. Hesitant to attend group settings d/t social anxiety   Basic Functional Mobility   Device Used: none   Bed Mobility: independent   Transfer Status: independent   Ambulatory Status: independent   Balance: WFL   Endurance: WFL  Activities of Daily Living   Grooming/Hygiene: independent (but neglectful d/t homelessness)   Dressing: independent   Bathing: independent (uses facilities at public park)   Toileting: independent   Sleep: impaired, has restlessness, racing thoughts  Instrumental Activities of Daily Living   Driving/ transportation: not driving   Medication Management/Compliance: states he was on mental health medications which initially worked well, but eventually they stopped having an effect and he took himself off them.   Managing Finances: impaired, homeless, on SSI   Patient Reported Daily Routine/Responsibility: Pt unable to report consistent daily components of routine   Emotional/Mental Health Management   Patient Identified Coping Skills- Positive: none      Negative: \"I keep it all inside me\"   Knowledge of Illness/Emotions: pt help seeking, aware of presence and need of help for depression and anxiety, lacks insight into how to manage   Stress Management: Maximally impaired, would benefit from exploration of stress management techniques and coping skills   Anger/Frustration Management: WFL   Depression/Anxiety Management: Maximally impaired, would benefit from exploration of stress management techniques and coping skills   Patient Identified Strengths: none   Patient Identified Weaknesses: none   Relapse Prevention Skills/Supports: Pt would benefit from exploration of community resources to use for post-discharge carryover of learned skills.       Encounter Problems       Encounter Problems (Active)       OT Goals       Pt will demo increased activity " level by attending 8-10 groups per week.        Start:  10/16/24    Expected End:  11/06/24            Pt will explore and ID 1-2 strategies to manage stressors/symptoms of illness/ grief more effectively prior to discharge.        Start:  10/16/24    Expected End:  11/06/24            Pt will ID/ utilize 1-2 ways to increase balance of activity/ re-involve self in functional daily routines/roles prior to discharge.        Start:  10/16/24    Expected End:  11/06/24            Pt will explore/ ID 1-2 meaningful leisure activities to improve QOL for post discharge use.        Start:  10/16/24    Expected End:  11/06/24            Pt will ID 2 community resources/programs to join/attend after D/C to improve their support system       Start:  10/16/24    Expected End:  11/06/24                     Education Documentation  No documentation found.  Education Comments  No comments found.          Additional Comments:

## 2024-10-16 NOTE — CARE PLAN
"The patient's goals for the shift include \"get some sleep\"    The clinical goals for the shift include Help patient reach his goals    Over the shift, the patient did not make progress toward the following goals. Barriers to progression include lack of medication knowledge. Recommendations to address these barriers include more education on medications.    "

## 2024-10-16 NOTE — NURSING NOTE
Pt stated his night time medications made his feel hot and shaky  Pt requests something else  Pt got 5 melatonin and 50 vistaril  Pt walked back to his room

## 2024-10-16 NOTE — GROUP NOTE
Group Topic: Goals   Group Date: 10/16/2024  Start Time: 0730  End Time: 0810  Facilitators: LOPEZ Bloom   Department: [facility] REHAB THERAPY VIRTUAL    Number of Participants: 9   Group Focus: check in, daily focus, and goals  Treatment Modality: Recreation Therapy  Interventions utilized were: Goal Setting Worksheet, exploration, orientation, problem solving, and support  Purpose: Goal Identification, self-care    Name: Jonny Valenzuela YOB: 1970   MR: [id number]      Facilitator: Recreational Therapist  Level of Participation: did not attend  Progress: None  Comments: The session focused on filling out and discussing a goal worksheet. The handout included a personal goal, target date to achieve the goal, action steps to take, identifying an outcome to the goal, and ways to stay motivated on accomplishing the personal goal.     Patient remained in their room throughout the session resting/sleeping.     Plan: continue with services

## 2024-10-16 NOTE — GROUP NOTE
"Group Topic: Dialectical Behavioral Therapy - Distress Tolerance   Group Date: 10/16/2024  Start Time: 1400  End Time: 1455  Facilitators: LOPEZ Bloom   Department: [facility] REHAB THERAPY VIRTUAL    Number of Participants: 8   Group Focus: acceptance, coping skills, and self-awareness  Treatment Modality: Recreation Therapy  Interventions utilized were: DBT-DT-STOP and Turning the Mind Skills,exploration, mental fitness, patient education, and support  Purpose: coping skills, maladaptive thinking, feelings, and insight or knowledge    Name: Jonny Valenzuela YOB: 1970   MR: [id number]      Facilitator: Recreational Therapist  Level of Participation: did not attend  Progress: None  Comments: This group discussed the S.T.O.P. acronym which is stop, take a step back, observe, and proceed mindfully. Participants were encouraged to share how they may use this skill and when. Group also involved distress tolerance skills \"turning the mind\" and understanding the step by step process to work at acceptance verse rejection. Steps include: observation, making an inner commitment, practice/doing it again, and developing a plan.      Patient was encouraged to attend and refused. He remained in his room throughout the session.     Plan: continue with services      "

## 2024-10-17 PROCEDURE — S4991 NICOTINE PATCH NONLEGEND: HCPCS | Performed by: NURSE PRACTITIONER

## 2024-10-17 PROCEDURE — 2500000002 HC RX 250 W HCPCS SELF ADMINISTERED DRUGS (ALT 637 FOR MEDICARE OP, ALT 636 FOR OP/ED): Performed by: NURSE PRACTITIONER

## 2024-10-17 PROCEDURE — 99233 SBSQ HOSP IP/OBS HIGH 50: CPT | Performed by: PSYCHIATRY & NEUROLOGY

## 2024-10-17 PROCEDURE — 2500000002 HC RX 250 W HCPCS SELF ADMINISTERED DRUGS (ALT 637 FOR MEDICARE OP, ALT 636 FOR OP/ED): Performed by: PSYCHIATRY & NEUROLOGY

## 2024-10-17 PROCEDURE — 1240000001 HC SEMI-PRIVATE BH ROOM DAILY

## 2024-10-17 PROCEDURE — 97150 GROUP THERAPEUTIC PROCEDURES: CPT | Mod: GO,CO

## 2024-10-17 PROCEDURE — 2500000001 HC RX 250 WO HCPCS SELF ADMINISTERED DRUGS (ALT 637 FOR MEDICARE OP): Performed by: PSYCHIATRY & NEUROLOGY

## 2024-10-17 RX ORDER — SERTRALINE HYDROCHLORIDE 50 MG/1
50 TABLET, FILM COATED ORAL DAILY
Status: COMPLETED | OUTPATIENT
Start: 2024-10-17 | End: 2024-10-17

## 2024-10-17 ASSESSMENT — PAIN - FUNCTIONAL ASSESSMENT: PAIN_FUNCTIONAL_ASSESSMENT: 0-10

## 2024-10-17 ASSESSMENT — PAIN SCALES - GENERAL
PAINLEVEL_OUTOF10: 0 - NO PAIN
PAINLEVEL_OUTOF10: 0 - NO PAIN

## 2024-10-17 NOTE — CARE PLAN
"Met with patient today;  he said he knows he cannot drink at [facility] [place] and will not drink: \"I am fine, I am on my medication, I was just really upset and drinking, but I am fine now, I don't need it and won't drink\".  Reminded him that [facility] will random drug/alcohol test and will kick him out if he uses there;  He said he knows that;  Told him that, [facility] but that he is at the top of their waiting list;  Talked to him about a Plan B: going to [facility] temporarily and he agreed; Called [facility] and there is a bed; faxed over patient's clinicals with request for them to review to consider accepting him for admission to the [facility].  Will call back to follow up asap to see if they will accept.  Patient is voicing high depression and anxiety (10/10 for both), but was cooperative with this sw.  Still stabilizing; Sw to follow.   "

## 2024-10-17 NOTE — NURSING NOTE
"Patient sitting on couch watching TV. A&Ox3 with VSS. Appears guarded and fidgety during nursing assessment. Reported anxiety and depression 10/10. Denies SI/HI, VAT hallucinations and physical pain. Last BM 10/16. Medication complaint. Seroquel increased to 150 mg at HS. Patient verbalized understanding. Denied having attended any groups today. Coping skills used were \" I don't have any coping skills\". Shift goal \" sleep through the night\". Patient stated strengths were \" fishing and camping\". PRN vistaril and melatonin given per patient request for anxiety and insomnia\". Patient in bed at 2100.   "

## 2024-10-17 NOTE — GROUP NOTE
Group Topic: Self-Care/Wellness   Group Date: 10/17/2024  Start Time: 0730  End Time: 0820  Facilitators: LOPEZ Bloom   Department: [facility] REHAB THERAPY VIRTUAL    Number of Participants: 5   Group Focus: check in, daily focus, and goals  Treatment Modality: Recreation Therapy  Interventions utilized were: Recovery Concept Cards, Goals, clarification, exploration, orientation, story telling, and support  Purpose: Illness Management, coping skills, insight or knowledge, and self-care    Name: Jonny Valenzuela YOB: 1970   MR: [id number]      Facilitator: Recreational Therapist  Level of Participation: did not attend  Progress: None  Comments: The group utilized cards which included concepts of recovery. They were individually handed out to each participant.  Patients were asked to discuss, reflect on, and create a goal related to the ideas (examples: attitude, mood, coping skills, physical/mental health, safety, honesty, communication, etc.) the group shared.    Patient remained in their room throughout the session resting/sleeping.     Plan: continue with services

## 2024-10-17 NOTE — PROGRESS NOTES
"Occupational Therapy     REHAB Therapy Assessment & Treatment    Patient Name: Jonny Valenzuela  MRN: [id number]  Today's Date: 10/17/2024      Activity Assessment:   Community Resources and Positive Supports Group: [id number]  Emotion Regulation and Crisis Intervention Group: [id number]  Pet Therapy as a Coping Tool Group: [id number]    3/3 Groups attended     Pt present in all groups presenting with improved interest in topics discussed, demos G initiation to share aloud, as well as improving help seeking behaviors. Pt receptive to all handouts and education provided re: community resources as well as able to ID X1 personal support for home carry over as \"my counselor\" During pet therapy group, pt eager to interact with the dog as well as able to share meaningful stories of past pets with G confidence. Pt making G progress this date toward OT Goals as evidenced by improved engagement in meaningful tasks, improved awareness and sharing of positive supports, and improved self motivation and help seeking behaviors. Pt would benefit from continued OT services in order to improve overall self-esteem, personal confidence and positive supports for safe transition at discharge.      Encounter Problems       Encounter Problems (Active)       OT Goals       Pt will demo increased activity level by attending 8-10 groups per week.  (Progressing)       Start:  10/16/24    Expected End:  11/06/24            Pt will explore and ID 1-2 strategies to manage stressors/symptoms of illness/ grief more effectively prior to discharge.  (Progressing)       Start:  10/16/24    Expected End:  11/06/24            Pt will ID/ utilize 1-2 ways to increase balance of activity/ re-involve self in functional daily routines/roles prior to discharge.  (Progressing)       Start:  10/16/24    Expected End:  11/06/24            Pt will explore/ ID 1-2 meaningful leisure activities to improve QOL for post discharge use.  (Progressing)       Start:  10/16/24    " Expected End:  11/06/24            Pt will ID 2 community resources/programs to join/attend after D/C to improve their support system (Progressing)       Start:  10/16/24    Expected End:  11/06/24                       Additional Comments:  PACHECO collaborated with patients nurse and charge nurse throughout the day to provide appropriate support and encouragement to attend groups. Pt up on unit when PACHECO left last group of the day. All needs met.

## 2024-10-17 NOTE — CARE PLAN
"The patient's goals for the shift include \" sleep through the night\"    The clinical goals for the shift include patient will remain safe throughout this shift    Over the shift, the patient did make progress toward the following goals.   Problem: Sensory Perceptual Alteration as Evidenced by  Goal: Cooperates with admission process  Outcome: Progressing  Goal: Patient/Family participate in treatment and discharge plans  Outcome: Progressing  Goal: Patient/Family verbalizes awareness of resources  Outcome: Progressing  Goal: Participates in unit activities  Outcome: Progressing  Goal: Discusses signs/symptoms of illness/treatment options  Outcome: Progressing  Goal: Initiates reality-based interactions  Outcome: Progressing  Goal: Able to discuss content of hallucinations/delusions  Outcome: Progressing  Goal: Notifies staff when experiencing hallucinations/delusions  Outcome: Progressing  Goal: Verbalizes reduction in hallucinations/delusions  Outcome: Progressing  Goal: Will not act on psychotic perception  Outcome: Progressing  Goal: Understands least restrictive measures  Outcome: Progressing  Goal: Free from restraint events  Outcome: Progressing     Problem: Altered Thought Processes as Evidenced by  Goal: STG - Desires improvement in ability to think and concentrate  Outcome: Progressing  Goal: STG - Participates in Occupational Therapy and other cognitive assessments  Outcome: Progressing     Problem: Potential for Harm to Self or Others  Goal: Cooperates with admission process  Outcome: Progressing  Goal: Participates in unit activities  Outcome: Progressing  Goal: Patient/Family participate in treatment and discharge plans  Outcome: Progressing  Goal: Identifies deescalation techniques  Outcome: Progressing  Goal: Understands least restrictive measures  Outcome: Progressing  Goal: Identifies stressors that lead to harmful behaviors  Outcome: Progressing  Goal: Notifies staff when experiencing harmful " thoughts toward self/others  Outcome: Progressing  Goal: Denies harm toward self or others  Outcome: Progressing  Goal: Free from restraint events  Outcome: Progressing     Problem: Educational/Scholastic Disruption  Goal: Meets educational requirements during hospitalization  Outcome: Progressing  Goal: Attends class without disruptive behavior  Outcome: Progressing  Goal: Completes daily assignments  Outcome: Progressing     Problem: Ineffective Coping  Goal: Cooperates with admission process  Outcome: Progressing  Goal: Identifies ineffective coping skills  Outcome: Progressing  Goal: Identifies healthy coping skills  Outcome: Progressing  Goal: Demonstrates healthy coping skills  Outcome: Progressing  Goal: Participates in unit activities  Outcome: Progressing  Goal: Patient/Family participate in treatment and discharge plans  Outcome: Progressing  Goal: Patient/Family verbalizes awareness of resources  Outcome: Progressing  Goal: Understands least restrictive measures  Outcome: Progressing  Goal: Free from restraint events  Outcome: Progressing     Problem: Alteration in Sleep  Goal: STG - Reports nightly sleep, duration, and quality  Outcome: Progressing  Goal: STG - Identifies sleep hygiene aids  Outcome: Progressing  Goal: STG - Informs staff if unable to sleep  Outcome: Progressing  Goal: STG - Attends breathing and relaxation group  Outcome: Progressing     Problem: Potential for Substance Withdrawal  Goal: Verbalizes signs/symptoms of withdrawal  Outcome: Progressing  Goal: Reports signs/symptoms of withdrawal  Outcome: Progressing  Goal: Free of withdrawal symptoms  Outcome: Progressing     Problem: Anxiety  Goal: Patient/family understands admission protocols  Outcome: Progressing  Goal: Attempts to manage anxiety with help  Outcome: Progressing  Goal: Verbalizes ways to manage anxiety  Outcome: Progressing  Goal: Implements measures to reduce anxiety  Outcome: Progressing  Goal: Free from restraint  events  Outcome: Progressing     Problem: Self Care Deficit  Goal: STG - Patient completes hygiene  Outcome: Progressing  Goal: Increase group attendance  Outcome: Progressing  Goal: Accepts need for medications  Outcome: Progressing  Goal: STG - Goes to and eats meals independently in dining room 100% of time  Outcome: Progressing     Problem: Defensive Coping  Goal: Cooperates with admission process  Outcome: Progressing  Goal: Identifies reckless/dangerous behavior  Outcome: Progressing  Goal: Identifies stressors that lead to reckless/dangerous behavior  Outcome: Progressing  Goal: Discusses and identifies healthy coping skills  Outcome: Progressing  Goal: Demonstrates healthy coping skills  Outcome: Progressing  Goal: Identifies appropriate social interaction  Outcome: Progressing  Goal: Demonstrates appropriate social interactions  Outcome: Progressing  Goal: Patient/Family verbalizes awareness of resources  Outcome: Progressing  Goal: Discusses signs/symptoms of illness/treatment options  Outcome: Progressing  Goal: Patient/Family participate in treatment and discharge plans  Outcome: Progressing  Goal: Understands least restrictive measures  Outcome: Progressing  Goal: Free from restraint events  Outcome: Progressing     Problem: Community resource needs  Goal: Patient is receiving increased resource support to enhance ability to remain at home  Outcome: Progressing

## 2024-10-17 NOTE — GROUP NOTE
Group Topic: Personal Responsibility   Group Date: 10/17/2024  Start Time: 1600  End Time: 1700  Facilitators: LOPEZ Bloom   Department: [facility] REHAB THERAPY VIRTUAL    Number of Participants: 11   Group Focus: clarity of thought, personal responsibility, and self-awareness  Treatment Modality: Recreation Therapy  Interventions utilized were: Living Skills - Values and Responsibilities (handout and video), exploration, mental fitness, patient education, problem solving, reminiscence, and support  Purpose: coping skills, insight or knowledge, self-worth, and self-care    Name: Jonny Valenzuela YOB: 1970   MR: [id number]      Facilitator: Recreational Therapist  Level of Participation: did not attend  Progress: None  Comments: Group focused on concepts related to values and responsibilities.  We discussed role models, a values list, characteristics of inspirational individuals, and how to identify and implement beliefs. Patients were asked to identify some personal responsibilities including self, family/friends/support and daily life. Participants were encouraged to share thoughts related to the topics and also develop some action steps to achieve them.    Patient remained in their room throughout the session for unknown reasons.     Plan: continue with services

## 2024-10-17 NOTE — PROGRESS NOTES
"Jonny Valenzuela is a 54 y.o. year old male patient who is on [facility] admission day 2.      Subjective   Jonny Valenzuela is a 54 y.o. year old male patient who was personally seen and interviewed, and discussed in morning team rounds. The patient was interviewed alone in his room (interviewed sitting on his bed), and was easily engaged and cooperative. This morning, Jonny reports feeling \"not good, depression and anxiety\" and currently rates his depression at a 10 out of 10. He further reports experiencing intermittent suicidal ideation, but no suicide plans. He also rates his anxiety at a 10 out of 10. No hallucinations or paranoia were endorsed or noted.   Jonny slept 5.5 hours last night (broken).           Objective   Mental Status Exam:   General: Appropriately groomed and dressed in casual/hospital attire.   Appearance: Appears stated age.   Attitude: Calm, cooperative.   Behavior: Appropriate eye contact.   Motor Activity: No agitation or retardation. No EPS/TD. Normal gait and station. Normal muscle tone and bulk.   Speech: Regular rate, rhythm, volume and tone, spontaneous, fluent. Non-pressured.   Mood: \"Not good, depression and anxiety\"   Affect: Slightly irritable to depressed.   Thought Process: Organized, and goal directed.   Thought Content: Does endorse intermittent suicidal ideation, but no suicide plans.   Does not endorse homicidal ideation.  No overt delusions or paranoia elicited.    Thought Perception: Does not endorse auditory or visual hallucinations, does not appear to be responding to hallucinatory stimuli.   Cognition: Alert, oriented x 3. No deficits noted. Adequate fund of knowledge. No deficit in recent and remote memory. No overt deficits in attention, concentration or language.   Insight: Poor-to-Fair, as patient recognizes symptoms of illness and need for recommended treatments.    Judgment: Impaired, as patient can not make reasonable decisions about ordinary activities of daily living and necessary " medical care recommendations.           LABS:  Results for orders placed or performed during the hospital encounter of 10/15/24 (from the past 96 hours)   Glucose, Fasting   Result Value Ref Range    Glucose, Fasting 100 (H) 74 - 99 mg/dL   Lipid Panel   Result Value Ref Range    Cholesterol 203 (H) 0 - 199 mg/dL    HDL-Cholesterol 72.1 mg/dL    Cholesterol/HDL Ratio 2.8     LDL Calculated 112 (H) <=99 mg/dL    VLDL 19 0 - 40 mg/dL    Triglycerides 96 0 - 149 mg/dL    Non HDL Cholesterol 131 0 - 149 mg/dL   Vitamin D 25-Hydroxy,Total (for eval of Vitamin D levels)   Result Value Ref Range    Vitamin D, 25-Hydroxy, Total 31 30 - 100 ng/mL        Last Recorded Vitals  Visit Vitals  /78 (BP Location: Right arm, Patient Position: Standing)   Pulse 77   Temp 36 °C (96.8 °F) (Temporal)   Resp 16        Intake/Output last 3 Shifts:  No intake/output data recorded.    Relevant Results  Scheduled medications  folic acid, 1 mg, oral, Daily  lisinopril, 20 mg, oral, Daily  nicotine, 1 patch, transdermal, Daily  QUEtiapine, 150 mg, oral, Nightly  [START ON 10/18/2024] sertraline, 150 mg, oral, Daily  sertraline, 50 mg, oral, Daily      Continuous medications     PRN medications  PRN medications: alum-mag hydroxide-simeth, baclofen, diphenhydrAMINE **OR** diphenhydrAMINE, haloperidol **OR** haloperidol lactate, hydrOXYzine pamoate, ibuprofen, LORazepam **OR** LORazepam, melatonin, nicotine polacrilex, psyllium               Assessment/Plan   1) Major Depressive Disorder (MDD), recurrent, severe, without psychosis        Plan: *1) Re-start Zoloft 100 -> 150 mg Qdaily                2) Trial Seroquel 75 mg -> 150 mg at bedtime for sleep                3) Melatonin 5 mg prn at bedtime                 4) Vistaril 50 mg q4h prn anxiety                5) Group and milieu therapy    Discussed potential risks, benefits, and alternatives to medications with patient, who consented to the above medications.     2) Generalized Anxiety  Disorder (SHOSHANA)       Plan: 1) See above.      3) Alcohol Use Disorder, severe, dependence        Plan: 1) Refer to outpatient treatment program.      4) Tobacco Dependence        Plan: 1) Continue nicotine patches.      5) Hypertension        Plan: 1) Continue Lisinopril 20 mg qdaily                2)  service to follow and manage.          Laci Avlarado MD

## 2024-10-17 NOTE — NURSING NOTE
"The pt was cooperative but appeared anxious during the interview that took place in the hallway per the pt's request away from his peers for privacy. The pt rated his anxiety a 8/10, depression 10/10. When this nurse asked if there was a specific reason for the high ratings he stated, \"I don't think I'm getting anywhere here. When can I talk to the lady who will help me find a house?\" This nurse explained that's the  and she will be around to see him. The pt denies SI, HI and AVH at this time as well as pain rating it a 0/10. Last bowel movement was, \"yesterday\" 10/16/24. Coping skills, \"I don't have any.\" Goal for the day, \"Attend groups.\" Pt strengths, \"Not really anything.\" The pt was medication compliant with his morning medications. The pt had poor hygiene so this nurse offered to place the pt on the shower list and he was in agreement. Q 15 minute monitoring continued.   "

## 2024-10-17 NOTE — GROUP NOTE
"Group Topic: Excercise/Physical    Group Date: 10/17/2024  Start Time: 1400  End Time: 1450  Facilitators: LOPEZ Bloom   Department: [facility] REHAB THERAPY VIRTUAL    Number of Participants: 9   Group Focus: Physical Activity/Movement  Treatment Modality: Recreation Therapy  Interventions utilized were: Stretching and Thera-bands, group exercise  Purpose: coping skills and self-care    Name: Jonny Valenzuela YOB: 1970   MR: [id number]      Facilitator: Recreational Therapist  Level of Participation: active  Quality of Participation: cooperative and engaged  Interactions with others:  minimal and appropriate  Mood/Affect: appropriate  Triggers (if applicable): N/A  Cognition:  capable  Progress: Moderate  Comments: Group involved upper/lower extremity stretching, shifting of weight/coordinating movements, and UE thera-band exercises.      Patient attended the entire session. He completed all stretches and thera-band exercises. He shared that he hasn't done much exercise recently, and that the movements felt \"good\".     Plan: continue with services      "

## 2024-10-18 PROCEDURE — 2500000002 HC RX 250 W HCPCS SELF ADMINISTERED DRUGS (ALT 637 FOR MEDICARE OP, ALT 636 FOR OP/ED): Performed by: PSYCHIATRY & NEUROLOGY

## 2024-10-18 PROCEDURE — 1240000001 HC SEMI-PRIVATE BH ROOM DAILY

## 2024-10-18 PROCEDURE — 2500000001 HC RX 250 WO HCPCS SELF ADMINISTERED DRUGS (ALT 637 FOR MEDICARE OP): Performed by: PSYCHIATRY & NEUROLOGY

## 2024-10-18 PROCEDURE — 2500000002 HC RX 250 W HCPCS SELF ADMINISTERED DRUGS (ALT 637 FOR MEDICARE OP, ALT 636 FOR OP/ED): Performed by: NURSE PRACTITIONER

## 2024-10-18 PROCEDURE — 99233 SBSQ HOSP IP/OBS HIGH 50: CPT | Performed by: PSYCHIATRY & NEUROLOGY

## 2024-10-18 PROCEDURE — S4991 NICOTINE PATCH NONLEGEND: HCPCS | Performed by: NURSE PRACTITIONER

## 2024-10-18 ASSESSMENT — COLUMBIA-SUICIDE SEVERITY RATING SCALE - C-SSRS
1. SINCE LAST CONTACT, HAVE YOU WISHED YOU WERE DEAD OR WISHED YOU COULD GO TO SLEEP AND NOT WAKE UP?: NO
6. HAVE YOU EVER DONE ANYTHING, STARTED TO DO ANYTHING, OR PREPARED TO DO ANYTHING TO END YOUR LIFE?: NO
2. HAVE YOU ACTUALLY HAD ANY THOUGHTS OF KILLING YOURSELF?: NO

## 2024-10-18 ASSESSMENT — PAIN SCALES - GENERAL
PAINLEVEL_OUTOF10: 0 - NO PAIN
PAINLEVEL_OUTOF10: 0 - NO PAIN

## 2024-10-18 ASSESSMENT — PAIN - FUNCTIONAL ASSESSMENT
PAIN_FUNCTIONAL_ASSESSMENT: 0-10
PAIN_FUNCTIONAL_ASSESSMENT: 0-10

## 2024-10-18 NOTE — NURSING NOTE
"The pt was anxious but cooperative during the interview that took place in the pt's room away from his peers for privacy. There pt rated his anxiety and depression 10/10 stating, \"I'm just anxious and worried about everything.\" The pt denies SI, HI and AVH at this time as well as pain rating it a 0/10. Last bowel movement was, \"yesterday\" 10/17/24. Coping skills, \"Groups.\" Goal for the day, \"go to groups.\" Pt strength, \"Not really.\" The pt was medication compliant with his morning medications. Q 15 minute monitoring continued.   "

## 2024-10-18 NOTE — GROUP NOTE
"Group Topic: Leisure Skills   Group Date: 10/18/2024  Start Time: 1400  End Time: 1500  Facilitators: SINDHU BloomS   Department: [facility] REHAB THERAPY VIRTUAL    Number of Participants: 10   Group Focus: Leisure Activities, Creative, Cognitive, Social   Treatment Modality: Recreation Therapy  Interventions utilized were: That's It! (game), Sun Catchers, Bracelets, leisure development  Purpose: Leisure Awareness/Education, Pleasurable Activity, Social Stimulation, Cognitive Stimulation, coping skills    Name: Jonny Valenzuela YOB: 1970   MR: [id number]      Facilitator: Recreational Therapist  Level of Participation: moderate  Quality of Participation: cooperative and superficial  Interactions with others: appropriate  Mood/Affect: appropriate  Triggers (if applicable): N/A  Cognition: concrete and capable  Progress: Minimal  Comments: Session included a cognitive leisure activity and continued creative projects from the 11:00 RT session. The leisure activity is called \"That's It\".  The activity involved cognitive tasks, social interactions, healthy competition, leisure awareness, and a pleasurable experience. All participants were encouraged to listen to the rules, ask questions as needed, and participate in the activity to the best of their abilities.      Ms. Valenzuela attended the entire session. He seemed initially apprehensive and disinterested in the activity. He did end up joining a group of peers and answering some of the questions.     Plan: continue with services      "

## 2024-10-18 NOTE — CARE PLAN
Discussed in Treatment team today;  he is voicing high depression and anxiety (10/10 on both);  [facility] called back: they cannot accept patient: there is a waiting list and they cannot accept due to past history of threatening his nieces and nephews.  This sw called [facility] [place], p. [phone], reached voice mail, left message; will call back asap. Sw to follow.

## 2024-10-18 NOTE — GROUP NOTE
Group Topic: Art Creative   Group Date: 10/18/2024  Start Time: 1100  End Time: 1200  Facilitators: SINDHU BloomS   Department: [facility] REHAB THERAPY VIRTUAL    Number of Participants: 10   Group Focus: leisure skills and relaxation  Treatment Modality: Recreation Therapy  Interventions utilized were: Sun Catchers, Bracelets, Music (song choices), leisure development  Purpose: Leisure Awareness/Education, Pleasurable Activity, Social Stimulation, coping skills    Name: Jonny Valenzuela YOB: 1970   MR: [id number]      Facilitator: Recreational Therapist  Level of Participation:  attended, did not participate  Quality of Participation: quiet and withdrawn  Interactions with others:  minimal  Mood/Affect:  avoidant, calm  Triggers (if applicable): N/A  Cognition:  capable  Progress: Minimal  Comments: Session included creative activities which were offered for 60 minutes.      Patient joined the session after it had already started. He sat away from his peers and refused to complete any art projects. Patient was encouraged to pick some music and made an artist choice.     Plan: continue with services

## 2024-10-18 NOTE — CARE PLAN
"  Problem: Altered Thought Processes as Evidenced by  Goal: STG - Desires improvement in ability to think and concentrate  Outcome: Progressing     Problem: Alteration in Sleep  Goal: STG - Reports nightly sleep, duration, and quality  Outcome: Progressing     Problem: Self Care Deficit  Goal: Increase group attendance  Outcome: Progressing   The patient's goals for the shift include \"Go to groups\"    The clinical goals for the shift include medication compliance    Over the shift, the patient did make progress toward the following goals.     "

## 2024-10-18 NOTE — PROGRESS NOTES
"Jonny Valenzuela is a 54 y.o. year old male patient who is on [facility] admission day 3.      Subjective   Jonny Valenzuela is a 54 y.o. year old male patient who was personally seen and interviewed, and discussed in morning team rounds. The patient was interviewed alone in his room (interviewed sitting on his bed), and was easily engaged and cooperative. This morning, Jonny reports feeling \"not too bad\" and currently rates his depression at an 8 out of 10. He denies experiencing any suicidal ideation or suicide plans. He also rates his anxiety at an 8 out of 10. No hallucinations or paranoia were endorsed or noted.   Jonny slept 8 hours last night (unbroken).           Objective   Mental Status Exam:   General: Appropriately groomed and dressed in casual/hospital attire.   Appearance: Appears stated age.   Attitude: Calm, cooperative.   Behavior: Appropriate eye contact.   Motor Activity: No agitation or retardation. No EPS/TD. Normal gait and station. Normal muscle tone and bulk.   Speech: Regular rate, rhythm, volume and tone, spontaneous, fluent. Non-pressured.   Mood: \"Not too bad\"   Affect: Neutral.   Thought Process: Organized, and goal directed.   Thought Content: Does not currently endorse any suicidal ideation or suicide plans.   Does not endorse homicidal ideation.  No overt delusions or paranoia elicited.    Thought Perception: Does not endorse auditory or visual hallucinations, does not appear to be responding to hallucinatory stimuli.   Cognition: Alert, oriented x 3. No deficits noted. Adequate fund of knowledge. No deficit in recent and remote memory. No overt deficits in attention, concentration or language.   Insight: Poor-to-Fair, as patient recognizes symptoms of illness and need for recommended treatments.    Judgment: Poor-to-Fair, as patient can questionably make reasonable decisions about ordinary activities of daily living and necessary medical care recommendations.           LABS:  Results for orders placed or " "performed during the hospital encounter of 10/15/24 (from the past 96 hours)   Glucose, Fasting   Result Value Ref Range    Glucose, Fasting 100 (H) 74 - 99 mg/dL   Lipid Panel   Result Value Ref Range    Cholesterol 203 (H) 0 - 199 mg/dL    HDL-Cholesterol 72.1 mg/dL    Cholesterol/HDL Ratio 2.8     LDL Calculated 112 (H) <=99 mg/dL    VLDL 19 0 - 40 mg/dL    Triglycerides 96 0 - 149 mg/dL    Non HDL Cholesterol 131 0 - 149 mg/dL   Vitamin D 25-Hydroxy,Total (for eval of Vitamin D levels)   Result Value Ref Range    Vitamin D, 25-Hydroxy, Total 31 30 - 100 ng/mL        Last Recorded Vitals  Visit Vitals  /68 (BP Location: Right arm, Patient Position: Standing)   Pulse 84   Temp 36.5 °C (97.7 °F) (Temporal)   Resp 16        Intake/Output last 3 Shifts:  No intake/output data recorded.    Relevant Results  Scheduled medications  folic acid, 1 mg, oral, Daily  lisinopril, 20 mg, oral, Daily  nicotine, 1 patch, transdermal, Daily  [Held by provider] QUEtiapine, 150 mg, oral, Nightly  sertraline, 150 mg, oral, Daily      Continuous medications     PRN medications  PRN medications: alum-mag hydroxide-simeth, baclofen, diphenhydrAMINE **OR** diphenhydrAMINE, haloperidol **OR** haloperidol lactate, hydrOXYzine pamoate, ibuprofen, LORazepam **OR** LORazepam, melatonin, nicotine polacrilex, psyllium               Assessment/Plan   1) Major Depressive Disorder (MDD), recurrent, severe, without psychosis        Plan: 1) Re-start Zoloft 100 -> 150 mg Qdaily                *2) HOLD trial Seroquel 75 mg -> 150 mg at bedtime for sleep - due to c/o \"fidgetiness\" 2 hours after dose before falling asleep.                3) Melatonin 5 mg prn at bedtime                 4) Vistaril 50 mg q4h prn anxiety                5) Group and milieu therapy    Discussed potential risks, benefits, and alternatives to medications with patient, who consented to the above medications.     2) Generalized Anxiety Disorder (SHOSHANA)       Plan: 1) See " above.      3) Alcohol Use Disorder, severe, dependence        Plan: 1) Refer to outpatient treatment program.      4) Tobacco Dependence        Plan: 1) Continue nicotine patches.      5) Hypertension        Plan: 1) Continue Lisinopril 20 mg qdaily                2)  service to follow and manage.          Laci Alvarado MD

## 2024-10-18 NOTE — CARE PLAN
"The patient's goals for the shift include \"good nights sleep\"    The clinical goals for the shift include medication compliance    Over the shift, the patient did make progress toward the following goals.   Problem: Sensory Perceptual Alteration as Evidenced by  Goal: Cooperates with admission process  Outcome: Progressing  Goal: Patient/Family participate in treatment and discharge plans  Outcome: Progressing  Goal: Patient/Family verbalizes awareness of resources  Outcome: Progressing  Goal: Participates in unit activities  Outcome: Progressing  Goal: Discusses signs/symptoms of illness/treatment options  Outcome: Progressing  Goal: Initiates reality-based interactions  Outcome: Progressing  Goal: Able to discuss content of hallucinations/delusions  Outcome: Progressing  Goal: Notifies staff when experiencing hallucinations/delusions  Outcome: Progressing  Goal: Verbalizes reduction in hallucinations/delusions  Outcome: Progressing  Goal: Will not act on psychotic perception  Outcome: Progressing  Goal: Understands least restrictive measures  Outcome: Progressing  Goal: Free from restraint events  Outcome: Progressing     Problem: Altered Thought Processes as Evidenced by  Goal: STG - Desires improvement in ability to think and concentrate  Outcome: Progressing  Goal: STG - Participates in Occupational Therapy and other cognitive assessments  Outcome: Progressing     Problem: Potential for Harm to Self or Others  Goal: Cooperates with admission process  Outcome: Progressing  Goal: Participates in unit activities  Outcome: Progressing  Goal: Patient/Family participate in treatment and discharge plans  Outcome: Progressing  Goal: Identifies deescalation techniques  Outcome: Progressing  Goal: Understands least restrictive measures  Outcome: Progressing  Goal: Identifies stressors that lead to harmful behaviors  Outcome: Progressing  Goal: Notifies staff when experiencing harmful thoughts toward self/others  Outcome: " Progressing  Goal: Denies harm toward self or others  Outcome: Progressing  Goal: Free from restraint events  Outcome: Progressing     Problem: Educational/Scholastic Disruption  Goal: Meets educational requirements during hospitalization  Outcome: Progressing  Goal: Attends class without disruptive behavior  Outcome: Progressing  Goal: Completes daily assignments  Outcome: Progressing     Problem: Ineffective Coping  Goal: Cooperates with admission process  Outcome: Progressing  Goal: Identifies ineffective coping skills  Outcome: Progressing  Goal: Identifies healthy coping skills  Outcome: Progressing  Goal: Demonstrates healthy coping skills  Outcome: Progressing  Goal: Participates in unit activities  Outcome: Progressing  Goal: Patient/Family participate in treatment and discharge plans  Outcome: Progressing  Goal: Patient/Family verbalizes awareness of resources  Outcome: Progressing  Goal: Understands least restrictive measures  Outcome: Progressing  Goal: Free from restraint events  Outcome: Progressing     Problem: Alteration in Sleep  Goal: STG - Reports nightly sleep, duration, and quality  Outcome: Progressing  Goal: STG - Identifies sleep hygiene aids  Outcome: Progressing  Goal: STG - Informs staff if unable to sleep  Outcome: Progressing  Goal: STG - Attends breathing and relaxation group  Outcome: Progressing     Problem: Potential for Substance Withdrawal  Goal: Verbalizes signs/symptoms of withdrawal  Outcome: Progressing  Goal: Reports signs/symptoms of withdrawal  Outcome: Progressing  Goal: Free of withdrawal symptoms  Outcome: Progressing     Problem: Anxiety  Goal: Patient/family understands admission protocols  Outcome: Progressing  Goal: Attempts to manage anxiety with help  Outcome: Progressing  Goal: Verbalizes ways to manage anxiety  Outcome: Progressing  Goal: Implements measures to reduce anxiety  Outcome: Progressing  Goal: Free from restraint events  Outcome: Progressing     Problem:  Self Care Deficit  Goal: STG - Patient completes hygiene  Outcome: Progressing  Goal: Increase group attendance  Outcome: Progressing  Goal: Accepts need for medications  Outcome: Progressing  Goal: STG - Goes to and eats meals independently in dining room 100% of time  Outcome: Progressing     Problem: Defensive Coping  Goal: Cooperates with admission process  Outcome: Progressing  Goal: Identifies reckless/dangerous behavior  Outcome: Progressing  Goal: Identifies stressors that lead to reckless/dangerous behavior  Outcome: Progressing  Goal: Discusses and identifies healthy coping skills  Outcome: Progressing  Goal: Demonstrates healthy coping skills  Outcome: Progressing  Goal: Identifies appropriate social interaction  Outcome: Progressing  Goal: Demonstrates appropriate social interactions  Outcome: Progressing  Goal: Patient/Family verbalizes awareness of resources  Outcome: Progressing  Goal: Discusses signs/symptoms of illness/treatment options  Outcome: Progressing  Goal: Patient/Family participate in treatment and discharge plans  Outcome: Progressing  Goal: Understands least restrictive measures  Outcome: Progressing  Goal: Free from restraint events  Outcome: Progressing     Problem: Community resource needs  Goal: Patient is receiving increased resource support to enhance ability to remain at home  Outcome: Progressing

## 2024-10-18 NOTE — CARE PLAN
Problem: Sensory Perceptual Alteration as Evidenced by  Goal: Cooperates with admission process  Outcome: Progressing  Goal: Patient/Family participate in treatment and discharge plans  Outcome: Progressing  Goal: Patient/Family verbalizes awareness of resources  Outcome: Progressing     Problem: Potential for Harm to Self or Others  Goal: Cooperates with admission process  Outcome: Progressing  Goal: Participates in unit activities  Outcome: Progressing  Goal: Patient/Family participate in treatment and discharge plans  Outcome: Progressing     Problem: Ineffective Coping  Goal: Patient/Family verbalizes awareness of resources  Outcome: Progressing     Problem: Anxiety  Goal: Patient/family understands admission protocols  Outcome: Progressing     Problem: Defensive Coping  Goal: Patient/Family verbalizes awareness of resources  Outcome: Progressing  Goal: Patient/Family participate in treatment and discharge plans  Outcome: Progressing     Problem: Community resource needs  Goal: Patient is receiving increased resource support to enhance ability to remain at home  Outcome: Progressing     Problem: Mental health issues  Goal: Stabilize adverse mental health factors affecting plan of care  Outcome: Progressing     Problem: Access to Care Issue  Goal: Assess and Address Access Barriers  Outcome: Progressing     Problem: Assessment of Caregiver  Goal: Caregiver Demonstrates Personal Health and Wellbeing; as well as Ability to Safely and Effectively Care for Patient  Outcome: Progressing     Problem: Assistance Required for Daily Activities  Goal: Develop a Plan to Assist Patient with Activites of Daily Living  Outcome: Progressing     Problem: Coordination of Psychosocial Support Services Needed  Goal: Coordination of Services will be Obtained  Outcome: Progressing     Problem: Coordination of Psychosocial Support Services Needed  Goal: Coordination of Services will be Obtained  Outcome: Progressing

## 2024-10-18 NOTE — GROUP NOTE
Group Topic: Self Esteem   Group Date: 10/18/2024  Start Time: 0730  End Time: 0830  Facilitators: LOPEZ Bloom   Department: [facility] REHAB THERAPY VIRTUAL    Number of Participants: 7   Group Focus: check in, daily focus, goals, and self-esteem  Treatment Modality: Recreation Therapy  Interventions utilized were: 8 Steps to Improving Your Self-Esteem,  confrontation, exploration, and orientation  Purpose: coping skills, maladaptive thinking, feelings, self-worth, and self-care    Name: Jonny Valenzuela YOB: 1970   MR: [id number]      Facilitator: Recreational Therapist  Level of Participation: did not attend  Progress: None  Comments: We discussed some ways to improve one’s self esteem by looking at eight strategies. Patients were asked to provide examples and problem solve through the instructions (handout). Participants were asked to focus on one or two of the provided skills and establish a goal related to it.     Patient remained in their room throughout the session resting/sleeping.     Plan: continue with services

## 2024-10-19 LAB — VIT B1 PYROPHOSHATE BLD-SCNC: 191 NMOL/L (ref 70–180)

## 2024-10-19 PROCEDURE — S4991 NICOTINE PATCH NONLEGEND: HCPCS | Performed by: NURSE PRACTITIONER

## 2024-10-19 PROCEDURE — 2500000002 HC RX 250 W HCPCS SELF ADMINISTERED DRUGS (ALT 637 FOR MEDICARE OP, ALT 636 FOR OP/ED): Performed by: NURSE PRACTITIONER

## 2024-10-19 PROCEDURE — 99233 SBSQ HOSP IP/OBS HIGH 50: CPT | Performed by: PSYCHIATRY & NEUROLOGY

## 2024-10-19 PROCEDURE — 1240000001 HC SEMI-PRIVATE BH ROOM DAILY

## 2024-10-19 PROCEDURE — 2500000002 HC RX 250 W HCPCS SELF ADMINISTERED DRUGS (ALT 637 FOR MEDICARE OP, ALT 636 FOR OP/ED): Performed by: PSYCHIATRY & NEUROLOGY

## 2024-10-19 PROCEDURE — 2500000001 HC RX 250 WO HCPCS SELF ADMINISTERED DRUGS (ALT 637 FOR MEDICARE OP): Performed by: PSYCHIATRY & NEUROLOGY

## 2024-10-19 RX ORDER — LORAZEPAM 1 MG/1
1 TABLET ORAL EVERY 8 HOURS PRN
Status: DISCONTINUED | OUTPATIENT
Start: 2024-10-19 | End: 2024-10-20

## 2024-10-19 ASSESSMENT — PAIN SCALES - GENERAL
PAINLEVEL_OUTOF10: 0 - NO PAIN
PAINLEVEL_OUTOF10: 0 - NO PAIN

## 2024-10-19 ASSESSMENT — PAIN - FUNCTIONAL ASSESSMENT: PAIN_FUNCTIONAL_ASSESSMENT: 0-10

## 2024-10-19 NOTE — CARE PLAN
"The patient's goals for the shift include \"Don't have one really.\"    The clinical goals for the shift include Maintain safety, medication adherence, participation in unit activities.    Over the shift, the patient did make progress toward the following goals. Barriers to progression include acuity of illness. Recommendations to address these barriers include continuation of care plan.    "

## 2024-10-19 NOTE — NURSING NOTE
Patient is out in the lounge sitting at a table with other patients eating snack and watching the baseball game. Pt is not observed talking with other patients. Pt makes appropriate eye contact while answering assessment questions. Pt appears to have less visible anxiety (pt still bouncing legs) and is sitting with other patients instead of sitting on the outskirts of the lounge (previous days/nights). Pt rated anxiety 8,depression 10,denied si/hi,denied a/v hallucinations. Pt did not have any scheduled night medications due, but asked for hydroxyzine and melatonin before he went to bed.

## 2024-10-19 NOTE — GROUP NOTE
"Group Topic: Chemical Dependency - Dual Diagnosis   Group Date: 10/19/2024  Start Time: 1100  End Time: 1200  Facilitators: LOPEZ Santacruz   Department: [facility] REHAB THERAPY VIRTUAL    Number of Participants: 7   Group Focus: coping skills, dual diagnosis, leisure skills, personal responsibility, psychiatric education, self-awareness, social skills, and substance abuse education  Treatment Modality: Recreational Therapy   Interventions utilized were Coping Skills - Addictions Handout, Jenga with Recovery Questions, exploration, leisure development, patient education, story telling, and support  Purpose: coping skills, insight or knowledge, self-care, relapse prevention strategies, and trigger / craving management    Name: Jonny Valenzuela YOB: 1970   MR: [id number]      Facilitator: Recreational Therapist  Level of Participation: did not attend  Progress: None  Comments: Patients were provided with the \"Coping Skills Addictions\" handout which includes four main areas (social support, diversions, building new habits, and prevention). We had discussion and patients were given an opportunity to share their thoughts. We also engaged in a group activity (Jenga) to facilitate further discussion in which patients were prompted with a variety of recovery based questions.    Patient declined invitation to group activity at this time. Patient will continue to be provided with opportunities to enhance leisure skills and/or coping mechanisms.    Plan: continue with services      "

## 2024-10-19 NOTE — PROGRESS NOTES
"Jonny Valenzuela is a 54 y.o. year old male patient who is on U admission day 4.      Subjective   Jonny Valenzuela is a 54 y.o. year old male patient who was personally seen and interviewed, and discussed in morning team rounds. The patient was interviewed alone at the end of the hallway (interviewed sitting in a chair), and was easily engaged and cooperative. This morning, Jonny reports feeling \"anxious and depressed\" and currently rates his depression at a 10 out of 10. He denies experiencing any suicidal ideation or suicide plans. He also rates his anxiety at a 10 out of 10. No hallucinations or paranoia were endorsed or noted.   Jonny slept 7.5 hours last night (broken).           Objective   Mental Status Exam:   General: Appropriately groomed and dressed in casual/hospital attire.   Appearance: Appears stated age.   Attitude: Calm, cooperative.   Behavior: Appropriate eye contact.   Motor Activity: No agitation or retardation. No EPS/TD. Normal gait and station. Normal muscle tone and bulk. +Tremulousness (mild).   Speech: Regular rate, rhythm, volume and tone, spontaneous, fluent. Non-pressured.   Mood: \"Anxious and depressed\"   Affect: Neutral.   Thought Process: Organized, and goal directed.   Thought Content: Does not currently endorse any suicidal ideation or suicide plans.   Does not endorse homicidal ideation.  No overt delusions or paranoia elicited.    Thought Perception: Does not endorse auditory or visual hallucinations, does not appear to be responding to hallucinatory stimuli.   Cognition: Alert, oriented x 3. No deficits noted. Adequate fund of knowledge. No deficit in recent and remote memory. No overt deficits in attention, concentration or language.   Insight: Poor-to-Fair, as patient recognizes symptoms of illness and need for recommended treatments.    Judgment: Poor-to-Fair, as patient can questionably make reasonable decisions about ordinary activities of daily living and necessary medical care " recommendations.           LABS:  Results for orders placed or performed during the hospital encounter of 10/15/24 (from the past 96 hours)   Glucose, Fasting   Result Value Ref Range    Glucose, Fasting 100 (H) 74 - 99 mg/dL   Lipid Panel   Result Value Ref Range    Cholesterol 203 (H) 0 - 199 mg/dL    HDL-Cholesterol 72.1 mg/dL    Cholesterol/HDL Ratio 2.8     LDL Calculated 112 (H) <=99 mg/dL    VLDL 19 0 - 40 mg/dL    Triglycerides 96 0 - 149 mg/dL    Non HDL Cholesterol 131 0 - 149 mg/dL   Vitamin D 25-Hydroxy,Total (for eval of Vitamin D levels)   Result Value Ref Range    Vitamin D, 25-Hydroxy, Total 31 30 - 100 ng/mL        Last Recorded Vitals  Visit Vitals  /85 (BP Location: Right arm, Patient Position: Sitting)   Pulse 75   Temp 36.3 °C (97.3 °F) (Temporal)   Resp 18        Intake/Output last 3 Shifts:  No intake/output data recorded.    Relevant Results  Scheduled medications  folic acid, 1 mg, oral, Daily  lisinopril, 20 mg, oral, Daily  nicotine, 1 patch, transdermal, Daily  QUEtiapine, 150 mg, oral, Nightly  sertraline, 150 mg, oral, Daily      Continuous medications     PRN medications  PRN medications: alum-mag hydroxide-simeth, baclofen, diphenhydrAMINE **OR** diphenhydrAMINE, haloperidol **OR** haloperidol lactate, hydrOXYzine pamoate, ibuprofen, LORazepam **OR** LORazepam, melatonin, nicotine polacrilex, psyllium               Assessment/Plan   1) Major Depressive Disorder (MDD), recurrent, severe, without psychosis        Plan: 1) Re-start Zoloft 100 -> 150 mg Qdaily                *2) re-trial Seroquel 75 mg -> 150 mg at bedtime for sleep (reports   Vistaril or Melatonin made him fidgety last night)                3) Melatonin 5 mg prn at bedtime                 *4) HOLD - Vistaril 50 mg q4h prn anxiety                *5) trial Ativan 1 mg Q8hrs - PRN anxiety                 6) Group and milieu therapy    Discussed potential risks, benefits, and alternatives to medications with patient,  who consented to the above medications.     2) Generalized Anxiety Disorder (SHOSHANA)       Plan: 1) See above.      3) Alcohol Use Disorder, severe, dependence        Plan: 1) Refer to outpatient treatment program.      4) Tobacco Dependence        Plan: 1) Continue nicotine patches.      5) Hypertension        Plan: 1) Continue Lisinopril 20 mg qdaily                2)  service to follow and manage.          Laci Alvarado MD

## 2024-10-19 NOTE — GROUP NOTE
"Group Topic: Art Creative   Group Date: 10/19/2024  Start Time: 1400  End Time: 1530  Facilitators: SINDHU SantacruzS   Department: [facility] REHAB THERAPY VIRTUAL    Number of Participants: 11   Group Focus: leisure skills and social skills  Treatment Modality: Recreational Therapy   Interventions utilized were Acrylic Painting/Ceramics, exploration and leisure development  Purpose: other: creative expression, leisure awareness, social engagement     Name: Jonny Valenzuela YOB: 1970   MR: [id number]      Facilitator: Recreational Therapist  Level of Participation: did not attend  Progress: None  Comments: Patients were gathered to participate in \"Acrylic Painting/Ceramics\". This activity works on creative expression, fine motor skills, following directions, positive social interaction, and leisure awareness.    Patient declined invitation to group activity at this time. Patient will continue to be provided with opportunities to enhance leisure skills and/or coping mechanisms.    Plan: continue with services      "

## 2024-10-19 NOTE — CARE PLAN
"The patient's goals for the shift include \"Getting outta here\"    The clinical goals for the shift include medication compliance    Over the shift, the patient did make progress toward the following goals    Problem: Sensory Perceptual Alteration as Evidenced by  Goal: Participates in unit activities  Outcome: Progressing     Problem: Sensory Perceptual Alteration as Evidenced by  Goal: Initiates reality-based interactions  Outcome: Progressing     Problem: Altered Thought Processes as Evidenced by  Goal: STG - Desires improvement in ability to think and concentrate  Outcome: Progressing     Problem: Potential for Harm to Self or Others  Goal: Denies harm toward self or others  Outcome: Progressing     Problem: Alteration in Sleep  Goal: STG - Reports nightly sleep, duration, and quality  Outcome: Progressing     Problem: Alteration in Sleep  Goal: STG - Identifies sleep hygiene aids  Outcome: Progressing     Problem: Alteration in Sleep  Goal: STG - Informs staff if unable to sleep  Outcome: Progressing     Problem: Self Care Deficit  Goal: Accepts need for medications  Outcome: Progressing     Problem: Self Care Deficit  Goal: STG - Goes to and eats meals independently in dining room 100% of time  Outcome: Progressing     "

## 2024-10-19 NOTE — NURSING NOTE
"RN/writer met with patient early this morning for routine shift assessment. Met with patient at the table outside Room 209. Appeared irritable. Endorsed feeling anxious, and rated at 10 of 10. Rated depression at 10 of 10. Denied thought of harm to self or others. Denied SI or HI and contracted for safety. Denied hallucinations. Denied pain. Endorsed last BM as yesterday, 10/18.     Described mood as \"Just aggravated. Depressed and anxious.\"  Described sleep as \"Not good. Tossed and turned. 3 hours.\"    Coping skills: \"Watching a little TV.\"  Strengths: \"I'm still alive.\" \"Try to be kind to others, help out as much as I can.\"  Goals: \"Don't have one really.\"    Medication adherent. Lorazepam 1mg po per PRN order for anxiety. Endorsed some relief with PRN med.     Social with peers and staff. Shaved with electric razor late this morning under staff supervision. Did not attend any groups today.     Q15 minutes safety checks maintained. Will continue to monitor.   "

## 2024-10-19 NOTE — GROUP NOTE
"Group Topic: Team Building   Group Date: 10/19/2024  Start Time: 0930  End Time: 1020  Facilitators: LOPEZ Santacruz   Department: [facility] REHAB THERAPY VIRTUAL    Number of Participants: 8   Group Focus: personal responsibility, problem solving, self-awareness, and social skills  Treatment Modality: Recreational Therapy   Interventions utilized were Match up the Sayings, exploration, problem solving, story telling, and support  Purpose: maladaptive thinking, irrational fears, insight or knowledge, self-worth, and self-care    Name: Jonny Valenzuela YOB: 1970   MR: [id number]      Facilitator: Recreational Therapist  Level of Participation: did not attend  Progress: None  Comments: In this group session, \"Match up the Sayings\", patients collaborated to reconstruct famous sayings. This activity served as a catalyst for teamwork and cognitive stimulation. Following the completion of the task, the group engaged in discussion, delving into the meanings of the sayings and collectively exploring their practical application in everyday life.    Patient declined invitation to group activity at this time. Patient will continue to be provided with opportunities to enhance leisure skills and/or coping mechanisms.    Plan: continue with services      "

## 2024-10-20 VITALS
TEMPERATURE: 97.7 F | DIASTOLIC BLOOD PRESSURE: 78 MMHG | SYSTOLIC BLOOD PRESSURE: 116 MMHG | RESPIRATION RATE: 16 BRPM | HEART RATE: 72 BPM | BODY MASS INDEX: 27.75 KG/M2 | OXYGEN SATURATION: 98 % | HEIGHT: 67 IN | WEIGHT: 176.81 LBS

## 2024-10-20 PROCEDURE — 2500000002 HC RX 250 W HCPCS SELF ADMINISTERED DRUGS (ALT 637 FOR MEDICARE OP, ALT 636 FOR OP/ED): Performed by: NURSE PRACTITIONER

## 2024-10-20 PROCEDURE — 99233 SBSQ HOSP IP/OBS HIGH 50: CPT | Performed by: PSYCHIATRY & NEUROLOGY

## 2024-10-20 PROCEDURE — 1240000001 HC SEMI-PRIVATE BH ROOM DAILY

## 2024-10-20 PROCEDURE — 2500000002 HC RX 250 W HCPCS SELF ADMINISTERED DRUGS (ALT 637 FOR MEDICARE OP, ALT 636 FOR OP/ED): Performed by: PSYCHIATRY & NEUROLOGY

## 2024-10-20 PROCEDURE — S4991 NICOTINE PATCH NONLEGEND: HCPCS | Performed by: NURSE PRACTITIONER

## 2024-10-20 PROCEDURE — 2500000001 HC RX 250 WO HCPCS SELF ADMINISTERED DRUGS (ALT 637 FOR MEDICARE OP): Performed by: PSYCHIATRY & NEUROLOGY

## 2024-10-20 RX ORDER — LORAZEPAM 1 MG/1
1 TABLET ORAL 2 TIMES DAILY
Status: DISCONTINUED | OUTPATIENT
Start: 2024-10-20 | End: 2024-10-21

## 2024-10-20 ASSESSMENT — COLUMBIA-SUICIDE SEVERITY RATING SCALE - C-SSRS
6. HAVE YOU EVER DONE ANYTHING, STARTED TO DO ANYTHING, OR PREPARED TO DO ANYTHING TO END YOUR LIFE?: NO
1. SINCE LAST CONTACT, HAVE YOU WISHED YOU WERE DEAD OR WISHED YOU COULD GO TO SLEEP AND NOT WAKE UP?: NO
2. HAVE YOU ACTUALLY HAD ANY THOUGHTS OF KILLING YOURSELF?: NO

## 2024-10-20 ASSESSMENT — PAIN - FUNCTIONAL ASSESSMENT
PAIN_FUNCTIONAL_ASSESSMENT: 0-10
PAIN_FUNCTIONAL_ASSESSMENT: 0-10

## 2024-10-20 ASSESSMENT — PAIN SCALES - GENERAL
PAINLEVEL_OUTOF10: 0 - NO PAIN
PAINLEVEL_OUTOF10: 0 - NO PAIN

## 2024-10-20 NOTE — GROUP NOTE
"Group Topic: Community   Group Date: 10/20/2024  Start Time: 1100  End Time: 1200  Facilitators: SINDHU SantacruzS   Department: [facility] REHAB THERAPY VIRTUAL    Number of Participants: 7   Group Focus: leisure skills and social skills  Treatment Modality: Recreational Therapy   Interventions utilized were Community Meeting, Herd Mentality, exploration, leisure development, and support  Purpose: other: social engagement, leisure awareness    Name: Jonny Valenzuela YOB: 1970   MR: [id number]      Facilitator: Recreational Therapist  Level of Participation: did not attend  Progress: None  Comments: This session included providing participants an opportunity to understand unit guidelines, rules, expectations, and provide a healthy environment to give suggestions for unit improvements. CTRS prioritized suggestions to discuss during session through an activity based intervention (\"Herd Mentality\"). Community Meeting questionnaire slips were passed out and collected.    Patient declined invitation to group activity at this time. Patient will continue to be provided with opportunities to enhance leisure skills and/or coping mechanisms.    Plan: continue with services      "

## 2024-10-20 NOTE — PROGRESS NOTES
"Jonny Valenzuela is a 54 y.o. year old male patient who is on U admission day 5.      Subjective   Jonny Valenzuela is a 54 y.o. year old male patient who was personally seen and interviewed, and discussed in morning team rounds. The patient was interviewed alone at the end of the hallway (interviewed sitting in a chair), and was easily engaged and cooperative. This morning, Jonny reports feeling \"shaky, alright\" and currently rates his depression at an 8 out of 10. He denies experiencing any suicidal ideation or suicide plans. Jonny does state \"I'm doing better\" in regards to his mood. He does report he's always been \"a worrywart\" and that his dad would call him this. He also rates his anxiety at an 8-10 out of 10. No hallucinations or paranoia were endorsed or noted.   Jonny slept 8 hours last night (unbroken), stating \"I slept good (last night).\"         Objective   Mental Status Exam:   General: Appropriately groomed and dressed in casual/hospital attire.   Appearance: Appears stated age.   Attitude: Calm, cooperative.   Behavior: Appropriate eye contact.   Motor Activity: No agitation or retardation. No EPS/TD. Normal gait and station. Normal muscle tone and bulk. +Tremulousness (mild).   Speech: Regular rate, rhythm, volume and tone, spontaneous, fluent. Non-pressured.   Mood: \"Shaky, alright\"   Affect: Neutral.   Thought Process: Organized, and goal directed.   Thought Content: Does not currently endorse any suicidal ideation or suicide plans.   Does not endorse homicidal ideation.  No overt delusions or paranoia elicited.    Thought Perception: Does not endorse auditory or visual hallucinations, does not appear to be responding to hallucinatory stimuli.   Cognition: Alert, oriented x 3. No deficits noted. Adequate fund of knowledge. No deficit in recent and remote memory. No overt deficits in attention, concentration or language.   Insight: Poor-to-Fair, as patient recognizes symptoms of illness and need for recommended " treatments.    Judgment: Poor-to-Fair, as patient can questionably make reasonable decisions about ordinary activities of daily living and necessary medical care recommendations.           LABS:  No results found for this or any previous visit (from the past 96 hours).       Last Recorded Vitals  Visit Vitals  /73 (BP Location: Right arm, Patient Position: Sitting)   Pulse 73   Temp 36.3 °C (97.3 °F)   Resp 16        Intake/Output last 3 Shifts:  No intake/output data recorded.    Relevant Results  Scheduled medications  folic acid, 1 mg, oral, Daily  lisinopril, 20 mg, oral, Daily  nicotine, 1 patch, transdermal, Daily  QUEtiapine, 150 mg, oral, Nightly  sertraline, 150 mg, oral, Daily      Continuous medications     PRN medications  PRN medications: alum-mag hydroxide-simeth, baclofen, diphenhydrAMINE **OR** diphenhydrAMINE, haloperidol **OR** haloperidol lactate, [Held by provider] hydrOXYzine pamoate, ibuprofen, LORazepam **OR** LORazepam, LORazepam, melatonin, nicotine polacrilex, psyllium               Assessment/Plan   1) Major Depressive Disorder (MDD), recurrent, severe, without psychosis        Plan: 1) Re-start Zoloft 100 -> 150 mg Qdaily                2) re-trial Seroquel 75 mg -> 150 mg at bedtime for sleep (reports   Vistaril or Melatonin made him fidgety last night)                3) Melatonin 5 mg prn at bedtime                 4) HOLD - Vistaril 50 mg q4h prn anxiety                5) trial Ativan 1 mg Q8hrs - PRN anxiety (trial started 10/19/24)                 6) Group and milieu therapy    Discussed potential risks, benefits, and alternatives to medications with patient, who consented to the above medications.     2) Generalized Anxiety Disorder (SHOSHANA)       Plan: 1) See above.      3) Alcohol Use Disorder, severe, dependence        Plan: 1) Refer to outpatient treatment program.      4) Tobacco Dependence        Plan: 1) Continue nicotine patches.      5) Hypertension        Plan: 1)  Continue Lisinopril 20 mg qdaily                2) IM service to follow and manage.          Laci Alvarado MD

## 2024-10-20 NOTE — NURSING NOTE
"Upon assessment pt is calm, cooperative, slightly short/irritable. Pt rated anxiety and depression 10/10. No SI/HI, hallucinations, or pain. Pt could not give me any strengths. Coping skill \"watching TV\" and goal \"sleep through the night\". Pt given PRN melatonin and ativan 1 mg with nighttime medications for insomnia and anxiety.   "

## 2024-10-20 NOTE — GROUP NOTE
"Group Topic: Spiritual/Devotional/Thought of the Day   Group Date: 10/20/2024  Start Time: 0930  End Time: 1020  Facilitators: LOPEZ Santacruz   Department: [facility] REHAB THERAPY VIRTUAL    Number of Participants: 7   Group Focus: goals, personal responsibility, self-awareness, and social skills  Treatment Modality: Recreational Therapy   Interventions utilized were Thought - \"Doing what is good for us\"/ \"Building New Habits\" handout, Bullet Journaling - (Gumball Machine/Tasktris), exploration, patient education, story telling, and support  Purpose: insight or knowledge, self-worth, and self-care    Name: Jonny Valenzuela YOB: 1970   MR: [id number]      Facilitator: Recreational Therapist  Level of Participation: did not attend  Progress: None  Comments: Patients were provided with the Thought of the Day reading - “Recovery means change; recovery means work.” Patients were given an opportunity to share their thoughts and encouraged to create a personal goal based on the reading. Patients were also provided with the \"Building New Habits\" handout and we had discussion about the importance/difference between setting personal goals and engaging in healthy habits to work towards achieving our overall goals. The handout included several concepts including (differentiating between goals/habits, small changes, update environment, tie new habits to other activities, practice, tell someone, track new habit, and celebrate success). Finally, patients were offered options of two different worksheets to set up and track healthy habits for themselves over a period of time.     Patient declined invitation to group activity at this time. Patient will continue to be provided with opportunities to enhance leisure skills and/or coping mechanisms.    Plan: continue with services      "

## 2024-10-20 NOTE — CARE PLAN
"The patient's goals for the shift include \"sleep through the night\"    The clinical goals for the shift include maintain safety    Over the shift, the patient made progress towards care plan goals. Patient rested quietly through the night. No PRNs given after ativan 1mg and melatonin last night.    "

## 2024-10-20 NOTE — NURSING NOTE
"Patient was assessed this morning at the table near the nurses station for privacy. He presents as cooperative and easy to engage in conversation. Patient rates both anxiety and depression 10/10 with no SI/HI or AVH reported. He denies pain or discomfort and has been observed out on the unit. Patient reports \" shaking\" at times and he feels it's from \" the anxiety\". His goal for today was \" go to group\". He states strengths are \" I'm a fighter and I'm nice to people and like to help out\". Patient reports sleeping good last night . He received Ativan 1mg at 0920 this morning per his request. Will continue to monitor for safety and encourage group participation.  "

## 2024-10-20 NOTE — GROUP NOTE
Group Topic: Art Therapy   Group Date: 10/20/2024  Start Time: 1400  End Time: 1530  Facilitators: SINDHU SantacruzS   Department: [facility] REHAB THERAPY VIRTUAL    Number of Participants: 10   Group Focus: leisure skills and social skills  Treatment Modality: Recreational Therapy   Interventions utilized were Watercolor Painting, Suncatchers, exploration and leisure development  Purpose: other: creative expression, leisure awareness, social engagement     Name: Jonny Valenzuela YOB: 1970   MR: [id number]      Facilitator: Recreational Therapist  Level of Participation: did not attend  Progress: None  Comments: Patients were gathered to participate in their choice of multiple creative activities (Watercolor Painting/Suncatchers). This activity works on creative expression, fine motor skills, following directions, positive social interaction, and leisure awareness.    Patient declined invitation to group activity at this time. Patient will continue to be provided with opportunities to enhance leisure skills and/or coping mechanisms.    Plan: continue with services

## 2024-10-20 NOTE — CARE PLAN
"The patient's goals for the shift include \" go to group\"    The clinical goals for the shift include maintain safety    Over the shift, the patient made progress toward attending groups . Barriers to progression include acute anxiety. Recommendations to address these barriers include continue prn medications as ordered.    "

## 2024-10-21 PROBLEM — F33.2: Status: ACTIVE | Noted: 2024-10-21

## 2024-10-21 PROBLEM — F10.20 ALCOHOL USE DISORDER, SEVERE, DEPENDENCE (MULTI): Chronic | Status: ACTIVE | Noted: 2024-10-21

## 2024-10-21 PROBLEM — F17.200 TOBACCO DEPENDENCE: Status: ACTIVE | Noted: 2024-10-21

## 2024-10-21 PROBLEM — F33.1 MODERATE EPISODE OF RECURRENT MAJOR DEPRESSIVE DISORDER: Chronic | Status: ACTIVE | Noted: 2024-10-21

## 2024-10-21 PROBLEM — F41.1 GAD (GENERALIZED ANXIETY DISORDER): Status: ACTIVE | Noted: 2024-10-21

## 2024-10-21 PROCEDURE — 1240000001 HC SEMI-PRIVATE BH ROOM DAILY

## 2024-10-21 PROCEDURE — 2500000001 HC RX 250 WO HCPCS SELF ADMINISTERED DRUGS (ALT 637 FOR MEDICARE OP): Performed by: PSYCHIATRY & NEUROLOGY

## 2024-10-21 PROCEDURE — 2500000002 HC RX 250 W HCPCS SELF ADMINISTERED DRUGS (ALT 637 FOR MEDICARE OP, ALT 636 FOR OP/ED): Performed by: PSYCHIATRY & NEUROLOGY

## 2024-10-21 PROCEDURE — 99232 SBSQ HOSP IP/OBS MODERATE 35: CPT | Performed by: PSYCHIATRY & NEUROLOGY

## 2024-10-21 PROCEDURE — S4991 NICOTINE PATCH NONLEGEND: HCPCS | Performed by: NURSE PRACTITIONER

## 2024-10-21 PROCEDURE — 2500000002 HC RX 250 W HCPCS SELF ADMINISTERED DRUGS (ALT 637 FOR MEDICARE OP, ALT 636 FOR OP/ED): Performed by: NURSE PRACTITIONER

## 2024-10-21 RX ORDER — QUETIAPINE FUMARATE 25 MG/1
12.5 TABLET, FILM COATED ORAL ONCE
Status: COMPLETED | OUTPATIENT
Start: 2024-10-21 | End: 2024-10-21

## 2024-10-21 RX ORDER — FLUVOXAMINE MALEATE 50 MG/1
50 TABLET, FILM COATED ORAL NIGHTLY
Status: DISCONTINUED | OUTPATIENT
Start: 2024-10-21 | End: 2024-10-26

## 2024-10-21 RX ORDER — SERTRALINE HYDROCHLORIDE 100 MG/1
100 TABLET, FILM COATED ORAL DAILY
Status: DISCONTINUED | OUTPATIENT
Start: 2024-10-22 | End: 2024-10-22

## 2024-10-21 RX ORDER — QUETIAPINE FUMARATE 25 MG/1
12.5 TABLET, FILM COATED ORAL EVERY 8 HOURS PRN
Status: DISCONTINUED | OUTPATIENT
Start: 2024-10-21 | End: 2024-10-31 | Stop reason: HOSPADM

## 2024-10-21 RX ORDER — QUETIAPINE FUMARATE 25 MG/1
12.5 TABLET, FILM COATED ORAL
Status: DISCONTINUED | OUTPATIENT
Start: 2024-10-22 | End: 2024-10-22

## 2024-10-21 ASSESSMENT — PAIN - FUNCTIONAL ASSESSMENT
PAIN_FUNCTIONAL_ASSESSMENT: 0-10
PAIN_FUNCTIONAL_ASSESSMENT: 0-10

## 2024-10-21 ASSESSMENT — PAIN SCALES - GENERAL
PAINLEVEL_OUTOF10: 0 - NO PAIN
PAINLEVEL_OUTOF10: 0 - NO PAIN

## 2024-10-21 NOTE — NURSING NOTE
"0800- The pt was calm and cooperative during the interview that took place in the pt's room away from his peers for privacy. The pt rated his anxiety a 7/10, depression 8/10, denies SI, HI and AVH at this time as well as pain rating it a 0/10. Last bowel movement was, \"yesterday\" 10/20/24. Coping skills, \"I don't have any.\" Goal for the day, \"Talk with the .\" Pt strength, \"Just keep rolling with the punches.\" The pt was medication compliant with his morning medications. Q 15 minute monitoring continued.     1651- The pt had an uneventful day. The pt was medication compliant throughout the day. The pt required no PRN medications throughout the day. Q 15 minute monitoring continued.   "

## 2024-10-21 NOTE — GROUP NOTE
Group Topic: Dialectical Behavioral Therapy - Mindfulness   Group Date: 10/21/2024  Start Time: 1600  End Time: 1700  Facilitators: LOPEZ Bloom   Department: [facility] REHAB THERAPY VIRTUAL    Number of Participants: 8   Group Focus: coping skills and mindfulness  Treatment Modality: Recreation Therapy  Interventions utilized were: DBT - Mindfulness - Doing and Being Mind, clarification, exploration, patient education, and support  Purpose: coping skills, maladaptive thinking, feelings, and insight or knowledge    Name: Jonny Valenzuela YOB: 1970   MR: [id number]      Facilitator: Recreational Therapist  Level of Participation: did not attend  Progress: None  Comments: Group involved education on the doing, being, and wise minds.  We worked through the differences providing a variety of examples.  The session also involved ideas for practicing balancing the doing and being minds (readings, reminders, routines, staying in the moment, awareness of events, willingness, and slowing down doing mind step by step). All participants were encouraged to share thoughts, feeling, and ability to practice the coping strategies discussed.     Patient remained in their room throughout the session for unknown reasons.     Plan: continue with services

## 2024-10-21 NOTE — GROUP NOTE
Group Topic: Goals   Group Date: 10/21/2024  Start Time: 0730  End Time: 0815  Facilitators: LOPEZ Bloom   Department: [facility] REHAB THERAPY VIRTUAL    Number of Participants: 9   Group Focus: check in, daily focus, and goals  Treatment Modality: Recreation Therapy  Interventions utilized were: Setting Life Goals (handout), clarification, exploration, orientation, and support  Purpose: Goal Identification, self-care    Name: Jonny Valenzuela YOB: 1970   MR: [id number]      Facilitator: Recreational Therapist  Level of Participation: did not attend  Progress: None  Comments: Patients were provided a handout that included different areas of life (family/friends, leisure lifestyle, work/school, spirituality, physical, and mental health) and encouraged to think about each area and answer questions. Participants were provided examples and assistance to complete the activity. Questions included: what I’m doing well, where I need improvement, and my goals.     Patient remained in their room throughout the session for unknown reasons.    Plan: continue with services

## 2024-10-21 NOTE — GROUP NOTE
Group Topic: Community   Group Date: 10/21/2024  Start Time: 1100  End Time: 1150  Facilitators: SINDHU BloomS   Department: [facility] REHAB THERAPY VIRTUAL    Number of Participants: 11   Group Focus: check in and community group  Treatment Modality: Recreation Therapy, [employer] Volunteer  Interventions utilized were: [employer] community resources, clarification, exploration, patient education, story telling, and support  Purpose: coping skills, insight or knowledge, and self-care    Name: Jonny Valenzuela YOB: 1970   MR: [id number]      Facilitator: Recreational Therapist  Level of Participation: did not attend  Progress: None  Comments: This session involved a volunteer from [employer] ([employer]) providing community based resources for participants to utilize post discharge. The group provided some education on [facility] services in addition to being a support group for those to share their experiences dealing with mental health, being hospitalized, and any additional feedback.      Patient remained in their room throughout the session for unknown reasons.     Plan: continue with services

## 2024-10-21 NOTE — GROUP NOTE
"Group Topic: Music Therapy   Group Date: 10/21/2024  Start Time: 0935  End Time: 1030  Facilitators: Adela Arroyo   Department: [facility] EXPRESSIVE THER VIRTUAL    Number of Participants: 8   Group Focus: expressive outlet, music therapy, opportunity for choice/control, and other emotional outlet and mood modification  Treatment Modality: Music Therapy  Interventions Utilized were: active music engagement, empathic listening/validating emotions, passive music engagement, and sharing/discussion    Participants were given a \"song menu\" and encouraged to choose from a list of 9 options. Participants were provided with beau sheets and encouraged to follow along by actively listening or singing with MT. Opportunities for discussion followed each selection.      Name: Jonny Valenzuela YOB: 1970   MR: [id number]      Level of Participation: did not attend  Plan: follow-up needed and patient will be encouraged to attend future groups      "

## 2024-10-21 NOTE — CARE PLAN
Discussed in Interdisciplinary Treatment team today;  patient still rating depression and anxiety high; Plan to administer MoCA to test for neurocognitive functioning;  [facility] has him at the top, first on their waiting list but do not anticipate a discharge from there for about a week;  Spoke to patient: informed him that [facility] has declined to accept, that he is first on the waiting list for [facility], but can make a referral to [facility] for sober living, chemical dependency living, as a back up plan.  He reluctantly agreed to that.  Will call [facility] asap to see if they have a bed.  Patient has no ADOD yet. Sw to follow.

## 2024-10-21 NOTE — PROGRESS NOTES
"Jonny Valenzuela is a 54 y.o. male on day 6 of admission presenting with Depression with suicidal ideation.      Subjective   The patient was seen and examined. I reviewed the chart and vital signs from overnight. I reviewed previous notes. I reviewed medications, administered overnight and their reported benefits or side effects. Patient reported racing mind, high anxiety most of his life, has been to different doctors, tried different medications, symptoms improve than worsen. He was highly anxious, could not sit still, mind is running \"non stop\" stated valium in past and ativan now area activating him. Stated he has been on zoloft for years and it has not worked.       Objective     Last Recorded Vitals  Blood pressure 115/73, pulse 76, temperature 36.5 °C (97.7 °F), temperature source Temporal, resp. rate 16, height 1.702 m (5' 7.01\"), weight 80.2 kg (176 lb 12.9 oz), SpO2 98%.    Review of Systems    Psychiatric ROS - Adult  Anxiety: General Anxiety Disorder (SHOSHANA)SHOSHANA Behaviors: difficult to control worry, excessive anxiety/worry, difficulty concentrating, easily fatigued, restlessness, and sleep disturbance  Depression: anhedonia  Delirium: negative  Psychosis: negative  Gloria: negative  Safety Issues: none    Physical Exam    Mental Status Exam  General: CM with high anxiety, ex etoh abuse  Appearance: discheveled, in own attire, anxious  Attitude: help seeking  Behavior: good eye contact, motor restless  Motor Activity: no eps or td, motor restless, gait stable  Speech: increased rate due to anxiety, clear tone, volume, fluent  Mood: anxious, \"mind wont stop running\", depression rated 8 and anxiety 7  Affect: highly anxious  Thought Process: grossly organized, becomes disorganized with increased anxiety during the assessment  Thought Content: denied SI, HI, no delusions elicited so far  Thought Perception: denied AH, VH  Cognition: alert and oriented to person place, time  Insight: fair  Judgement: " fair    Psychiatric Risk Assessment  Violence Risk Assessment: lower socioeconomic class, major mental illness, male, and substance abuse  Acute Risk of Harm to Others is Considered: low   Suicide Risk Assessment: , current psychiatric illness, life crisis (shame/despair), living alone or lack of social support, male, and panic attacks  Protective Factors against Suicide: adherence to  treatment and hopefulness/future orientation  Acute Risk of Harm to Self is Considered: low    Relevant Results               Assessment/Plan   Assessment & Plan  Major depressive disorder, recurrent, severe w/o psychotic behavior (Multi)    SHOSHANA (generalized anxiety disorder)    Alcohol use disorder, severe, dependence (Multi)    Tobacco dependence    Cross taper zoloft for luvox  Reduce zoloft to 100mg taper down, start luvox 50mg at bedtime    Continue seroquel 150mg at bedtime for now    Add seroquel 12.5mg 8am, and late afternoon    Stop ativan, hold vistaril and melatonin per prior entry note over weekend activates the patient    [place] when more focused less anxious                  I spent 25 minutes in the professional and overall care of this patient.      Ledy Arbolead MD

## 2024-10-21 NOTE — CARE PLAN
The patient's goals for the shift include ROGER    The clinical goals for the shift include maintain safety    Over the shift, the patient did not make progress toward the following goals. Barriers to progression include acuity of illness. Recommendations to address these barriers include encourage participation in unit activities and maintain pt safety.    Pt had uneventful night. Pt slept well. No changes from previous assessment. Q15 safety checks maintained.

## 2024-10-21 NOTE — NURSING NOTE
"Pt assessed in hallway away from others for privacy. Pt presents as anxious, depressed, and guarded. Rated anxiety and depression \"10/10.\" Denied SI/HI, AVH, and pain. Pt was unable to state a goal for the shift and was unable to state pt's strengths. PRN melatonin 5 mg given at 2115. Pt is cooperative and compliant with medications.   "

## 2024-10-21 NOTE — CARE PLAN
"  Problem: Potential for Harm to Self or Others  Goal: Denies harm toward self or others  Outcome: Progressing     Problem: Alteration in Sleep  Goal: STG - Reports nightly sleep, duration, and quality  Outcome: Progressing     Problem: Fall/Injury  Goal: Be free from injury by end of the shift  Outcome: Progressing   The patient's goals for the shift include \"Talk to the \"    The clinical goals for the shift include medication compliance    Over the shift, the patient did make progress toward the following goals.     "

## 2024-10-21 NOTE — GROUP NOTE
"Group Topic: Leisure Skills   Group Date: 10/21/2024  Start Time: 1400  End Time: 1500  Facilitators: LOPEZ Bloom   Department: [facility] REHAB THERAPY VIRTUAL    Number of Participants: 7   Group Focus: leisure skills, problem solving, and social skills  Treatment Modality: Recreation Therapy  Interventions utilized were: Confident? (Game), leisure development, mental fitness, and problem solving  Purpose: Leisure Awareness/Education, Teamwork, Cognitive/Social Stimulation, Pleasurable Activity    Name: Jonny Valenzuela YOB: 1970   MR: [id number]      Facilitator: Recreational Therapist  Level of Participation: did not attend  Progress: None  Comments: This session involved a leisure activity called \"Confident?\".  The activity involved cognitive tasks, social interactions, healthy competition, leisure awareness, and a pleasurable experience. All participants were encouraged to listen to the rules, ask questions as needed, and participate in the activity to the best of their abilities.      Patient remained in their room throughout the session for unknown reasons.     Plan: continue with services      "

## 2024-10-22 PROCEDURE — 2500000002 HC RX 250 W HCPCS SELF ADMINISTERED DRUGS (ALT 637 FOR MEDICARE OP, ALT 636 FOR OP/ED): Performed by: NURSE PRACTITIONER

## 2024-10-22 PROCEDURE — 2500000002 HC RX 250 W HCPCS SELF ADMINISTERED DRUGS (ALT 637 FOR MEDICARE OP, ALT 636 FOR OP/ED): Performed by: PSYCHIATRY & NEUROLOGY

## 2024-10-22 PROCEDURE — 1240000001 HC SEMI-PRIVATE BH ROOM DAILY

## 2024-10-22 PROCEDURE — 2500000001 HC RX 250 WO HCPCS SELF ADMINISTERED DRUGS (ALT 637 FOR MEDICARE OP): Performed by: PSYCHIATRY & NEUROLOGY

## 2024-10-22 PROCEDURE — S4991 NICOTINE PATCH NONLEGEND: HCPCS | Performed by: NURSE PRACTITIONER

## 2024-10-22 PROCEDURE — 99232 SBSQ HOSP IP/OBS MODERATE 35: CPT | Performed by: PSYCHIATRY & NEUROLOGY

## 2024-10-22 RX ORDER — QUETIAPINE FUMARATE 25 MG/1
25 TABLET, FILM COATED ORAL
Status: DISCONTINUED | OUTPATIENT
Start: 2024-10-22 | End: 2024-10-24

## 2024-10-22 RX ORDER — SERTRALINE HYDROCHLORIDE 50 MG/1
50 TABLET, FILM COATED ORAL ONCE
Status: COMPLETED | OUTPATIENT
Start: 2024-10-23 | End: 2024-10-23

## 2024-10-22 ASSESSMENT — PAIN SCALES - GENERAL
PAINLEVEL_OUTOF10: 0 - NO PAIN
PAINLEVEL_OUTOF10: 0 - NO PAIN

## 2024-10-22 ASSESSMENT — PAIN - FUNCTIONAL ASSESSMENT
PAIN_FUNCTIONAL_ASSESSMENT: CPOT (CRITICAL CARE PAIN OBSERVATION TOOL)
PAIN_FUNCTIONAL_ASSESSMENT: 0-10

## 2024-10-22 NOTE — NURSING NOTE
Pt took his night time medications  He watched TV and went to bed  Pt slept all night long  Pt had an uneventful night

## 2024-10-22 NOTE — GROUP NOTE
Group Topic: Gross Motor/Balance Skills   Group Date: 10/22/2024  Start Time: 1600  End Time: 1700  Facilitators: SINDHU BloomS   Department: [facility] REHAB THERAPY VIRTUAL    Number of Participants: 6   Group Focus: Physical Activity, leisure skills  Treatment Modality: Recreation Therapy  Interventions utilized were: Nintendo Wii ([employer]), Music, leisure development  Purpose: Leisure Awareness/Education, Physical Activity, Social Stimulation, Pleasurable Activity     Name: Jonny Valenzuela YOB: 1970   MR: [id number]      Facilitator: Recreational Therapist  Level of Participation: did not attend  Progress: None  Comments: This session involved participating in movement games via the [employer]. Patient participants were encouraged to practice coordinating motor movements, socialize with peers, and increase leisure awareness. For those participants that don't utilize technology for leisure they will be assisted with cognitive and movement tasks.    Patient remained in their room throughout the session for unknown reasons.     Plan: continue with services

## 2024-10-22 NOTE — NURSING NOTE
"Pt interview in the front [facility]  Pt is eating his snack and watching TV  Pt stated his day was \"fine\"   Pt is incongruent  He is sitting at the table and stated his anxiety 10/10   depression 10/10 and denied everything else at this time  Pt stated his goal \"get sleep\"  his strength \"I like to camp\"  and his coping skill \"I deep breath\"  Pt is appropriate with his answers to the questions at this time    "

## 2024-10-22 NOTE — GROUP NOTE
Group Topic: Chemical Dependency - Dual Diagnosis   Group Date: 10/22/2024  Start Time: 1400  End Time: 1450  Facilitators: SINDHU BloomS   Department: [facility] REHAB THERAPY VIRTUAL    Number of Participants: 9   Group Focus: chemical dependency education, dual diagnosis, personal responsibility, and substance abuse education  Treatment Modality: Recreation Therapy  Interventions utilized were: DBT-Dialectical Abstinence,  clarification, confrontation, exploration, patient education, and support  Purpose: coping skills, maladaptive thinking, self-care, relapse prevention strategies, and trigger / craving management    Name: Jonny Valenzuela YOB: 1970   MR: [id number]      Facilitator: Recreational Therapist  Level of Participation: minimal, left group   Quality of Participation: quiet, withdrawn, and avoidant  Interactions with others:  none, minimal  Mood/Affect:  disinterested  Triggers (if applicable): N/A  Cognition: concrete and capable  Progress: Minimal  Comments: This session involved discussing abstinence vs. harm reduction.  We went through the PROS and CONS of each, and then introduced the concept of dialectical abstinence.  This concept is a blending of abstinence and harm reduction. Participants were provided strategies to work on abstinence and harm reduction.     Mr. Valenzuela attended most of the group but did leave prior to its completion. He lacked engagement both verbally and nonverbally. All information was verbally presented with added prompts and encouragement. He did not share any thoughts about the topics being discussed.     Plan: continue with services

## 2024-10-22 NOTE — NURSING NOTE
"0815- The pt was calm and cooperative during the interview that took place in the pt's room away from his peers for privacy. The pt rated his anxiety and depression both 8/10, denies SI, HI and AVH at this time as well as pain rating it 0/10. Last bowel movement was, \"yesterday\" 10/21/24. Coping skills, \"No, no, none.\" Goal for the day, \"Go to groups at least one. I can't deal being around people like that. I don't like being asked questions in front of other people, I don't like that.\" Pt strengths, \"Just rolling with the punches.\" The pt was medication compliant with his medications. Q 15 minute monitoring continued.     1854- The pt had an uneventful day. The pt was medication compliant with all his medications and did not require any PRN medications. The pt ate at all meals and did attend at least one group to achieve his daily goal. Q 15 minute monitoring continued.   "

## 2024-10-22 NOTE — CARE PLAN
Phoned [facility]: they cannot accept patient as they do not accept [employer] or any Medicare product;  Still stabilizing;  sw to follow.

## 2024-10-22 NOTE — CARE PLAN
"  Problem: Sensory Perceptual Alteration as Evidenced by  Goal: Participates in unit activities  Outcome: Progressing  Flowsheets (Taken 10/22/2024 1853)  Participates in unit activities: Provide therapeutic environment     Problem: Potential for Harm to Self or Others  Goal: Participates in unit activities  Recent Flowsheet Documentation  Taken 10/22/2024 1853 by Genesis Alfonso RN  Participates in unit activities: Provide therapeutic environment     Problem: Ineffective Coping  Goal: Identifies healthy coping skills  Outcome: Progressing  Goal: Participates in unit activities  Recent Flowsheet Documentation  Taken 10/22/2024 1853 by Genesis Alfonso RN  Participates in unit activities: Provide therapeutic environment   The patient's goals for the shift include \"groups, at least 1.\"    The clinical goals for the shift include medication compliance    Over the shift, the patient did make progress toward the following goals.    "

## 2024-10-22 NOTE — PROGRESS NOTES
"Jonny Valenzuela is a 54 y.o. male on day 7 of admission presenting with Depression with suicidal ideation.      Subjective   The patient was seen and examined. I reviewed the chart and vital signs from overnight. I reviewed previous notes. I reviewed medications, administered overnight and their reported benefits or side effects.  Reported slept on seroquel 150mg. Reported anxiety and depression 8 and 8 before am seroquel. After rated mild improvement, mild tired. Wishes to trial higher dose. States has been hyperactive since childhood, could not attend in school. Drank to self medicated anxiety.         Objective     Scheduled medications  fLuvoxaMINE, 50 mg, oral, Nightly  folic acid, 1 mg, oral, Daily  lisinopril, 20 mg, oral, Daily  nicotine, 1 patch, transdermal, Daily  QUEtiapine, 12.5 mg, oral, BID  QUEtiapine, 150 mg, oral, Nightly  sertraline, 100 mg, oral, Daily      Continuous medications     PRN medications  PRN medications: alum-mag hydroxide-simeth, baclofen, diphenhydrAMINE **OR** diphenhydrAMINE, haloperidol **OR** haloperidol lactate, [Held by provider] hydrOXYzine pamoate, ibuprofen, LORazepam **OR** LORazepam, [Held by provider] melatonin, nicotine polacrilex, psyllium, QUEtiapine   Last Recorded Vitals  Blood pressure 113/81, pulse 75, temperature 36.7 °C (98.1 °F), temperature source Temporal, resp. rate 16, height 1.702 m (5' 7.01\"), weight 80.2 kg (176 lb 12.9 oz), SpO2 98%.    Review of Systems    Psychiatric ROS - Adult  Anxiety: General Anxiety Disorder (SHOSHANA)SHOSHANA Behaviors: difficult to control worry, excessive anxiety/worry, and sleep disturbance  Depression: anhedonia  Delirium: negative  Psychosis: negative  Gloria: negative  Safety Issues: none      Physical Exam    General: Appropriately groomed and dressed in hospital attire.  Appearance: Appears stated age.  Attitude: Anxious, cooperative.  Behavior: Appropriate eye contact.  Motor Activity: no eps or td, gait stable, fidgety and restless. " Normal gait and station. Normal muscle tone and bulk.  Speech: Regular rate, rhythm, volume and tone, spontaneous, fluent. Non-pressured.  Mood: anxious  Affect: anxious  Thought Process: Organized, linear, goal directed.  Thought Content: denied SI, denied HI, no true delusions  Thought Perception: Does not endorse auditory or visual hallucinations, does not appear to be responding to hallucinatory stimuli.  Cognition: Alert, oriented x 3. No deficits noted. Patient endorses lapses in memory and periods of confusion, deficits in concentration  Insight: Poor, as patient cannot recognize symptoms of  illness and need for recommended treatments.   Judgment: Impaired, as patient cannot make reasonable decisions about ordinary activities of daily living and necessary medical care recommendations.  Assessment/Plan   Assessment & Plan  Major depressive disorder, recurrent, severe w/o psychotic behavior (Multi)    SHOSHANA (generalized anxiety disorder)    Alcohol use disorder, severe, dependence (Multi)    Tobacco dependence    Depression with suicidal ideation    Moderate episode of recurrent major depressive disorder    PLAN:    Trial increase in Seroquel daytime to 25 mg bid   Dc Zoloft continue to taper up luvox  Continue Seroquel 150 mg at bedtime  Milieu therapy       I spent 25 minutes in the professional and overall care of this patient.      Ledy Arboleda MD

## 2024-10-22 NOTE — PROGRESS NOTES
Occupational Therapy     REHAB Therapy Assessment & Treatment    Patient Name: Jonny Valenzuela  MRN: [id number]  Today's Date: 10/22/2024      Activity Assessment:     Self-Reflection/ Self Image Group: [id number]  Rediscovering Yourself/ Self Confidence and Improvement Group: [id number]  Cognitive Task/ Team Collaboration Group: [id number]    3/3 Groups attended     Pt remains in bed throughout all above groups declining to attend despite max encouragement and personal invite. No PACHECO groups this date. Pt would benefit from continued OT services in order to improve overall self-esteem, personal confidence and positive supports for safe transition at discharge.        Encounter Problems       Encounter Problems (Active)       OT Goals       Pt will demo increased activity level by attending 8-10 groups per week.  (Progressing)       Start:  10/16/24    Expected End:  11/06/24            Pt will explore and ID 1-2 strategies to manage stressors/symptoms of illness/ grief more effectively prior to discharge.  (Progressing)       Start:  10/16/24    Expected End:  11/06/24            Pt will ID/ utilize 1-2 ways to increase balance of activity/ re-involve self in functional daily routines/roles prior to discharge.  (Progressing)       Start:  10/16/24    Expected End:  11/06/24            Pt will explore/ ID 1-2 meaningful leisure activities to improve QOL for post discharge use.  (Progressing)       Start:  10/16/24    Expected End:  11/06/24            Pt will ID 2 community resources/programs to join/attend after D/C to improve their support system (Progressing)       Start:  10/16/24    Expected End:  11/06/24                       Additional Comments:  PACHECO collaborated with patients nurse and charge nurse throughout the day to provide appropriate support and encouragement to attend groups. Pt up on unit when PACHECO left last group of the day. All needs met.

## 2024-10-22 NOTE — GROUP NOTE
Group Topic: Goals   Group Date: 10/22/2024  Start Time: 0730  End Time: 0810  Facilitators: LOPEZ Bloom   Department: [facility] REHAB THERAPY VIRTUAL    Number of Participants: 7   Group Focus: check in, daily focus, and goals  Treatment Modality: Recreation Therapy  Interventions utilized were: Adult Goals, clarification, orientation, and support  Purpose: Goal Identification     Name: Jonny Valenzuela YOB: 1970   MR: [id number]      Facilitator: Recreational Therapist  Level of Participation: did not attend  Progress: None  Comments: This session involved discussing in general terms goals that all adults living in this world could and should work on. Participants were asked to brainstorm goals and share them with the group. Patients were encouraged to create personal goals either based on the identified general goals or decide on one more specific to what they are currently going through.      Patient refused to join group.    Plan: continue with services

## 2024-10-23 PROCEDURE — 2500000002 HC RX 250 W HCPCS SELF ADMINISTERED DRUGS (ALT 637 FOR MEDICARE OP, ALT 636 FOR OP/ED): Performed by: PSYCHIATRY & NEUROLOGY

## 2024-10-23 PROCEDURE — S4991 NICOTINE PATCH NONLEGEND: HCPCS | Performed by: NURSE PRACTITIONER

## 2024-10-23 PROCEDURE — 99232 SBSQ HOSP IP/OBS MODERATE 35: CPT | Performed by: PSYCHIATRY & NEUROLOGY

## 2024-10-23 PROCEDURE — 2500000001 HC RX 250 WO HCPCS SELF ADMINISTERED DRUGS (ALT 637 FOR MEDICARE OP): Performed by: PSYCHIATRY & NEUROLOGY

## 2024-10-23 PROCEDURE — 2500000002 HC RX 250 W HCPCS SELF ADMINISTERED DRUGS (ALT 637 FOR MEDICARE OP, ALT 636 FOR OP/ED): Performed by: NURSE PRACTITIONER

## 2024-10-23 PROCEDURE — 1240000001 HC SEMI-PRIVATE BH ROOM DAILY

## 2024-10-23 ASSESSMENT — PAIN - FUNCTIONAL ASSESSMENT
PAIN_FUNCTIONAL_ASSESSMENT: 0-10

## 2024-10-23 ASSESSMENT — PAIN DESCRIPTION - LOCATION: LOCATION: HEAD

## 2024-10-23 ASSESSMENT — PAIN DESCRIPTION - DESCRIPTORS: DESCRIPTORS: BURNING

## 2024-10-23 ASSESSMENT — COLUMBIA-SUICIDE SEVERITY RATING SCALE - C-SSRS
6. HAVE YOU EVER DONE ANYTHING, STARTED TO DO ANYTHING, OR PREPARED TO DO ANYTHING TO END YOUR LIFE?: NO
2. HAVE YOU ACTUALLY HAD ANY THOUGHTS OF KILLING YOURSELF?: NO
1. SINCE LAST CONTACT, HAVE YOU WISHED YOU WERE DEAD OR WISHED YOU COULD GO TO SLEEP AND NOT WAKE UP?: NO

## 2024-10-23 ASSESSMENT — PAIN SCALES - GENERAL
PAINLEVEL_OUTOF10: 0 - NO PAIN
PAINLEVEL_OUTOF10: 0 - NO PAIN
PAINLEVEL_OUTOF10: 5 - MODERATE PAIN
PAINLEVEL_OUTOF10: 8

## 2024-10-23 NOTE — CARE PLAN
"The patient's goals for the shift include \"get a good nights sleep\"    The clinical goals for the shift include maintain safety    Over the shift, the patient did make progress toward the following goals. Barriers to progression include acuity of illness. Recommendations to address these barriers include continue medication compliance and encourage participation in unit activities.      Problem: Sensory Perceptual Alteration as Evidenced by  Goal: Cooperates with admission process  Outcome: Progressing  Goal: Patient/Family participate in treatment and discharge plans  Outcome: Progressing  Goal: Patient/Family verbalizes awareness of resources  Outcome: Progressing  Goal: Participates in unit activities  Outcome: Progressing  Goal: Discusses signs/symptoms of illness/treatment options  Outcome: Progressing  Goal: Initiates reality-based interactions  Outcome: Progressing  Goal: Will not act on psychotic perception  Outcome: Progressing  Goal: Understands least restrictive measures  Outcome: Progressing  Goal: Free from restraint events  Outcome: Progressing     Problem: Altered Thought Processes as Evidenced by  Goal: STG - Desires improvement in ability to think and concentrate  Outcome: Progressing  Goal: STG - Participates in Occupational Therapy and other cognitive assessments  Outcome: Progressing     Problem: Potential for Harm to Self or Others  Goal: Cooperates with admission process  Outcome: Progressing  Goal: Participates in unit activities  Outcome: Progressing  Goal: Patient/Family participate in treatment and discharge plans  Outcome: Progressing  Goal: Identifies deescalation techniques  Outcome: Progressing  Goal: Understands least restrictive measures  Outcome: Progressing  Goal: Identifies stressors that lead to harmful behaviors  Outcome: Progressing  Goal: Notifies staff when experiencing harmful thoughts toward self/others  Outcome: Progressing  Goal: Denies harm toward self or others  Outcome: " Progressing  Goal: Free from restraint events  Outcome: Progressing     Problem: Ineffective Coping  Goal: Cooperates with admission process  Outcome: Progressing  Goal: Identifies ineffective coping skills  Outcome: Progressing  Goal: Identifies healthy coping skills  Outcome: Progressing  Goal: Demonstrates healthy coping skills  Outcome: Progressing  Goal: Participates in unit activities  Outcome: Progressing  Goal: Patient/Family participate in treatment and discharge plans  Outcome: Progressing  Goal: Patient/Family verbalizes awareness of resources  Outcome: Progressing  Goal: Understands least restrictive measures  Outcome: Progressing  Goal: Free from restraint events  Outcome: Progressing     Problem: Alteration in Sleep  Goal: STG - Reports nightly sleep, duration, and quality  Outcome: Progressing  Goal: STG - Identifies sleep hygiene aids  Outcome: Progressing  Goal: STG - Informs staff if unable to sleep  Outcome: Progressing  Goal: STG - Attends breathing and relaxation group  Outcome: Progressing     Problem: Potential for Substance Withdrawal  Goal: Verbalizes signs/symptoms of withdrawal  Outcome: Progressing  Goal: Reports signs/symptoms of withdrawal  Outcome: Progressing  Goal: Free of withdrawal symptoms  Outcome: Progressing     Problem: Anxiety  Goal: Patient/family understands admission protocols  Outcome: Progressing  Goal: Attempts to manage anxiety with help  Outcome: Progressing  Goal: Verbalizes ways to manage anxiety  Outcome: Progressing  Goal: Implements measures to reduce anxiety  Outcome: Progressing  Goal: Free from restraint events  Outcome: Progressing     Problem: Self Care Deficit  Goal: STG - Patient completes hygiene  Outcome: Progressing  Goal: Increase group attendance  Outcome: Progressing  Goal: Accepts need for medications  Outcome: Progressing  Goal: STG - Goes to and eats meals independently in dining room 100% of time  Outcome: Progressing     Problem: Defensive  Coping  Goal: Cooperates with admission process  Outcome: Progressing  Goal: Identifies reckless/dangerous behavior  Outcome: Progressing  Goal: Identifies stressors that lead to reckless/dangerous behavior  Outcome: Progressing  Goal: Discusses and identifies healthy coping skills  Outcome: Progressing  Goal: Demonstrates healthy coping skills  Outcome: Progressing  Goal: Identifies appropriate social interaction  Outcome: Progressing  Goal: Demonstrates appropriate social interactions  Outcome: Progressing  Goal: Patient/Family verbalizes awareness of resources  Outcome: Progressing  Goal: Discusses signs/symptoms of illness/treatment options  Outcome: Progressing  Goal: Patient/Family participate in treatment and discharge plans  Outcome: Progressing  Goal: Understands least restrictive measures  Outcome: Progressing  Goal: Free from restraint events  Outcome: Progressing

## 2024-10-23 NOTE — PROGRESS NOTES
"Jonny Valenzuela is a 54 y.o. male on day 8 of admission presenting with Depression with suicidal ideation.      Subjective   The patient was seen and examined. I reviewed the chart and vital signs from overnight. I reviewed previous notes. I reviewed medications, administered overnight and their reported benefits or side effects.  Reported slept on seroquel 150mg. Reported anxiety and depression high with anxiety bit better with added daytime seroquel. States he is trying to go to group today feels very self conscious as he can't read or write. Was shaky when interviewing. Goal is to attend group allow med to work. We are still looking in to [facility] for dc disposition per patient request.         Objective     Scheduled medications  fLuvoxaMINE, 50 mg, oral, Nightly  folic acid, 1 mg, oral, Daily  lisinopril, 20 mg, oral, Daily  nicotine, 1 patch, transdermal, Daily  QUEtiapine, 150 mg, oral, Nightly  QUEtiapine, 25 mg, oral, BID      Continuous medications     PRN medications  PRN medications: alum-mag hydroxide-simeth, baclofen, diphenhydrAMINE **OR** diphenhydrAMINE, haloperidol **OR** haloperidol lactate, ibuprofen, LORazepam **OR** LORazepam, nicotine polacrilex, psyllium, QUEtiapine   Last Recorded Vitals  Blood pressure 115/77, pulse 68, temperature 36.7 °C (98.1 °F), temperature source Temporal, resp. rate 16, height 1.702 m (5' 7.01\"), weight 80.2 kg (176 lb 12.9 oz), SpO2 97%.    Review of Systems    Psychiatric ROS - Adult  Anxiety: General Anxiety Disorder (SHOSHANA)SHOSHANA Behaviors: difficult to control worry, excessive anxiety/worry, and sleep disturbance  Depression: anhedonia  Delirium: negative  Psychosis: negative  Gloria: negative  Safety Issues: none      Physical Exam  General: Appropriately groomed and dressed in hospital attire.  Appearance: Appears stated age.  Attitude: Anxious, cooperative.  Behavior: Appropriate eye contact.  Motor Activity: no eps or td, gait stable, not fidgety or " restless. Normal gait and station. Normal muscle tone and bulk.  Speech: Regular rate, rhythm, volume and tone, spontaneous, fluent. Non-pressured.  Mood: anxious  Affect: anxious  Thought Process: Organized, linear, goal directed.  Thought Content: denied SI, denied HI, no true delusions  Thought Perception: Does not endorse auditory or visual hallucinations, does not appear to be responding to hallucinatory stimuli.  Cognition: Alert, oriented x 3. No deficits noted. Patient endorses lapses in memory and periods of confusion, deficits in concentration  Insight: Poor, as patient cannot recognize symptoms of  illness and need for recommended treatments.   Judgment: Impaired, as patient cannot make reasonable decisions about ordinary activities of daily living and necessary medical care recommendations.    Assessment/Plan   Assessment & Plan  Major depressive disorder, recurrent, severe w/o psychotic behavior (Multi)    SHOSHANA (generalized anxiety disorder)    Alcohol use disorder, severe, dependence (Multi)    Tobacco dependence    Depression with suicidal ideation    Moderate episode of recurrent major depressive disorder    PLAN:    Trial increase in Seroquel daytime to 25 mg bid   Dc Zoloft continue to taper up luvox  Continue Seroquel 150 mg at bedtime  Milieu therapy   10/23: continue to monitor sleep, daytime anxiety    I spent 25 minutes in the professional and overall care of this patient.      Ledy Arboleda MD

## 2024-10-23 NOTE — NURSING NOTE
"0815- The pt was calm and cooperative during the interview that took place in the hallway away from his peers for privacy. The pt rated his anxiety 7/10, depression 10/10, denies SI, HI and AVH at this time as well as pain rating it a 0/10. Last bowel movement was, \"yesterday\" 10/22/24. Coping skills, \"groups, I went to 1/2 a group yesterday.\" Goal for the day, \"Go to the groups.\" Pt strength, \"Rolling with the punches.\" The pt was medication compliant with his morning medications. Q 15 minute monitoring continued.     1813- The pt had an uneventful day. The pt was medication compliant and only required PRN Ibuprofen 600 mg for a headache. Q 15 minute monitoring continued.   "

## 2024-10-23 NOTE — GROUP NOTE
Group Topic: Leisure Skills   Group Date: 10/23/2024  Start Time: 1400  End Time: 1450  Facilitators: Cas Holguin CTRS   Department: [facility] REHAB THERAPY VIRTUAL    Number of Participants: 6   Group Focus: coping skills, feeling awareness/expression, leisure skills, and self-awareness  Treatment Modality: Recreation Therapy  Interventions utilized were: Leisure Coat of Arms, exploration, leisure development, story telling, and support  Purpose: Leisure Awareness/Education, coping skills, insight or knowledge, and self-worth    Name: Jonny Valenzuela YOB: 1970   MR: [id number]      Facilitator: Recreational Therapist  Level of Participation: moderate  Quality of Participation: appropriate/pleasant, cooperative, engaged, and superficial  Interactions with others: appropriate  Mood/Affect: appropriate and brightens with interaction  Triggers (if applicable): N/A  Cognition: concrete and capable  Progress: Moderate  Comments: This session involved using creative ways to answer six questions to evaluate and heighten awareness to one’s leisure/recreational lifestyle. It included identifying two things you love to do, two people that have influenced you, the place you had a positive or memorable experience, two things you enjoy to do with family/friends/or support, three things you value most, and three words you would want others to say about you. Participants were asked to share and discuss the importance of a healthy leisure lifestyle.      Mr. Valenzuela attended the entire session and completed all desired group tasks needing minimal assistance. Patient needed encouragement and each of the questions repeated verbally to him at times during the session so he could complete the imagery/answers (illiterate). Patient was able to visualize his thoughts and answered all questions. He lacked engagement with the group but was willing to share all of his answers with this [facility].     Plan: continue with  services

## 2024-10-23 NOTE — NURSING NOTE
"Pt assessed in hallway away from others for privacy. Pt presents as anxious, depressed, and cooperative. Rated anxiety and depression \"7/10.\" Denied SI/HI, AVH, and pain. Pt's goal for the shift is to \"get a good nights sleep\" and stated his strengths are \"I tried to go to group today\" and \"I keep rolling with the punches.\" Pt was unable to state positive coping skills. Pt is medication compliant.   "

## 2024-10-23 NOTE — GROUP NOTE
"Group Topic: Stress Reduction/Relaxation   Group Date: 10/23/2024  Start Time: 1600  End Time: 1645  Facilitators: LOPEZ Bloom   Department: [facility] REHAB THERAPY VIRTUAL    Number of Participants: 7   Group Focus: coping skills, mindfulness, and relaxation  Treatment Modality: Recreation Therapy  Interventions utilized were: Meditation for Beginners, Progressive Muscle Relaxation, 20 minutes Guided Meditation PMR (video/audio), exploration and patient education  Purpose: Relaxation Techniques, coping skills and self-care    Name: Jonny Valenzuela YOB: 1970   MR: [id number]      Facilitator: Recreational Therapist  Level of Participation: moderate  Quality of Participation: cooperative, engaged, and initiates communication  Interactions with others: appropriate  Mood/Affect: appropriate and calm  Triggers (if applicable): N/A  Cognition: concrete and capable  Progress: Moderate  Comments: This session involved discussing relaxation techniques and providing education on beginning a meditation practice and utilizing progressive muscle relaxation.  Participants were provided examples and practiced a 20 minute guided PMR meditation.     Patient attended the entire session and completed most desired group tasks. He was observed trying the meditation but never appeared fully committed or relaxed. He shared after the practice that while he was in the woods he had \"put up a hammock\" and found that to be very relaxing for him.     Plan: continue with services      "

## 2024-10-23 NOTE — GROUP NOTE
"Group Topic: Goals   Group Date: 10/23/2024  Start Time: 0730  End Time: 0810  Facilitators: LOPEZ Bloom   Department: [facility] REHAB THERAPY VIRTUAL    Number of Participants: 7   Group Focus: happiness, check in, daily focus, and goals  Treatment Modality: Recreation Therapy  Interventions utilized were: 12 Steps to Happiness, exploration, orientation, and support  Purpose: Goal Identification, coping skills, self-worth, and self-care    Name: Jonny Valenzuela YOB: 1970   MR: [id number]      Facilitator: Recreational Therapist  Level of Participation: minimal, arrived late  Quality of Participation: cooperative and quiet  Interactions with others:  minimal  Mood/Affect: blunted and closed / guarded  Triggers (if applicable): N/A  Cognition: concrete and capable  Progress: Minimal  Comments: This session focused on \"12 Steps to Happiness\". Participants were asked to reflect on each of the steps and share how they currently practice them or how they may set goals to incorporate them into routines. The steps included practicing thanks, give/receive, help, eat well, exercise, rest, experience leisure, hike, meditate, socialize, aim/set goals, and smile.      Patient joined the group after it had already started. He was provided a recap of the happiness areas and encouraged to make a goal related to one or more of them. Patient identified a happiness idea stating, \"when I have money in my pockets\", in response to a question related to things that makes one happy/smile.     Plan: continue with services      "

## 2024-10-23 NOTE — PROGRESS NOTES
"Occupational Therapy     REHAB Therapy Assessment & Treatment    Patient Name: Jonny Valenzuela  MRN: [id number]  Today's Date: 10/23/2024      Activity Assessment:     Self-Care and [place] Setting Group: [phone]  Self-Esteem and Humor as a Coping Tool Group: [id number]  Perception and Leisure Activities Group: [id number]    0/3 Groups attended     Pt continues to refuse to attend groups this date replying with \"I dont feel like it, maybe later\" Pt provided with max encouragement however, continues to decline. Pt would benefit from continued OT services in order to improve overall self-esteem, personal confidence and positive supports for safe transition at discharge.        Encounter Problems       Encounter Problems (Active)       OT Goals       Pt will demo increased activity level by attending 8-10 groups per week.  (Not Progressing)       Start:  10/16/24    Expected End:  11/06/24            Pt will explore and ID 1-2 strategies to manage stressors/symptoms of illness/ grief more effectively prior to discharge.  (Not Progressing)       Start:  10/16/24    Expected End:  11/06/24            Pt will ID/ utilize 1-2 ways to increase balance of activity/ re-involve self in functional daily routines/roles prior to discharge.  (Not Progressing)       Start:  10/16/24    Expected End:  11/06/24            Pt will explore/ ID 1-2 meaningful leisure activities to improve QOL for post discharge use.  (Not Progressing)       Start:  10/16/24    Expected End:  11/06/24            Pt will ID 2 community resources/programs to join/attend after D/C to improve their support system (Not Progressing)       Start:  10/16/24    Expected End:  11/06/24                       Additional Comments:    PACHECO collaborated with patients nurse and charge nurse throughout the day to provide appropriate support and encouragement to attend groups. Pt up on unit when PACHECO left last group of the day. All needs met.    "

## 2024-10-24 PROCEDURE — S4991 NICOTINE PATCH NONLEGEND: HCPCS | Performed by: NURSE PRACTITIONER

## 2024-10-24 PROCEDURE — 2500000001 HC RX 250 WO HCPCS SELF ADMINISTERED DRUGS (ALT 637 FOR MEDICARE OP): Performed by: PSYCHIATRY & NEUROLOGY

## 2024-10-24 PROCEDURE — 2500000002 HC RX 250 W HCPCS SELF ADMINISTERED DRUGS (ALT 637 FOR MEDICARE OP, ALT 636 FOR OP/ED): Performed by: NURSE PRACTITIONER

## 2024-10-24 PROCEDURE — 2500000002 HC RX 250 W HCPCS SELF ADMINISTERED DRUGS (ALT 637 FOR MEDICARE OP, ALT 636 FOR OP/ED): Performed by: PSYCHIATRY & NEUROLOGY

## 2024-10-24 PROCEDURE — 99232 SBSQ HOSP IP/OBS MODERATE 35: CPT | Performed by: PSYCHIATRY & NEUROLOGY

## 2024-10-24 PROCEDURE — 1240000001 HC SEMI-PRIVATE BH ROOM DAILY

## 2024-10-24 RX ORDER — QUETIAPINE FUMARATE 25 MG/1
12.5 TABLET, FILM COATED ORAL 3 TIMES DAILY
Status: DISCONTINUED | OUTPATIENT
Start: 2024-10-24 | End: 2024-10-28

## 2024-10-24 ASSESSMENT — PAIN - FUNCTIONAL ASSESSMENT
PAIN_FUNCTIONAL_ASSESSMENT: 0-10
PAIN_FUNCTIONAL_ASSESSMENT: 0-10

## 2024-10-24 ASSESSMENT — PAIN SCALES - GENERAL
PAINLEVEL_OUTOF10: 0 - NO PAIN
PAINLEVEL_OUTOF10: 0 - NO PAIN

## 2024-10-24 NOTE — NURSING NOTE
Pt slept through the night. No PRNs given. No changes from previous assessment. Q15 safety checks maintained. No new orders to carry out at this time.

## 2024-10-24 NOTE — NURSING NOTE
"Pt assessed in hallway away from others for privacy. Pt presents as anxious and cooperative. Rated anxiety and depression \"7/10.\" Denied SI/HI and AVH. Rated upper abdomen pain \"8/10\" and described it as \"burning.\" PRN Mylanta given at 2024. Pt's goal for the shift is to \"get a good nights sleep.\" Pt stated strength is \"I keep rolling with the punches and hopefully things will get better.\" Pt stated \"not so much\" when asked about positive coping skills. Pt is medication compliant.   "

## 2024-10-24 NOTE — GROUP NOTE
"Group Topic: Spiritual/Devotional/Thought of the Day   Group Date: 10/24/2024  Start Time: 0730  End Time: 0800  Facilitators: LOPEZ Santacruz   Department: [facility] REHAB THERAPY VIRTUAL    Number of Participants: 4   Group Focus: daily focus, depression, personal responsibility, and social skills  Treatment Modality: Recreational Therapy   Interventions utilized were Thought - \"My actions today should reflect my concerns and be appropriate to the need\", exploration, patient education, story telling, and support  Purpose: insight or knowledge, self-worth, and self-care    Name: Jonny Valenzuela YOB: 1970   MR: [id number]      Facilitator: Recreational Therapist  Level of Participation: did not attend  Progress: None  Comments: Patients were provided with the Thought of the Day reading - “My actions today should reflect my concerns and be appropriate to the need.” Patients were given an opportunity to share their thoughts and encouraged to create a personal goal based on the reading.      Patient declined invitation to group activity at this time. Patient will continue to be provided with opportunities to enhance leisure skills and/or coping mechanisms.    Plan: continue with services      "

## 2024-10-24 NOTE — CARE PLAN
Phoned [facility]; Riaz Mesa stated no beds open yet and stated he hopes 1 samson will be leaving soon and patient can take his bed; Patient has declined any shelters offered, and has also declined [address] houses in [place], stated he will not go to stay in [place].  Patient is still stabilizing; no ADOD yet, maybe this Monday, 10/28;  Sw to follow.

## 2024-10-24 NOTE — PROGRESS NOTES
Occupational Therapy     REHAB Therapy Assessment & Treatment    Patient Name: Jonny Valenzuela  MRN: [id number]  Today's Date: 10/24/2024      Activity Assessment:     Emotional Baggage Group: [id number]  Gratitude and Letting Go Group: [id number]    0/2 Groups attended     Pt continues to be seclusive to his room/bed and declines to attempt group participation again this date. No PACHECO groups. Pt would benefit from continued OT services in order to improve overall self-esteem, personal confidence and positive supports for safe transition at discharge.      Encounter Problems       Encounter Problems (Active)       OT Goals       Pt will demo increased activity level by attending 8-10 groups per week.  (Not Progressing)       Start:  10/16/24    Expected End:  11/06/24            Pt will explore and ID 1-2 strategies to manage stressors/symptoms of illness/ grief more effectively prior to discharge.  (Not Progressing)       Start:  10/16/24    Expected End:  11/06/24            Pt will ID/ utilize 1-2 ways to increase balance of activity/ re-involve self in functional daily routines/roles prior to discharge.  (Not Progressing)       Start:  10/16/24    Expected End:  11/06/24            Pt will explore/ ID 1-2 meaningful leisure activities to improve QOL for post discharge use.  (Not Progressing)       Start:  10/16/24    Expected End:  11/06/24            Pt will ID 2 community resources/programs to join/attend after D/C to improve their support system (Not Progressing)       Start:  10/16/24    Expected End:  11/06/24                         Additional Comments:  PACHECO collaborated with patients nurse and charge nurse throughout the day to provide appropriate support and encouragement to attend groups. Pt up on unit when PACHECO left last group of the day. All needs met.

## 2024-10-24 NOTE — NURSING NOTE
"0815- The pt was calm and cooperative during the interview that took place in the pt's room away from his peers for privacy. The pt rated his anxiety a 5/10, depression a 7/10, denies SI, HI and AVH at this time as well as pain rating it a 0/10. Last bowel movement was, \"yesterday\" 10/23/24. Coping skills, \"Telling myself my problems are not a big deal.\" Goal for the day, \"Go to groups, I went to two yesterday.\" Pt strength, \"Morrison at pushing stuff away.\" The pt was medication compliant with his morning medications. Q 15 minute monitoring continued.     1851- The pt had an uneventful day. The pt attended groups, ate at all meals and was medication compliant. Q 15 minute monitoring continued.   "

## 2024-10-24 NOTE — CARE PLAN
"The patient's goals for the shift include \"get a good nights sleep\"    The clinical goals for the shift include maintain pt safety    Over the shift, the patient did make progress toward the following goals. Barriers to progression include acuity of illness. Recommendations to address these barriers include encourage participation in unit activities.      Problem: Sensory Perceptual Alteration as Evidenced by  Goal: Cooperates with admission process  Outcome: Progressing  Goal: Patient/Family participate in treatment and discharge plans  Outcome: Progressing  Goal: Patient/Family verbalizes awareness of resources  Outcome: Progressing  Goal: Participates in unit activities  Outcome: Progressing  Goal: Discusses signs/symptoms of illness/treatment options  Outcome: Progressing  Goal: Initiates reality-based interactions  Outcome: Progressing  Goal: Will not act on psychotic perception  Outcome: Progressing  Goal: Understands least restrictive measures  Outcome: Progressing  Goal: Free from restraint events  Outcome: Progressing     Problem: Altered Thought Processes as Evidenced by  Goal: STG - Desires improvement in ability to think and concentrate  Outcome: Progressing  Goal: STG - Participates in Occupational Therapy and other cognitive assessments  Outcome: Progressing     Problem: Potential for Harm to Self or Others  Goal: Cooperates with admission process  Outcome: Progressing  Goal: Participates in unit activities  Outcome: Progressing  Goal: Patient/Family participate in treatment and discharge plans  Outcome: Progressing  Goal: Identifies deescalation techniques  Outcome: Progressing  Goal: Understands least restrictive measures  Outcome: Progressing  Goal: Identifies stressors that lead to harmful behaviors  Outcome: Progressing  Goal: Notifies staff when experiencing harmful thoughts toward self/others  Outcome: Progressing  Goal: Denies harm toward self or others  Outcome: Progressing  Goal: Free from " restraint events  Outcome: Progressing     Problem: Ineffective Coping  Goal: Cooperates with admission process  Outcome: Progressing  Goal: Identifies ineffective coping skills  Outcome: Progressing  Goal: Identifies healthy coping skills  Outcome: Progressing  Goal: Demonstrates healthy coping skills  Outcome: Progressing  Goal: Participates in unit activities  Outcome: Progressing  Goal: Patient/Family participate in treatment and discharge plans  Outcome: Progressing  Goal: Patient/Family verbalizes awareness of resources  Outcome: Progressing  Goal: Understands least restrictive measures  Outcome: Progressing  Goal: Free from restraint events  Outcome: Progressing     Problem: Alteration in Sleep  Goal: STG - Reports nightly sleep, duration, and quality  Outcome: Progressing  Goal: STG - Identifies sleep hygiene aids  Outcome: Progressing  Goal: STG - Informs staff if unable to sleep  Outcome: Progressing  Goal: STG - Attends breathing and relaxation group  Outcome: Progressing     Problem: Potential for Substance Withdrawal  Goal: Verbalizes signs/symptoms of withdrawal  Outcome: Progressing  Goal: Reports signs/symptoms of withdrawal  Outcome: Progressing  Goal: Free of withdrawal symptoms  Outcome: Progressing     Problem: Anxiety  Goal: Patient/family understands admission protocols  Outcome: Progressing  Goal: Attempts to manage anxiety with help  Outcome: Progressing  Goal: Verbalizes ways to manage anxiety  Outcome: Progressing  Goal: Implements measures to reduce anxiety  Outcome: Progressing  Goal: Free from restraint events  Outcome: Progressing     Problem: Self Care Deficit  Goal: STG - Patient completes hygiene  Outcome: Progressing  Goal: Increase group attendance  Outcome: Progressing  Goal: Accepts need for medications  Outcome: Progressing  Goal: STG - Goes to and eats meals independently in dining room 100% of time  Outcome: Progressing     Problem: Defensive Coping  Goal: Cooperates with  admission process  Outcome: Progressing  Goal: Identifies reckless/dangerous behavior  Outcome: Progressing  Goal: Identifies stressors that lead to reckless/dangerous behavior  Outcome: Progressing  Goal: Discusses and identifies healthy coping skills  Outcome: Progressing  Goal: Demonstrates healthy coping skills  Outcome: Progressing  Goal: Identifies appropriate social interaction  Outcome: Progressing  Goal: Demonstrates appropriate social interactions  Outcome: Progressing  Goal: Patient/Family verbalizes awareness of resources  Outcome: Progressing  Goal: Discusses signs/symptoms of illness/treatment options  Outcome: Progressing  Goal: Patient/Family participate in treatment and discharge plans  Outcome: Progressing  Goal: Understands least restrictive measures  Outcome: Progressing  Goal: Free from restraint events  Outcome: Progressing

## 2024-10-24 NOTE — GROUP NOTE
Group Topic: Dialectical Behavioral Therapy - Emotional Regulation   Group Date: 10/24/2024  Start Time: 1600  End Time: 1730  Facilitators: LOPEZ Santacruz   Department: [facility] REHAB THERAPY VIRTUAL    Number of Participants: 8   Group Focus: anxiety, coping skills, depression, impulsivity, and psychiatric education  Treatment Modality: Recreational Therapy   Interventions utilized were DBT - ER - (PLEASE, (+) Events, Opposite Action, Check the Facts), exploration, patient education, and support  Purpose: coping skills and insight or knowledge    Name: Jonny Valenzuela YOB: 1970   MR: [id number]      Facilitator: Recreational Therapist  Level of Participation: minimal  Quality of Participation: appropriate/pleasant, cooperative, passive, and quiet  Interactions with others: appropriate  Mood/Affect: appropriate and brightens with interaction  Triggers (if applicable): n/a  Cognition: concrete  Progress: Moderate  Comments: Patients were provided with an Emotion Regulation handout which includes four skill areas (P.L.E.A.S.E, paying attention to positive events, opposite action, and check the facts). We discussed the concepts in each area and patients were asked to create goals or reflect on their personal status.    Pt was calm, cooperative, and pleasant throughout session. He did not engage in overall discussion, but appeared to be actively listening.     Plan: continue with services

## 2024-10-24 NOTE — PROGRESS NOTES
"Jonny Valenzuela is a 54 y.o. male on day 9 of admission presenting with Depression with suicidal ideation.      Subjective   The patient was seen and examined. I reviewed the chart and vital signs from overnight. I reviewed previous notes. I reviewed medications, administered overnight and their reported benefits or side effects.  Reported had trouble falling asleep due to worry about dc. Stated felt dizzy today despite drinking 2 quarts water. Did not attend group due to feeling dizzy.       Objective     Scheduled medications  fLuvoxaMINE, 50 mg, oral, Nightly  folic acid, 1 mg, oral, Daily  lisinopril, 20 mg, oral, Daily  nicotine, 1 patch, transdermal, Daily  QUEtiapine, 150 mg, oral, Nightly  QUEtiapine, 25 mg, oral, BID      Continuous medications     PRN medications  PRN medications: alum-mag hydroxide-simeth, baclofen, diphenhydrAMINE **OR** diphenhydrAMINE, haloperidol **OR** haloperidol lactate, ibuprofen, LORazepam **OR** LORazepam, nicotine polacrilex, psyllium, QUEtiapine   Last Recorded Vitals  Blood pressure 106/67, pulse 78, temperature 36.6 °C (97.9 °F), temperature source Temporal, resp. rate 16, height 1.702 m (5' 7.01\"), weight 80.2 kg (176 lb 12.9 oz), SpO2 98%.    Review of Systems    Psychiatric ROS - Adult  Anxiety: General Anxiety Disorder (SHOSHANA)SHOSHANA Behaviors: difficult to control worry, excessive anxiety/worry, and sleep disturbance  Depression: anhedonia  Delirium: negative  Psychosis: negative  Gloria: negative  Safety Issues: none      Physical Exam  General: Appropriately groomed and dressed in hospital attire.  Appearance: Appears stated age.  Attitude: Anxious, cooperative.  Behavior: Appropriate eye contact.  Motor Activity: no eps or td, gait stable, not fidgety or restless. Normal gait and station. Normal muscle tone and bulk.  Speech: Regular rate, rhythm, volume and tone, spontaneous, fluent. Non-pressured.  Mood: anxious again today  Affect: anxious  Thought Process: Organized, linear, " goal directed.  Thought Content: denied SI, denied HI, no true delusions  Thought Perception: Does not endorse auditory or visual hallucinations, does not appear to be responding to hallucinatory stimuli.  Cognition: Alert, oriented x 3. No deficits noted. Patient endorses lapses in memory and periods of confusion, deficits in concentration  Insight: Poor, as patient cannot recognize symptoms of  illness and need for recommended treatments.   Judgment: Impaired, as patient cannot make reasonable decisions about ordinary activities of daily living and necessary medical care recommendations.    Assessment/Plan   Assessment & Plan  Major depressive disorder, recurrent, severe w/o psychotic behavior (Multi)    SHOSHANA (generalized anxiety disorder)    Alcohol use disorder, severe, dependence (Multi)    Tobacco dependence    Depression with suicidal ideation    Moderate episode of recurrent major depressive disorder    PLAN:    Trial increase in Seroquel daytime to 25 mg bid   Dc Zoloft continue to taper up luvox  Continue Seroquel 150 mg at bedtime  Milieu therapy   10/23: continue to monitor sleep, daytime anxiety  10/24: reduce seroquel to 12.5mg tid    I spent 25 minutes in the professional and overall care of this patient.      Ledy Arboleda MD

## 2024-10-24 NOTE — GROUP NOTE
"Group Topic: Art Creative   Group Date: 10/24/2024  Start Time: 1400  End Time: 1500  Facilitators: SINDHU SantacruzS   Department: [facility] REHAB THERAPY VIRTUAL    Number of Participants: 6   Group Focus: concentration, leisure skills, and social skills  Treatment Modality: Recreational Therapy   Interventions utilized were Sand Art, exploration and leisure development  Purpose: other: creative expression, leisure awareness, social engagement     Name: Jonny Valenzuela YOB: 1970   MR: [id number]      Facilitator: Recreational Therapist  Level of Participation: did not attend  Progress: None  Comments: Patients were gathered to participate in designing \"Sand Art\" Pictures. This activity works on creative expression, fine motor skills, following directions, positive social interaction, and leisure awareness.    Patient declined invitation to group activity at this time. Patient will continue to be provided with opportunities to enhance leisure skills and/or coping mechanisms.    Plan: continue with services      "

## 2024-10-25 PROCEDURE — 99232 SBSQ HOSP IP/OBS MODERATE 35: CPT | Performed by: PSYCHIATRY & NEUROLOGY

## 2024-10-25 PROCEDURE — 2500000002 HC RX 250 W HCPCS SELF ADMINISTERED DRUGS (ALT 637 FOR MEDICARE OP, ALT 636 FOR OP/ED): Performed by: NURSE PRACTITIONER

## 2024-10-25 PROCEDURE — S4991 NICOTINE PATCH NONLEGEND: HCPCS | Performed by: NURSE PRACTITIONER

## 2024-10-25 PROCEDURE — 2500000002 HC RX 250 W HCPCS SELF ADMINISTERED DRUGS (ALT 637 FOR MEDICARE OP, ALT 636 FOR OP/ED): Performed by: PSYCHIATRY & NEUROLOGY

## 2024-10-25 PROCEDURE — 1240000001 HC SEMI-PRIVATE BH ROOM DAILY

## 2024-10-25 PROCEDURE — 2500000001 HC RX 250 WO HCPCS SELF ADMINISTERED DRUGS (ALT 637 FOR MEDICARE OP): Performed by: PSYCHIATRY & NEUROLOGY

## 2024-10-25 RX ORDER — ACETAMINOPHEN 500 MG
5 TABLET ORAL NIGHTLY
Status: DISCONTINUED | OUTPATIENT
Start: 2024-10-25 | End: 2024-10-31 | Stop reason: HOSPADM

## 2024-10-25 ASSESSMENT — PAIN - FUNCTIONAL ASSESSMENT
PAIN_FUNCTIONAL_ASSESSMENT: 0-10
PAIN_FUNCTIONAL_ASSESSMENT: 0-10

## 2024-10-25 ASSESSMENT — COLUMBIA-SUICIDE SEVERITY RATING SCALE - C-SSRS
2. HAVE YOU ACTUALLY HAD ANY THOUGHTS OF KILLING YOURSELF?: NO
1. SINCE LAST CONTACT, HAVE YOU WISHED YOU WERE DEAD OR WISHED YOU COULD GO TO SLEEP AND NOT WAKE UP?: NO
6. HAVE YOU EVER DONE ANYTHING, STARTED TO DO ANYTHING, OR PREPARED TO DO ANYTHING TO END YOUR LIFE?: NO

## 2024-10-25 ASSESSMENT — PAIN SCALES - GENERAL
PAINLEVEL_OUTOF10: 0 - NO PAIN
PAINLEVEL_OUTOF10: 0 - NO PAIN

## 2024-10-25 NOTE — NURSING NOTE
"Pt interview in the front unge  Pt is sitting watching TV and eating his snack  Pt stated his day was \"alright\"  Pt rated his anxiety 10/10  depression  10/10 and denied everything else at this time  Pt stated his goal \"get sleep\"  his strength \" I get along with people and help them\"  and his coping skill \"I push it aside\"  Pt is appropriate with his answers to the questions at this time    "

## 2024-10-25 NOTE — PROGRESS NOTES
Jonny Valenzuela is a 54 y.o. male on day 10 of admission presenting with Depression with suicidal ideation.      Subjective   The patient was seen and examined. I reviewed the chart and vital signs from overnight. I reviewed previous notes. I reviewed medications, administered overnight and their reported benefits or side effects.  Reported had trouble falling asleep due to worry about dc. Stated that he ruminates and takes 2 hours to fall asleep. Denied dizziness today. Requested restarting melatonin. Worried about where he is going on dc, patient is updated that we are waiting to hear back from [facility]. Did attend groups today, working on coping.       Objective     Scheduled medications  fLuvoxaMINE, 50 mg, oral, Nightly  folic acid, 1 mg, oral, Daily  lisinopril, 20 mg, oral, Daily  melatonin, 5 mg, oral, Nightly  nicotine, 1 patch, transdermal, Daily  QUEtiapine, 12.5 mg, oral, TID  QUEtiapine, 150 mg, oral, Nightly      Continuous medications     PRN medications  PRN medications: alum-mag hydroxide-simeth, baclofen, diphenhydrAMINE **OR** diphenhydrAMINE, haloperidol **OR** haloperidol lactate, ibuprofen, LORazepam **OR** LORazepam, nicotine polacrilex, psyllium, QUEtiapine       10/24/2024     6:11 AM 10/24/2024     6:23 PM 10/24/2024     6:25 PM 10/25/2024     6:20 AM 10/25/2024     6:21 AM 10/25/2024     6:41 PM 10/25/2024     6:42 PM   Vitals   Systolic 106 117 103 112 102 104 94   Diastolic 67 79 62 78 69 60 63   Heart Rate 78 71 73 61 79 70 75   Temp  36.6 °C (97.9 °F)  36.2 °C (97.2 °F)  36.5 °C (97.7 °F)       Review of Systems    Psychiatric ROS - Adult  Anxiety: General Anxiety Disorder (SHOSHANA)SHOSHANA Behaviors: difficult to control worry, excessive anxiety/worry, and sleep disturbance  Depression: anhedonia  Delirium: negative  Psychosis: negative  Gloria: negative  Safety Issues: none      Physical Exam  General: Appropriately groomed and dressed in hospital attire.  Appearance: Appears stated  age.  Attitude: Anxious, cooperative.  Behavior: Appropriate eye contact.  Motor Activity: no eps or td, gait stable, not fidgety or restless. Normal gait and station. Normal muscle tone and bulk.  Speech: Regular rate, rhythm, volume and tone, spontaneous, fluent. Non-pressured.  Mood: anxious  Affect: anxious  Thought Process: Organized, linear, goal directed.  Thought Content: denied SI, denied HI, no true delusions  Thought Perception: Does not endorse auditory or visual hallucinations, does not appear to be responding to hallucinatory stimuli.  Cognition: Alert, oriented x 3. No deficits noted. Patient endorses lapses in memory and periods of confusion, deficits in concentration  Insight: Poor, as patient cannot recognize symptoms of  illness and need for recommended treatments.   Judgment: Impaired, as patient cannot make reasonable decisions about ordinary activities of daily living and necessary medical care recommendations.       Assessment/Plan   Assessment & Plan  Major depressive disorder, recurrent, severe w/o psychotic behavior (Multi)    SHOSHANA (generalized anxiety disorder)    Alcohol use disorder, severe, dependence (Multi)    Tobacco dependence    Depression with suicidal ideation    Moderate episode of recurrent major depressive disorder    PLAN:    Trial increase in Seroquel daytime to 25 mg bid   Dc Zoloft continue to taper up luvox  Continue Seroquel 150 mg at bedtime  Milieu therapy   10/23: continue to monitor sleep, daytime anxiety  10/24: reduce seroquel to 12.5mg tid  10/25: add melatonin 5mg at bedtime    I spent 25 minutes in the professional and overall care of this patient.      Ledy Arboleda MD

## 2024-10-25 NOTE — GROUP NOTE
Group Topic: Goals   Group Date: 10/25/2024  Start Time: 0730  End Time: 0810  Facilitators: LOPEZ Santacruz   Department: [facility] REHAB THERAPY VIRTUAL    Number of Participants: 5   Group Focus: goals, personal responsibility, and social skills  Treatment Modality: Recreational Therapy   Interventions utilized were SMART Goals, Relaxation Ball, exploration, patient education, and support  Purpose: self-worth, self-care, and other: personal goals     Name: Jonny Valenzuela YOB: 1970   MR: [id number]      Facilitator: Recreational Therapist  Level of Participation: did not attend  Progress: None  Comments: Patients were provided with a SMART Goals worksheet for morning goal session (specific, measurable, attainable, relevant, and timely). We discussed setting goals and challenging ourselves to make healthy lifestyle changes.    Patient declined invitation to group activity at this time. Patient will continue to be provided with opportunities to enhance leisure skills and/or coping mechanisms.    Plan: continue with services

## 2024-10-25 NOTE — CARE PLAN
"The patient's goals for the shift include \"go to a group\"    The clinical goals for the shift include medication compliance    Over the shift, the patient did make progress toward the following goals. Barriers to progression include acuteness of illness. Recommendations to address these barriers include educate patient on prescribed medications.    "

## 2024-10-25 NOTE — NURSING NOTE
"Patient was assessed sitting in the hallway away from peers for patient's privacy. Patient presents as friendly and calm. Patient out on the unit and social with peers this shift. Rated anxiety 7/10 and depression 7/10. Denied SI/HI. Denied auditory/visual/other hallucinations. Patient has been medication and group compliant. No PRN medications administered this shift. No complaints of pain or discomfort. Patient's stated goal for today is \"go to a group\". Able to state positive coping skills such as \"fishing, watch TV, and movies\". Patient has been cooperative with staff.  Q 15 minute checks to be maintained throughout shift for safety.    "

## 2024-10-25 NOTE — GROUP NOTE
"Group Topic: Leisure Skills   Group Date: 10/25/2024  Start Time: 1400  End Time: 1500  Facilitators: LOPEZ Santacruz   Department: [facility] REHAB THERAPY VIRTUAL    Number of Participants: 4   Group Focus: leisure skills and social skills  Treatment Modality: Recreational Therapy  Interventions utilized were Sp Attack, exploration, leisure development, and mental fitness  Purpose: other: cognitive skills/focus, leisure awareness, social engagement     Name: Jonny Valenzuela YOB: 1970   MR: [id number]      Facilitator: Recreational Therapist  Level of Participation: minimal  Quality of Participation: appropriate/pleasant and cooperative  Interactions with others: appropriate  Mood/Affect: appropriate and positive  Triggers (if applicable): n/a  Cognition: coherent/clear and concrete  Progress: Moderate  Comments: Patients were gathered to learn and participate in the game \"Sp Attack\". This activity works on cognitive skills, following directions, positive social interaction/teamwork, and promotes leisure awareness.    Pt was pleasant and social throughout session, although politely declined to participate in activity. Sat amongst peers and appeared to enjoy time in group.    Plan: continue with services      "

## 2024-10-25 NOTE — GROUP NOTE
"Group Topic: Coping Skills   Group Date: 10/25/2024  Start Time: 0930  End Time: 1030  Facilitators: LOPEZ Santacruz   Department: [facility] REHAB THERAPY VIRTUAL    Number of Participants: 6   Group Focus: anxiety, coping skills, personal responsibility, psychiatric education, and social skills  Treatment Modality: Recreational Therapy   Interventions utilized were \"Coping with Stress\" worksheet, \"Stress Management: Coping with Stress\" handout, exploration, patient education, and support  Purpose: coping skills, insight or knowledge, and self-care    Name: Jonny Valenzuela YOB: 1970   MR: [id number]      Facilitator: Recreational Therapist  Level of Participation: did not attend  Progress: None  Comments: Patients were provided with the \"Coping with Stress\" worksheet which includes a variety of areas (things that make me feel stress, changes in my body, thoughts I have, things I do, and when I feel stress I cope by). We worked together as a group to brainstorm ideas/answers to those questions and patients were given an opportunity to share about personal stressors and situations. Patients were also provided with the \"Stress Management: Coping with Stress\" handout which includes further information and healthy mechanisms to to manage stress including \"9 Scientifically Proven Stress Management Coping Skills\".    Patient declined invitation to group activity at this time. Patient will continue to be provided with opportunities to enhance leisure skills and/or coping mechanisms.    Plan: continue with services      "

## 2024-10-26 PROCEDURE — 2500000002 HC RX 250 W HCPCS SELF ADMINISTERED DRUGS (ALT 637 FOR MEDICARE OP, ALT 636 FOR OP/ED): Performed by: PSYCHIATRY & NEUROLOGY

## 2024-10-26 PROCEDURE — 2500000002 HC RX 250 W HCPCS SELF ADMINISTERED DRUGS (ALT 637 FOR MEDICARE OP, ALT 636 FOR OP/ED): Performed by: NURSE PRACTITIONER

## 2024-10-26 PROCEDURE — 2500000001 HC RX 250 WO HCPCS SELF ADMINISTERED DRUGS (ALT 637 FOR MEDICARE OP): Performed by: PSYCHIATRY & NEUROLOGY

## 2024-10-26 PROCEDURE — 1240000001 HC SEMI-PRIVATE BH ROOM DAILY

## 2024-10-26 PROCEDURE — S4991 NICOTINE PATCH NONLEGEND: HCPCS | Performed by: NURSE PRACTITIONER

## 2024-10-26 PROCEDURE — 99232 SBSQ HOSP IP/OBS MODERATE 35: CPT | Performed by: PSYCHIATRY & NEUROLOGY

## 2024-10-26 RX ORDER — FLUVOXAMINE MALEATE 50 MG/1
100 TABLET, FILM COATED ORAL NIGHTLY
Status: DISCONTINUED | OUTPATIENT
Start: 2024-10-26 | End: 2024-10-31 | Stop reason: HOSPADM

## 2024-10-26 ASSESSMENT — PAIN - FUNCTIONAL ASSESSMENT
PAIN_FUNCTIONAL_ASSESSMENT: 0-10
PAIN_FUNCTIONAL_ASSESSMENT: 0-10

## 2024-10-26 ASSESSMENT — COLUMBIA-SUICIDE SEVERITY RATING SCALE - C-SSRS
2. HAVE YOU ACTUALLY HAD ANY THOUGHTS OF KILLING YOURSELF?: NO
6. HAVE YOU EVER DONE ANYTHING, STARTED TO DO ANYTHING, OR PREPARED TO DO ANYTHING TO END YOUR LIFE?: NO
1. SINCE LAST CONTACT, HAVE YOU WISHED YOU WERE DEAD OR WISHED YOU COULD GO TO SLEEP AND NOT WAKE UP?: NO

## 2024-10-26 ASSESSMENT — PAIN SCALES - GENERAL
PAINLEVEL_OUTOF10: 0 - NO PAIN
PAINLEVEL_OUTOF10: 0 - NO PAIN

## 2024-10-26 NOTE — NURSING NOTE
"Pt was assessed in the front lounge. Pt was calm and cooperative. Pt was social with peers and was out watching TV. Pt rated both anxiety and depression 7/10, pt denied SI/HI, AVH and pain. Pt stated his goal is \"sleep better,\" his coping skill is \"keep rolling,\" and his strength is \"fishing.\" Pt was medication compliant, no PRNs needed. Pt is currently watching TV in the front lounge without any obvious physical signs or symptoms of distress. No new orders to carry out at this time.   "

## 2024-10-26 NOTE — CARE PLAN
"The patient's goals for the shift include \"Trying to slow my mind.\" Asked to elaborate, notes \"a little bit\" of racing thoughts.    The clinical goals for the shift include Maintain safety, medication adherence, participation in unit activities.    Over the shift, the patient did make progress toward the following goals. Barriers to progression include acuity of illness. Recommendations to address these barriers include continuation of care plan.    "

## 2024-10-26 NOTE — NURSING NOTE
"RN/writer met with patient in the hallway early this morning for routine shift assessment. Rated anxiety at 5 of 10. Rated depression at 5 of 10. Denied thoughts of harm to self or others. Denied SI or HI and contracted for safety. Denied hallucinations. Denied pain. Endorsed last BM as yesterday, 10/25. Described sleep last night as \"I had a good night's sleep. Really good. I was out.\" Described mood as \"pretty good.\"    Coping skills: \"Going to group.\"  Strengths: \"Get along good with others. Easy to get along with.\"  Goals: \"Trying to slow down my mind.\" Asked if he has racing thoughts, he affirmed.     Lisinopril held today with the permission of medical team APRN due to 'soft' blood pressures.     Medication adherent. No PRNs requested this shift.   Showered this shift.     Q15 minutes safety checks maintained. Will continue to monitor.   "

## 2024-10-26 NOTE — PROGRESS NOTES
"Jonny Valenzuela is a 54 y.o. male on day 11 of admission presenting with Depression with suicidal ideation.      Subjective   The patient was seen and examined. I reviewed the chart and vital signs from overnight. I reviewed previous notes. I reviewed medications, administered overnight and their reported benefits or side effects.      Per nightshift nurse:  Pt was calm and cooperative. Pt was social with peers and was out watching TV. Pt rated both anxiety and depression 7/10, pt denied SI/HI, AVH and pain. Pt stated his goal is \"sleep better,\" his coping skill is \"keep rolling,\" and his strength is \"fishing.\" Pt was medication compliant, no PRNs needed. Pt is currently watching TV in the front lounge without any obvious physical signs or symptoms of distress. No new orders to carry out at this time.       10/25 per Dr. Arboleda “ Reported had trouble falling asleep due to worry about dc. Stated that he ruminates and takes 2 hours to fall asleep.”     10/26, slept 7.5 hours last night, reports he had the best sleep last night. Melatonin added last night while continuing seroquel 150mg hs, and fluvoxamine 50mg. Pt denied dizziness today. Reports depressed moot at 5/10 and anxiety 5/10, still at times with fleeting thought of Suicide, much better and without intention  or plan     Objective     Scheduled medications  fLuvoxaMINE, 100 mg, oral, Nightly  folic acid, 1 mg, oral, Daily  lisinopril, 20 mg, oral, Daily  melatonin, 5 mg, oral, Nightly  nicotine, 1 patch, transdermal, Daily  QUEtiapine, 12.5 mg, oral, TID  QUEtiapine, 150 mg, oral, Nightly      Continuous medications     PRN medications  PRN medications: alum-mag hydroxide-simeth, baclofen, diphenhydrAMINE **OR** diphenhydrAMINE, haloperidol **OR** haloperidol lactate, ibuprofen, LORazepam **OR** LORazepam, nicotine polacrilex, psyllium, QUEtiapine       10/25/2024     6:20 AM 10/25/2024     6:21 AM 10/25/2024     6:41 PM 10/25/2024     6:42 PM 10/26/2024     6:09 " AM 10/26/2024     6:10 AM 10/26/2024     9:08 AM   Vitals   Systolic 112 102 104 94 101 100 105   Diastolic 78 69 60 63 68 68 66   Heart Rate 61 79 70 75 80 88 80   Temp 36.2 °C (97.2 °F)  36.5 °C (97.7 °F)  36.3 °C (97.3 °F)        Review of Systems    Psychiatric ROS - Adult  Anxiety: General Anxiety Disorder (SHOSHANA)SHOSHANA Behaviors: difficult to control worry, excessive anxiety/worry, and sleep disturbance  Depression: anhedonia Reports depressed moot at 5/10 and anxiety 5/10,  Delirium: negative  Psychosis: negative  Gloria: negative  Safety Issues: still at times with fleeting thought of Suicide, much better and without intention  or plan       Physical Exam  General: Appropriately groomed and dressed in hospital attire.  Appearance: Appears stated age.  Attitude: Anxious, cooperative.  Behavior: Appropriate eye contact.  Motor Activity: no eps or td, gait stable, not fidgety or restless. Normal gait and station. Normal muscle tone and bulk.  Speech: Regular rate, rhythm, volume and tone, spontaneous, fluent. Non-pressured.  Mood: Reports depressed moot at 5/10 and anxiety 5/10,  Affect: anxious, depressed moderately  Thought Process: Organized, linear, goal directed.  Thought Content: still at times with fleeting thought of Suicide, much better and without intention  or plan  denied HI, no true delusions  Thought Perception: Does not endorse auditory or visual hallucinations, does not appear to be responding to hallucinatory stimuli.  Cognition: Alert, oriented x 3. No deficits noted. Patient endorses lapses in memory and periods of confusion, deficits in concentration  Insight: Poor, as patient cannot recognize symptoms of  illness and need for recommended treatments.   Judgment: Impaired, as patient cannot make reasonable decisions about ordinary activities of daily living and necessary medical care recommendations.       Assessment/Plan   Assessment & Plan  Major depressive disorder, recurrent, severe w/o  psychotic behavior (Multi)    SHOSHANA (generalized anxiety disorder)    Alcohol use disorder, severe, dependence (Multi)    Tobacco dependence    Depression with suicidal ideation    Moderate episode of recurrent major depressive disorder    PLAN:    Trial increase in Seroquel daytime to 25 mg bid   Dc Zoloft continue to taper up luvox  Continue Seroquel 150 mg at bedtime  Milieu therapy   10/23: continue to monitor sleep, daytime anxiety  10/24: reduce seroquel to 12.5mg tid    10/25: add melatonin 5mg at bedtime, effective    10/26: increase luxox to 100mg qhs     I spent 25 minutes in the professional and overall care of this patient.      Lo Connolly MD PhD

## 2024-10-26 NOTE — CARE PLAN
"The patient's goals for the shift include \"sleep better\"    The clinical goals for the shift include maintain safety    Over the shift, the patient did not make progress toward the following goals. Barriers to progression include illness acuity. Recommendations to address these barriers include continue with treatment plan.    "

## 2024-10-26 NOTE — GROUP NOTE
"Group Topic: Chemical Dependency - Dual Diagnosis   Group Date: 10/26/2024  Start Time: 1400  End Time: 1500  Facilitators: LOPEZ Bloom   Department: [facility] REHAB THERAPY VIRTUAL    Number of Participants: 7   Group Focus: chemical dependency education, dual diagnosis, personal responsibility, and psychiatric education  Treatment Modality: Recreation Therapy  Interventions utilized were: Dual Recovery - The Process of Recovery, Recovery Components, clarification, exploration, patient education, and support  Purpose: insight or knowledge, self-care, and relapse prevention strategies    Name: Jonny Valenzuela YOB: 1970   MR: [id number]      Facilitator: Recreational Therapist  Level of Participation: moderate  Quality of Participation: attentive, cooperative, and engaged  Interactions with others: improving/appropriate and sarcastic at times  Mood/Affect: appropriate  Triggers (if applicable): N/A  Cognition: concrete and capable  Progress: Moderate  Comments: Group provided some education on the “Process of Recovery”.  We discussed and gave examples of the four processes on the provided handout.  Areas discussed included: individuals before mental health issues, individuals with mental illness, individuals and professionals, and recovery process outcome. Patients were also provided a handout to rank recovery components 1 through 9 (1 being most important).  The components included: clinical care/treatment plan, overcoming stigma, peer support/relationships, family support, community involvement, work/meaningful activity, power/control, education, and access to resources. The group was provided examples and encouraged to share thoughts related to each of the areas.    Mr. Valenzuela attended the entire session. At first he pleasantly joked about not wanting to do group and made some comments about \"not having anything else to do but watch TV\". Patient was reminded of the program schedule and available " "leisure activities. He then stated, \"I stopped playing games a long time ago\". Patient lacks a leisure lifestyle and has difficulties identifying any interests. During the session he opened up about past hospitalizations and \"nothing ever changing in life\". He identified that he is an alcoholic and \"needs to make better decisions including not drinking and spending his money better\". Patient is illiterate but was able to understand the verbal descriptions of the information/handouts.     Plan: continue with services      "

## 2024-10-26 NOTE — GROUP NOTE
Group Topic: Gross Motor/Balance Skills   Group Date: 10/26/2024  Start Time: 1100  End Time: 1150  Facilitators: LOPEZ Bloom   Department: [facility] REHAB THERAPY VIRTUAL    Number of Participants: 5   Group Focus: Physical Activity, leisure skills  Treatment Modality: Recreation Therapy  Interventions utilized were: [place],  leisure development  Purpose: Movement/Physical Leisure, Leisure Awareness, Social Stimulation, Pleasurable Activity, coping skills    Name: Jonny Valenzuela YOB: 1970   MR: [id number]      Facilitator: Recreational Therapist  Level of Participation: did not attend  Progress: None  Comments: This physical activity involved coordinating movements, social interactions, healthy competition, leisure awareness, and a pleasurable experience.    Patient remained in their room throughout the session for unknown reasons.     Plan: continue with services

## 2024-10-26 NOTE — NURSING NOTE
Pt had an uneventful night, pt slept well, no PRNs needed. Pt is currently sleeping in bed without any obvious physical signs or symptoms of distress. No new order to carry out at this time. Q15 minute safety checks maintained.

## 2024-10-26 NOTE — GROUP NOTE
"Group Topic: Illness Education   Group Date: 10/26/2024  Start Time: 0930  End Time: 1010  Facilitators: LOPEZ Bloom   Department: [facility] REHAB THERAPY VIRTUAL    Number of Participants: 5   Group Focus: feeling awareness/expression, personal responsibility, and self-awareness  Treatment Modality: Recreation Therapy  Interventions utilized were: Compassion Focused Therapy - Systems of Emotion Regulation, exploration, patient education, and support  Purpose: coping skills, maladaptive thinking, feelings, irrational fears, and insight or knowledge    Name: Jonny Valenzuela YOB: 1970   MR: [id number]      Facilitator: Recreational Therapist  Level of Participation: did not attend  Progress: None  Comments: Participants were provided education on Compassion Focused Therapy and we discussed the \"systems of emotion regulation\". Patients were explained the roles, emotions felt, and issues when each of the areas (drive, threat, and soothing) are out of balance. Coping strategies to aid in emotional balance and soothing were discussed. Participants were encouraged to share examples and communicate personal thoughts throughout the session.       Patient remained in their room throughout the session for unknown reasons.     Plan: continue with services      "

## 2024-10-27 VITALS
OXYGEN SATURATION: 96 % | HEART RATE: 81 BPM | HEIGHT: 67 IN | SYSTOLIC BLOOD PRESSURE: 118 MMHG | BODY MASS INDEX: 28.1 KG/M2 | RESPIRATION RATE: 16 BRPM | WEIGHT: 179.01 LBS | DIASTOLIC BLOOD PRESSURE: 79 MMHG | TEMPERATURE: 97.7 F

## 2024-10-27 PROCEDURE — 2500000002 HC RX 250 W HCPCS SELF ADMINISTERED DRUGS (ALT 637 FOR MEDICARE OP, ALT 636 FOR OP/ED): Performed by: NURSE PRACTITIONER

## 2024-10-27 PROCEDURE — 2500000001 HC RX 250 WO HCPCS SELF ADMINISTERED DRUGS (ALT 637 FOR MEDICARE OP): Performed by: PSYCHIATRY & NEUROLOGY

## 2024-10-27 PROCEDURE — S4991 NICOTINE PATCH NONLEGEND: HCPCS | Performed by: NURSE PRACTITIONER

## 2024-10-27 PROCEDURE — 2500000002 HC RX 250 W HCPCS SELF ADMINISTERED DRUGS (ALT 637 FOR MEDICARE OP, ALT 636 FOR OP/ED): Performed by: PSYCHIATRY & NEUROLOGY

## 2024-10-27 PROCEDURE — 1240000001 HC SEMI-PRIVATE BH ROOM DAILY

## 2024-10-27 PROCEDURE — 99232 SBSQ HOSP IP/OBS MODERATE 35: CPT | Performed by: PSYCHIATRY & NEUROLOGY

## 2024-10-27 ASSESSMENT — PAIN SCALES - GENERAL: PAINLEVEL_OUTOF10: 0 - NO PAIN

## 2024-10-27 ASSESSMENT — PAIN - FUNCTIONAL ASSESSMENT: PAIN_FUNCTIONAL_ASSESSMENT: 0-10

## 2024-10-27 NOTE — GROUP NOTE
Group Topic: Personal Responsibility   Group Date: 10/27/2024  Start Time: 0930  End Time: 1015  Facilitators: LOPEZ Bloom   Department: [facility] REHAB THERAPY VIRTUAL    Number of Participants: 8   Group Focus: attitude, coping skills, personal responsibility, and self-esteem  Treatment Modality: Recreation Therapy  Interventions utilized were: 10 Attitude Improvement Tips (handout), exploration and support  Purpose: coping skills, insight or knowledge, self-worth, and self-care    Name: Jonny Valenzuela YOB: 1970   MR: [id number]      Facilitator: Recreational Therapist  Level of Participation: did not attend  Progress: None  Comments: This session provided strategies to improve one's attitude. Participants were provided skills including breathing techniques, importance of gratitude, managing challenges, managing rejection, using healthier words, helping others, changing “have” to “get”, changing complaints, problems with solutions, and identifying the honorable.      Patient remained in their room throughout the session resting/sleeping.     Plan: continue with services

## 2024-10-27 NOTE — GROUP NOTE
Group Topic: Art Creative   Group Date: 10/27/2024  Start Time: 1400  End Time: 1530  Facilitators: LOPEZ Bloom   Department: [facility] REHAB THERAPY VIRTUAL    Number of Participants: 4   Group Focus: leisure skills and relaxation  Treatment Modality: Recreation Therapy  Interventions utilized were: Plastic Weaving Bowl, Thread Board Image, leisure development  Purpose: Leisure Awareness, Pleasurable Activity, coping skills    Name: Jonny Valenzuela YOB: 1970   MR: [id number]      Facilitator: Recreational Therapist  Level of Participation: did not attend  Progress: None  Comments: Session included creative activities which were offered for 90 minutes. Participants had the option to work on a plastic weaving bowl and/or a knitting/thread board image. Patients were provided relaxation music and social stimulation.      Patient remained in their room throughout the session for unknown reasons.     Plan: continue with services

## 2024-10-27 NOTE — CARE PLAN
"The patient's goals for the shift include \"To be able to get a good nights sleep without nitemares\"    The clinical goals for the shift include medication compliance    Over the shift, the patient did make progress toward the following goals    Problem: Sensory Perceptual Alteration as Evidenced by  Goal: Initiates reality-based interactions  Outcome: Progressing     Problem: Altered Thought Processes as Evidenced by  Goal: STG - Desires improvement in ability to think and concentrate  Outcome: Progressing     Problem: Potential for Harm to Self or Others  Goal: Participates in unit activities  Outcome: Progressing     Problem: Potential for Harm to Self or Others  Goal: Denies harm toward self or others  Outcome: Progressing     Problem: Ineffective Coping  Goal: Identifies healthy coping skills  Outcome: Progressing     Problem: Ineffective Coping  Goal: Demonstrates healthy coping skills  Outcome: Progressing     Problem: Ineffective Coping  Goal: Participates in unit activities  Outcome: Progressing     Problem: Alteration in Sleep  Goal: STG - Reports nightly sleep, duration, and quality  Outcome: Progressing     Problem: Alteration in Sleep  Goal: STG - Identifies sleep hygiene aids  Outcome: Progressing     Problem: Alteration in Sleep  Goal: STG - Informs staff if unable to sleep  Outcome: Progressing     Problem: Self Care Deficit  Goal: Accepts need for medications  Outcome: Progressing     Problem: Self Care Deficit  Goal: STG - Goes to and eats meals independently in dining room 100% of time  Outcome: Progressing     Problem: Defensive Coping  Goal: Demonstrates appropriate social interactions  Outcome: Progressing     Problem: Medication Adherence  Goal: Adherence to Medication Regimen  Outcome: Progressing     Problem: Fall/Injury  Goal: Not fall by end of shift  Outcome: Progressing     Problem: Fall/Injury  Goal: Be free from injury by end of the shift  Outcome: Progressing     Problem: Risk for " Suicide  Goal: Accepts medications as prescribed/needed this shift  Outcome: Progressing     Problem: Risk for Suicide  Goal: Makes needs known through verbalization or behaviors this shift  Outcome: Progressing     Problem: Risk for Suicide  Goal: No self harm this shift  Outcome: Progressing

## 2024-10-27 NOTE — NURSING NOTE
Patient is out I the lounge sitting at a table eating snack and watching a movie. Pt is sitting by himself at the table. Pt makes appropriate eye contact when spoken to. Pt rated anxiety 6, depression 6, denied si/hi,denied a/v hallucinations. Pt asked for room to be warmer and an extra blanket. Pt is medication compliant.

## 2024-10-27 NOTE — GROUP NOTE
"Group Topic: Community   Group Date: 10/27/2024  Start Time: 1100  End Time: 1150  Facilitators: SINDHU BloomS   Department: [facility] REHAB THERAPY VIRTUAL    Number of Participants: 7   Group Focus: community group and leisure skills  Treatment Modality: Recreation Therapy  Interventions utilized were: Community Meeting, Catch Phrase, exploration, leisure development, and orientation  Purpose: Unit/Community Suggestions, Rules/Guidelines, Social/Cognitive Stimulation, Support    Name: Jonny Valenzuela YOB: 1970   MR: [id number]      Facilitator: Recreational Therapist  Level of Participation: did not attend  Progress: None  Comments: This session included providing participants an opportunity to understand unit guidelines, rules, expectations, and provide a healthy environment to give suggestions for unit improvements. Community meeting questionnaire slips were passed out and collect. CTRS prioritized suggestions to discuss during this session.  The group also included peers verbalizing suggestions while CTRS recorded meeting minutes. Participants were also provided a leisure activity \"Catch Phrase\" towards the end of the group.    Patient remained in their room throughout the session for unknown reasons.     Plan: continue with services      "

## 2024-10-27 NOTE — PROGRESS NOTES
Jonny Valenzuela is a 54 y.o. male on day 12 of admission presenting with Depression with suicidal ideation.      Subjective   The patient was seen and examined. I reviewed the chart and vital signs from overnight. I reviewed previous notes. I reviewed medications, administered overnight and their reported benefits or side effects.      10/25 per Dr. Arboleda “ Reported had trouble falling asleep due to worry about dc. Stated that he ruminates and takes 2 hours to fall asleep.”   10/26, slept 7.5 hours UB last night, reports he had the best sleep last night. Melatonin added last night while continuing seroquel 150mg hs, and fluvoxamine 50mg. Pt denied dizziness today.   10/27, slept 8.5 hours UB last night, reports he slept well last two nights. Pt is compliant with meds, and  Pt denied Side effects.   Pt Reports he feels lots of better, with depressed mood improved from 10/1 to 5/10 and anxiety from 10/10 to 5/10, but admits that he still at times with fleeting thought of Suicide,  without intention  or plan.     Objective     Scheduled medications  fLuvoxaMINE, 100 mg, oral, Nightly  folic acid, 1 mg, oral, Daily  lisinopril, 20 mg, oral, Daily  melatonin, 5 mg, oral, Nightly  nicotine, 1 patch, transdermal, Daily  QUEtiapine, 12.5 mg, oral, TID  QUEtiapine, 150 mg, oral, Nightly      Continuous medications     PRN medications  PRN medications: alum-mag hydroxide-simeth, baclofen, diphenhydrAMINE **OR** diphenhydrAMINE, haloperidol **OR** haloperidol lactate, ibuprofen, LORazepam **OR** LORazepam, nicotine polacrilex, psyllium, QUEtiapine       10/26/2024     6:10 AM 10/26/2024     9:08 AM 10/26/2024     5:49 PM 10/26/2024     5:50 PM 10/27/2024     5:35 AM 10/27/2024     5:36 AM 10/27/2024     9:53 AM   Vitals   Systolic 100 105 125 115 106 107 113   Diastolic 68 66 82 68 72 76 70   Heart Rate 88 80 72 75 75 85 80   Temp   36.2 °C (97.2 °F)  36 °C (96.8 °F)     Resp     18  16   Weight (lb)      179.01    BMI      28.03  kg/m2    BSA (m2)      1.96 m2       Review of Systems    Psychiatric ROS - Adult  Anxiety: General Anxiety Disorder (SHOSHANA)SHOSHANA Behaviors: difficult to control worry, excessive anxiety/worry, and sleep disturbance  Depression: anhedonia Reports depressed moot at 5/10 and anxiety 5/10,  Delirium: negative  Psychosis: negative  Gloria: negative  Safety Issues: still at times with fleeting thought of Suicide, much better and without intention  or plan       Physical Exam  General: Appropriately groomed and dressed in hospital attire.  Appearance: Appears stated age.  Attitude: Anxious, cooperative.  Behavior: Appropriate eye contact.  Motor Activity: no eps or td, gait stable, not fidgety or restless. Normal gait and station. Normal muscle tone and bulk.  Speech: Regular rate, rhythm, volume and tone, spontaneous, fluent. Non-pressured.  Mood: he feels lots of better, with depressed mood improved from 10/1 to 5/10 and anxiety from 10/10 to 5/10,  Affect: anxious, depressed mildly to moderately  Thought Process: Organized, linear, goal directed.  Thought Content: still at times with fleeting thought of Suicide, much better and without intention  or plan  denied HI, no true delusions  Thought Perception: Does not endorse auditory or visual hallucinations, does not appear to be responding to hallucinatory stimuli.  Cognition: Alert, oriented x 3. No deficits noted. Patient endorses lapses in memory and periods of confusion, deficits in concentration  Insight: Poor, as patient cannot recognize symptoms of  illness and need for recommended treatments.   Judgment: Impaired, as patient cannot make reasonable decisions about ordinary activities of daily living and necessary medical care recommendations.       Assessment/Plan   Assessment & Plan  Major depressive disorder, recurrent, severe w/o psychotic behavior (Multi)    SHOSHANA (generalized anxiety disorder)    Alcohol use disorder, severe, dependence (Multi)    Tobacco  dependence    Depression with suicidal ideation    Moderate episode of recurrent major depressive disorder    PLAN:    Trial increase in Seroquel daytime to 25 mg bid   Dc Zoloft continue to taper up luvox  Continue Seroquel 150 mg at bedtime  Milieu therapy   10/23: continue to monitor sleep, daytime anxiety  10/24: reduce seroquel to 12.5mg tid    10/25: add melatonin 5mg at bedtime, effective    10/26: increase luxox to 100mg at bedtime; continues other meds above  10/27: continue tx plan above; is slowly improving    I spent 25 minutes in the professional and overall care of this patient.      oL Connolly MD PhD

## 2024-10-27 NOTE — NURSING NOTE
"RN/writer met with patient in his room this morning for routine shift assessment. Rated anxiety at 7 of 10. Asked to elaborate on high rating, he explained \"I'm worried about getting out of here and what I'm gonna do.\" Rated depression at 5 of 10. Denied SI or HI and contracted for safety. Denied hallucinations. Denied pain. Endorsed last BM as \"yesterday\" 10/26. Described sleep as \"Pretty good\" (yawned). Asked if he had any nightmares, denied, saying \"Yeah one but it wasn't a nightmare, it was a dream.\"     Coping skills: \"Later on go do a group.\"  Strengths: \"I can get along good with others.\"  Goals: \"Get to a group.\"    Lisinopril held again today per medical team APRN permission due to 'soft' blood pressures. Patient verbalized he is consuming fluids.     Did not attend any groups today. Social with staff and peers during meals. Watched television mid-to-late afternoon.    Medication adherent. At approximately 1544 endorsed \"hearburn\" and received PRN Mylanta. Endorsed relief of heartburn on follow-up assessment.    Q15 minutes safety checks maintained. Will continue to monitor.     "

## 2024-10-28 PROCEDURE — 2500000002 HC RX 250 W HCPCS SELF ADMINISTERED DRUGS (ALT 637 FOR MEDICARE OP, ALT 636 FOR OP/ED): Performed by: PSYCHIATRY & NEUROLOGY

## 2024-10-28 PROCEDURE — 2500000002 HC RX 250 W HCPCS SELF ADMINISTERED DRUGS (ALT 637 FOR MEDICARE OP, ALT 636 FOR OP/ED): Performed by: NURSE PRACTITIONER

## 2024-10-28 PROCEDURE — 2500000001 HC RX 250 WO HCPCS SELF ADMINISTERED DRUGS (ALT 637 FOR MEDICARE OP): Performed by: PSYCHIATRY & NEUROLOGY

## 2024-10-28 PROCEDURE — 97150 GROUP THERAPEUTIC PROCEDURES: CPT | Mod: GO,CO

## 2024-10-28 PROCEDURE — 99232 SBSQ HOSP IP/OBS MODERATE 35: CPT | Performed by: PSYCHIATRY & NEUROLOGY

## 2024-10-28 PROCEDURE — S4991 NICOTINE PATCH NONLEGEND: HCPCS | Performed by: NURSE PRACTITIONER

## 2024-10-28 PROCEDURE — 1240000001 HC SEMI-PRIVATE BH ROOM DAILY

## 2024-10-28 RX ORDER — FAMOTIDINE 20 MG/1
20 TABLET, FILM COATED ORAL 2 TIMES DAILY
Status: DISCONTINUED | OUTPATIENT
Start: 2024-10-28 | End: 2024-10-31 | Stop reason: HOSPADM

## 2024-10-28 RX ORDER — QUETIAPINE FUMARATE 25 MG/1
12.5 TABLET, FILM COATED ORAL 2 TIMES DAILY
Status: DISCONTINUED | OUTPATIENT
Start: 2024-10-28 | End: 2024-10-31 | Stop reason: HOSPADM

## 2024-10-28 ASSESSMENT — PAIN SCALES - GENERAL
PAINLEVEL_OUTOF10: 0 - NO PAIN
PAINLEVEL_OUTOF10: 10 - WORST POSSIBLE PAIN

## 2024-10-28 ASSESSMENT — PAIN SCALES - PAIN ASSESSMENT IN ADVANCED DEMENTIA (PAINAD)
BODYLANGUAGE: RELAXED
TOTALSCORE: 0
CONSOLABILITY: NO NEED TO CONSOLE
FACIALEXPRESSION: SMILING OR INEXPRESSIVE
TOTALSCORE: MEDICATION (SEE MAR)
BREATHING: NORMAL

## 2024-10-28 ASSESSMENT — PAIN - FUNCTIONAL ASSESSMENT: PAIN_FUNCTIONAL_ASSESSMENT: 0-10

## 2024-10-28 ASSESSMENT — PAIN SCALES - WONG BAKER: WONGBAKER_NUMERICALRESPONSE: HURTS EVEN MORE

## 2024-10-28 ASSESSMENT — PAIN DESCRIPTION - LOCATION: LOCATION: TEETH

## 2024-10-28 ASSESSMENT — PAIN DESCRIPTION - ORIENTATION: ORIENTATION: LEFT;UPPER

## 2024-10-28 NOTE — CARE PLAN
"The patient's goals for the shift include \"I wish I was out of here\"    The clinical goals for the shift include medication compliance    Over the shift, the patient did make progress toward the following goals.    Problem: Sensory Perceptual Alteration as Evidenced by  Goal: Participates in unit activities  Outcome: Progressing     Problem: Sensory Perceptual Alteration as Evidenced by  Goal: Participates in unit activities  Outcome: Progressing     Problem: Sensory Perceptual Alteration as Evidenced by  Goal: Initiates reality-based interactions  Outcome: Progressing     Problem: Altered Thought Processes as Evidenced by  Goal: STG - Desires improvement in ability to think and concentrate  Outcome: Progressing     Problem: Potential for Harm to Self or Others  Goal: Denies harm toward self or others  Outcome: Progressing     Problem: Alteration in Sleep  Goal: STG - Reports nightly sleep, duration, and quality  Outcome: Progressing     Problem: Alteration in Sleep  Goal: STG - Identifies sleep hygiene aids  Outcome: Progressing     Problem: Alteration in Sleep  Goal: STG - Informs staff if unable to sleep  Outcome: Progressing     Problem: Self Care Deficit  Goal: Accepts need for medications  Outcome: Progressing     Problem: Self Care Deficit  Goal: STG - Goes to and eats meals independently in dining room 100% of time  Outcome: Progressing     Problem: Medication Adherence  Goal: Adherence to Medication Regimen  Outcome: Progressing     Problem: Fall/Injury  Goal: Not fall by end of shift  Outcome: Progressing     Problem: Fall/Injury  Goal: Be free from injury by end of the shift  Outcome: Progressing     Problem: Risk for Suicide  Goal: No self harm this shift  Outcome: Progressing     Problem: Risk for Suicide  Goal: Makes needs known through verbalization or behaviors this shift  Outcome: Progressing     Problem: Risk for Suicide  Goal: Accepts medications as prescribed/needed this shift  Outcome: " Progressing

## 2024-10-28 NOTE — PROGRESS NOTES
"Jonny Valenzuela is a 54 y.o. male on day 13 of admission presenting with Depression with suicidal ideation.      Subjective   The patient was seen and examined. I reviewed the chart and vital signs from overnight. I reviewed previous notes. I reviewed medications, administered overnight and their reported benefits or side effects. Patient reported feeling little dizzy on changing positions. Worries about disposition at [facility], appears visibly anxious when interviewing. States he feels the meds are helping, sleeping ok. Trying to work on coping skills, is uncomfortable in groups, around others. Selfseclusive.         Objective     Last Recorded Vitals  Blood pressure 118/77, pulse 70, temperature 36.5 °C (97.7 °F), temperature source Temporal, resp. rate 16, height 1.702 m (5' 7.01\"), weight 81.2 kg (179 lb 0.2 oz), SpO2 98%.    Review of Systems    Psychiatric ROS - Adult  Anxiety: General Anxiety Disorder (SHOSHANA)SHOSHANA Behaviors: difficult to control worry, excessive anxiety/worry, easily fatigued, restlessness, and sleep disturbance  Depression: anhedonia  Delirium: negative  Psychosis: negative  Gloria: negative  Safety Issues:  homeless  Psychiatric ROS Comment: medication adherent, help seeking    Physical Exam      Mental Status Exam  General: CM with anxiety, depression, history of alcohol abuse  Appearance: in own attire, unkempt, own attire, fair hygiene  Attitude: help seeking, good eye contact  Behavior: self seclusive, social anxiety, generalized anxiety  Motor Activity: figits continues, no eps or td, gait without impairment  Speech: clear, normal tone, volume rate, fluent, no impairment  Mood: derpessed 10 and anxiety 9 due to homeless, life in general not stabilized  Affect: very anxious  Thought Process: linear, organized, goal directed  Thought Content: denied SI, denied HI, no delusions elicited  Thought Perception: denied AH, denied VH, denied somatic, tactile or olfactory somatic sx  Cognition: alert " and oriented to person, place, time, purpose, grossly intact short and long term memory  Insight: fair, help seeking  Judgement: fair, able to make decisions regarding medical and mental health care decisions      Assessment/Plan   Assessment & Plan  Major depressive disorder, recurrent, severe w/o psychotic behavior (Multi)    SHOSHANA (generalized anxiety disorder)    Alcohol use disorder, severe, dependence (Multi)    Tobacco dependence    Depression with suicidal ideation    Moderate episode of recurrent major depressive disorder    PLAN:  Trial increase in Seroquel daytime to 25 mg bid   Dc Zoloft continue to taper up luvox  Continue Seroquel 150 mg at bedtime  Milieu therapy   10/23: continue to monitor sleep, daytime anxiety  10/24: reduce seroquel to 12.5mg tid  10/25: add melatonin 5mg at bedtime  10/28A: luvox increased over the weekend, feeling mild dizzy on daytime dose of seroquel. Reduce 12,5mg tid to bid. Sw to talk to patient about disposition location, updates.        I spent 25 minutes in the professional and overall care of this patient.      Ledy Arboleda MD

## 2024-10-28 NOTE — PROGRESS NOTES
"Occupational Therapy     REHAB Therapy Assessment & Treatment    Patient Name: Jonny Valenzuela  MRN: [id number]  Today's Date: 10/28/2024      Activity Assessment:     Music and Self Expression Group: [id number]  TINA and Positive Supports Group: [id number]  Recovery and Maintaining Well Being Group: [id number]    1/3 Groups attended     Pt only partially participates in recovery group as pt remains in bed during earlier groups, declining to attend. During attended group, pt sits away from peers however, attentive and receptive to the recovery education provided. At end of group, pt thanks this writer for the info and shares \"Diana just never been a people person, that's why it's hard for me to come out to your groups\" Pt remains help seeking when able to attend groups. Pt would benefit from continued OT services in order to improve overall self-esteem, personal confidence and positive supports for safe transition at discharge.        Encounter Problems       Encounter Problems (Active)       OT Goals       Pt will demo increased activity level by attending 8-10 groups per week.  (Progressing)       Start:  10/16/24    Expected End:  11/06/24            Pt will explore and ID 1-2 strategies to manage stressors/symptoms of illness/ grief more effectively prior to discharge.  (Progressing)       Start:  10/16/24    Expected End:  11/06/24            Pt will ID/ utilize 1-2 ways to increase balance of activity/ re-involve self in functional daily routines/roles prior to discharge.  (Progressing)       Start:  10/16/24    Expected End:  11/06/24            Pt will explore/ ID 1-2 meaningful leisure activities to improve QOL for post discharge use.  (Progressing)       Start:  10/16/24    Expected End:  11/06/24            Pt will ID 2 community resources/programs to join/attend after D/C to improve their support system (Progressing)       Start:  10/16/24    Expected End:  11/06/24                       Additional " Comments:    PACHECO collaborated with patients nurse and charge nurse throughout the day to provide appropriate support and encouragement to attend groups. Pt up on unit when PACHECO left last group of the day. All needs met.

## 2024-10-28 NOTE — GROUP NOTE
Group Topic: Dialectical Behavioral Therapy - Distress Tolerance   Group Date: 10/28/2024  Start Time: 1600  End Time: 1700  Facilitators: LOPEZ Santacruz   Department: [facility] REHAB THERAPY VIRTUAL    Number of Participants: 6   Group Focus: coping skills and social skills  Treatment Modality: Recreational Therapy   Interventions utilized were DBT - Self-Soothing, exploration, patient education, and support  Purpose: coping skills, insight or knowledge, and self-care    Name: Jonny Valenzuela YOB: 1970   MR: [id number]      Facilitator: Recreational Therapist  Level of Participation: did not attend  Progress: None  Comments: Patients were provided with the DBT - Distress Tolerance Handout “Self-Soothing” with our five senses. We discussed ways to self-soothe in each of the five categories (Vision, Hearing, Smell, Taste, Touch).    Patient declined invitation to group activity at this time. Patient will continue to be provided with opportunities to enhance leisure skills and/or coping mechanisms.    Plan: continue with services

## 2024-10-28 NOTE — NURSING NOTE
Patient is out in the lounge sitting at a table watching tv and eating snack. Pt is not observed being social with any other patients  initially. Pt told me that he does not like groups of people. Pt states with some irritation that he wants discharged soon and hopes to get good news from the  tomorrow about an opening at the [facility]. Patient rated anxiety 6,depression 6, denied si/hi,denied a/v hallucinations. Patient is medication compliant. Before this patient went to bed for the night he was observed talking with another patient for about a half hour.

## 2024-10-28 NOTE — GROUP NOTE
"Group Topic: Leisure Skills   Group Date: 10/28/2024  Start Time: 1400  End Time: 1445  Facilitators: SINDHU SantacruzS   Department: [facility] REHAB THERAPY VIRTUAL    Number of Participants: 5   Group Focus: leisure skills and social skills  Treatment Modality: Recreational Therapy   Interventions utilized were \"It's in the Bag\", exploration and leisure development  Purpose: other: cognitive skills/focus, healthy competition, leisure awareness, social engagement     Name: Jonny Valenzuela YOB: 1970   MR: [id number]      Facilitator: Recreational Therapist  Level of Participation: did not attend  Progress: None  Comments: Patients were gathered to learn and participate in the game, \"It's in the Bag\". This activity works on cognitive skills, following directions, positive social interaction/teamwork, and promotes leisure awareness.    Patient declined invitation to group activity at this time. Patient will continue to be provided with opportunities to enhance leisure skills and/or coping mechanisms.    Plan: continue with services      "

## 2024-10-28 NOTE — NURSING NOTE
"The pt was cooperative but appeared anxious during the interview that took place in the pt's room away from his peers for privacy. The pt rated his anxiety a 7/10, depression 8/10, denies SI, HI and AVH at this time as well as pain rating it a 0/10. Last bowel movement was, \"yesterday\" 10/27/24. Coping skills, \"I don't have any.\" Goal for the day, \"Talking to that Angeli lady or whoever my  is.\" Pt strength, \"No.\" The pt was medication compliant with his morning medications. Q 15 minute monitoring continued.   "

## 2024-10-28 NOTE — CARE PLAN
Problem: Sensory Perceptual Alteration as Evidenced by  Goal: Patient/Family participate in treatment and discharge plans  Outcome: Progressing  Goal: Patient/Family verbalizes awareness of resources  Outcome: Progressing  Goal: Participates in unit activities  Outcome: Progressing     Problem: Potential for Harm to Self or Others  Goal: Cooperates with admission process  Outcome: Met  Goal: Participates in unit activities  Outcome: Progressing  Goal: Patient/Family participate in treatment and discharge plans  Outcome: Progressing     Problem: Ineffective Coping  Goal: Patient/Family participate in treatment and discharge plans  Outcome: Progressing  Goal: Patient/Family verbalizes awareness of resources  Outcome: Progressing     Problem: Anxiety  Goal: Patient/family understands admission protocols  Outcome: Progressing     Problem: Defensive Coping  Goal: Patient/Family verbalizes awareness of resources  Outcome: Progressing  Goal: Patient/Family participate in treatment and discharge plans  Outcome: Progressing     Problem: Community resource needs  Goal: Patient is receiving increased resource support to enhance ability to remain at home  Outcome: Progressing     Problem: Mental health issues  Goal: Stabilize adverse mental health factors affecting plan of care  Outcome: Progressing     Problem: Access to Care Issue  Goal: Assess and Address Access Barriers  Outcome: Progressing     Problem: Coordination of Community Resources Needed  Goal: Coordination of Services will be Obtained  Outcome: Progressing     Problem: Coordination of Psychosocial Support Services Needed  Goal: Coordination of Services will be Obtained  Outcome: Progressing     Problem: Financial Problem  Goal: Assess and Address Specific Financial Obstacles Affecting Patient's Adderence to Plan of Care  Outcome: Progressing     Problem: Coordination of Psychosocial Support Services Needed  Goal: Coordination of Services will be  Obtained  Outcome: Progressing

## 2024-10-28 NOTE — GROUP NOTE
"Group Topic: Spiritual/Devotional/Thought of the Day   Group Date: 10/28/2024  Start Time: 0730  End Time: 0800  Facilitators: LOPEZ Santacruz   Department: [facility] REHAB THERAPY VIRTUAL    Number of Participants: 5   Group Focus: goals, personal responsibility, and social skills  Treatment Modality: Recreational Therapy   Interventions utilized were Thought - \"Taking Care of the Little Things\", exploration, story telling, and support  Purpose: insight or knowledge, self-care, and other: personal goals    Name: Jonny Valenzuela YOB: 1970   MR: [id number]      Facilitator: Recreational Therapist  Level of Participation: did not attend  Progress: None  Comments: Patients were provided with the Thought of the Day reading - “Taking care of the little things prevents larger problems.\" Patients were given an opportunity to share their thoughts and encouraged to create a personal goal based on the reading.      Patient declined invitation to group activity at this time. Patient will continue to be provided with opportunities to enhance leisure skills and/or coping mechanisms.    Plan: continue with services      "

## 2024-10-28 NOTE — GROUP NOTE
Group Topic: Music Therapy   Group Date: 10/28/2024  Start Time: 0930  End Time: 1030  Facilitators: Adela rAroyo   Department: [facility] EXPRESSIVE THER VIRTUAL    Number of Participants: 4   Group Focus: communication/socialization, expressive outlet, and music therapy  Treatment Modality: Music Therapy  Interventions Utilized were: active music engagement, empathic listening/validating emotions, passive music engagement, and sharing/discussion    Participants began with a rhythmic introduction and then were invited to share favorite or significant songs with the group.    Name: Jonny Valenzuela YOB: 1970   MR: [id number]      Level of Participation: did not attend  Plan: patient will be encouraged to attend future groups

## 2024-10-29 PROBLEM — F10.20 ALCOHOL USE DISORDER, SEVERE, DEPENDENCE (MULTI): Chronic | Status: RESOLVED | Noted: 2024-10-21 | Resolved: 2024-10-29

## 2024-10-29 PROBLEM — R45.851 DEPRESSION WITH SUICIDAL IDEATION: Status: RESOLVED | Noted: 2024-10-15 | Resolved: 2024-10-29

## 2024-10-29 PROBLEM — F32.A DEPRESSION WITH SUICIDAL IDEATION: Status: RESOLVED | Noted: 2024-10-15 | Resolved: 2024-10-29

## 2024-10-29 PROBLEM — F33.1 MODERATE EPISODE OF RECURRENT MAJOR DEPRESSIVE DISORDER: Chronic | Status: RESOLVED | Noted: 2024-10-21 | Resolved: 2024-10-29

## 2024-10-29 PROBLEM — F33.2: Status: RESOLVED | Noted: 2024-10-21 | Resolved: 2024-10-29

## 2024-10-29 PROCEDURE — 1240000001 HC SEMI-PRIVATE BH ROOM DAILY

## 2024-10-29 PROCEDURE — S4991 NICOTINE PATCH NONLEGEND: HCPCS | Performed by: NURSE PRACTITIONER

## 2024-10-29 PROCEDURE — 2500000001 HC RX 250 WO HCPCS SELF ADMINISTERED DRUGS (ALT 637 FOR MEDICARE OP): Performed by: PSYCHIATRY & NEUROLOGY

## 2024-10-29 PROCEDURE — 99232 SBSQ HOSP IP/OBS MODERATE 35: CPT | Performed by: PSYCHIATRY & NEUROLOGY

## 2024-10-29 PROCEDURE — 2500000002 HC RX 250 W HCPCS SELF ADMINISTERED DRUGS (ALT 637 FOR MEDICARE OP, ALT 636 FOR OP/ED): Performed by: PSYCHIATRY & NEUROLOGY

## 2024-10-29 PROCEDURE — 2500000002 HC RX 250 W HCPCS SELF ADMINISTERED DRUGS (ALT 637 FOR MEDICARE OP, ALT 636 FOR OP/ED): Performed by: NURSE PRACTITIONER

## 2024-10-29 RX ORDER — MICONAZOLE NITRATE 2 %
2 CREAM (GRAM) TOPICAL EVERY 2 HOUR PRN
Qty: 100 EACH | Refills: 0 | Status: SHIPPED | OUTPATIENT
Start: 2024-10-29

## 2024-10-29 RX ORDER — FAMOTIDINE 20 MG/1
20 TABLET, FILM COATED ORAL 2 TIMES DAILY
Qty: 60 TABLET | Refills: 1 | Status: SHIPPED | OUTPATIENT
Start: 2024-10-29 | End: 2024-12-28

## 2024-10-29 RX ORDER — QUETIAPINE FUMARATE 150 MG/1
150 TABLET, FILM COATED ORAL NIGHTLY
Qty: 30 TABLET | Refills: 1 | Status: SHIPPED | OUTPATIENT
Start: 2024-10-29 | End: 2024-12-28

## 2024-10-29 RX ORDER — QUETIAPINE FUMARATE 25 MG/1
12.5 TABLET, FILM COATED ORAL 2 TIMES DAILY
Qty: 30 TABLET | Refills: 1 | Status: SHIPPED | OUTPATIENT
Start: 2024-10-29 | End: 2024-12-28

## 2024-10-29 RX ORDER — FLUVOXAMINE MALEATE 100 MG/1
100 TABLET, COATED ORAL NIGHTLY
Qty: 30 TABLET | Refills: 1 | Status: SHIPPED | OUTPATIENT
Start: 2024-10-29 | End: 2024-12-28

## 2024-10-29 RX ORDER — IBUPROFEN 200 MG
1 TABLET ORAL DAILY
Qty: 30 PATCH | Refills: 0 | Status: SHIPPED | OUTPATIENT
Start: 2024-10-30 | End: 2024-12-25

## 2024-10-29 RX ORDER — ACETAMINOPHEN 500 MG
5 TABLET ORAL NIGHTLY
Qty: 30 TABLET | Refills: 1 | Status: SHIPPED | OUTPATIENT
Start: 2024-10-29 | End: 2024-12-28

## 2024-10-29 RX ORDER — BISMUTH SUBSALICYLATE 262 MG
1 TABLET,CHEWABLE ORAL DAILY
Qty: 30 TABLET | Refills: 1 | Status: SHIPPED | OUTPATIENT
Start: 2024-10-29 | End: 2024-12-28

## 2024-10-29 ASSESSMENT — PAIN SCALES - GENERAL
PAINLEVEL_OUTOF10: 0 - NO PAIN
PAINLEVEL_OUTOF10: 0 - NO PAIN

## 2024-10-29 ASSESSMENT — PAIN - FUNCTIONAL ASSESSMENT
PAIN_FUNCTIONAL_ASSESSMENT: 0-10
PAIN_FUNCTIONAL_ASSESSMENT: 0-10

## 2024-10-29 ASSESSMENT — PAIN SCALES - WONG BAKER: WONGBAKER_NUMERICALRESPONSE: NO HURT

## 2024-10-29 NOTE — GROUP NOTE
"Group Topic: Leisure Skills   Group Date: 10/29/2024  Start Time: 1400  End Time: 1500  Facilitators: LOPEZ Santacruz   Department: [facility] REHAB THERAPY VIRTUAL    Number of Participants: 6   Group Focus: leisure skills and social skills  Treatment Modality: Recreational Therapy   Interventions utilized were Ball Toss Trivia, exploration, leisure development, and mental fitness  Purpose: other: cognitive skills/focus, motor skills, leisure awareness, healthy competition, social engagement     Name: Jonny Valenzuela YOB: 1970   MR: [id number]      Facilitator: Recreational Therapist  Level of Participation: active  Quality of Participation: appropriate/pleasant, cooperative, and engaged  Interactions with others: appropriate and gave feedback  Mood/Affect: appropriate and positive  Triggers (if applicable): n/a  Cognition: coherent/clear and goal directed  Progress: Moderate  Comments: Patients were gathered and encouraged to actively participate in \"Ball Toss Trivia\". This activity works on motor skills/coordinating movements, cognitive skills, healthy competition, social interaction, and provides an overall pleasurable experience.    Pt was pleasant, cooperative and actively engaged in group. He appeared to enjoy the friendly competition of the game and worked well within team.     Plan: continue with services      "

## 2024-10-29 NOTE — PROGRESS NOTES
Occupational Therapy     REHAB Therapy Assessment & Treatment    Patient Name: Jonny Valenzuela  MRN: [id number]  Today's Date: 10/29/2024      Activity Assessment:   Happiness and Life Fulfillment Group: [id number]  Acceptance and Letting Go Group: [id number]  Problem Solving and Team Collaboration Group: [id number]    0/3 Groups attended     Pt remains in his room despite max encouragement during above group. Pt observed out on the unit for snack or meals only. Pt would benefit from continued OT services in order to improve overall self-esteem, personal confidence and positive supports for safe transition at discharge.        Encounter Problems       Encounter Problems (Active)       OT Goals       Pt will demo increased activity level by attending 8-10 groups per week.  (Progressing)       Start:  10/16/24    Expected End:  11/06/24            Pt will explore and ID 1-2 strategies to manage stressors/symptoms of illness/ grief more effectively prior to discharge.  (Progressing)       Start:  10/16/24    Expected End:  11/06/24            Pt will ID/ utilize 1-2 ways to increase balance of activity/ re-involve self in functional daily routines/roles prior to discharge.  (Progressing)       Start:  10/16/24    Expected End:  11/06/24            Pt will explore/ ID 1-2 meaningful leisure activities to improve QOL for post discharge use.  (Progressing)       Start:  10/16/24    Expected End:  11/06/24            Pt will ID 2 community resources/programs to join/attend after D/C to improve their support system (Progressing)       Start:  10/16/24    Expected End:  11/06/24                         Additional Comments:  PACHECO collaborated with patients nurse and charge nurse throughout the day to provide appropriate support and encouragement to attend groups. Pt up on unit when PACHECO left last group of the day. All needs met.

## 2024-10-29 NOTE — GROUP NOTE
"Group Topic: Chemical Dependency - Dual Diagnosis   Group Date: 10/29/2024  Start Time: 1600  End Time: 1645  Facilitators: LOPEZ Santacruz   Department: [facility] REHAB THERAPY VIRTUAL    Number of Participants: 8   Group Focus: coping skills, dual diagnosis, personal responsibility, relapse prevention, and substance abuse education  Treatment Modality: Recreational Therapy   Interventions utilized were Urge Surfing/Meditation, exploration, patient education, and support  Purpose: coping skills, insight or knowledge, relapse prevention strategies, and trigger / craving management    Name: Jonny Valenzuela YOB: 1970   MR: [id number]      Facilitator: Recreational Therapist  Level of Participation: moderate  Quality of Participation: appropriate/pleasant, cooperative, and engaged  Interactions with others: appropriate  Mood/Affect: appropriate and brightens with interaction  Triggers (if applicable): n/a  Cognition: concrete and goal directed  Progress: Moderate  Comments: Patients were provided with the \"Urge Surfing\" handout which outlines a technique/process for managing substance use, emotional reactions, and unwanted behaviors. The handout includes a step by step process of how to practice urge surfing, managing triggers, and delay/distraction techniques. Patients were given an opportunity to share their thoughts related to their personal recovery process. We also listened to an urge surfing mindfulness meditation at the close of group which allowed patients to put the skill into practice.     Pt was calm, cooperative, and pleasant throughout. He was quiet during discussion, but appeared to be actively listening and participated in urge surfing meditation.     Plan: continue with services      "

## 2024-10-29 NOTE — GROUP NOTE
"Group Topic: Goals   Group Date: 10/29/2024  Start Time: 0730  End Time: 0800  Facilitators: LOPEZ Santacruz   Department: [facility] REHAB THERAPY VIRTUAL    Number of Participants: 4   Group Focus: daily focus, goals, and social skills  Treatment Modality: Recreational Therapy   Interventions utilized were \"My Main Goal Right Now\" worksheet, exploration and support  Purpose: other: goal planning, personal strengths, social engagement     Name: Jonny Valenzuela YOB: 1970   MR: [id number]      Facilitator: Recreational Therapist  Level of Participation: did not attend  Progress: None  Comments: Patients were provided with the \"My Main Goal Right Now\"\" worksheet which includes a variety of prompts for participants to consider including (my main goal, key strengths that will help me, why it's important to me, obstacles that may arise/how I plan to respond, what will be different when I achieve my goal, and next best steps). Patients were given an opportunity to share their completed worksheet and provided with feedback.    Patient declined invitation to group activity at this time. Patient will continue to be provided with opportunities to enhance leisure skills and/or coping mechanisms.    Plan: continue with services      "

## 2024-10-29 NOTE — PROGRESS NOTES
"Jonny Valenzuela is a 54 y.o. male on day 14 of admission presenting with Depression with suicidal ideation.      Subjective   Patient was laying down, but alert this morning around 0900. Patient says he \"slept so well last night\" and feels like it was his \"best night of sleep.\" It seems the patient's sleep as improved for the better. Patient has confirmed with friend that he can stay with him until a bed opens up at homeless shelter starting on Friday. However, patient rated his anxiety around a 7/10 because he is excessively worrying about when he will get his own apartment. That is one of his major concerns. Patient also rated his depression around a 7/10 because he has to be here and wait for a bed at a homeless shelter. Encouraged patient to attend group sessions so he can move around and converse a bit more. Patient says he does not like the one man's voice who runs group so he tends to stay away.        Objective     Last Recorded Vitals  Blood pressure 113/75, pulse 74, temperature 36.4 °C (97.5 °F), temperature source Temporal, resp. rate 18, height 1.702 m (5' 7.01\"), weight 81.2 kg (179 lb 0.2 oz), SpO2 96%.    Review of Systems    Psychiatric ROS - Adult  Anxiety: General Anxiety Disorder (SHOSHANA)SHOSHANA Behaviors: difficult to control worry and excessive anxiety/worry  Depression: energy, sleep increased, and withdrawn  Delirium: negative  Psychosis: negative  Gloria: negative  Safety Issues: homeless  Psychiatric ROS Comment: excessive worry, low energy, withdrawn    Physical Exam      Mental Status Exam  General: 54 year old male, appropriately dressed and laying comfortable in bed  Appearance: Patient is laying on bed, but is awake and alert.   Attitude: Patient is cooperative.  Behavior: Patient makes good eye contact. Patient has a tremor to his hand when speaking.  Motor Activity: Tremor in hands  Speech: Patient's tone and volume was low due to sleeping roommate, rate was logical. Rate of tone was coherent. "   Mood: Patient reports mood as anxious and some what dysthymic.  Affect: Appropriate response to emotional cues  Thought Process: Sequential   Thought Content: Normal, denied SI or HI  Thought Perception: Normal, denied AH and VH  Cognition: Patient was able to concentrate on conversation and shift to different topics when prompted. AxO x3  Insight: Intact  Judgement: Intact    Psychiatric Risk Assessment  Violence Risk Assessment: male  Acute Risk of Harm to Others is Considered: low   Suicide Risk Assessment: none  Protective Factors against Suicide: strong therapeutic alliance with provider  Acute Risk of Harm to Self is Considered: low    Relevant Results               Assessment/Plan   Assessment & Plan  Major depressive disorder, recurrent, severe w/o psychotic behavior (Multi)    SHOSHANA (generalized anxiety disorder)    Alcohol use disorder, severe, dependence (Multi)    Tobacco dependence    Depression with suicidal ideation    Moderate episode of recurrent major depressive disorder    PLAN  Trial increase in Seroquel daytime to 25 mg bid   Dc Zoloft continue to taper up luvox  Continue Seroquel 150 mg at bedtime  Milieu therapy   10/23: continue to monitor sleep, daytime anxiety  10/24: reduce seroquel to 12.5mg tid  10/25: add melatonin 5mg at bedtime  10/28A: luvox increased over the weekend, feeling mild dizzy on daytime dose of seroquel. Reduce 12,5mg tid to bid. Sw to talk to patient about disposition location, updates.      10/29: continue on current regimen of Seroquel. Possibly discuss with patient about increase in Luvox to help increasing anxiety.   Clarissa Carney

## 2024-10-29 NOTE — NURSING NOTE
"0815- The pt was calm and cooperative during the interview that took place in the hallway per the pt's request but away from his peers for privacy. The pt approached this nurse and stated, \"my anxiety and depression are zero, I mean five, both of them.\" The pt denies SI, HI and AVH at this time as well as pain rating it a 0/10. Last bowel movement was, \"last night\" 10/28/24. Coping skills, \"No.\" Goal for the day, \"Not really.\" Pt strengths, \"I'm good at dealing with bullshit.\" The pt was medication compliant with his morning medications. Q 15 minute monitoring continued.    1744- The pt had an uneventful day. The pt was medication compliant, attending groups and ate at all meals. The pt did shower today and his clothing washed. Q 15 minute monitoring continued.   "

## 2024-10-29 NOTE — NURSING NOTE
"Pt interview in the front [facility]  Pt is watching TV and getting ready to eat his snack   Pt stated his day was alright. Pt stated he thinks he is getting discharged on Friday  Pt stated his goal \"get sleep\"  his strength \"I like to fish\"  and his coping skill \" I let it go\"  Pt rated his anxiety 5-6/10  his depression 5-6/10 tooth pain 10/10  got an ibuprofen an denied everything else at this time  Pt is appropriate with his answers to the questions at this time   "

## 2024-10-29 NOTE — NURSING NOTE
Pt took his night time medications  pt watched TV and then went to bed  pt had an uneventful night

## 2024-10-29 NOTE — DISCHARGE INSTR - APPOINTMENTS
Outpatient Mental Health Follow Up Appointments:   Emergency Services Appointment, to be reassessed for psychiatry, counseling and case management services and dual diagnosis treatment.  On  Monday, November 4th, 2024 at 10:00 AM.  at  [facility],  [address] [place] [address]  [id number]    P. [phone];  F. [phone];     ([facility] phone number is: [phone], call every other day in the morning to check on any open beds available).

## 2024-10-29 NOTE — ASSESSMENT & PLAN NOTE
PLAN  Trial increase in Seroquel daytime to 25 mg bid   Dc Zoloft continue to taper up luvox  Continue Seroquel 150 mg at bedtime  Milieu therapy   10/23: continue to monitor sleep, daytime anxiety  10/24: reduce seroquel to 12.5mg tid  10/25: add melatonin 5mg at bedtime  10/28A: luvox increased over the weekend, feeling mild dizzy on daytime dose of seroquel. Reduce 12,5mg tid to bid. Sw to talk to patient about disposition location, updates.      10/29: continue on current regimen of Seroquel. Possibly discuss with patient about increase in Luvox to help increasing anxiety.

## 2024-10-30 PROCEDURE — 2500000001 HC RX 250 WO HCPCS SELF ADMINISTERED DRUGS (ALT 637 FOR MEDICARE OP): Performed by: PSYCHIATRY & NEUROLOGY

## 2024-10-30 PROCEDURE — 2500000002 HC RX 250 W HCPCS SELF ADMINISTERED DRUGS (ALT 637 FOR MEDICARE OP, ALT 636 FOR OP/ED): Performed by: PSYCHIATRY & NEUROLOGY

## 2024-10-30 PROCEDURE — 2500000002 HC RX 250 W HCPCS SELF ADMINISTERED DRUGS (ALT 637 FOR MEDICARE OP, ALT 636 FOR OP/ED): Performed by: NURSE PRACTITIONER

## 2024-10-30 PROCEDURE — 97150 GROUP THERAPEUTIC PROCEDURES: CPT | Mod: GO,CO

## 2024-10-30 PROCEDURE — 1240000001 HC SEMI-PRIVATE BH ROOM DAILY

## 2024-10-30 PROCEDURE — S4991 NICOTINE PATCH NONLEGEND: HCPCS | Performed by: NURSE PRACTITIONER

## 2024-10-30 ASSESSMENT — PAIN - FUNCTIONAL ASSESSMENT
PAIN_FUNCTIONAL_ASSESSMENT: 0-10
PAIN_FUNCTIONAL_ASSESSMENT: 0-10

## 2024-10-30 ASSESSMENT — PAIN SCALES - GENERAL
PAINLEVEL_OUTOF10: 0 - NO PAIN
PAINLEVEL_OUTOF10: 0 - NO PAIN

## 2024-10-30 NOTE — CARE PLAN
Problem: Sensory Perceptual Alteration as Evidenced by  Goal: Patient/Family participate in treatment and discharge plans  Outcome: Met  Goal: Patient/Family verbalizes awareness of resources  Outcome: Met     Problem: Potential for Harm to Self or Others  Goal: Participates in unit activities  Outcome: Met  Goal: Patient/Family participate in treatment and discharge plans  Outcome: Met     Problem: Ineffective Coping  Goal: Patient/Family participate in treatment and discharge plans  Outcome: Met  Goal: Patient/Family verbalizes awareness of resources  Outcome: Met     Problem: Anxiety  Goal: Patient/family understands admission protocols  Outcome: Met     Problem: Defensive Coping  Goal: Patient/Family verbalizes awareness of resources  Outcome: Met  Goal: Patient/Family participate in treatment and discharge plans  Outcome: Met     Problem: Community resource needs  Goal: Patient is receiving increased resource support to enhance ability to remain at home  Outcome: Met     Problem: Mental health issues  Goal: Stabilize adverse mental health factors affecting plan of care  Outcome: Met     Problem: Access to Care Issue  Goal: Assess and Address Access Barriers  Outcome: Met     Problem: Coordination of Community Resources Needed  Goal: Coordination of Services will be Obtained  Outcome: Met     Problem: Coordination of Psychosocial Support Services Needed  Goal: Coordination of Services will be Obtained  Outcome: Met     Problem: Financial Problem  Goal: Assess and Address Specific Financial Obstacles Affecting Patient's Adderence to Plan of Care  Outcome: Met     Problem: Risk of Uncoordinated Care  Goal: Care will be Coordinated and Supported by a Multidisciplinary Team of Providers  Outcome: Met     Problem: Coordination of Psychosocial Support Services Needed  Goal: Coordination of Services will be Obtained  Outcome: Met    Sw phoned [facility], scheduled Emergency Services follow up care and noted  discharge plan/after care visit accordingly; Sw set up a ride for discharge, tomorrow,  at 1 PM via [facility], to go to his friend's to stay at: [address], [place], [place] [address] ([facility] [place] [facility]); Patient agrees he will go to [facility] asap once a bed becomes available, he is at the top of the waiting list currently; he is staying with this friend in [place] temporarily until he can get in to [facility]; He is stabilized;

## 2024-10-30 NOTE — GROUP NOTE
Group Topic: Personal Responsibility   Group Date: 10/30/2024  Start Time: 1400  End Time: 1500  Facilitators: LOPEZ Bloom   Department: [facility] REHAB THERAPY VIRTUAL    Number of Participants: 7   Group Focus: acceptance, personal responsibility, and self-awareness  Treatment Modality: Recreation Therapy  Interventions utilized were: Building Resiliency, clarification, confrontation, exploration, patient education, and support  Purpose: coping skills, insight or knowledge, and self-care    Name: Jonny Valenzuela YOB: 1970   MR: [id number]      Facilitator: Recreational Therapist  Level of Participation: minimal, left group   Quality of Participation: quiet and selectively attentive  Interactions with others:  none  Mood/Affect:  calm  Triggers (if applicable): N/A  Cognition: concrete and capable  Progress: Minimal  Comments: Session defined resiliency and provided education on signs of it and how to build the skill. Group included discussions on life challenges and barriers that may impact the ability to be resilient. Participants were encouraged to relate with, share thoughts, and problem solve the information being presented.      Patient attended some of the session. Mr. Valenzuela left the group for unknown reasons. He did not share any personal thoughts or ideas.     Plan: continue with services

## 2024-10-30 NOTE — NURSING NOTE
Pt took his night time medications  He watched TV and talked with another patient  He went to bed and slept all night  Pt had an uneventful night

## 2024-10-30 NOTE — PROGRESS NOTES
Occupational Therapy     REHAB Therapy Assessment & Treatment    Patient Name: Jonny Valenzuela  MRN: [id number]  Today's Date: 10/30/2024      Activity Assessment:     Music and Emotion Regulation Group: [id number]  Healthy Sleep and Self Care Group: [id number]  Leisure Awareness and Impulse Control Group: [id number]    1/3 Groups attended     Pt only present in leisure group this date as pt remains mostly avoidant during earlier groups. Pt presents to group with improved mood, G attention to task and G understanding and reasoning of leisure activities. Pt does well socially with peers and is insightful to topic 100% of the time. Pt with overall improved mood and increased emotion regulation during group. Pt would benefit from continued OT services in order to improve overall self-esteem, personal confidence and positive supports for safe transition at discharge.      Encounter Problems       Encounter Problems (Active)       OT Goals       Pt will demo increased activity level by attending 8-10 groups per week.  (Progressing)       Start:  10/16/24    Expected End:  11/06/24            Pt will explore and ID 1-2 strategies to manage stressors/symptoms of illness/ grief more effectively prior to discharge.  (Progressing)       Start:  10/16/24    Expected End:  11/06/24            Pt will ID/ utilize 1-2 ways to increase balance of activity/ re-involve self in functional daily routines/roles prior to discharge.  (Progressing)       Start:  10/16/24    Expected End:  11/06/24            Pt will explore/ ID 1-2 meaningful leisure activities to improve QOL for post discharge use.  (Progressing)       Start:  10/16/24    Expected End:  11/06/24            Pt will ID 2 community resources/programs to join/attend after D/C to improve their support system (Progressing)       Start:  10/16/24    Expected End:  11/06/24                     Additional Comments:  TARA collaborated with patients nurse and charge nurse throughout the day  to provide appropriate support and encouragement to attend groups. Pt up on unit when PACHECO left last group of the day. All needs met.

## 2024-10-30 NOTE — NURSING NOTE
"Patient anxiety and depression 5/10. Patient denies SI/HI and hallucinations. Patient states \"slept pretty good\". Goal is to make it to one of the groups today. Denies hallucinations and does not have a coping skill. I told patient I would follow up to see if we were able to come up with a coping skill. Patient strength is fishing and 0/10 pain. Patient medication compliant. Cooperative with staff and other patients.   "

## 2024-10-30 NOTE — NURSING NOTE
"Pt interview in the front [facility]  Pt is watching TV and eating his snack  Pt stated his day was \"alright\"  Pt is talking with another patient  Pt stated his goal \"get sleep\"  his strength \"I fish\"  and his coping skill \"I ignore it\"  Pt rated his anxiety 5/10  depression 5/10  and denied everything else at this time  Pt is appropriate with his answers to the questions at this time  "

## 2024-10-30 NOTE — PROGRESS NOTES
"Jonny Valenzuela is a 54 y.o. male on day 15 of admission presenting with Depression with suicidal ideation.      Subjective   Patient was seen this morning around 1030 when waking. Patient says he slept well last night and is feeling \"a bit better\" today. Patient rates anxiety and depression both at a 5/10. Patient attended some group sessions yesterday after encouragement and \"enjoyed most of it.\" Encouraged to attend again today. Patient feels okay with the plan to discharge Friday and will be staying with this friend for a temporary time, he does stress about getting his own apartment.        Objective     Last Recorded Vitals  Blood pressure 113/72, pulse 84, temperature 36.1 °C (97 °F), temperature source Temporal, resp. rate 18, height 1.702 m (5' 7.01\"), weight 81.2 kg (179 lb 0.2 oz), SpO2 98%.    Review of Systems    Psychiatric ROS - Adult  Anxiety: General Anxiety Disorder (SHOSHANA)SHOSHANA Behaviors: excessive anxiety/worry and easily fatigued  Depression: sleep increased  Delirium: negative  Psychosis: negative  Gloria: negative  Safety Issues:  homeless  Psychiatric ROS Comment: Patient has become less withdrawn    Physical Exam      Mental Status Exam  General: Patient with anxiety, depression and history of alcohol abuse  Appearance: in own attire, poor hygiene and poor grooming  Attitude: Good eye contact, help seeking  Behavior: Less withdrawn, anxiousness with future   Motor Activity: Tremor in hands, holding coffee, no eps or td  Speech: spontaneous, logical, relevant  Mood: depression and anxiety both 5/10  Affect: Appropriate response to emotional cues  Thought Process: linear, organized   Thought Content: Denies SI or HI  Thought Perception: denies VH or AH  Cognition: AxO 3x to person place and time  Insight: intact, help seeking  Judgement: intact, able to make decisions regarding medical care    Psychiatric Risk Assessment  Violence Risk Assessment: lower socioeconomic class  Acute Risk of Harm to Others " is Considered: low   Suicide Risk Assessment: , living alone or lack of social support, and male  Protective Factors against Suicide: strong therapeutic alliance with provider  Acute Risk of Harm to Self is Considered: low    Relevant Results               Assessment/Plan   Assessment & Plan  SHOSHANA (generalized anxiety disorder)    Tobacco dependence    PLAN  Trial increase in Seroquel daytime to 25 mg bid   Dc Zoloft continue to taper up luvox  Continue Seroquel 150 mg at bedtime  Milieu therapy   10/23: continue to monitor sleep, daytime anxiety  10/24: reduce seroquel to 12.5mg tid  10/25: add melatonin 5mg at bedtime  10/28A: luvox increased over the weekend, feeling mild dizzy on daytime dose of seroquel. Reduce 12,5mg tid to bid. Sw to talk to patient about disposition location, updates.        10/29: continue on current regimen of Seroquel. Possibly discuss with patient about increase in Luvox to help increasing anxiety.    10/30: continued encouragement in attending group sessions. Continue current medicine regimen.    Clarissa Carney

## 2024-10-30 NOTE — GROUP NOTE
Group Topic: Goals   Group Date: 10/30/2024  Start Time: 0730  End Time: 0800  Facilitators: LOPEZ Bloom   Department: [facility] REHAB THERAPY VIRTUAL    Number of Participants: 3   Group Focus: check in, daily focus, and goals  Treatment Modality: Recreation Therapy  Interventions utilized were: Rules? What Rules? (Areas of Recovery), exploration and support  Purpose: Goal Identification, self-care    Name: Jonny Valenzuela YOB: 1970   MR: [id number]      Facilitator: Recreational Therapist  Level of Participation: did not attend  Progress: None  Comments: A handout was provided and discussed that included six recovery concepts (be safe, be here, be honest, commit to goals, care for self and others, let go and move on).  Participants had the opportunity to share their thoughts about each of the areas and participate in a discussion on how to work on each. Patients were also asked to create a goal related to one or two of the categories.      Patient remained in their room throughout the session resting/sleeping.     Plan: continue with services

## 2024-10-30 NOTE — CARE PLAN
"The patient's goals for the shift include \"get sleep\"    The clinical goals for the shift include medication compliance    Over the shift, the patient did not make progress toward the following goals. Barriers to progression include lack of medication compliance. Recommendations to address these barriers include more education on medications.    "

## 2024-10-30 NOTE — SIGNIFICANT EVENT
10/30/24 1620   Discharge Planning   Living Arrangements Alone   Support Systems Friends/neighbors   Assistance Needed needs to follow up with referrals with [facility] for his community mental health services, especially for his housing/community support/case management needs.   Type of Residence Private residence   Who is requesting discharge planning? Patient   Home or Post Acute Services Community services   Expected Discharge Disposition Home   Does the patient need discharge transport arranged? Yes   RoundTrip coordination needed? No   Has discharge transport been arranged? Yes   What day is the transport expected? 10/31/24   What time is the transport expected? 1300   Financial Resource Strain   How hard is it for you to pay for the very basics like food, housing, medical care, and heating? Somewhat   Housing Stability   In the last 12 months, was there a time when you were not able to pay the mortgage or rent on time? Y   In the past 12 months, how many times have you moved where you were living? 2   At any time in the past 12 months, were you homeless or living in a shelter (including now)? Y   Patient Choice   Patient / Family choosing to utilize agency / facility established prior to hospitalization Yes     Shannen phoned [facility], scheduled Emergency Services follow up care and noted discharge plan/after care visit accordingly;  Shannen set up a ride for discharge, tomorrow,  at 1 PM via [facility], to go to his friend's to stay at: [address], [place], [place] [address] ([facility] [place] [facility]);  Patient agrees he will go to [facility] asap once a bed becomes available, he is at the top of the waiting list currently; he is staying with this friend in [place] temporarily until he can get in to [facility];  He is stabilized;

## 2024-10-31 VITALS
HEIGHT: 67 IN | RESPIRATION RATE: 16 BRPM | WEIGHT: 179.01 LBS | BODY MASS INDEX: 28.1 KG/M2 | TEMPERATURE: 97.3 F | SYSTOLIC BLOOD PRESSURE: 88 MMHG | DIASTOLIC BLOOD PRESSURE: 61 MMHG | HEART RATE: 80 BPM | OXYGEN SATURATION: 97 %

## 2024-10-31 PROCEDURE — 99239 HOSP IP/OBS DSCHRG MGMT >30: CPT | Performed by: PSYCHIATRY & NEUROLOGY

## 2024-10-31 PROCEDURE — 2500000002 HC RX 250 W HCPCS SELF ADMINISTERED DRUGS (ALT 637 FOR MEDICARE OP, ALT 636 FOR OP/ED): Performed by: NURSE PRACTITIONER

## 2024-10-31 PROCEDURE — 2500000001 HC RX 250 WO HCPCS SELF ADMINISTERED DRUGS (ALT 637 FOR MEDICARE OP): Performed by: PSYCHIATRY & NEUROLOGY

## 2024-10-31 PROCEDURE — 2500000002 HC RX 250 W HCPCS SELF ADMINISTERED DRUGS (ALT 637 FOR MEDICARE OP, ALT 636 FOR OP/ED): Performed by: PSYCHIATRY & NEUROLOGY

## 2024-10-31 PROCEDURE — S4991 NICOTINE PATCH NONLEGEND: HCPCS | Performed by: NURSE PRACTITIONER

## 2024-10-31 ASSESSMENT — PAIN - FUNCTIONAL ASSESSMENT: PAIN_FUNCTIONAL_ASSESSMENT: 0-10

## 2024-10-31 ASSESSMENT — PAIN SCALES - GENERAL: PAINLEVEL_OUTOF10: 0 - NO PAIN

## 2024-10-31 ASSESSMENT — PAIN SCALES - WONG BAKER: WONGBAKER_NUMERICALRESPONSE: NO HURT

## 2024-10-31 NOTE — PROGRESS NOTES
"Jonny Valenzuela is a 54 y.o. male on day 16 of admission presenting with Depression with suicidal ideation.      Subjective   Patient was assessed around 1200. Patient is feeling \"a bit better\" and has plans to shave after our conversation. Patient was happy to have therapy dog in common room and enjoyed the visit. Patient feels \"fine\" about leaving tomorrow and hopes \"to get on track.\" Patient slept well last night and has also started going to group more. Patient endorses liking group sessions.        Objective     Last Recorded Vitals  Blood pressure 88/61, pulse 80, temperature 36.3 °C (97.3 °F), temperature source Temporal, resp. rate 16, height 1.702 m (5' 7.01\"), weight 81.2 kg (179 lb 0.2 oz), SpO2 97%.    Review of Systems    Psychiatric ROS - Adult  Anxiety: General Anxiety Disorder (SHOSHANA)SHOSHANA Behaviors: excessive anxiety/worry  Depression: withdrawn minimal   Delirium: negative  Psychosis: negative  Gloria: negative  Safety Issues: none  Psychiatric ROS Comment: Patient seems better overall today    Physical Exam      Mental Status Exam  General: Patient with anxiety, depression and history of alcohol abuse   Appearance: Patient in hospital pants and gown. Fair hygiene and grooming, plans to shave today  Attitude: Good eye contact, continues help seeking  Behavior: Still anxious about future, has been more social  Motor Activity: No EDS or TD  Speech: spontaneous, logical, relevant  Mood: Endorses minimal anxiety and depression  Affect: Appropriate response to emotional cues  Thought Process: linear, sequential, organized  Thought Content: Denies SI or HI  Thought Perception: Denies VH or AH  Cognition: AxO 3x to person place and time  Insight: Intact, help seeking   Judgement: Intact, able to make decisions regarding medical care    Psychiatric Risk Assessment  Violence Risk Assessment: lower socioeconomic class and male  Acute Risk of Harm to Others is Considered: low   Suicide Risk Assessment: , " chronic medical illness, living alone or lack of social support, and male  Protective Factors against Suicide: adherence to  treatment  Acute Risk of Harm to Self is Considered: low    Relevant Results               Assessment/Plan   Assessment & Plan  SHOSHANA (generalized anxiety disorder)    Tobacco dependence    PLAN  Trial increase in Seroquel daytime to 25 mg bid   Dc Zoloft continue to taper up luvox  Continue Seroquel 150 mg at bedtime  Milieu therapy   10/23: continue to monitor sleep, daytime anxiety  10/24: reduce seroquel to 12.5mg tid  10/25: add melatonin 5mg at bedtime  10/28A: luvox increased over the weekend, feeling mild dizzy on daytime dose of seroquel. Reduce 12,5mg tid to bid. Sw to talk to patient about disposition location, updates.        10/29: continue on current regimen of Seroquel. Possibly discuss with patient about increase in Luvox to help increasing anxiety.     10/30: continued encouragement in attending group sessions. Continue current medicine regimen.    10/31: Continue attending group session, denies any adverse medication effects so continue current regimen      Clarissa RICOS                        Clarissa Carney

## 2024-10-31 NOTE — CARE PLAN
"The patient's goals for the shift include \"Shower and shave in the morning\"    The clinical goals for the shift include medication compliance    Over the shift, the patient did make progress toward the following goals    Problem: Sensory Perceptual Alteration as Evidenced by  Goal: Participates in unit activities  Outcome: Progressing     Problem: Sensory Perceptual Alteration as Evidenced by  Goal: Initiates reality-based interactions  Outcome: Progressing     Problem: Altered Thought Processes as Evidenced by  Goal: STG - Desires improvement in ability to think and concentrate  Outcome: Progressing     Problem: Potential for Harm to Self or Others  Goal: Denies harm toward self or others  Outcome: Progressing     Problem: Alteration in Sleep  Goal: STG - Reports nightly sleep, duration, and quality  Outcome: Progressing     Problem: Alteration in Sleep  Goal: STG - Identifies sleep hygiene aids  Outcome: Progressing     Problem: Alteration in Sleep  Goal: STG - Informs staff if unable to sleep  Outcome: Progressing     Problem: Self Care Deficit  Goal: Accepts need for medications  Outcome: Progressing     Problem: Self Care Deficit  Goal: STG - Goes to and eats meals independently in dining room 100% of time  Outcome: Progressing     "

## 2024-10-31 NOTE — GROUP NOTE
"Group Topic: Goals   Group Date: 10/31/2024  Start Time: 0730  End Time: 0815  Facilitators: LOPEZ Bloom   Department: [facility] REHAB THERAPY VIRTUAL    Number of Participants: 3   Group Focus: check in, daily focus, goals, and reminiscence  Treatment Modality: Recreation Therapy  Interventions utilized were: Goal Discussion, Halloween Reminiscing, Thought for the Day Reading \"Trick or Treat\", exploration, story telling, and support  Purpose: Goal Identification, feelings and self-worth    Name: Jonny Valenzuela YOB: 1970   MR: [id number]      Facilitator: Recreational Therapist  Level of Participation: active  Quality of Participation: appropriate/pleasant, cooperative, engaged, and initiates communication  Interactions with others: appropriate  Mood/Affect: appropriate  Triggers (if applicable): N/A  Cognition: concrete and capable  Progress: Moderate  Comments: Morning group included a goal identification/discussion, reminiscing about Halloween memories, and reviewing a thought for the day. The quote related to the reading was “the treat in store for me in recovery are there when I am ready to receive them”. Participants were encouraged to reflect on the reading, share thoughts/opinions, identify some areas they are doing well in, and establish a goal for today.    Patient attended the entire session and completed all desired group tasks with encouragement.    Plan: continue with services      "

## 2024-10-31 NOTE — PROGRESS NOTES
"Occupational Therapy     REHAB Therapy Assessment & Treatment    Patient Name: Jonny Valenzuela  MRN: [id number]  Today's Date: 10/31/2024      Activity Assessment:     Stress ID and Management Group: [id number]  Healthy Coping vs Unhealthy Coping Group: [id number]    0/2  Groups attended     Pt does not participate in groups this date sharing \"Im leaving today\" Despite encouragement, he remains in his room. No groups this date. Pt would benefit from continued OT services in order to improve overall self-esteem, personal confidence and positive supports for safe transition at discharge.      Encounter Problems       Encounter Problems (Active)       OT Goals       Pt will demo increased activity level by attending 8-10 groups per week.  (Progressing)       Start:  10/16/24    Expected End:  11/06/24            Pt will explore and ID 1-2 strategies to manage stressors/symptoms of illness/ grief more effectively prior to discharge.  (Progressing)       Start:  10/16/24    Expected End:  11/06/24            Pt will ID/ utilize 1-2 ways to increase balance of activity/ re-involve self in functional daily routines/roles prior to discharge.  (Progressing)       Start:  10/16/24    Expected End:  11/06/24            Pt will explore/ ID 1-2 meaningful leisure activities to improve QOL for post discharge use.  (Progressing)       Start:  10/16/24    Expected End:  11/06/24            Pt will ID 2 community resources/programs to join/attend after D/C to improve their support system (Progressing)       Start:  10/16/24    Expected End:  11/06/24                     Additional Comments:    TARA collaborated with patients nurse and charge nurse throughout the day to provide appropriate support and encouragement to attend groups. Pt up on unit when PACHECO left last group of the day. All needs met.    "

## 2024-10-31 NOTE — GROUP NOTE
Group Topic: Leisure Skills   Group Date: 10/30/2024  Start Time: 1600  End Time: 1720  Facilitators: LOPEZ Bloom   Department: [facility] REHAB THERAPY VIRTUAL    Number of Participants: 6   Group Focus: concentration and leisure skills  Treatment Modality: Recreation Therapy  Interventions utilized were: Card Tricks (watching and learning x3 tricks), The Game, leisure development and mental fitness  Purpose: Leisure Awareness/Education, Pleasurable Activity, Social/Cognitive Stimulation, coping skills    Name: Jonny Valenzuela YOB: 1970   MR: [id number]      Facilitator: Recreational Therapist  Level of Participation: active  Quality of Participation: appropriate/pleasant, cooperative, and engaged  Interactions with others: appropriate  Mood/Affect: appropriate and brightens with interaction  Triggers (if applicable): N/A  Cognition: concrete and capable  Progress: Moderate  Comments: This session involved multiple leisure activities including the showing and teaching of three card tricks and an activity called “The Game”.  The group involved cognitive tasks, social interactions, healthy competition, leisure awareness, and a pleasurable experience. All participants were encouraged to listen to the rules, ask questions as needed, and participate in the activities to the best of their abilities.     Patient attended the entire session and completed all desired group tasks encouragement and assistance. Patient displayed some problem solving and comprehension issues with some of the cognitive tasks.     Plan: continue with services

## 2024-10-31 NOTE — NURSING NOTE
Patient is out in the lounge sitting at the same table In the same spot he sits everyday since being here. Pt appears brighter this evening and states he is being discharged tomorrow. Pt states he is ready to be discharged. Pt rated anxiety 5,depression 5,denied si/hi, denied a/v hallucinations. Pt ate snack. Pt is medication compliant. We spoke briefly of a plan for this patient to complete showering and shaving before he leaves tomorrow and the patient stated he will shower and shave after breakfast tomorrow.

## 2024-10-31 NOTE — NURSING NOTE
"0845- The pt was calm and cooperative during the interview that took place in the pt's room away from his peers for privacy. The pt denies anxiety rating it a 0/10, depression 4/10, denies SI, HI and AVH at this time as well as pain rating it a 0/10. Last bowel movement was, \"yesterday\" 10/30/24. Coping skills, \"I don't know.\" Goal for the day, \"Make a good dinner.\" Pt strength, \"I'm good at dealing with shit and putting up with people.\" The pt was medication compliant with his morning medications. Q 15 minute monitoring continued.     1515-This nurse reviewed the discharge paperwork with the pt and the importance of keeping his follow up appointments as that is where he will receive his medication refills and if he cannot keep the scheduled appointment to call to reschedule. The pt verbalized understanding. The pt was discharged from the unit.  "

## 2024-10-31 NOTE — DISCHARGE SUMMARY
"       Discharge Summary      Reason For Admission:\" I'm anxious and depressed\"   Discharge Destination:  friend's home    Discharge Diagnosis:  Major depressive disorder, recurrent, severe w/o psychotic behavior (Multi)  SHOSHANA (generalized anxiety disorder)  Alcohol use disorder, severe, dependence (Multi)  Tobacco dependence     HISTORY OF PRESENT ILLNESS:  Jonny Valenzuela is a 54 y.o. year old male patient who was transferred to the Behavior Health Unit once medically cleared following acute alcohol withdrawal. Patient had presented to the Emergency Department with suicidal ideation and acute alcohol intoxication. Patient reports his depression and anxiety worsening over the past two months after he was asked to leave his brother's home and became homeless. (See HPI from psychiatry consult 10- below).  Patient continues to report 10/10 anxiety and depression with suicidal ideation that \"comes and goes.\" He continues to deny homicidal ideation or auditory, visual, olfactory, gustatory or tactile hallucinations. He endorses paranoia, and says, \"when I am in a group, I feel like everyone is talking about me and pointing at me.\" Patient reports he does not actually see this, but it is a feeling he has. Patient reports he does feel safe here; however, he is not interested in participating in any groups for this reason.    He continues to have difficulty sleeping. Reports melatonin 5 mg helped him fall asleep last night, but he still frequently wakes due to anxiety and constant \"tossing and turning.\" Patient also received Seroquel 75 mg and Vistaril 50 mg for sleep last night, stating these \"did not help.\" Also reports no decrease in symptoms since beginning Zoloft 100 mg a few days ago.       Per Psychiatry Consult Assessment of 10-:  HPI: Jonny Valenzuela is a 54 y.o. year old male patient who presented to [facility] Emergency Department for suicidal ideation while intoxicated. Patient states for the past 2 months, ever " "since he was \"kicked out\" of his brother's home, he has experienced worsening depression and anxiety. He endorses \"occasional\" drinking all his life, but states for the past 1 week he was drinking a 12-pack of beer at night, \"just so I could fall asleep.\" He reports alcohol use to \"self-medicate\" his anxiety symptoms of constant worrying and inability to sleep.  He reports experiencing worsening feelings of depression for the past 2 months, along with decreased sleep (\"2-3 hours on a good night\"), decreased energy, decreased concentration, increased feelings of hopelessness and helplessness and worthlessness, and anhedonia, all for the past 2 months. He also reports experiencing intermittent suicidal ideation for the past 2 months along with a suicide plan to hang himself for the past week. He reports experiencing prior depressive episodes in the past. He denies experiencing any hallucinations and does not appear to be internally stimulated. Patient is still experiencing suicidal ideation. In the Emergency Room, patient had endorsed homicidal ideation for his niece and nephew. Patient reports now that he was misunderstood. He stopped himself from committing suicide because he \"knew it would upset them, I didn't want to hurt them emotionally.\" Patient denies ever wanting to hurt them physically.    He endorses constant paranoia, stating, \"I don't like a lot of people ... If I see a group of people I automatically think they're talking about me.\" Patient reports he does not truly believe people are talking about him, it is just a feeling he cannot control.  Jonny reports experiencing excessive worries about everyday activities that he can't control, and result in problems sleeping, concentrating, decreased energy, irritability, and restlessness. He reports, \"my thoughts keep me up at night ... Even things from 20-30 years ago... I'll wake up every 15 minutes worried about something.\"  He reports experiencing less than " "1 week (\"a few days at most\") of an abnormally elevated mood, along with a decreased need for sleep, racing thoughts, increased distractibility and increased goal directed activity. Per patient, he has not experienced one of these episodes in \"years,\" and cannot provide more details.     He reports frequent episodes (at least once per day) where he feels confusion while completing a task. Per patient, \"I'll start walking somewhere and while I'm on my way I'll forget where I'm going.\" Patient states these have been happening for \"a while.\"     Hospital course:  Jonny reported very anxiety, social, general, depressive type. Zoloft was discontinued and replace with luvox, seroquel. Patient's attended some group, limited by social anxiety. By the end of the hospitalization with improved control of anxiety and sleep, Jonny began attending 1-2 groups a day. Patient reported no drug side effects. The patient was seen daily by the team, which included the provider, nursing, occupational therapy and social work. Patient received education regarding their diagnosis and treatment plan. Patient was visible on the unit, medication compliant, cooperative  with care and help seeking. The patient attended group therapy, worked on individualized coping skills and was goal oriented to the future and to ongoing stabilization of their mental health needs.       MMSE:                                                                                                                       General:   Appearance: appropriate grooming and hygiene, appears stated age  Attitude:  calmer, cooperative  Behavior: appropriate eye contact  Movement: no psychomotor agitation or retardation. No EPS/TD. Normal gait and station. Normal muscle tone/bulk.  Speech and language:  regular rate, rhythm, volume and tone, spontaneous, fluent.   Mood: \"better\"  Affect:  anxious-improvement  Thought process: linear, organized, logical, goal directed thinking and " processing  Thought content: does not endorse suicidal ideation or homicidal ideations, no delusions elicited.  Perception: does not endorse auditory/visual hallucinations. Does not appear to be responding to hallucinatory stimuli, no olfactory, somatic or tactile hallucinations were appreciated.  Cognition:  alert and oriented to person, place, time and purpose, short and long term memory within normal limits, attention and concentration within normal limits  Insight:  fair, as patient recognizes symptoms of illness and need for recommended treatments.   Judgment:  can make reasonable decisions about ordinary activities of daily living and necessary medical care recommendations    LABS   ECG 12 lead    Result Date: 10/16/2024  Normal sinus rhythm Left axis deviation Abnormal ECG When compared with ECG of 15-SEP-2024 21:21, No significant change was found See ED provider note for full interpretation and clinical correlation Confirmed by Elzbieta Rushing ([id number]) on 10/16/2024 3:34:41 PM    CT head wo IV contrast    Result Date: 10/14/2024  Interpreted By:  Harpreet Lema, STUDY: CT HEAD WO IV CONTRAST; 10/14/2024 7:44 am   INDICATION: Signs/Symptoms:encephalopathy   COMPARISON: None   ACCESSION NUMBER(S): [id number]   ORDERING CLINICIAN: ALFONSO ALEMAN   TECHNIQUE: Axial images were obtained through the brain. No IV contrast was administered.   All CT examinations are performed with one or more of the following dose reduction techniques: Automated Exposure Control, adjustment of mA and/or kV according to patient size, or use of iterative reconstruction techniques.   FINDINGS: The ventricles are normal in size and midline in position. Patchy periventricular hypodensities are present suggestive of minimal small vessel ischemic white matter disease. There is no intracranial hemorrhage. No mass or mass effect is seen. The osseus structures are unremarkable. Small, 10 mm sebaceous cyst is again noted in the subcutaneous  soft tissues adjacent to the right zygomatic arch.       FUNCTIONAL ESTIMATES  Estimate of Intelligence:  average   Estimate of Capacity for Activities of Daily Living:  independent       A safety risk assessment was completed on the day of discharge. The patient is judged a minimal suicide risk due to:  1)  No access to guns.  2) Denied prior suicide attempts.  3) Denies current suicidal ideation or plans  4) Goal directed to the future:  5) No current symptoms of a major depressive episode.  The team deemed the patient to be a low risk of self harm and recommend the patient for discharge today.    Substance Use Risk Assessment:  Nicotine: Risks of continued tobacco use was addressed with the patient which included: inpatient education and counseling of the risks of oral, esophageal as well as other organ cancers (including oral, dermatological, gastric, pancreatic, respiratory) along with the ongoing risk of neurological and cardiovascular disease/events (strokes, angina). Treatment options for cessation were offered to include: alternate tobacco products, both inpatient/outpatient counseling. Replacement products were offered during this admission and prescribed at the time of discharge along with a referral to outpatient cessation counseling.  Alcohol: The increase in morbidity and mortality, financial, both interpersonal and physical health risk in direct relationship to the use of alcohol ( in either a binge pattern or a sustained use over time) was discussed with the patient. Risks of intoxication, disinhibition, legal and interpersonal issues as well as abuse and dependence, along with the    increased risks of organ damage (cardiac, neurological, esophageal, gastric, liver, pancreatic, renal dysfunction among others) was discussed. The risks of decreased hepatic clearance and increased medication serum drug levels along with increase in potential medication side effects, was also discussed. Options for  treatment: Discussed was reduction in alcohol consumption, referral to dual diagnosis program, residential rehabilitation programs, AA, NA, TINA, gabapentin and oral naltrexone, if meets criteria as a candidate for these medications.  Street Drugs: Street drug use was addressed on admission, including both physical, mental, financial and psychological risk factors of ongoing use. There are no FDA prescribed treatment medications for cannabis, stimulants use/abuse (cocaine, PCP) or hallucinogens.  Patient was screened for concomitant other drugs used (tobacco, alcohol). Treatment options available were discussed ( if applicable) AA, NA, TINA, and outpatient dual diagnosis therapy, treatment programs. Patient voiced understanding of their treatment options.  Cannabis use: Patient was counseled on longer term effects on central nervous system receptors with chronic frequent use of cannabis, risk of dependence psychological, financial, drug interactions, sedative effects with mental health medications.    I spent over 30 minutes in the preparation of this summary. All 11 elements of the transition record were discussed with the patient and or caregiver and the receiving inpatient facility (if applicable).  A copy of the transition record was given to the patient and was transmitted to the outpatient provider accepting the patient's care following  discharge.    Patient's illness, medication/potential for medication side effects, and the medication recommended along with the importance of mediation compliance benefits and risk were reviewed prior to discharge with the patient and with designated family member patient (if applicable).  The patient voiced understanding of their diagnosis and treatment plan.  The patient was counseled not to stop medications without the supervision of a psychiatrist.  The patient was counseled to follow-up with their outpatient medical provider as indicated.   The patient was counseled that  if there was an increase in mental health issues, depression, anxiety, medication side effects, self harming thoughts or thoughts to harm others, to call [facility] or 911 and come to the nearest emergency room.   The patient also received information regarding advanced mental and medical health directives during this hospitalization which they could discuss with their outpatient provider.   The plan was discussed with the patient, the nurse and the social work department. The patient voiced understanding and agreement with the plan.    ick up 8 medications from [facility]/pharmacy #[id number] [place], [place] - [id number] [facility]     Medications on Discharge:  famotidine, 20 mg, oral, BID  fLuvoxaMINE, 100 mg, oral, Nightly  folic acid, 1 mg, oral, Daily  lisinopril, 20 mg, oral, Daily  melatonin, 5 mg, oral, Nightly  nicotine, 1 patch, transdermal, Daily  QUEtiapine, 12.5 mg, oral, BID  QUEtiapine, 150 mg, oral, Nightly     Medication to Stop:  OLANZapine 10 mg tablet (ZyPREXA)   PARoxetine 30 mg tablet (Paxil)        Follow up appointments:    Outpatient Mental Health Follow Up Appointments:   Emergency Services Appointment, to be reassessed for psychiatry, counseling and case management services and dual diagnosis treatment.  On  Monday, November 4th, 2024 at 10:00 AM.  at  [facility],  [address] [place] [address]  [id number]     P. [phone];  F. [phone];     ([facility] phone number is: [phone], call every other day in the morning to check on any open beds available).   Psychiatric Continuing Care Plan  Admission Details:      Do you have a guardian: No .  The hospital or service name is n/a. The reason for your hospitalization was  .  Safety recommendations include Safety precautions at home. Successful interventions implemented include Coping skills, Music, Sleep routines, Relaxation techniques, Group programming, Work-stress management, Individual  programming. Social interventions include Other.   Are you a tobacco user: Yes, tobacco user. Tobacco counseling given: Yes. Tobacco cessation medications given: Yes.     Advance Directives:   Do you have a Advanced Directive or DNR: No  Do you have a Medical Advanced Directive:  No, reason given for no Medical Advance Directive was Did not wish to discuss Adv Direction/surrogate.     Information about Medical Advanced Directives was given or requested: Patient/family declined.  Do you have a Mental Health Advanced Directive: No,  reason given for no directive was Did not wish to discuss Adv Direction/surrogate.    Information about Mental Health Advanced Directives was given or requested: Yes, provided in admission packet.     Discharge Details:  You were discharged to Family or friend's house.  Pending test results:  No.   Were there any procedures performed during this hospitalization:  No .  If you need any additional mental health help, please call  [facility] - [facility] [place] [address] - [phone], [employer] [place] [employer] - (358) 012 [facility] ([id number]) or info@[email or web address], Crisis Hotline (444) 670-0102.  You were discharged on multiple antipsychotic medications:  No .     Emergencies Related to Your Inpatient Hospitalization:  Please call Dr Arboleda at [address] [phone] if you have an emergency that is related to your recent hospitalization.     Elements of Transition:  All 11 elements of the transition record were discussed with/provided to patient and/or caregiver at time of discharge by discharging inpatient facility or the 4 key elements of the transition record were discussed with receiving inpatient facility: Yes.   Community Resources  Crisis Center Hotline:  National Suicide  & Crisis Prevention Lifeline: Call or Text 747 or 1-279.794.6342 (TALK)  Crisis Text Line: Text HOME to 819743       Ledy Arboleda MD

## 2024-11-02 NOTE — SIGNIFICANT EVENT
Follow Up Phone Call    Outgoing phone call    Spoke to: Jonny Valenzuela Relationship:self   Phone number: [phone]      Outcome: missing/invalid number   No chief complaint on file.         Diagnosis:Not applicable

## 2024-12-19 ENCOUNTER — APPOINTMENT (OUTPATIENT)
Dept: PRIMARY CARE | Facility: CLINIC | Age: 54
End: 2024-12-19
Payer: COMMERCIAL

## 2024-12-19 VITALS
DIASTOLIC BLOOD PRESSURE: 90 MMHG | BODY MASS INDEX: 34.66 KG/M2 | HEART RATE: 74 BPM | WEIGHT: 203 LBS | HEIGHT: 64 IN | SYSTOLIC BLOOD PRESSURE: 138 MMHG | OXYGEN SATURATION: 99 %

## 2024-12-19 DIAGNOSIS — L20.89 OTHER ATOPIC DERMATITIS: Primary | ICD-10-CM

## 2024-12-19 DIAGNOSIS — I10 HTN (HYPERTENSION), BENIGN: ICD-10-CM

## 2024-12-19 PROCEDURE — 3080F DIAST BP >= 90 MM HG: CPT

## 2024-12-19 PROCEDURE — 3079F DIAST BP 80-89 MM HG: CPT

## 2024-12-19 PROCEDURE — 3075F SYST BP GE 130 - 139MM HG: CPT

## 2024-12-19 PROCEDURE — 3008F BODY MASS INDEX DOCD: CPT

## 2024-12-19 PROCEDURE — 99204 OFFICE O/P NEW MOD 45 MIN: CPT

## 2024-12-19 PROCEDURE — 3077F SYST BP >= 140 MM HG: CPT

## 2024-12-19 RX ORDER — LOSARTAN POTASSIUM 25 MG/1
25 TABLET ORAL DAILY
Qty: 30 TABLET | Refills: 11 | Status: SHIPPED | OUTPATIENT
Start: 2024-12-19 | End: 2024-12-19 | Stop reason: WASHOUT

## 2024-12-19 RX ORDER — HYDROCORTISONE 25 MG/G
CREAM TOPICAL 2 TIMES DAILY PRN
Qty: 30 G | Refills: 0 | Status: SHIPPED | OUTPATIENT
Start: 2024-12-19 | End: 2025-01-02

## 2024-12-19 ASSESSMENT — PATIENT HEALTH QUESTIONNAIRE - PHQ9
2. FEELING DOWN, DEPRESSED OR HOPELESS: NEARLY EVERY DAY
1. LITTLE INTEREST OR PLEASURE IN DOING THINGS: NEARLY EVERY DAY
SUM OF ALL RESPONSES TO PHQ9 QUESTIONS 1 AND 2: 6

## 2024-12-19 NOTE — PROGRESS NOTES
Subjective   Patient ID: Jonny Valenzuela is a 54 y.o. male who presents for No chief complaint on file..     HPI  Patient has cataract surgery planned in January 14/2025.   He has been having trouble with the vision for the past year especially on the right eye.   Has significant anisocoria, which he states has been present for quite some time without change  Patient does not have any pain with eye movement. No headache reported.    MDD   SHOSHANA  Patient was hospitalized for depression 2 months ago and was discharged on fluvoxamine and Seroquel.   Patient has been out of the medication for the last month but has appointment today with his psychiatry.  Patient endorses depression symptoms such as feeling hopelessness, having low energy, gain weight in the last 2 months but his appetite did not change, insomnia.  Patient does not feel guilty.   He also has suicidal ideation but does not have any plan on acting.  Denied hallucination.     HTN  BP today was 138/90 mmHg.   He started 3 months ago on lisinopril 20 mg but discontinued a month ago due to having higher and lower blood pressure measurement at home.   Patient was wondering if he would need blood pressure medication at this time.    Dermatitis  Patient reports having a lesion in the right palm for years.  Patient has been working in the past but has not currently working.  He was repairing the roofs.  The lesion is itchy.       Diet-can be better  Alcohol-no  Exercise-does not  Ilicit drug -does not  Smoking-is a smoker    Current Outpatient Medications on File Prior to Visit   Medication Sig Dispense Refill    baclofen (Lioresal) 10 mg tablet Take 1 tablet (10 mg) by mouth 3 times a day as needed for muscle spasms. Verified with pharmacy last pickup from pharmacy 9/27/24.      famotidine (Pepcid) 20 mg tablet Take 1 tablet (20 mg) by mouth 2 times a day. 60 tablet 1    fLuvoxaMINE (Luvox) 100 mg tablet Take 1 tablet (100 mg) by mouth once daily at bedtime. 30 tablet 1     folic acid (Folvite) 1 mg tablet Take 1 tablet (1 mg) by mouth once daily. Verified with pharmacy last picked up 9/27/24.      lisinopril 20 mg tablet Take 1 tablet (20 mg) by mouth once daily. Verified with pharmacy last picked up from pharmacy 9/27/24.      melatonin 5 mg tablet Take 1 tablet (5 mg) by mouth once daily at bedtime. 30 tablet 1    multivitamin tablet Take 1 tablet by mouth once daily. Verified with pharmacy and last picked up 9/27/24. 30 tablet 1    nicotine (Nicoderm CQ) 14 mg/24 hr patch Place 1 patch over 24 hours on the skin once daily. 30 patch 0    nicotine polacrilex (Nicorette) 2 mg gum Chew 1 each (2 mg) every 2 hours if needed for smoking cessation (nicotine craving, urge to smoke). 100 each 0    QUEtiapine (SEROquel) 25 mg tablet Take 0.5 tablets (12.5 mg) by mouth 2 times a day. 30 tablet 1    QUEtiapine 150 mg tablet Take 150 mg by mouth once daily at bedtime. 30 tablet 1     No current facility-administered medications on file prior to visit.        No Known Allergies      There is no immunization history on file for this patient.      Review of Systems  All pertinent positive symptoms are included in the history of present illness.  All other systems have been reviewed and are negative and noncontributory to this patient's current ailments.     Objective   There were no vitals taken for this visit.  BSA: There is no height or weight on file to calculate BSA.  No visits with results within 1 Month(s) from this visit.   Latest known visit with results is:   Admission on 10/15/2024, Discharged on 10/31/2024   Component Date Value Ref Range Status    Glucose, Fasting 10/16/2024 100 (H)  74 - 99 mg/dL Final    Cholesterol 10/16/2024 203 (H)  0 - 199 mg/dL Final          Age      Desirable   Borderline High   High     0-19 Y     0 - 169       170 - 199     >/= 200    20-24 Y     0 - 189       190 - 224     >/= 225         >24 Y     0 - 199       200 - 239     >/= 240   **All ranges are  based on fasting samples. Specific   therapeutic targets will vary based on patient-specific   cardiac risk.    Pediatric guidelines reference:Pediatrics 2011, 128(S5).Adult guidelines reference: NCEP ATPIII Guidelines,JERO 2001, 258:2486-97    Venipuncture immediately after or during the administration of Metamizole may lead to falsely low results. Testing should be performed immediately prior to Metamizole dosing.    HDL-Cholesterol 10/16/2024 72.1  mg/dL Final      Age       Very Low   Low     Normal    High    0-19 Y    < 35      < 40     40-45     ----  20-24 Y    ----     < 40      >45      ----        >24 Y      ----     < 40     40-60      >60      Cholesterol/HDL Ratio 10/16/2024 2.8   Final      Ref Values  Desirable  < 3.4  High Risk  > 5.0    LDL Calculated 10/16/2024 112 (H)  <=99 mg/dL Final                                Near   Borderline      AGE      Desirable  Optimal    High     High     Very High     0-19 Y     0 - 109     ---    110-129   >/= 130     ----    20-24 Y     0 - 119     ---    120-159   >/= 160     ----      >24 Y     0 -  99   100-129  130-159   160-189     >/=190      VLDL 10/16/2024 19  0 - 40 mg/dL Final    Triglycerides 10/16/2024 96  0 - 149 mg/dL Final       Age         Desirable   Borderline High   High     Very High   0 D-90 D    19 - 174         ----         ----        ----  91 D- 9 Y     0 -  74        75 -  99     >/= 100      ----    10-19 Y     0 -  89        90 - 129     >/= 130      ----    20-24 Y     0 - 114       115 - 149     >/= 150      ----         >24 Y     0 - 149       150 - 199    200- 499    >/= 500    Venipuncture immediately after or during the administration of Metamizole may lead to falsely low results. Testing should be performed immediately prior to Metamizole dosing.    Non HDL Cholesterol 10/16/2024 131  0 - 149 mg/dL Final          Age       Desirable   Borderline High   High     Very High     0-19 Y     0 - 119       120 - 144     >/= 145    >/=  160    20-24 Y     0 - 149       150 - 189     >/= 190      ----         >24 Y    30 mg/dL above LDL Cholesterol goal      Vitamin D, 25-Hydroxy, Total 10/16/2024 31  30 - 100 ng/mL Final       Physical Exam  CONSTITUTIONAL - well nourished, well developed, looks like stated age, in no acute distress, not ill-appearing, and not tired appearing  SKIN - normal skin color and pigmentation, normal skin turgor.  Patient has a lesion on the right hand, located on the palm.  No erythema was present but some silver scales were present.   HEAD - no trauma, normocephalic  EYES -anisocoria with R>L, but no issue with eye movement in all 4 direction.  ENT - TM's intact, no injection, no signs of infection, uvula midline, normal tongue movement and throat normal, no exudate, nasal passage without discharge and patent  NECK - supple without rigidity, no neck mass was observed, no thyromegaly or thyroid nodules  CHEST - clear to auscultation, no wheezing, no crackles and no rales, good effort  CARDIAC - regular rate and regular rhythm, no skipped beats, no murmur  ABDOMEN - no organomegaly, soft, nontender, nondistended, normal bowel sounds, no guarding/rebound/rigidity,  EXTREMITIES - no edema, no deformities  NEUROLOGICAL - normal gait, normal balance, normal motor, no ataxia, DTRs equal and symmetrical; alert, oriented and no focal signs  PSYCHIATRIC - alert, pleasant,  age-appropriate.  Depressed mood.    Assessment/Plan   He is 54 years old man with past medical history of MDD, generalized anxiety disorder, hypertension who came today to the office for presurgical clearance.  He is surgery is in January 14/ 2025.    Presurgical clearance  -Patient has low risk of having major event during surgery.    Other atopic dermatitis  -     hydrocortisone 2.5 % cream; Apply topically 2 times a day as needed for irritation or rash for up to 14 days.    HTN (hypertension), benign  -Patient was advised to watch his diet, eat low-salt  diet.   -Try to exercise to bring the weight down so it can affect(decrease) blood pressure and the health overall  -Patient was advised to monitor her blood pressure and make a log.   -Based on the numbers so we can decide to prescribe losartan.     Recurrent MDD  Patient had symptoms of major depression disorder, has suicidal ideation but no plan on acting.   Patient has a follow-up appointment today with the psychiatry.  He has been out of his medication for the past month.     Follow-up in a month for health maintenance visit or sooner if needed.    I discussed my plan with my attending, Dr. Isidro Lindsay. MD  Family medicine resident  PGY3

## 2024-12-20 ENCOUNTER — TELEPHONE (OUTPATIENT)
Dept: PRIMARY CARE | Facility: CLINIC | Age: 54
End: 2024-12-20
Payer: COMMERCIAL

## 2024-12-20 NOTE — TELEPHONE ENCOUNTER
Patient will check on getting BP home machine.  They wanted a weeks worth of readings to determine if patient should stay on BP meds.

## 2025-01-09 ENCOUNTER — APPOINTMENT (OUTPATIENT)
Dept: PRIMARY CARE | Facility: CLINIC | Age: 55
End: 2025-01-09
Payer: COMMERCIAL

## 2025-02-18 ENCOUNTER — APPOINTMENT (OUTPATIENT)
Dept: PRIMARY CARE | Facility: CLINIC | Age: 55
End: 2025-02-18
Payer: MEDICARE

## 2025-03-21 ENCOUNTER — HOSPITAL ENCOUNTER (EMERGENCY)
Facility: HOSPITAL | Age: 55
Discharge: STILL ADMITTED OR ADMINISTRATIVE | DRG: 885 | End: 2025-03-22
Attending: STUDENT IN AN ORGANIZED HEALTH CARE EDUCATION/TRAINING PROGRAM
Payer: COMMERCIAL

## 2025-03-21 DIAGNOSIS — R45.851 SUICIDAL THOUGHTS: Primary | ICD-10-CM

## 2025-03-21 PROCEDURE — 99285 EMERGENCY DEPT VISIT HI MDM: CPT | Performed by: STUDENT IN AN ORGANIZED HEALTH CARE EDUCATION/TRAINING PROGRAM

## 2025-03-21 ASSESSMENT — COLUMBIA-SUICIDE SEVERITY RATING SCALE - C-SSRS
6. HAVE YOU EVER DONE ANYTHING, STARTED TO DO ANYTHING, OR PREPARED TO DO ANYTHING TO END YOUR LIFE?: YES
6. HAVE YOU EVER DONE ANYTHING, STARTED TO DO ANYTHING, OR PREPARED TO DO ANYTHING TO END YOUR LIFE?: NO
5. HAVE YOU STARTED TO WORK OUT OR WORKED OUT THE DETAILS OF HOW TO KILL YOURSELF? DO YOU INTEND TO CARRY OUT THIS PLAN?: NO
4. HAVE YOU HAD THESE THOUGHTS AND HAD SOME INTENTION OF ACTING ON THEM?: NO
2. HAVE YOU ACTUALLY HAD ANY THOUGHTS OF KILLING YOURSELF?: YES
1. IN THE PAST MONTH, HAVE YOU WISHED YOU WERE DEAD OR WISHED YOU COULD GO TO SLEEP AND NOT WAKE UP?: YES

## 2025-03-21 ASSESSMENT — PAIN SCALES - GENERAL: PAINLEVEL_OUTOF10: 10 - WORST POSSIBLE PAIN

## 2025-03-21 ASSESSMENT — PAIN DESCRIPTION - PAIN TYPE: TYPE: ACUTE PAIN

## 2025-03-21 ASSESSMENT — PAIN - FUNCTIONAL ASSESSMENT: PAIN_FUNCTIONAL_ASSESSMENT: 0-10

## 2025-03-21 ASSESSMENT — PAIN DESCRIPTION - LOCATION: LOCATION: TEETH

## 2025-03-21 ASSESSMENT — PAIN DESCRIPTION - ORIENTATION: ORIENTATION: LEFT;LOWER;POSTERIOR

## 2025-03-22 ENCOUNTER — HOSPITAL ENCOUNTER (INPATIENT)
Facility: HOSPITAL | Age: 55
DRG: 885 | End: 2025-03-22
Attending: PSYCHIATRY & NEUROLOGY | Admitting: PSYCHIATRY & NEUROLOGY
Payer: COMMERCIAL

## 2025-03-22 ENCOUNTER — APPOINTMENT (OUTPATIENT)
Dept: CARDIOLOGY | Facility: HOSPITAL | Age: 55
DRG: 885 | End: 2025-03-22
Payer: COMMERCIAL

## 2025-03-22 VITALS
WEIGHT: 200 LBS | RESPIRATION RATE: 16 BRPM | BODY MASS INDEX: 34.15 KG/M2 | OXYGEN SATURATION: 97 % | SYSTOLIC BLOOD PRESSURE: 128 MMHG | TEMPERATURE: 97.5 F | HEIGHT: 64 IN | HEART RATE: 75 BPM | DIASTOLIC BLOOD PRESSURE: 84 MMHG

## 2025-03-22 DIAGNOSIS — K21.9 GASTROESOPHAGEAL REFLUX DISEASE WITHOUT ESOPHAGITIS: ICD-10-CM

## 2025-03-22 DIAGNOSIS — I10 HYPERTENSION, UNSPECIFIED TYPE: ICD-10-CM

## 2025-03-22 DIAGNOSIS — F10.20 ALCOHOL USE DISORDER, SEVERE, DEPENDENCE (MULTI): Chronic | ICD-10-CM

## 2025-03-22 DIAGNOSIS — K08.89 TOOTH PAIN: ICD-10-CM

## 2025-03-22 DIAGNOSIS — F41.1 GAD (GENERALIZED ANXIETY DISORDER): ICD-10-CM

## 2025-03-22 DIAGNOSIS — F33.2 SEVERE EPISODE OF RECURRENT MAJOR DEPRESSIVE DISORDER, WITHOUT PSYCHOTIC FEATURES (MULTI): Primary | ICD-10-CM

## 2025-03-22 DIAGNOSIS — F17.200 TOBACCO DEPENDENCE: ICD-10-CM

## 2025-03-22 PROBLEM — R45.851 DEPRESSION WITH SUICIDAL IDEATION: Status: ACTIVE | Noted: 2025-03-22

## 2025-03-22 PROBLEM — F32.A DEPRESSION WITH SUICIDAL IDEATION: Status: ACTIVE | Noted: 2025-03-22

## 2025-03-22 LAB
AMPHETAMINES UR QL SCN: ABNORMAL
ANION GAP SERPL CALC-SCNC: 16 MMOL/L (ref 10–20)
APAP SERPL-MCNC: <10 UG/ML
BARBITURATES UR QL SCN: ABNORMAL
BENZODIAZ UR QL SCN: ABNORMAL
BUN SERPL-MCNC: 10 MG/DL (ref 6–23)
BZE UR QL SCN: ABNORMAL
CALCIUM SERPL-MCNC: 9 MG/DL (ref 8.6–10.3)
CANNABINOIDS UR QL SCN: ABNORMAL
CHLORIDE SERPL-SCNC: 101 MMOL/L (ref 98–107)
CO2 SERPL-SCNC: 22 MMOL/L (ref 21–32)
CREAT SERPL-MCNC: 0.82 MG/DL (ref 0.5–1.3)
EGFRCR SERPLBLD CKD-EPI 2021: >90 ML/MIN/1.73M*2
ERYTHROCYTE [DISTWIDTH] IN BLOOD BY AUTOMATED COUNT: 13.6 % (ref 11.5–14.5)
ETHANOL SERPL-MCNC: 173 MG/DL
ETHANOL SERPL-MCNC: 23 MG/DL
FENTANYL+NORFENTANYL UR QL SCN: ABNORMAL
GLUCOSE SERPL-MCNC: 95 MG/DL (ref 74–99)
HCT VFR BLD AUTO: 45 % (ref 41–52)
HGB BLD-MCNC: 15.8 G/DL (ref 13.5–17.5)
MCH RBC QN AUTO: 34.5 PG (ref 26–34)
MCHC RBC AUTO-ENTMCNC: 35.1 G/DL (ref 32–36)
MCV RBC AUTO: 98 FL (ref 80–100)
METHADONE UR QL SCN: ABNORMAL
NRBC BLD-RTO: 0 /100 WBCS (ref 0–0)
OPIATES UR QL SCN: ABNORMAL
OXYCODONE+OXYMORPHONE UR QL SCN: ABNORMAL
PCP UR QL SCN: ABNORMAL
PLATELET # BLD AUTO: 252 X10*3/UL (ref 150–450)
POTASSIUM SERPL-SCNC: 3.6 MMOL/L (ref 3.5–5.3)
RBC # BLD AUTO: 4.58 X10*6/UL (ref 4.5–5.9)
SALICYLATES SERPL-MCNC: <3 MG/DL
SARS-COV-2 RNA RESP QL NAA+PROBE: NOT DETECTED
SODIUM SERPL-SCNC: 135 MMOL/L (ref 136–145)
WBC # BLD AUTO: 6.9 X10*3/UL (ref 4.4–11.3)

## 2025-03-22 PROCEDURE — 87635 SARS-COV-2 COVID-19 AMP PRB: CPT | Performed by: STUDENT IN AN ORGANIZED HEALTH CARE EDUCATION/TRAINING PROGRAM

## 2025-03-22 PROCEDURE — 2500000002 HC RX 250 W HCPCS SELF ADMINISTERED DRUGS (ALT 637 FOR MEDICARE OP, ALT 636 FOR OP/ED): Performed by: STUDENT IN AN ORGANIZED HEALTH CARE EDUCATION/TRAINING PROGRAM

## 2025-03-22 PROCEDURE — 2500000002 HC RX 250 W HCPCS SELF ADMINISTERED DRUGS (ALT 637 FOR MEDICARE OP, ALT 636 FOR OP/ED): Performed by: PSYCHIATRY & NEUROLOGY

## 2025-03-22 PROCEDURE — 80307 DRUG TEST PRSMV CHEM ANLYZR: CPT | Performed by: STUDENT IN AN ORGANIZED HEALTH CARE EDUCATION/TRAINING PROGRAM

## 2025-03-22 PROCEDURE — 1240000001 HC SEMI-PRIVATE BH ROOM DAILY

## 2025-03-22 PROCEDURE — 36415 COLL VENOUS BLD VENIPUNCTURE: CPT | Performed by: STUDENT IN AN ORGANIZED HEALTH CARE EDUCATION/TRAINING PROGRAM

## 2025-03-22 PROCEDURE — 85027 COMPLETE CBC AUTOMATED: CPT | Performed by: STUDENT IN AN ORGANIZED HEALTH CARE EDUCATION/TRAINING PROGRAM

## 2025-03-22 PROCEDURE — S4991 NICOTINE PATCH NONLEGEND: HCPCS | Performed by: STUDENT IN AN ORGANIZED HEALTH CARE EDUCATION/TRAINING PROGRAM

## 2025-03-22 PROCEDURE — 80048 BASIC METABOLIC PNL TOTAL CA: CPT | Performed by: STUDENT IN AN ORGANIZED HEALTH CARE EDUCATION/TRAINING PROGRAM

## 2025-03-22 PROCEDURE — 93005 ELECTROCARDIOGRAM TRACING: CPT

## 2025-03-22 PROCEDURE — 80320 DRUG SCREEN QUANTALCOHOLS: CPT | Performed by: STUDENT IN AN ORGANIZED HEALTH CARE EDUCATION/TRAINING PROGRAM

## 2025-03-22 PROCEDURE — 2500000001 HC RX 250 WO HCPCS SELF ADMINISTERED DRUGS (ALT 637 FOR MEDICARE OP): Performed by: PSYCHIATRY & NEUROLOGY

## 2025-03-22 PROCEDURE — 99222 1ST HOSP IP/OBS MODERATE 55: CPT | Performed by: NURSE PRACTITIONER

## 2025-03-22 PROCEDURE — 82077 ASSAY SPEC XCP UR&BREATH IA: CPT | Mod: CCI | Performed by: STUDENT IN AN ORGANIZED HEALTH CARE EDUCATION/TRAINING PROGRAM

## 2025-03-22 RX ORDER — TRAZODONE HYDROCHLORIDE 50 MG/1
50 TABLET ORAL NIGHTLY
COMMUNITY
End: 2025-03-28 | Stop reason: HOSPADM

## 2025-03-22 RX ORDER — IBUPROFEN 200 MG
1 TABLET ORAL ONCE
Status: DISCONTINUED | OUTPATIENT
Start: 2025-03-22 | End: 2025-03-22 | Stop reason: HOSPADM

## 2025-03-22 RX ORDER — MULTIVIT-MIN/IRON FUM/FOLIC AC 7.5 MG-4
1 TABLET ORAL DAILY
Status: DISCONTINUED | OUTPATIENT
Start: 2025-03-23 | End: 2025-03-28 | Stop reason: HOSPADM

## 2025-03-22 RX ORDER — POTASSIUM CHLORIDE 20 MEQ/1
40 TABLET, EXTENDED RELEASE ORAL ONCE
Status: COMPLETED | OUTPATIENT
Start: 2025-03-23 | End: 2025-03-23

## 2025-03-22 RX ORDER — HALOPERIDOL 5 MG/1
10 TABLET ORAL EVERY 6 HOURS PRN
Status: DISCONTINUED | OUTPATIENT
Start: 2025-03-22 | End: 2025-03-28 | Stop reason: HOSPADM

## 2025-03-22 RX ORDER — HALOPERIDOL LACTATE 5 MG/ML
10 INJECTION, SOLUTION INTRAMUSCULAR EVERY 6 HOURS PRN
Status: DISCONTINUED | OUTPATIENT
Start: 2025-03-22 | End: 2025-03-28 | Stop reason: HOSPADM

## 2025-03-22 RX ORDER — DIPHENHYDRAMINE HCL 50 MG
50 CAPSULE ORAL EVERY 6 HOURS PRN
Status: DISCONTINUED | OUTPATIENT
Start: 2025-03-22 | End: 2025-03-28 | Stop reason: HOSPADM

## 2025-03-22 RX ORDER — QUETIAPINE 150 MG/1
150 TABLET, FILM COATED, EXTENDED RELEASE ORAL DAILY
COMMUNITY
End: 2025-03-28 | Stop reason: HOSPADM

## 2025-03-22 RX ORDER — LORAZEPAM 2 MG/ML
2 INJECTION INTRAMUSCULAR EVERY 6 HOURS PRN
Status: DISCONTINUED | OUTPATIENT
Start: 2025-03-22 | End: 2025-03-28 | Stop reason: HOSPADM

## 2025-03-22 RX ORDER — DIPHENHYDRAMINE HYDROCHLORIDE 50 MG/ML
50 INJECTION, SOLUTION INTRAMUSCULAR; INTRAVENOUS ONCE AS NEEDED
Status: DISCONTINUED | OUTPATIENT
Start: 2025-03-22 | End: 2025-03-28 | Stop reason: HOSPADM

## 2025-03-22 RX ORDER — DIAZEPAM 5 MG/1
10 TABLET ORAL EVERY 2 HOUR PRN
Status: DISCONTINUED | OUTPATIENT
Start: 2025-03-22 | End: 2025-03-28 | Stop reason: HOSPADM

## 2025-03-22 RX ORDER — LANOLIN ALCOHOL/MO/W.PET/CERES
100 CREAM (GRAM) TOPICAL DAILY
Status: DISCONTINUED | OUTPATIENT
Start: 2025-03-23 | End: 2025-03-28 | Stop reason: HOSPADM

## 2025-03-22 RX ORDER — ARIPIPRAZOLE 10 MG/1
10 TABLET ORAL DAILY
COMMUNITY
End: 2025-03-28 | Stop reason: HOSPADM

## 2025-03-22 RX ORDER — HYDROXYZINE PAMOATE 50 MG/1
50 CAPSULE ORAL EVERY 4 HOURS PRN
Status: DISCONTINUED | OUTPATIENT
Start: 2025-03-22 | End: 2025-03-28 | Stop reason: HOSPADM

## 2025-03-22 RX ORDER — ALUMINUM HYDROXIDE, MAGNESIUM HYDROXIDE, AND SIMETHICONE 1200; 120; 1200 MG/30ML; MG/30ML; MG/30ML
30 SUSPENSION ORAL EVERY 6 HOURS PRN
Status: DISCONTINUED | OUTPATIENT
Start: 2025-03-22 | End: 2025-03-28 | Stop reason: HOSPADM

## 2025-03-22 RX ORDER — FOLIC ACID 1 MG/1
1 TABLET ORAL DAILY
Status: DISCONTINUED | OUTPATIENT
Start: 2025-03-23 | End: 2025-03-28 | Stop reason: HOSPADM

## 2025-03-22 RX ORDER — MIRTAZAPINE 15 MG/1
15 TABLET, FILM COATED ORAL NIGHTLY
COMMUNITY
End: 2025-03-28 | Stop reason: HOSPADM

## 2025-03-22 RX ORDER — ARIPIPRAZOLE 5 MG/1
5 TABLET ORAL DAILY
COMMUNITY
End: 2025-03-28 | Stop reason: HOSPADM

## 2025-03-22 RX ORDER — LORAZEPAM 1 MG/1
2 TABLET ORAL EVERY 6 HOURS PRN
Status: DISCONTINUED | OUTPATIENT
Start: 2025-03-22 | End: 2025-03-28 | Stop reason: HOSPADM

## 2025-03-22 RX ORDER — IBUPROFEN 600 MG/1
600 TABLET ORAL EVERY 8 HOURS PRN
Status: DISCONTINUED | OUTPATIENT
Start: 2025-03-22 | End: 2025-03-28 | Stop reason: HOSPADM

## 2025-03-22 RX ORDER — ACETAMINOPHEN 500 MG
5 TABLET ORAL NIGHTLY PRN
Status: DISCONTINUED | OUTPATIENT
Start: 2025-03-22 | End: 2025-03-28 | Stop reason: HOSPADM

## 2025-03-22 RX ORDER — MICONAZOLE NITRATE 2 %
2 CREAM (GRAM) TOPICAL EVERY 2 HOUR PRN
Status: DISCONTINUED | OUTPATIENT
Start: 2025-03-22 | End: 2025-03-28 | Stop reason: HOSPADM

## 2025-03-22 RX ORDER — FAMOTIDINE 20 MG/1
20 TABLET, FILM COATED ORAL 2 TIMES DAILY PRN
Status: DISCONTINUED | OUTPATIENT
Start: 2025-03-22 | End: 2025-03-28 | Stop reason: HOSPADM

## 2025-03-22 RX ADMIN — IBUPROFEN 600 MG: 600 TABLET, FILM COATED ORAL at 18:49

## 2025-03-22 RX ADMIN — NICOTINE 1 PATCH: 21 PATCH, EXTENDED RELEASE TRANSDERMAL at 09:05

## 2025-03-22 RX ADMIN — Medication 5 MG: at 20:31

## 2025-03-22 RX ADMIN — HYDROXYZINE PAMOATE 50 MG: 50 CAPSULE ORAL at 18:48

## 2025-03-22 RX ADMIN — NICOTINE POLACRILEX 2 MG: 2 GUM, CHEWING BUCCAL at 18:48

## 2025-03-22 SDOH — HEALTH STABILITY: MENTAL HEALTH: IN THE PAST FEW WEEKS, HAVE YOU FELT THAT YOU OR YOUR FAMILY WOULD BE BETTER OFF IF YOU WERE DEAD?: YES

## 2025-03-22 SDOH — HEALTH STABILITY: MENTAL HEALTH: IN THE PAST FEW WEEKS, HAVE YOU WISHED YOU WERE DEAD?: YES

## 2025-03-22 SDOH — HEALTH STABILITY: MENTAL HEALTH: ANXIETY SYMPTOMS: GENERALIZED;PALPITATIONS;PANIC ATTACK;SOCIAL PHOBIAS;SHORTNESS OF BREATH;UNEXPLAINED FEARS

## 2025-03-22 SDOH — HEALTH STABILITY: MENTAL HEALTH: HAVE YOU HAD THESE THOUGHTS AND HAD SOME INTENTION OF ACTING ON THEM?: YES

## 2025-03-22 SDOH — HEALTH STABILITY: MENTAL HEALTH: HAVE YOU EVER TRIED TO KILL YOURSELF?: YES

## 2025-03-22 SDOH — HEALTH STABILITY: MENTAL HEALTH: BEHAVIORS/MOOD: CALM;COOPERATIVE

## 2025-03-22 SDOH — HEALTH STABILITY: MENTAL HEALTH: SUICIDAL BEHAVIOR (3 MONTHS): NO

## 2025-03-22 SDOH — HEALTH STABILITY: MENTAL HEALTH: WAS THIS WITHIN THE PAST THREE MONTHS?: NO

## 2025-03-22 SDOH — HEALTH STABILITY: MENTAL HEALTH: IN THE PAST WEEK, HAVE YOU BEEN HAVING THOUGHTS ABOUT KILLING YOURSELF?: YES

## 2025-03-22 SDOH — HEALTH STABILITY: MENTAL HEALTH: DESCRIBE YOUR THOUGHTS OF KILLING YOURSELF RIGHT NOW:: "LIFE IS PRETTY MUCH DONE."

## 2025-03-22 SDOH — HEALTH STABILITY: MENTAL HEALTH: ACTIVE SUICIDAL IDEATION WITH SPECIFIC PLAN AND INTENT (PAST 1 MONTH): NO

## 2025-03-22 SDOH — HEALTH STABILITY: MENTAL HEALTH: SUICIDAL BEHAVIOR (LIFETIME): YES

## 2025-03-22 SDOH — HEALTH STABILITY: MENTAL HEALTH
DEPRESSION SYMPTOMS: CHANGE IN ENERGY LEVEL;SLEEP DISTURBANCE;CRYING;FEELINGS OF HELPLESSNESS;FEELINGS OF HOPELESSESS;FEELINGS OF WORTHLESSNESS;IMPAIRED CONCENTRATION;INCREASED IRRITABILITY;ISOLATIVE;LOSS OF INTEREST;PSYCHOMOTOR RETARDATION

## 2025-03-22 SDOH — HEALTH STABILITY: MENTAL HEALTH: HOW DID YOU TRY TO KILL YOURSELF?: SHOTGUN- PATIENT WAS HOSPITALIZED FOR ATTEMPTING TO SHOOT HIMSELF.

## 2025-03-22 SDOH — ECONOMIC STABILITY: HOUSING INSECURITY: FEELS SAFE LIVING IN HOME: OTHER (COMMENT)

## 2025-03-22 SDOH — HEALTH STABILITY: MENTAL HEALTH: BEHAVIORAL HEALTH(WDL): EXCEPTIONS TO WDL

## 2025-03-22 SDOH — HEALTH STABILITY: MENTAL HEALTH: ACTIVE SUICIDAL IDEATION WITH SOME INTENT TO ACT, WITHOUT SPECIFIC PLAN (PAST 1 MONTH): NO

## 2025-03-22 SDOH — HEALTH STABILITY: MENTAL HEALTH: WISH TO BE DEAD (PAST 1 MONTH): YES

## 2025-03-22 SDOH — HEALTH STABILITY: MENTAL HEALTH: NON-SPECIFIC ACTIVE SUICIDAL THOUGHTS (PAST 1 MONTH): YES

## 2025-03-22 SDOH — HEALTH STABILITY: MENTAL HEALTH: HAVE YOU BEEN THINKING ABOUT HOW YOU MIGHT DO THIS?: YES

## 2025-03-22 SDOH — HEALTH STABILITY: MENTAL HEALTH: HAVE YOU EVER DONE ANYTHING, STARTED TO DO ANYTHING, OR PREPARED TO DO ANYTHING TO END YOUR LIFE?: YES

## 2025-03-22 SDOH — HEALTH STABILITY: MENTAL HEALTH: ARE YOU HAVING THOUGHTS OF KILLING YOURSELF RIGHT NOW?: YES

## 2025-03-22 SDOH — HEALTH STABILITY: MENTAL HEALTH: SUICIDAL BEHAVIOR (DESCRIPTION): PATENT DESCRIBES WANTING TO BE HIT BY A CAR.

## 2025-03-22 SDOH — HEALTH STABILITY: MENTAL HEALTH: SUICIDE ASSESSMENT: ADULT (C-SSRS)

## 2025-03-22 SDOH — HEALTH STABILITY: MENTAL HEALTH

## 2025-03-22 SDOH — HEALTH STABILITY: MENTAL HEALTH: WHEN DID YOU TRY TO KILL YOURSELF?: WHEN HE WAS ABOUT 20 YEARS

## 2025-03-22 SDOH — HEALTH STABILITY: MENTAL HEALTH: HAVE YOU ACTUALLY HAD ANY THOUGHTS OF KILLING YOURSELF?: YES

## 2025-03-22 SDOH — HEALTH STABILITY: MENTAL HEALTH: HAVE YOU WISHED YOU WERE DEAD OR WISHED YOU COULD GO TO SLEEP AND NOT WAKE UP?: YES

## 2025-03-22 ASSESSMENT — ACTIVITIES OF DAILY LIVING (ADL)
TOILETING: INDEPENDENT
JUDGMENT_ADEQUATE_SAFELY_COMPLETE_DAILY_ACTIVITIES: YES
DRESSING YOURSELF: INDEPENDENT
BATHING: INDEPENDENT
GROOMING: INDEPENDENT
HEARING - LEFT EAR: FUNCTIONAL
ADEQUATE_TO_COMPLETE_ADL: YES
PATIENT'S MEMORY ADEQUATE TO SAFELY COMPLETE DAILY ACTIVITIES?: YES
HEARING - RIGHT EAR: FUNCTIONAL
WALKS IN HOME: INDEPENDENT
FEEDING YOURSELF: INDEPENDENT

## 2025-03-22 ASSESSMENT — COLUMBIA-SUICIDE SEVERITY RATING SCALE - C-SSRS
2. HAVE YOU ACTUALLY HAD ANY THOUGHTS OF KILLING YOURSELF?: NO
2. HAVE YOU ACTUALLY HAD ANY THOUGHTS OF KILLING YOURSELF?: YES
1. SINCE LAST CONTACT, HAVE YOU WISHED YOU WERE DEAD OR WISHED YOU COULD GO TO SLEEP AND NOT WAKE UP?: NO
6. HAVE YOU EVER DONE ANYTHING, STARTED TO DO ANYTHING, OR PREPARED TO DO ANYTHING TO END YOUR LIFE?: NO
5. HAVE YOU STARTED TO WORK OUT OR WORKED OUT THE DETAILS OF HOW TO KILL YOURSELF? DO YOU INTEND TO CARRY OUT THIS PLAN?: YES
1. SINCE LAST CONTACT, HAVE YOU WISHED YOU WERE DEAD OR WISHED YOU COULD GO TO SLEEP AND NOT WAKE UP?: YES
5. HAVE YOU STARTED TO WORK OUT OR WORKED OUT THE DETAILS OF HOW TO KILL YOURSELF? DO YOU INTEND TO CARRY OUT THIS PLAN?: NO
6. HAVE YOU EVER DONE ANYTHING, STARTED TO DO ANYTHING, OR PREPARED TO DO ANYTHING TO END YOUR LIFE?: NO

## 2025-03-22 ASSESSMENT — PAIN SCALES - GENERAL
PAINLEVEL_OUTOF10: 0 - NO PAIN
PAINLEVEL_OUTOF10: 8
PAINLEVEL_OUTOF10: 10 - WORST POSSIBLE PAIN
PAINLEVEL_OUTOF10: 10 - WORST POSSIBLE PAIN
PAINLEVEL_OUTOF10: 8

## 2025-03-22 ASSESSMENT — PAIN SCALES - WONG BAKER
WONGBAKER_NUMERICALRESPONSE: HURTS WHOLE LOT
WONGBAKER_NUMERICALRESPONSE: HURTS WHOLE LOT

## 2025-03-22 ASSESSMENT — PAIN DESCRIPTION - DESCRIPTORS: DESCRIPTORS: ACHING

## 2025-03-22 ASSESSMENT — PAIN - FUNCTIONAL ASSESSMENT
PAIN_FUNCTIONAL_ASSESSMENT: 0-10

## 2025-03-22 ASSESSMENT — LIFESTYLE VARIABLES
PRESCIPTION_ABUSE_PAST_12_MONTHS: NO
SUBSTANCE_ABUSE_PAST_12_MONTHS: YES

## 2025-03-22 ASSESSMENT — PAIN DESCRIPTION - ORIENTATION: ORIENTATION: LEFT;LOWER;POSTERIOR

## 2025-03-22 ASSESSMENT — PAIN DESCRIPTION - LOCATION: LOCATION: TEETH

## 2025-03-22 NOTE — NURSING NOTE
Patient arrived on [address] at 1715 via wheelchair from [facility] ED. Completed all admission paperwork with assistance and completed meal menus with assistance as patient is unable to read or write.     Patient explained he rode his electric bike from [place] to [facility] ED. Patient emphasized his desire to discharge from this inpatient stay prior to April 1st because his storage unit payment is due April 1st. He explained if not paid timely he will lose access to the storage unit as well as losing all possessions within.     Patient explained he has been 'living' in the storage unit.    Patient showered. Received PRN hydroxyzine for anxiety, ibuprofen for tooth ache, and nicorette gum.      Q15 minutes safety checks maintained. Will continue to monitor.

## 2025-03-22 NOTE — PROGRESS NOTES
Emergency Medicine Transition of Care Note.    I received Jonny Valenzuela in signout from Dr. Rangel.  Please see the previous ED provider note for all HPI, PE and MDM up to the time of signout at 0600. This is in addition to the primary record.    In brief Jonny Valenzuela is an 54 y.o. male presenting for   Chief Complaint   Patient presents with    Suicidal     Pt to ED for suicidal thoughts with plan to step in front of a truck. Pt stated he has tried to hurt himself in the past. He is sick of life. He is sick of being homeless. He has a toothache in the back right. He stated he doesn't have any family that cares about him. Has been admitted for psych in past; works through [employer]. Unequal pupils from previous injury; only sees out of left eye.      At the time of signout we were awaiting: EPAT to see.  Patient presents to the emergency department suicidal ideation with a plan to jump in front of a truck.  He was intoxicated upon initial presentation.  Patient will be evaluated by EPAT when clinically sober.  Patient availed by EPAT who recommended placement.  I agree with plan.  Patient accepted at [facility].  [place] slip placed.    ED Course as of 03/22/25 1530   Sat Mar 22, 2025   0139 EKG interpreted by me shows normal sinus rhythm bifascicular block.  No STEMI.  Rate 78.  QTc 460.  Artifact present limiting EKG.  Compared to prior EKG changes have occurred [WL]   0830 Alcohol(!): 23 [HD]   0902 EPAT evaluated the patient recommended placement. [HD]   1138 Discussed with patient.  He does not drink alcohol on a daily basis.  He denies history of withdrawal.  At this point patient medically cleared awaiting EPAT placement. [HD]   1529 Accepted at [facility] [HD]      ED Course User Index  [HD] Josie Bergman DO  [WL] René Rangel DO         Diagnoses as of 03/22/25 1530   Suicidal thoughts       Procedure  Procedures    Josie Bergman DO

## 2025-03-22 NOTE — CARE PLAN
Problem: Sensory Perceptual Alteration as Evidenced by  Goal: Cooperates with admission process  Outcome: Met   The patient's goals for the shift include      The clinical goals for the shift include      Over the shift, the patient did make progress toward the following goals.

## 2025-03-22 NOTE — ED PROVIDER NOTES
History/Exam limitations: none  HPI was provided by patient    HPI:    Chief Complaint   Patient presents with    Suicidal     Pt to ED for suicidal thoughts with plan to step in front of a truck. Pt stated he has tried to hurt himself in the past. He is sick of life. He is sick of being homeless. He has a toothache in the back right. He stated he doesn't have any family that cares about him. Has been admitted for psych in past; works through [employer]. Unequal pupils from previous injury; only sees out of left eye.         Jonny Valenzuela is a 54 y.o. male presents with chief complaint of suicidal ideation.  HPI noted above.  States has plan that he wants to jump in front of a car. Denies auditory hallucinations. Denies visual hallucinations   Patient's complaints have been constant without any alleviating or exacerbating factors.  States he has been off any medications for a month plus.  Denies any illicit drug use.  Does occasionally use alcohol.       ROS: (Bolded text if patient is positive for) All other review of systems are negative except as noted above and HPI or ROS.   CONSTITUTIONAL fever, chills.  ENT sore throat, congestion, rhinorrhea.  CARDIOVASCULAR chest pain, palpitations.  RESPIRATORY cough, shortness of breath, wheeze.  GI abdominal pain, nausea, vomiting, diarrhea.  GENITOURINARY dysuria, hematuria, frequency.  MUSCULOSKELETAL deformity, neck pain.  SKIN rash, color change.  NEUROLOGIC headache, numbness, focal weakness.  NOTES: All systems reviewed, negative except as described above.       Physical Exam:  GENERAL: Alert, oriented , cooperative,  in no acute distress.  HEAD: normocephalic, atraumatic  SKIN: Intact,  dry skin, no lesions.  EYES: Dilated right pupil, patient states this is baseline for him, EOMs intact,  Conjunctiva pink with no erythema or exudates. No scleral icterus.   ENT: No external deformities. Nares patent, mucus membranes moist.  Pharynx clear, uvula midline.   NECK: Supple,  without meningismus. Trachea at midline. No lymphadenopathy.  PULMONARY: Clear bilaterally. No rales, rhonchi or wheezing.   CARDIAC: Regular rate and rhythm. good pulses.  ABDOMEN: Soft, nontender, active bowel sounds.  No palpable organomegaly.  No rebound or guarding.  No CVA tenderness.  : Exam deferred.  MUSCULOSKELETAL: Full range of motion, no deformity. Pulses full and equal. No EDEMA  NEUROLOGICAL:  CN II through XII are grossly intact, no focal neuro deficits.  Strength 5 out of 5 throughout bilateral upper and lower extremities neurovascular intact in bilateral upper and lower extremities  PSYCHIATRIC:  Calm.     History reviewed. No pertinent past medical history.   Social History     Socioeconomic History    Marital status:    Tobacco Use    Smoking status: Every Day     Current packs/day: 2.00     Types: Cigarettes    Smokeless tobacco: Never   Vaping Use    Vaping status: Never Used   Substance and Sexual Activity    Alcohol use: Yes     Comment: 12 cans of beer per day    Drug use: Not Currently     Social Drivers of Health     Financial Resource Strain: Medium Risk (10/30/2024)    Overall Financial Resource Strain (CARDIA)     Difficulty of Paying Living Expenses: Somewhat hard   Food Insecurity: No Food Insecurity (10/15/2024)    Hunger Vital Sign     Worried About Running Out of Food in the Last Year: Never true     Ran Out of Food in the Last Year: Never true   Transportation Needs: Unmet Transportation Needs (10/15/2024)    PRAPARE - Transportation     Lack of Transportation (Medical): Yes     Lack of Transportation (Non-Medical): Yes   Physical Activity: Inactive (10/15/2024)    Exercise Vital Sign     Days of Exercise per Week: 0 days     Minutes of Exercise per Session: 0 min   Stress: Stress Concern Present (10/15/2024)    [employer] - Occupational Stress Questionnaire     Feeling of Stress : Very much   Intimate Partner Violence: Not At Risk  (10/15/2024)    Humiliation, Afraid, Rape, and Kick questionnaire     Fear of Current or Ex-Partner: No     Emotionally Abused: No     Physically Abused: No     Sexually Abused: No   Housing Stability: High Risk (10/30/2024)    Housing Stability Vital Sign     Unable to Pay for Housing in the Last Year: Yes     Number of Times Moved in the Last Year: 2     Homeless in the Last Year: Yes     Current Outpatient Medications   Medication Instructions    ARIPiprazole (ABILIFY) 10 mg, oral, Daily    ARIPiprazole (ABILIFY) 5 mg, oral, Daily    famotidine (PEPCID) 20 mg, oral, 2 times daily    fLuvoxaMINE (LUVOX) 100 mg, oral, Nightly    hydrocortisone 2.5 % cream Topical, 2 times daily PRN    mirtazapine (REMERON) 15 mg, oral, Nightly    nicotine (Nicoderm CQ) 14 mg/24 hr patch 1 patch, transdermal, Daily    QUEtiapine (SEROQUEL) 12.5 mg, oral, 2 times daily    QUEtiapine XR (SEROQUEL XR) 150 mg, oral, Daily, Do not crush, chew, or split.    QUEtiapine 150 mg, oral, Nightly    traZODone (DESYREL) 50 mg, oral, Nightly     No Known Allergies      ED Triage Vitals [03/21/25 2344]   Temperature Heart Rate Respirations BP   36 °C (96.8 °F) (!) 102 18 (!) 154/92      Pulse Ox Temp Source Heart Rate Source Patient Position   95 % Temporal -- Sitting      BP Location FiO2 (%)     Left arm --                   Labs and Imaging  No orders to display     Labs Reviewed   CBC - Abnormal       Result Value    WBC 6.9      nRBC 0.0      RBC 4.58      Hemoglobin 15.8      Hematocrit 45.0      MCV 98      MCH 34.5 (*)     MCHC 35.1      RDW 13.6      Platelets 252     BASIC METABOLIC PANEL - Abnormal    Glucose 95      Sodium 135 (*)     Potassium 3.6      Chloride 101      Bicarbonate 22      Anion Gap 16      Urea Nitrogen 10      Creatinine 0.82      eGFR >90      Calcium 9.0     ACUTE TOXICOLOGY PANEL, BLOOD - Abnormal    Acetaminophen <10.0      Salicylate  <3      Alcohol 173 (*)    DRUG SCREEN,URINE - Abnormal    Amphetamine Screen,  Urine Presumptive Negative      Barbiturate Screen, Urine Presumptive Negative      Benzodiazepines Screen, Urine Presumptive Negative      Cannabinoid Screen, Urine Presumptive Positive (*)     Cocaine Metabolite Screen, Urine Presumptive Negative      Fentanyl Screen, Urine Presumptive Negative      Opiate Screen, Urine Presumptive Negative      Oxycodone Screen, Urine Presumptive Negative      PCP Screen, Urine Presumptive Negative      Methadone Screen, Urine Presumptive Negative      Narrative:     Drug screen results are presumptive and should not be used to assess   compliance with prescribed medication. Contact the performing [facility] laboratory   to add-on definitive confirmatory testing if clinically indicated.    Toxicology screening results are reported qualitatively. The concentration must   be greater than or equal to the cutoff to be reported as positive. The concentration   at which the screening test can detect an individual drug or metabolite varies.   The absence of expected drug(s) and/or drug metabolite(s) may indicate non-compliance,   inappropriate timing of specimen collection relative to drug administration, poor drug   absorption, diluted/adulterated urine, or limitations of testing. For medical purposes   only; not valid for forensic use.    Interpretive questions should be directed to the laboratory medical directors.   ALCOHOL - Abnormal    Alcohol 23 (*)    SARS-COV-2 PCR - Normal    Coronavirus 2019, PCR Not Detected      Narrative:     This assay is an FDA-cleared, in vitro diagnostic nucleic acid amplification test for the qualitative detection and differentiation of SARS CoV-2 from nasopharyngeal specimens collected from individuals with signs and symptoms of respiratory tract infections, and has been validated for use at [facility]. Negative results do not preclude COVID-19 infections and should not be used as the sole basis for diagnosis, treatment, or other  management decisions. Testing for SARS CoV-2 is recommended only for patients who meet current clinical and/or epidemiological criteria defined by federal, state, or local public health directives.         Medical Decision Making:     The patient presented for evaluation for a psych evaluation.    Plan will be to medically clear the patient.  Patient will then talk to EPAT.  Disposition pending at this time.    Pending EPAT evaluation patient signed out to oncoming physician at shift change        External Records Reviewed: I reviewed recent and relevant outside records    ED Course as of 03/22/25 1832   Sat Mar 22, 2025   0139 EKG interpreted by me shows normal sinus rhythm bifascicular block.  No STEMI.  Rate 78.  QTc 460.  Artifact present limiting EKG.  Compared to prior EKG changes have occurred [WL]   0830 Alcohol(!): 23 [HD]   0902 EPAT evaluated the patient recommended placement. [HD]   1138 Discussed with patient.  He does not drink alcohol on a daily basis.  He denies history of withdrawal.  At this point patient medically cleared awaiting EPAT placement. [HD]   1529 Accepted at [facility] [HD]      ED Course User Index  [HD] Josie Bergman DO  [WL] René Rangel DO         Diagnoses as of 03/22/25 1832   Suicidal thoughts         No orders to display     Labs Reviewed   CBC - Abnormal       Result Value    WBC 6.9      nRBC 0.0      RBC 4.58      Hemoglobin 15.8      Hematocrit 45.0      MCV 98      MCH 34.5 (*)     MCHC 35.1      RDW 13.6      Platelets 252     BASIC METABOLIC PANEL - Abnormal    Glucose 95      Sodium 135 (*)     Potassium 3.6      Chloride 101      Bicarbonate 22      Anion Gap 16      Urea Nitrogen 10      Creatinine 0.82      eGFR >90      Calcium 9.0     ACUTE TOXICOLOGY PANEL, BLOOD - Abnormal    Acetaminophen <10.0      Salicylate  <3      Alcohol 173 (*)    DRUG SCREEN,URINE - Abnormal    Amphetamine Screen, Urine Presumptive Negative      Barbiturate Screen, Urine Presumptive  Negative      Benzodiazepines Screen, Urine Presumptive Negative      Cannabinoid Screen, Urine Presumptive Positive (*)     Cocaine Metabolite Screen, Urine Presumptive Negative      Fentanyl Screen, Urine Presumptive Negative      Opiate Screen, Urine Presumptive Negative      Oxycodone Screen, Urine Presumptive Negative      PCP Screen, Urine Presumptive Negative      Methadone Screen, Urine Presumptive Negative      Narrative:     Drug screen results are presumptive and should not be used to assess   compliance with prescribed medication. Contact the performing [facility] laboratory   to add-on definitive confirmatory testing if clinically indicated.    Toxicology screening results are reported qualitatively. The concentration must   be greater than or equal to the cutoff to be reported as positive. The concentration   at which the screening test can detect an individual drug or metabolite varies.   The absence of expected drug(s) and/or drug metabolite(s) may indicate non-compliance,   inappropriate timing of specimen collection relative to drug administration, poor drug   absorption, diluted/adulterated urine, or limitations of testing. For medical purposes   only; not valid for forensic use.    Interpretive questions should be directed to the laboratory medical directors.   ALCOHOL - Abnormal    Alcohol 23 (*)    SARS-COV-2 PCR - Normal    Coronavirus 2019, PCR Not Detected      Narrative:     This assay is an FDA-cleared, in vitro diagnostic nucleic acid amplification test for the qualitative detection and differentiation of SARS CoV-2 from nasopharyngeal specimens collected from individuals with signs and symptoms of respiratory tract infections, and has been validated for use at [facility]. Negative results do not preclude COVID-19 infections and should not be used as the sole basis for diagnosis, treatment, or other management decisions. Testing for SARS CoV-2 is recommended only for  patients who meet current clinical and/or epidemiological criteria defined by federal, state, or local public health directives.           Procedure  Procedures             Note: This note was dictated by speech recognition. Minor errors in transcription may be present.           René Rangel,   03/22/25 0553       René Rangel,   03/22/25 1836

## 2025-03-22 NOTE — SIGNIFICANT EVENT
Application for Emergency Admission      Ready for Transfer?  Is the patient medically cleared for transfer to inpatient psychiatry: Yes  Has the patient been accepted to an inpatient psychiatric hospital: Yes    Application for Emergency Admission  IN ACCORDANCE WITH [place] 5122[place]  The Chief Clinical Officer of: Nicole DE LEON 3/22/2025 .3:29 PM    Reason for Hospitalization  The undersigned has reason to believe that: Jonny Valenzuela Is a mentally ill person subject to hospitalization by court order under division B Section 5122.01 of the Revised Code, i.e., this person:    1.Yes  Represents a substantial risk of physical harm to self as manifested by evidence of threats of, or attempts at, suicide or serious self-inflicted bodily harm    2.No Represents a substantial risk of physical harm to others as manifested by evidence of recent homicidal or other violent behavior, evidence of recent threats that place another in reasonable fear of violent behavior and serious physical harm, or other evidence of present dangerousness    3.No Represents a substantial and immediate risk of serious physical impairment or injury to self as manifested by  evidence that the person is unable to provide for and is not providing for the person's basic physical needs because of the person's mental illness and that appropriate provision for those needs cannot be made  immediately available in the community    4.Yes Would benefit from treatment in a hospital for his mental illness and is in need of such treatment as manifested by evidence of behavior that creates a grave and imminent risk to substantial rights of others or  himself.    5.Yes Would benefit from treatment as manifested by evidence of behavior that indicates all of the following:       (a) The person is unlikely to survive safely in the community without supervision, based on a clinical determination.       (b) The person has a history of lack of compliance with treatment  for mental illness and one of the following applies:      (i) At least twice within the thirty-six months prior to the filing of an affidavit seeking court-ordered treatment of the person under section 5122.111 of the Revised Code, the lack of compliance has been a significant factor in necessitating hospitalization in a hospital or receipt of services in a forensic or other mental health unit of a correctional facility, provided that the thirty-six-month period shall be extended by the length of any hospitalization or incarceration of the person that occurred within the thirty-six-month period.      (ii) Within the forty-eight months prior to the filing of an affidavit seeking court-ordered treatment of the person under section 5122.111 of the Revised Code, the lack of compliance resulted in one or more acts of serious violent behavior toward self or others or threats of, or attempts at, serious physical harm to self or others, provided that the forty-eight-month period shall be extended by the length of any hospitalization or incarceration of the person that occurred within the forty-eight-month period.      (c) The person, as a result of mental illness, is unlikely to voluntarily participate in necessary treatment.       (d) In view of the person's treatment history and current behavior, the person is in need of treatment in order to prevent a relapse or deterioration that would be likely to result in substantial risk of serious harm to the person or others.    (e) Represents a substantial risk of physical harm to self or others if allowed to remain at liberty pending examination.    Therefore, it is requested that said person be admitted to the above named facility.    STATEMENT OF BELIEF    Must be filled out by one of the following: a psychiatrist, licensed physician, licensed clinical psychologist, health or ,  or .  (Statement shall include the circumstances under which the  individual was taken into custody and the reason for the person's belief that hospitalization is necessary. The statement shall also include a reference to efforts made to secure the individual's property at his residence if he was taken into custody there. Every reasonable and appropriate effort should be made to take this person into custody in the least conspicuous manner possible.)    Suicidal ideation with plan to jump in front of a truck    Josie Bergman DO 3/22/2025     _____________________________________________________________   Place of Employment: [facility]    STATEMENT OF OBSERVATION BY PSYCHIATRIST, LICENSED PHYSICIAN, OR LICENSED CLINICAL PSYCHOLOGIST, IF APPLICABLE    Place of Observation (e.g., [facility], general hospital, office, emergency facility)  (If applicable, please complete)    Josie Bergman,  3/22/2025    _____________________________________________________________
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
## 2025-03-22 NOTE — ED TRIAGE NOTES
Pt to ED for suicidal thoughts with plan to step in front of a truck. Pt stated he has tried to hurt himself in the past. He is sick of life. He is sick of being homeless. He has a toothache in the back right. He stated he doesn't have any family that cares about him. Has been admitted for psych in past; works through [employer]. Unequal pupils from previous injury; only sees out of left eye.

## 2025-03-22 NOTE — GROUP NOTE
Group Topic: Gross Motor/Balance Skills   Group Date: 3/22/2025  Start Time: 1600  End Time: 1700  Facilitators: SINDHU SantacruzS   Department: [facility] REHAB THERAPY VIRTUAL    Number of Participants: 6   Group Focus: leisure skills and social skills  Treatment Modality: Recreational Therapy   Interventions utilized were Wii - Jude Kart, exploration and leisure development  Purpose: other: motor skills, leisure awareness, social engagement     Name: Jonny Valenzuela YOB: 1970   MR: [id number]      Facilitator: Recreational Therapist  Level of Participation: did not attend  Progress: None  Comments: Patients were gathered to learn and participate in multiple movement games via the [employer]. This activity worked on fine/gross motor skills, cognitive skills, positive social engagement, healthy competition and leisure awareness.      Patient is new admission and not present for group at this time. Patient will continue to be provided with opportunities to enhance leisure skills and/or coping mechanisms.    Plan: patient will be encouraged to complete RT assessment

## 2025-03-23 VITALS
HEIGHT: 64 IN | OXYGEN SATURATION: 97 % | RESPIRATION RATE: 18 BRPM | WEIGHT: 198.19 LBS | TEMPERATURE: 98.2 F | BODY MASS INDEX: 33.84 KG/M2 | SYSTOLIC BLOOD PRESSURE: 133 MMHG | HEART RATE: 84 BPM | DIASTOLIC BLOOD PRESSURE: 92 MMHG

## 2025-03-23 PROBLEM — K21.9 GASTROESOPHAGEAL REFLUX DISEASE WITHOUT ESOPHAGITIS: Status: ACTIVE | Noted: 2025-03-23

## 2025-03-23 PROBLEM — K08.89 TOOTH PAIN: Status: ACTIVE | Noted: 2025-03-23

## 2025-03-23 PROBLEM — H26.9 CATARACT, RIGHT EYE: Status: ACTIVE | Noted: 2025-03-23

## 2025-03-23 PROBLEM — F22 PARANOIA (PSYCHOSIS) (MULTI): Status: ACTIVE | Noted: 2025-03-23

## 2025-03-23 LAB
25(OH)D3 SERPL-MCNC: 30 NG/ML (ref 30–100)
CHOLEST SERPL-MCNC: 157 MG/DL (ref 0–199)
CHOLESTEROL/HDL RATIO: 2.7
GLUCOSE P FAST SERPL-MCNC: 99 MG/DL (ref 74–99)
HDLC SERPL-MCNC: 57.8 MG/DL
LDLC SERPL CALC-MCNC: 81 MG/DL
NON HDL CHOLESTEROL: 99 MG/DL (ref 0–149)
TRIGL SERPL-MCNC: 93 MG/DL (ref 0–149)
VLDL: 19 MG/DL (ref 0–40)

## 2025-03-23 PROCEDURE — 80061 LIPID PANEL: CPT | Performed by: PSYCHIATRY & NEUROLOGY

## 2025-03-23 PROCEDURE — 2500000001 HC RX 250 WO HCPCS SELF ADMINISTERED DRUGS (ALT 637 FOR MEDICARE OP): Performed by: NURSE PRACTITIONER

## 2025-03-23 PROCEDURE — 2500000002 HC RX 250 W HCPCS SELF ADMINISTERED DRUGS (ALT 637 FOR MEDICARE OP, ALT 636 FOR OP/ED): Performed by: PSYCHIATRY & NEUROLOGY

## 2025-03-23 PROCEDURE — 82947 ASSAY GLUCOSE BLOOD QUANT: CPT | Performed by: PSYCHIATRY & NEUROLOGY

## 2025-03-23 PROCEDURE — 2500000002 HC RX 250 W HCPCS SELF ADMINISTERED DRUGS (ALT 637 FOR MEDICARE OP, ALT 636 FOR OP/ED): Performed by: NURSE PRACTITIONER

## 2025-03-23 PROCEDURE — S4991 NICOTINE PATCH NONLEGEND: HCPCS | Performed by: PSYCHIATRY & NEUROLOGY

## 2025-03-23 PROCEDURE — 1240000001 HC SEMI-PRIVATE BH ROOM DAILY

## 2025-03-23 PROCEDURE — 82306 VITAMIN D 25 HYDROXY: CPT | Mod: GEALAB | Performed by: PSYCHIATRY & NEUROLOGY

## 2025-03-23 PROCEDURE — 99223 1ST HOSP IP/OBS HIGH 75: CPT | Performed by: PSYCHIATRY & NEUROLOGY

## 2025-03-23 PROCEDURE — 36415 COLL VENOUS BLD VENIPUNCTURE: CPT | Performed by: PSYCHIATRY & NEUROLOGY

## 2025-03-23 PROCEDURE — 97165 OT EVAL LOW COMPLEX 30 MIN: CPT | Mod: GO | Performed by: OCCUPATIONAL THERAPIST

## 2025-03-23 PROCEDURE — 2500000001 HC RX 250 WO HCPCS SELF ADMINISTERED DRUGS (ALT 637 FOR MEDICARE OP): Performed by: PSYCHIATRY & NEUROLOGY

## 2025-03-23 RX ORDER — QUETIAPINE FUMARATE 25 MG/1
12.5 TABLET, FILM COATED ORAL EVERY MORNING
Status: DISCONTINUED | OUTPATIENT
Start: 2025-03-24 | End: 2025-03-28 | Stop reason: HOSPADM

## 2025-03-23 RX ORDER — QUETIAPINE FUMARATE 50 MG/1
50 TABLET, FILM COATED ORAL NIGHTLY
Status: DISCONTINUED | OUTPATIENT
Start: 2025-03-23 | End: 2025-03-25

## 2025-03-23 RX ORDER — FLUVOXAMINE MALEATE 50 MG/1
25 TABLET, FILM COATED ORAL NIGHTLY
Status: DISCONTINUED | OUTPATIENT
Start: 2025-03-23 | End: 2025-03-25

## 2025-03-23 RX ORDER — IBUPROFEN 200 MG
1 TABLET ORAL DAILY
Status: DISCONTINUED | OUTPATIENT
Start: 2025-03-23 | End: 2025-03-28 | Stop reason: HOSPADM

## 2025-03-23 RX ADMIN — IBUPROFEN 600 MG: 600 TABLET, FILM COATED ORAL at 20:41

## 2025-03-23 RX ADMIN — Medication 1 TABLET: at 09:59

## 2025-03-23 RX ADMIN — FOLIC ACID 1 MG: 1 TABLET ORAL at 09:59

## 2025-03-23 RX ADMIN — POTASSIUM CHLORIDE 40 MEQ: 1500 TABLET, EXTENDED RELEASE ORAL at 08:43

## 2025-03-23 RX ADMIN — Medication 5 MG: at 20:40

## 2025-03-23 RX ADMIN — NICOTINE 1 PATCH: 21 PATCH, EXTENDED RELEASE TRANSDERMAL at 12:06

## 2025-03-23 RX ADMIN — FLUVOXAMINE MALEATE 25 MG: 50 TABLET ORAL at 20:41

## 2025-03-23 RX ADMIN — HYDROXYZINE PAMOATE 50 MG: 50 CAPSULE ORAL at 20:40

## 2025-03-23 RX ADMIN — HYDROXYZINE PAMOATE 50 MG: 50 CAPSULE ORAL at 14:18

## 2025-03-23 RX ADMIN — THIAMINE HCL TAB 100 MG 100 MG: 100 TAB at 09:59

## 2025-03-23 RX ADMIN — QUETIAPINE FUMARATE 50 MG: 50 TABLET ORAL at 20:41

## 2025-03-23 SDOH — HEALTH STABILITY: PHYSICAL HEALTH: ON AVERAGE, HOW MANY DAYS PER WEEK DO YOU ENGAGE IN MODERATE TO STRENUOUS EXERCISE (LIKE A BRISK WALK)?: 0 DAYS

## 2025-03-23 SDOH — SOCIAL STABILITY: SOCIAL INSECURITY: WITHIN THE LAST YEAR, HAVE YOU BEEN HUMILIATED OR EMOTIONALLY ABUSED IN OTHER WAYS BY YOUR PARTNER OR EX-PARTNER?: NO

## 2025-03-23 SDOH — ECONOMIC STABILITY: HOUSING INSECURITY: IN THE LAST 12 MONTHS, WAS THERE A TIME WHEN YOU WERE NOT ABLE TO PAY THE MORTGAGE OR RENT ON TIME?: YES

## 2025-03-23 SDOH — SOCIAL STABILITY: SOCIAL NETWORK
DO YOU BELONG TO ANY CLUBS OR ORGANIZATIONS SUCH AS CHURCH GROUPS, UNIONS, FRATERNAL OR ATHLETIC GROUPS, OR SCHOOL GROUPS?: YES

## 2025-03-23 SDOH — HEALTH STABILITY: MENTAL HEALTH: HOW OFTEN DO YOU HAVE SIX OR MORE DRINKS ON ONE OCCASION?: NEVER

## 2025-03-23 SDOH — SOCIAL STABILITY: SOCIAL INSECURITY: DOES ANYONE TRY TO KEEP YOU FROM HAVING/CONTACTING OTHER FRIENDS OR DOING THINGS OUTSIDE YOUR HOME?: NO

## 2025-03-23 SDOH — HEALTH STABILITY: MENTAL HEALTH: HOW OFTEN DO YOU HAVE A DRINK CONTAINING ALCOHOL?: 2-3 TIMES A WEEK

## 2025-03-23 SDOH — ECONOMIC STABILITY: HOUSING INSECURITY: AT ANY TIME IN THE PAST 12 MONTHS, WERE YOU HOMELESS OR LIVING IN A SHELTER (INCLUDING NOW)?: YES

## 2025-03-23 SDOH — ECONOMIC STABILITY: FOOD INSECURITY: HOW HARD IS IT FOR YOU TO PAY FOR THE VERY BASICS LIKE FOOD, HOUSING, MEDICAL CARE, AND HEATING?: SOMEWHAT HARD

## 2025-03-23 SDOH — ECONOMIC STABILITY: FOOD INSECURITY
WITHIN THE PAST 12 MONTHS, YOU WORRIED THAT YOUR FOOD WOULD RUN OUT BEFORE YOU GOT THE MONEY TO BUY MORE.: SOMETIMES TRUE

## 2025-03-23 SDOH — HEALTH STABILITY: MENTAL HEALTH: HOW MANY DRINKS CONTAINING ALCOHOL DO YOU HAVE ON A TYPICAL DAY WHEN YOU ARE DRINKING?: 3 OR 4

## 2025-03-23 SDOH — SOCIAL STABILITY: SOCIAL INSECURITY: HAVE YOU HAD ANY THOUGHTS OF HARMING ANYONE ELSE?: NO

## 2025-03-23 SDOH — ECONOMIC STABILITY: INCOME INSECURITY
IN THE PAST 12 MONTHS HAS THE ELECTRIC, GAS, OIL, OR WATER COMPANY THREATENED TO SHUT OFF SERVICES IN YOUR HOME?: ALREADY SHUT OFF

## 2025-03-23 SDOH — SOCIAL STABILITY: SOCIAL NETWORK: HOW OFTEN DO YOU ATTEND CHURCH OR RELIGIOUS SERVICES?: NEVER

## 2025-03-23 SDOH — ECONOMIC STABILITY: FOOD INSECURITY: WITHIN THE PAST 12 MONTHS, THE FOOD YOU BOUGHT JUST DIDN'T LAST AND YOU DIDN'T HAVE MONEY TO GET MORE.: SOMETIMES TRUE

## 2025-03-23 SDOH — ECONOMIC STABILITY: HOUSING INSECURITY: IN THE PAST 12 MONTHS, HOW MANY TIMES HAVE YOU MOVED WHERE YOU WERE LIVING?: 2

## 2025-03-23 SDOH — SOCIAL STABILITY: SOCIAL INSECURITY: WITHIN THE LAST YEAR, HAVE YOU BEEN AFRAID OF YOUR PARTNER OR EX-PARTNER?: NO

## 2025-03-23 SDOH — SOCIAL STABILITY: SOCIAL INSECURITY: ARE YOU MARRIED, WIDOWED, DIVORCED, SEPARATED, NEVER MARRIED, OR LIVING WITH A PARTNER?: WIDOWED

## 2025-03-23 SDOH — SOCIAL STABILITY: SOCIAL INSECURITY: DO YOU FEEL UNSAFE GOING BACK TO THE PLACE WHERE YOU ARE LIVING?: YES

## 2025-03-23 SDOH — SOCIAL STABILITY: SOCIAL NETWORK: IN A TYPICAL WEEK, HOW MANY TIMES DO YOU TALK ON THE PHONE WITH FAMILY, FRIENDS, OR NEIGHBORS?: NEVER

## 2025-03-23 SDOH — HEALTH STABILITY: PHYSICAL HEALTH: ON AVERAGE, HOW MANY MINUTES DO YOU ENGAGE IN EXERCISE AT THIS LEVEL?: 0 MIN

## 2025-03-23 SDOH — ECONOMIC STABILITY: HOUSING INSECURITY

## 2025-03-23 SDOH — SOCIAL STABILITY: SOCIAL NETWORK: HOW OFTEN DO YOU GET TOGETHER WITH FRIENDS OR RELATIVES?: NEVER

## 2025-03-23 SDOH — HEALTH STABILITY: MENTAL HEALTH
DO YOU FEEL STRESS - TENSE, RESTLESS, NERVOUS, OR ANXIOUS, OR UNABLE TO SLEEP AT NIGHT BECAUSE YOUR MIND IS TROUBLED ALL THE TIME - THESE DAYS?: RATHER MUCH

## 2025-03-23 SDOH — ECONOMIC STABILITY: INCOME INSECURITY

## 2025-03-23 SDOH — SOCIAL STABILITY: SOCIAL NETWORK: HOW OFTEN DO YOU ATTEND MEETINGS OF THE CLUBS OR ORGANIZATIONS YOU BELONG TO?: NEVER

## 2025-03-23 SDOH — SOCIAL STABILITY: SOCIAL NETWORK
DO YOU BELONG TO ANY CLUBS OR ORGANIZATIONS SUCH AS CHURCH GROUPS, UNIONS, FRATERNAL OR ATHLETIC GROUPS, OR SCHOOL GROUPS?: NO

## 2025-03-23 SDOH — SOCIAL STABILITY: SOCIAL INSECURITY: HAS ANYONE EVER THREATENED TO HURT YOUR FAMILY OR YOUR PETS?: NO

## 2025-03-23 SDOH — SOCIAL STABILITY: SOCIAL INSECURITY: ABUSE: ADULT

## 2025-03-23 SDOH — SOCIAL STABILITY: SOCIAL INSECURITY: ARE YOU OR HAVE YOU BEEN THREATENED OR ABUSED PHYSICALLY, EMOTIONALLY, OR SEXUALLY BY ANYONE?: NO

## 2025-03-23 SDOH — HEALTH STABILITY: MENTAL HEALTH
EXPERIENCED ANY OF THE FOLLOWING LIFE EVENTS: DEATH OF FAMILY/FRIEND;SOCIAL LOSS (BANKRUPTCY, DIVORCE, WORK-RELATED STRESS)

## 2025-03-23 SDOH — SOCIAL STABILITY: SOCIAL INSECURITY: ARE THERE ANY APPARENT SIGNS OF INJURIES/BEHAVIORS THAT COULD BE RELATED TO ABUSE/NEGLECT?: NO

## 2025-03-23 SDOH — SOCIAL STABILITY: SOCIAL INSECURITY: HAVE YOU HAD THOUGHTS OF HARMING ANYONE ELSE?: NO

## 2025-03-23 SDOH — ECONOMIC STABILITY: TRANSPORTATION INSECURITY: IN THE PAST 12 MONTHS, HAS LACK OF TRANSPORTATION KEPT YOU FROM MEDICAL APPOINTMENTS OR FROM GETTING MEDICATIONS?: YES

## 2025-03-23 SDOH — SOCIAL STABILITY: SOCIAL INSECURITY: DO YOU FEEL ANYONE HAS EXPLOITED OR TAKEN ADVANTAGE OF YOU FINANCIALLY OR OF YOUR PERSONAL PROPERTY?: NO

## 2025-03-23 SDOH — SOCIAL STABILITY: SOCIAL INSECURITY: WERE YOU ABLE TO COMPLETE ALL THE BEHAVIORAL HEALTH SCREENINGS?: YES

## 2025-03-23 ASSESSMENT — LIFESTYLE VARIABLES
NAUSEA AND VOMITING: NO NAUSEA AND NO VOMITING
AGITATION: NORMAL ACTIVITY
ANXIETY: NO ANXIETY, AT EASE
TREMOR: NO TREMOR
AUDITORY DISTURBANCES: NOT PRESENT
HEADACHE, FULLNESS IN HEAD: NOT PRESENT
PULSE: 70
ORIENTATION AND CLOUDING OF SENSORIUM: ORIENTED AND CAN DO SERIAL ADDITIONS
TOTAL SCORE: 0
ORIENTATION AND CLOUDING OF SENSORIUM: ORIENTED AND CAN DO SERIAL ADDITIONS
PAROXYSMAL SWEATS: NO SWEAT VISIBLE
PAROXYSMAL SWEATS: NO SWEAT VISIBLE
CIWA TOTAL SCORE: 6
TREMOR: 2
AUDIT-C TOTAL SCORE: 4
TREMOR: 2
ORIENTATION AND CLOUDING OF SENSORIUM: ORIENTED AND CAN DO SERIAL ADDITIONS
AUDITORY DISTURBANCES: NOT PRESENT
TOTAL SCORE: 4
TOTAL SCORE: 0
PAROXYSMAL SWEATS: NO SWEAT VISIBLE
VISUAL DISTURBANCES: NOT PRESENT
AGITATION: NORMAL ACTIVITY
AGITATION: NORMAL ACTIVITY
PULSE: 84
TOTAL_SCORE: 4
AGITATION: NORMAL ACTIVITY
ORIENTATION AND CLOUDING OF SENSORIUM: ORIENTED AND CAN DO SERIAL ADDITIONS
ORIENTATION AND CLOUDING OF SENSORIUM: ORIENTED AND CAN DO SERIAL ADDITIONS
VISUAL DISTURBANCES: NOT PRESENT
ANXIETY: NO ANXIETY, AT EASE
VISUAL DISTURBANCES: NOT PRESENT
PAROXYSMAL SWEATS: NO SWEAT VISIBLE
TREMOR: 2
VISUAL DISTURBANCES: NOT PRESENT
HEADACHE, FULLNESS IN HEAD: NOT PRESENT
NAUSEA AND VOMITING: NO NAUSEA AND NO VOMITING
PAROXYSMAL SWEATS: NO SWEAT VISIBLE
ANXIETY: MODERATELY ANXIOUS, OR GUARDED, SO ANXIETY IS INFERRED
AGITATION: NORMAL ACTIVITY
NAUSEA AND VOMITING: NO NAUSEA AND NO VOMITING
PAROXYSMAL SWEATS: NO SWEAT VISIBLE
HEADACHE, FULLNESS IN HEAD: NOT PRESENT
AGITATION: NORMAL ACTIVITY
TOTAL SCORE: 0
NAUSEA AND VOMITING: NO NAUSEA AND NO VOMITING
TOTAL SCORE: 6
TOTAL SCORE: 6
VISUAL DISTURBANCES: NOT PRESENT
PRESCIPTION_ABUSE_PAST_12_MONTHS: NO
ANXIETY: MODERATELY ANXIOUS, OR GUARDED, SO ANXIETY IS INFERRED
AUDITORY DISTURBANCES: NOT PRESENT
TREMOR: NO TREMOR
ANXIETY: MODERATELY ANXIOUS, OR GUARDED, SO ANXIETY IS INFERRED
HOW MANY STANDARD DRINKS CONTAINING ALCOHOL DO YOU HAVE ON A TYPICAL DAY: 5 OR 6
NAUSEA AND VOMITING: NO NAUSEA AND NO VOMITING
AUDITORY DISTURBANCES: NOT PRESENT
TREMOR: NO TREMOR
HEADACHE, FULLNESS IN HEAD: NOT PRESENT
HEADACHE, FULLNESS IN HEAD: NOT PRESENT
SKIP TO QUESTIONS 9-10: 0
BLOOD PRESSURE: 130/80
AUDIT-C TOTAL SCORE: 10
NAUSEA AND VOMITING: NO NAUSEA AND NO VOMITING
ORIENTATION AND CLOUDING OF SENSORIUM: ORIENTED AND CAN DO SERIAL ADDITIONS
AGITATION: NORMAL ACTIVITY
AGITATION: NORMAL ACTIVITY
VISUAL DISTURBANCES: NOT PRESENT
NAUSEA AND VOMITING: NO NAUSEA AND NO VOMITING
SKIP TO QUESTIONS 9-10: 0
NAUSEA AND VOMITING: NO NAUSEA AND NO VOMITING
VISUAL DISTURBANCES: NOT PRESENT
AGITATION: NORMAL ACTIVITY
AUDITORY DISTURBANCES: NOT PRESENT
ORIENTATION AND CLOUDING OF SENSORIUM: ORIENTED AND CAN DO SERIAL ADDITIONS
TREMOR: NO TREMOR
ORIENTATION AND CLOUDING OF SENSORIUM: ORIENTED AND CAN DO SERIAL ADDITIONS
AUDITORY DISTURBANCES: NOT PRESENT
TREMOR: NO TREMOR
PAROXYSMAL SWEATS: NO SWEAT VISIBLE
NAUSEA AND VOMITING: NO NAUSEA AND NO VOMITING
ORIENTATION AND CLOUDING OF SENSORIUM: ORIENTED AND CAN DO SERIAL ADDITIONS
TOTAL SCORE: 0
AUDITORY DISTURBANCES: NOT PRESENT
AGITATION: NORMAL ACTIVITY
SKIP TO QUESTIONS 9-10: 0
VISUAL DISTURBANCES: NOT PRESENT
AUDITORY DISTURBANCES: NOT PRESENT
HEADACHE, FULLNESS IN HEAD: NOT PRESENT
HEADACHE, FULLNESS IN HEAD: NOT PRESENT
ANXIETY: NO ANXIETY, AT EASE
PAROXYSMAL SWEATS: NO SWEAT VISIBLE
ORIENTATION AND CLOUDING OF SENSORIUM: ORIENTED AND CAN DO SERIAL ADDITIONS
TOTAL SCORE: 6
ORIENTATION AND CLOUDING OF SENSORIUM: ORIENTED AND CAN DO SERIAL ADDITIONS
TREMOR: 2
SUBSTANCE_ABUSE_PAST_12_MONTHS: YES
HEADACHE, FULLNESS IN HEAD: NOT PRESENT
ANXIETY: MODERATELY ANXIOUS, OR GUARDED, SO ANXIETY IS INFERRED
BLOOD PRESSURE: 134/84
AUDITORY DISTURBANCES: NOT PRESENT
HEADACHE, FULLNESS IN HEAD: NOT PRESENT
AUDITORY DISTURBANCES: NOT PRESENT
TREMOR: 2
AGITATION: NORMAL ACTIVITY
TOTAL SCORE: 5
ANXIETY: NO ANXIETY, AT EASE
VISUAL DISTURBANCES: NOT PRESENT
TREMOR: NO TREMOR
TREMOR: NO TREMOR
HEADACHE, FULLNESS IN HEAD: NOT PRESENT
NAUSEA AND VOMITING: NO NAUSEA AND NO VOMITING
VISUAL DISTURBANCES: NOT PRESENT
VISUAL DISTURBANCES: NOT PRESENT
TOTAL SCORE: 0
TOTAL SCORE: 0
TOTAL SCORE: 6
HEADACHE, FULLNESS IN HEAD: NOT PRESENT
AUDIT-C TOTAL SCORE: 10
NAUSEA AND VOMITING: NO NAUSEA AND NO VOMITING
PAROXYSMAL SWEATS: NO SWEAT VISIBLE
TREMOR: 2
HEADACHE, FULLNESS IN HEAD: NOT PRESENT
AUDITORY DISTURBANCES: NOT PRESENT
HEADACHE, FULLNESS IN HEAD: NOT PRESENT
ANXIETY: NO ANXIETY, AT EASE
AUDIT-C TOTAL SCORE: 4
NAUSEA AND VOMITING: NO NAUSEA AND NO VOMITING
AGITATION: NORMAL ACTIVITY
VISUAL DISTURBANCES: NOT PRESENT
NAUSEA AND VOMITING: NO NAUSEA AND NO VOMITING
HOW OFTEN DO YOU HAVE 6 OR MORE DRINKS ON ONE OCCASION: DAILY OR ALMOST DAILY
AGITATION: NORMAL ACTIVITY
ANXIETY: MODERATELY ANXIOUS, OR GUARDED, SO ANXIETY IS INFERRED
ORIENTATION AND CLOUDING OF SENSORIUM: ORIENTED AND CAN DO SERIAL ADDITIONS
PAROXYSMAL SWEATS: NO SWEAT VISIBLE
ORIENTATION AND CLOUDING OF SENSORIUM: ORIENTED AND CAN DO SERIAL ADDITIONS
ANXIETY: 3
PAROXYSMAL SWEATS: NO SWEAT VISIBLE
ANXIETY: NO ANXIETY, AT EASE
ANXIETY: MODERATELY ANXIOUS, OR GUARDED, SO ANXIETY IS INFERRED
VISUAL DISTURBANCES: NOT PRESENT
PAROXYSMAL SWEATS: NO SWEAT VISIBLE
AUDITORY DISTURBANCES: NOT PRESENT
HOW OFTEN DO YOU HAVE A DRINK CONTAINING ALCOHOL: 4 OR MORE TIMES A WEEK
PAROXYSMAL SWEATS: NO SWEAT VISIBLE
AUDITORY DISTURBANCES: NOT PRESENT

## 2025-03-23 ASSESSMENT — COLUMBIA-SUICIDE SEVERITY RATING SCALE - C-SSRS
2. HAVE YOU ACTUALLY HAD ANY THOUGHTS OF KILLING YOURSELF?: NO
1. SINCE LAST CONTACT, HAVE YOU WISHED YOU WERE DEAD OR WISHED YOU COULD GO TO SLEEP AND NOT WAKE UP?: NO
2. HAVE YOU ACTUALLY HAD ANY THOUGHTS OF KILLING YOURSELF?: NO
6. HAVE YOU EVER DONE ANYTHING, STARTED TO DO ANYTHING, OR PREPARED TO DO ANYTHING TO END YOUR LIFE?: NO
6. HAVE YOU EVER DONE ANYTHING, STARTED TO DO ANYTHING, OR PREPARED TO DO ANYTHING TO END YOUR LIFE?: NO
1. SINCE LAST CONTACT, HAVE YOU WISHED YOU WERE DEAD OR WISHED YOU COULD GO TO SLEEP AND NOT WAKE UP?: NO

## 2025-03-23 ASSESSMENT — PAIN SCALES - WONG BAKER: WONGBAKER_NUMERICALRESPONSE: NO HURT

## 2025-03-23 ASSESSMENT — PATIENT HEALTH QUESTIONNAIRE - PHQ9
1. LITTLE INTEREST OR PLEASURE IN DOING THINGS: MORE THAN HALF THE DAYS
2. FEELING DOWN, DEPRESSED OR HOPELESS: MORE THAN HALF THE DAYS
SUM OF ALL RESPONSES TO PHQ9 QUESTIONS 1 & 2: 4

## 2025-03-23 ASSESSMENT — ACTIVITIES OF DAILY LIVING (ADL)
LACK_OF_TRANSPORTATION: YES

## 2025-03-23 ASSESSMENT — PAIN - FUNCTIONAL ASSESSMENT
PAIN_FUNCTIONAL_ASSESSMENT: 0-10

## 2025-03-23 ASSESSMENT — ENCOUNTER SYMPTOMS
MUSCULOSKELETAL NEGATIVE: 1
ENDOCRINE NEGATIVE: 1
ROS GI COMMENTS: 1) GERD
NEUROLOGICAL NEGATIVE: 1
CARDIOVASCULAR NEGATIVE: 1
RESPIRATORY NEGATIVE: 1
ALLERGIC/IMMUNOLOGIC NEGATIVE: 1
CONSTITUTIONAL NEGATIVE: 1
HEMATOLOGIC/LYMPHATIC NEGATIVE: 1

## 2025-03-23 ASSESSMENT — PAIN SCALES - GENERAL
PAINLEVEL_OUTOF10: 0 - NO PAIN
PAINLEVEL_OUTOF10: 10 - WORST POSSIBLE PAIN

## 2025-03-23 NOTE — GROUP NOTE
Group Topic: Art Creative   Group Date: 3/23/2025  Start Time: 1400  End Time: 1500  Facilitators: LOPEZ Santacruz   Department: [facility] REHAB THERAPY VIRTUAL    Number of Participants: 8   Group Focus: leisure skills and social skills  Treatment Modality: Recreational Therapy   Interventions utilized were Ceramics, Music, exploration and leisure development  Purpose: other: creative expression, leisure awareness, social engagement     Name: Jonny Valenzuela YOB: 1970   MR: [id number]      Facilitator: Recreational Therapist  Level of Participation: did not attend  Progress: None  Comments: Patients were gathered to participate in Acrylic Painting (ceramics). This activity works on creative expression, fine motor skills, following directions, positive social interaction, and leisure awareness.    Patient declined invitation to group activity at this time. Patient will continue to be provided with opportunities to enhance leisure skills and/or coping mechanisms.    Plan: continue with services

## 2025-03-23 NOTE — GROUP NOTE
"Group Topic: Leisure Skills   Group Date: 3/23/2025  Start Time: 1115  End Time: 1200  Facilitators: SINDHU SantacruzS   Department: [facility] REHAB THERAPY VIRTUAL    Number of Participants: 6   Group Focus: concentration, leisure skills, and social skills  Treatment Modality: Recreational Therapy   Interventions utilized were Community Meeting, \"That's It\", leisure development and mental fitness  Purpose: other: cognitive skills/focus, social engagement, leisure awareness     Name: Jonny Valenzuela YOB: 1970   MR: [id number]      Facilitator: Recreational Therapist  Level of Participation: did not attend  Progress: None  Comments: This session included providing participants an opportunity to understand unit guidelines, rules, expectations, and provide a healthy environment to give suggestions for unit improvements. CTRS prioritized suggestions to discuss during session through an activity based intervention (\"That's It\"). Community Meeting questionnaire slips were passed out and collected.    Patient declined invitation to group activity at this time. Patient will continue to be provided with opportunities to enhance leisure skills and/or coping mechanisms.    Plan: continue with services      "
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
## 2025-03-23 NOTE — CARE PLAN
"The patient's goals for the shift include \"Find housing and get on the right medications\"    The clinical goals for the shift include Medication compliance and Safety    Over the shift, the patient did not make progress toward the following goals. Barriers to progression include acuity of illness. Recommendations to address these barriers include medication compliance, safety, and education.    "

## 2025-03-23 NOTE — ASSESSMENT & PLAN NOTE
Plan: 1) re-trial Luvox 25 mg QHS           2) re-trial Seroquel 50 mg QHS           3) Individual therapy by OT.  Discussed potential risks, benefits, and alternatives to medications with patient, who consented to the above medications.

## 2025-03-23 NOTE — CARE PLAN
"The patient's goals for the shift include \"Get some rest. Talk wiith the doctor.\"    The clinical goals for the shift include Maintain safety, medication adherence, participation in unit activities    Over the shift, the patient did not make progress toward the following goals. Barriers to progression include acuity of illness. Recommendations to address these barriers include continuation of care plan.    Problem: Sensory Perceptual Alteration as Evidenced by  Goal: Free from restraint events  Outcome: Progressing     Problem: Sensory Perceptual Alteration as Evidenced by  Goal: Understands least restrictive measures  Outcome: Progressing     Problem: Potential for Harm to Self or Others  Goal: Denies harm toward self or others  Outcome: Progressing     Problem: Alteration in Sleep  Goal: STG - Identifies sleep hygiene aids  Outcome: Progressing   .    Problem: Sensory Perceptual Alteration as Evidenced by  Goal: Participates in unit activities  Outcome: Not Progressing     Problem: Potential for Harm to Self or Others  Goal: Participates in unit activities  Outcome: Not Progressing     Problem: Ineffective Coping  Goal: Participates in unit activities  Outcome: Not Progressing     "

## 2025-03-23 NOTE — CARE PLAN
Problem: Mental health issues  Goal: Stabilize adverse mental health factors affecting plan of care  Outcome: Progressing     Problem: Access to Care Issue  Goal: Assess and Address Access Barriers  Outcome: Progressing     Problem: Assessment of Caregiver  Goal: Caregiver Demonstrates Personal Health and Wellbeing; as well as Ability to Safely and Effectively Care for Patient  Outcome: Progressing     Problem: Assistance Required for Daily Activities  Goal: Develop a Plan to Assist Patient with Activites of Daily Living  Outcome: Progressing     Problem: Coordination of Community Resources Needed  Goal: Coordination of Services will be Obtained  Outcome: Progressing     Problem: Coordination of Psychosocial Support Services Needed  Goal: Coordination of Services will be Obtained  Outcome: Progressing     Problem: Medication Adherence  Goal: Adherence to Medication Regimen  Outcome: Progressing     Problem: Financial Problem  Goal: Assess and Address Specific Financial Obstacles Affecting Patient's Adderence to Plan of Care  Outcome: Progressing     Problem: Risk of Exacerbation, or Readmission to Hospital Related to Symptoms of Comorbidities  Goal: Knowledge and Symptom Management of Chronic Disease will be Documented  Outcome: Progressing     Problem: Risk of Uncoordinated Care  Goal: Care will be Coordinated and Supported by a Multidisciplinary Team of Providers  Outcome: Progressing     Problem: Negative Experience, Conflict with, or Distrust of Providers and/or Health System  Goal: Plan to Address Patient Specific Negative Experience, Distrust, or Conflict with Providers and/or Health System  Outcome: Progressing

## 2025-03-23 NOTE — NURSING NOTE
"RN met with patient in his room early this morning for routine shift assessment. Endorsed anxiety and rated at 10 of 10 (yet did not appear highly anxious). Endorsed depression and rated at 10 of 10 (yet did not appear extremely depressed). Denied thoughts of harm to self or others. Denied SI or HI and contracted for safety. Denied hallucinations. Endorsed recent tooth pain/discomfort yet denied current pain (left, lower, posterior).   Described sleep as 'good.' Described mood as \"Just anxious, depressed still.\"    Coping skills: \"Try to get some rest.\"  Strengths: \"Good at ignoring what's getting on my nerves.\"  Goals: \"Get some rest. Talk with the doctor.\"    Medication adherent. PRN vistaril administered for anxiety. Appeared effective as evidenced by resting quietly and comfortably at follow up assessment.     Met with Occupational Therapist, Recreational Therapist, and  today.     Did not attend any groups today. Cooperative and friendly yet generally isolate to self and his room this shift.     Received some of his personal clothing. Changed clothes.     Q15 minutes safety checks maintained. Will continue to monitor.     "

## 2025-03-23 NOTE — CONSULTS
Consults    Reason For Consult  Medical management of alcohol use disorder etc    History Of Present Illness  Jonny Valenzuela is a 54 y.o. male with pertinent medical history of daily alcohol use, nicotine use, ?HTN who presented to [facility] ER with suicide ideations.  Patient was admitted to [facility] for further evaluation and treatment by psychiatry.  Hospitalist consulted for medical management.  Patient complained of tooth pain and has a loose molar.  He also complained of anxiety.  He was noted on exam to have some tremors.  Patient is concerned since he is homeless and has been living in a storage unit. He denied headache, diaphoresis, agitation, nausea, vomiting, confusion, hallucinations, chest pain, abdominal pain, dyspnea, diarrhea, or urinary symptoms.  Pertinent workup in the ER included: Potassium 3.6, kidney function within normal limits, CBC unremarkable, COVID-negative, aspirin & Tylenol levels are negative.  Positive ethanol level.  Tox screen positive for cannabinoids.  EKG reviewed and did not show ST changes/abnormalities or prolonged QT. NSR.     Past Medical History  Anxiety, depression, ?HTN.     Surgical History  + Keaton in leg.     Social History  He reports that he has been smoking cigarettes.  Smokes a pack a day and has a 15-year pack history.  He has never used smokeless tobacco. He reports current alcohol use.  Says he drinks a 12 pack a week.  He denies history of alcohol withdrawal or seizures.  He reports that he does not currently use drugs.    Family History  Anxiety/depression-mom     Allergies  Patient has no known allergies.    Review of Systems  Review of Systems   Constitutional:  Negative for chills, diaphoresis, fever.  HENT:  Negative for sore throat and trouble swallowing.    Mouth: + loose molar with discomfort.  Respiratory: Neg for cough, shortness of breath and wheezing.    Cardiovascular:  Negative for chest pain, palpitations and leg swelling.   Gastrointestinal: Negative  for abdominal pain, constipation, diarrhea, nausea and vomiting.   Genitourinary:  Negative for dysuria, frequency and urgency.   Musculoskeletal:  Negative for back pain and neck pain. No focal weakness.   Skin:  Negative for color change and rash.   Neurological: Negative for dizziness, +tremors, neg for seizures, syncope, facial asymmetry, speech difficulty, focal weakness, and headaches.   Psychiatric/Behavioral: Negative for confusion and hallucinations. The patient is nervous/anxious.      Physical Exam  Constitutional:       Appearance: Normal appearance. he is obese.  HENT:      Head: Normocephalic and atraumatic.      Mouth: Mucous membranes are moist. + loose molar with slight erythema at gum line.      Pharynx: Oropharynx is clear. No oropharyngeal exudate or posterior oropharyngeal erythema.   Neck: No JVD or use of accessory muscles.  Eyes:      Extraocular Movements: Extraocular movements intact.      Conjunctiva/sclera: Conjunctivae normal.      Pupils: Pupils are round, and reactive to light. Right pupil larger than left (baseline).  Cardiovascular:      Rate and Rhythm: Normal rate and regular rhythm.      Pulses: Normal pulses.      Heart sounds: Normal heart sounds. No murmur heard.  Pulmonary:      Effort: no respiratory distress.      Breath sounds: No wheezing or rhonchi. No tachypnea or labored breathing, No cyanosis.  Abdominal:      General: Bowel sounds are normal. There is no distension.      Palpations: Abdomen is soft. There is no abdominal tenderness. There is no guarding or rebound.   Extremities: No swelling and palpable distal pulses.   Musculoskeletal:         General: No swelling or tenderness.      Comments: No focal weakness   Neurological:      Mental Status: he is alert and oriented to person, place, and time.      Cranial Nerves: No cranial nerve deficit.      Motor: No focal weakness. + tremors in BL hands.  Psychiatric:         Mood and Affect: Mood normal.         Behavior:  Behavior normal.     Last Recorded Vitals  /81 (BP Location: Right arm, Patient Position: Standing)   Pulse 97   Temp 37.3 °C (99.1 °F)   Resp 18   Wt 89.1 kg (196 lb 6.9 oz)   SpO2 98%     Relevant Results  Scheduled medications  [START ON 3/23/2025] folic acid, 1 mg, oral, Daily  [START ON 3/23/2025] multivitamin with minerals, 1 tablet, oral, Daily  [START ON 3/23/2025] thiamine, 100 mg, oral, Daily      Continuous medications     PRN medications  PRN medications: alum-mag hydroxide-simeth, diazePAM, diphenhydrAMINE **OR** diphenhydrAMINE, famotidine, haloperidol **OR** haloperidol lactate, hydrOXYzine pamoate, ibuprofen, LORazepam **OR** LORazepam, melatonin, nicotine polacrilex, psyllium    Results for orders placed or performed during the hospital encounter of 03/21/25 (from the past 24 hours)   DRUG SCREEN,URINE   Result Value Ref Range    Amphetamine Screen, Urine Presumptive Negative Presumptive Negative    Barbiturate Screen, Urine Presumptive Negative Presumptive Negative    Benzodiazepines Screen, Urine Presumptive Negative Presumptive Negative    Cannabinoid Screen, Urine Presumptive Positive (A) Presumptive Negative    Cocaine Metabolite Screen, Urine Presumptive Negative Presumptive Negative    Fentanyl Screen, Urine Presumptive Negative Presumptive Negative    Opiate Screen, Urine Presumptive Negative Presumptive Negative    Oxycodone Screen, Urine Presumptive Negative Presumptive Negative    PCP Screen, Urine Presumptive Negative Presumptive Negative    Methadone Screen, Urine Presumptive Negative Presumptive Negative   Sars-CoV-2 PCR   Result Value Ref Range    Coronavirus 2019, PCR Not Detected Not Detected   CBC   Result Value Ref Range    WBC 6.9 4.4 - 11.3 x10*3/uL    nRBC 0.0 0.0 - 0.0 /100 WBCs    RBC 4.58 4.50 - 5.90 x10*6/uL    Hemoglobin 15.8 13.5 - 17.5 g/dL    Hematocrit 45.0 41.0 - 52.0 %    MCV 98 80 - 100 fL    MCH 34.5 (H) 26.0 - 34.0 pg    MCHC 35.1 32.0 - 36.0 g/dL     RDW 13.6 11.5 - 14.5 %    Platelets 252 150 - 450 x10*3/uL   Basic Metabolic Panel   Result Value Ref Range    Glucose 95 74 - 99 mg/dL    Sodium 135 (L) 136 - 145 mmol/L    Potassium 3.6 3.5 - 5.3 mmol/L    Chloride 101 98 - 107 mmol/L    Bicarbonate 22 21 - 32 mmol/L    Anion Gap 16 10 - 20 mmol/L    Urea Nitrogen 10 6 - 23 mg/dL    Creatinine 0.82 0.50 - 1.30 mg/dL    eGFR >90 >60 mL/min/1.73m*2    Calcium 9.0 8.6 - 10.3 mg/dL   Acute Toxicology Panel, Blood   Result Value Ref Range    Acetaminophen <10.0 10.0 - 30.0 ug/mL    Salicylate  <3 4 - 20 mg/dL    Alcohol 173 (H) <=10 mg/dL   Ethanol   Result Value Ref Range    Alcohol 23 (H) <=10 mg/dL     No results found.    Assessment/Plan   SI  Psych primary and to f/U regarding routine labs ordered by psych  Supportive care    GERD  Pepcid prn    ?HTN  Monitor BP and if remains elevated, add lisinopril    Nicotine Use Disorder  Smoking cessation education  Nicotine replacements    Daily Alcohol Use  CIWA; monitor for potential withdrawal  Thiamine, folic acid and MV  As needed Valium  Can cancel CIWA and Valium if WNL after 3 days    Positive Drug Screen  -Denies drug use    Dental Pain  Orajel QID prn  Motrin  Needs to f/U with a dentist on dc    Homelessness  SW consult    DVT PX  Encourage ambulation    Siomara Martínez, APRN-CNP  60 min spent interviewing and assessing patient, placing orders, updating care plan.

## 2025-03-23 NOTE — H&P
"Physician Certification/Re-Certification: INITIAL   I certify that the inpatient psychiatric hospital admission is medically necessary for:  treatment which could reasonably be expected to improve the patient's condition that could not be provided in a less restrictive setting   I estimate the period of hospitalization are necessary for treatment of this patient will be:  7-14 days   My plans for post hospital care for this patient are:  home     The reason for admission includes: alcohol abuse, anxiety, and depression worse. The onset of symptoms was gradual starting 2 months ago with gradually worsening course since that time. Psychosocial stressors include: financial, health, and drug and alcohol.      Chief Complaint: \"... I felt like I was going to commit suicide...\"    History Of Present Illness  Jonny Valenzuela is a 54 y.o. year old male patient who presented to the Emergency Department intoxicated and with suicidal thoughts (see EPAT note below). On admission, Jonny reports experiencing worsening feelings of depression for the past 2 weeks, along with decreased sleep, decreased energy, decreased concentration, increased feelings of hopelessness and helplessness and worthlessness, and anhedonia, all for the past 2 months. He also reports experiencing worsening, chronic, intermittent suicidal ideation for the past 2 months, but denies having any suicide plans. Jonny reports experiencing prior depressive episodes, but not manic symptoms, in the past. No hallucinations were endorsed or noted.       Jonny also reports experiencing excessive worries about everyday activities that he can't control, and result in problems sleeping, concentrating, decreased energy, irritability, and restlessness.       Jonny further reports experiencing chronic feelings (for years) that \"they're (other people) talking about me all the time.\"       Jonny also reports drinking an average of 6 cans of Ice House (16 oz size - and 5.5% alcohol) per " day.               [facility]  Patient Jonny Valenzuela, is a 25 year old male with history of suicide ideations, suicide attempt, depression, anxiety, and alcohol abuse. Patient presented to ED by EMS with complaint of suicidal ideation. Patient denied homicidal ideation, and hallucinations to ED provider upon ED arrival. Patient notably had BAL of 173 around an hour after arrival to ED.     Patient, Jonny Valenzuela is a 54 year old male with history of anxiety, depression, and possibly PTSD. Patient presented to ED by EMS with complaint of suicidal ideation. Patient discussed reason for ED visit stating he had been feeling as if “life is done” for him and that he is worn down by homelessness and managing significant grief/loss. Patient most recent hospitalization 10/16/2024 at [facility]. Patient shared that the medications he was on following this inpatient stay made him feel better and when he had them changed he noticed that he began to feel worse. He is requesting that his past mediation list be considered in his ongoing care plan at this time. Patient lost his wife a few years ago and reported that he found his father  about 30 years ago. Patient reported that he attempted suicide shortly after his father’s passing by attempting to shoot himself with a shot gun. Patient discussed patient’s use of alcohol- says he drinks alcohol once a week. Patient’s initial BAL was 173. Patient denied any recent self-harm or suicide attempts. Patient’s C-SSRS scored at imminent risk due to active ideation when clinically sober. Patient’s overall lifetime risk remains elevated at moderate risk due to history of suicide attempt and recent suicidal statements. Patient denied homicidal ideation and hallucinations. Patient endorsed many acute changes to sleeping, eating, anxiety, or depression symptoms recently. Patient discussed feeling as if “life is done for him”. Patient reported having consistent appointments  without patient  at [facility]. Patient reported that his  “Rose Marie” is supposed to be assisting him with finding housing.. Patient was unable to identify supportive people in his social network. Patient able to identify reason for living being his niece and nephew- he believes that they would be hurt if he were to complete suicide. Patient does meet criteria for inpatient hospitalization due to high risk of harm to self, severe depression, and current lack of outpatient management of symptoms and medications. Patient encouraged to follow up with current providers, call 9-1-1, and return to ED if symptoms worsen following inpatient care.. Plan for care discussed with and approved by Dr. Josie Bergman.             PSYCHIATRIC REVIEW OF SYMPTOMS  Depressive Symptoms: depressed or irritable mood, diminished interest, insomnia or hypersomnia, fatigue or loss of energy, worthlessness or guilt, poor concentration or indecisiveness, and suicidal ideation or plan  Manic Symptoms: negative  Anxiety Symptoms: excessive worry Worry Symptoms: difficulty concentrating due to worry, difficulty controlling worry, easily fatigued due to worry, irritability due to worry, restlessness or feeling on edge due to worry, and sleep disturbances due to worry  Psychotic Symptoms:  Chronic paranoia for years (see HPI).  Delirium/Altered Mental Status Symptoms:  None  Other Symptoms/Concerns:  None      Past Medical History  No past medical history on file.     Code Status: Personally discussed with patient on admission, and is currently a full code.        Past Psychiatric History: 1) Past Dx: MDD, SHOSHANA, AUD.                                            2) No prior psychiatric hospitalizations: last at [facility] in 10/2024 for depression/suicidal ideation.                                            3) No prior suicide attempts                                            4) No gun ownership and no guns in his storage unit  where he lives.                                            5) No prior SIB                                            6) No prior rehab treatment programs                                            7) Outpt MH Tx: None                                            8) Current psych meds: None for at least 4 weeks.      Past Psychiatric Meds: 1) Abilify                                         2) Seroquel                                         3) Remeron                                         4) Trazodone                                         5) Zyprexa                                         6) Paxil                                         7) Zoloft                                         8) Luvox        Family History: 1) Brother - depression                             2) Mother committed suicide when patient was 2 years-old (jumped [place] a Bridge around [facility]).                                    Substance Use History: 1) Tobacco - 1 ppd of cigarettes                                          2) ETOH - avg of six Ice House Beers a day (16.9 oz and 5.5% alcohol).                                          3) Cannabis - Denies.                                          4) Denies any illicit drug use.        Social History  Social History     Socioeconomic History    Marital status:      Spouse name: Not on file    Number of children: Not on file    Years of education: Not on file    Highest education level: Not on file   Occupational History    Not on file   Tobacco Use    Smoking status: Every Day     Current packs/day: 2.00     Types: Cigarettes    Smokeless tobacco: Never   Vaping Use    Vaping status: Never Used   Substance and Sexual Activity    Alcohol use: Yes     Comment: 12 cans of beer per day    Drug use: Not Currently    Sexual activity: Not on file   Other Topics Concern    Not on file   Social History Narrative    Not on file     Social Drivers of Health     Financial Resource Strain: Medium Risk  (10/30/2024)    Overall Financial Resource Strain (CARDIA)     Difficulty of Paying Living Expenses: Somewhat hard   Food Insecurity: No Food Insecurity (10/15/2024)    Hunger Vital Sign     Worried About Running Out of Food in the Last Year: Never true     Ran Out of Food in the Last Year: Never true   Transportation Needs: Unmet Transportation Needs (10/15/2024)    PRAPARE - Transportation     Lack of Transportation (Medical): Yes     Lack of Transportation (Non-Medical): Yes   Physical Activity: Inactive (10/15/2024)    Exercise Vital Sign     Days of Exercise per Week: 0 days     Minutes of Exercise per Session: 0 min   Stress: Stress Concern Present (10/15/2024)    [place] [employer] - Occupational Stress Questionnaire     Feeling of Stress : Very much   Social Connections: Not on file   Intimate Partner Violence: Not At Risk (10/15/2024)    Humiliation, Afraid, Rape, and Kick questionnaire     Fear of Current or Ex-Partner: No     Emotionally Abused: No     Physically Abused: No     Sexually Abused: No   Housing Stability: High Risk (10/30/2024)    Housing Stability Vital Sign     Unable to Pay for Housing in the Last Year: Yes     Number of Times Moved in the Last Year: 2     Homeless in the Last Year: Yes          Other Social History:  The patient finished the 11th grade. His work history includes working doing [employer]. He has been on disability since 2015.  until wife  in 2017. No children. No significant legal history. The patient lives in a storage unit (renting) in [place].         Trauma History  Victim, Perpetrator or Witness of Abuse: No significant physical, sexual, emotional abuse, neglect or trauma history was identified on initial assessment of the patient. This shall not be an active focus of treatment, but will continue to be reassessed throughout admission. (3)    Physical Abuse: No  Sexual Abuse: No  Verbal / Emotional Abuse / Bullying (+Cyber): No  "  Financial Abuse: No  Domestic Violence: No      Review of Systems   Review of Systems   Constitutional: Negative.    HENT:  Positive for dental problem (see Hospitalist's consult).    Eyes:         1) Cataract of right eye.   Respiratory: Negative.     Cardiovascular: Negative.    Gastrointestinal:         1) GERD   Endocrine: Negative.    Genitourinary: Negative.    Musculoskeletal: Negative.    Allergic/Immunologic: Negative.    Neurological: Negative.    Hematological: Negative.    Psychiatric/Behavioral:  Positive for suicidal ideas (see HPI).         Cranial Nerve Exam  CN II - normal  CN III - normal  CN IV - normal  CN V - normal  CN VI - normal  CN VII - normal  CN VIII - normal  CN IX - normal  CN X - normal  CN XI - normal  CN XII - normal        Physical Exam  Mental Status Exam:   General: Appropriately groomed and dressed in hospital attire.   Appearance: Appears stated age.   Attitude: Calm, cooperative.   Behavior: Appropriate eye contact.   Motor Activity: No agitation or retardation. No EPS/TD. Normal gait and station. Normal muscle tone and bulk.   Speech: Regular rate, rhythm, volume and tone, spontaneous, fluent. Non-pressured.   Mood: \"depressed and anxious\"   Affect: Depressed appearing.   Thought Process: Organized, and goal directed.   Thought Content: Does not endorse suicidal ideation or any suicide plans.   Does not endorse homicidal ideation.  No overt delusions or paranoia elicited.    Thought Perception: Does not endorse auditory or visual hallucinations, does not appear to be responding to hallucinatory stimuli.   Cognition: Alert, oriented x 3. No deficits noted. Adequate fund of knowledge. No deficit in recent and remote memory. No deficits in attention, concentration or language.   Insight: Poor-to-Fair, as patient recognizes some symptoms of  illness and need for recommended treatments.    Judgment: Poor, as patient can not make reasonable decisions about ordinary activities of " daily living and necessary medical care recommendations.         -----------------------------------------------------------------------------------------------------------------------------------------------------------------------------------------------------------------------  Abnormal Involuntary Movement Scale (AIMS)  Evaluator: Laci Alvarado MD  Date: 3/23/2025      Note: ratings for first three major categories. 0 = none, 1 = minimal (or be extreme normal), 2 = mild, 3 = moderate, and 4 = severe.      A) Facial and Oral Movement       1) Muscles of facial expression (e.g., movements of forehead, eyebrows, periorbital area, cheeks, include frowning, blinking, grimacing of upper face)       Rating = 0         2) Lips and perioral area (e.g., puckering, pouting, smacking)       Rating = 0         3) Jaw (e.g., biting, clenching, chewing, mouth opening, lateral movement)       Rating = 0         4) Tongue (rate only increase in movements both in and out of mouth, not inability to sustain movement)       Rating = 0    B) Extremity Movements       5) Upper (arms, wrist, hands, fingers). Include movements that are choreic (rapid, objectively purposeless, irregular, spontaneous) or athetoid (slow, irregular, complex, serpentine). Do not include            tremor (repetitive, regular, rhythmic movements).       Rating = 0         6) Lower (legs, knees, ankles, toes). (E.g., lateral knee movement, foot tapping, heel, dropping, foot squirming, inversion and eversion of foot).       Rating = 0    C) Trunk Movements       7) Neck, shoulders, hips (e.g., rocking, twisting, squirming, pelvic gyrations, and include diaphragmatic movements)       Rating = 0    D) Global Judgments       8) Severity of abnormal movements (based on highest single score of the above items).       Rating = 0         9) Incapacitation due to abnormal movements       Rating = 0         10) Patient's awareness of abnormal movements       Rating  = 0    E) Dental Status       11) Current problems with teeth and/or dentures       0-point NO         12) Does patient usually wear dentures       0-point NO     -----------------------------------------------------------------------------------------------------------------------------------------------------------------------------------------------------------------------          Functional Estimates  Estimate of Intelligence: average   Estimate of Capacity for Activities of Daily Living: independent       Last Recorded Vitals  Visit Vitals  /81 (BP Location: Right arm, Patient Position: Standing)   Pulse 97   Temp 37.3 °C (99.1 °F)   Resp 18        Relevant Results  Results for orders placed or performed during the hospital encounter of 03/21/25 (from the past 24 hours)   Ethanol   Result Value Ref Range    Alcohol 23 (H) <=10 mg/dL         Allergies  No Known Allergies    Medications  Scheduled medications  folic acid, 1 mg, oral, Daily  multivitamin with minerals, 1 tablet, oral, Daily  potassium chloride CR, 40 mEq, oral, Once  thiamine, 100 mg, oral, Daily      Continuous medications     PRN medications  PRN medications: alum-mag hydroxide-simeth, benzocaine, diazePAM, diphenhydrAMINE **OR** diphenhydrAMINE, famotidine, haloperidol **OR** haloperidol lactate, hydrOXYzine pamoate, ibuprofen, LORazepam **OR** LORazepam, melatonin, nicotine polacrilex, psyllium      OARRS Report reviewed on 3- (score = 000).        PSYCHIATRIC RISK ASSESSMENT  Violence Risk Assessment: lower socioeconomic class, major mental illness, male, and substance abuse  Acute Risk of Harm to Others is Considered: low   Suicide Risk Assessment: , current psychiatric illness, family history of completed suicide, feelings of hopelessness, living alone or lack of social support, male, severe anxiety, substance abuse, suicidal ideations, and unmarried  Protective Factors against Suicide: none  Acute Risk of Harm to  Self is Considered: high        Diagnostic Impression/Plan:    Assessment & Plan  Severe episode of recurrent major depressive disorder, without psychotic features (Multi)  Plan: 1) re-trial Luvox 25 mg QHS           2) re-trial Seroquel 50 mg QHS           3) Individual therapy by OT.  Discussed potential risks, benefits, and alternatives to medications with patient, who consented to the above medications.    SHOSHANA (generalized anxiety disorder)  Plan: 1) see above    Paranoia (psychosis) (Multi)  Plan: 1) see above    Alcohol use disorder, severe, dependence (Multi)  Plan: 1) Refer to outpatient treatment program.    Tobacco dependence  Plan: 1) Nicotine Patch Qdaily    Gastroesophageal reflux disease without esophagitis  Plan: 1) IM service to follow and manage.    Tooth pain  Plan: 1) Follow up with dentist outpatient    Cataract, right eye  Plan: 1) IM service to follow and manage.             KARLIE Alvarado MD

## 2025-03-23 NOTE — PROGRESS NOTES
"Occupational Therapy     REHAB Therapy Assessment & Treatment    Patient Name: Jonny Valenzuela  MRN: [id number]  Today's Date: 3/23/2025      Assessment:  Initial Assessment  Attention Span: 5-15 Minutes  Cognitive Behavior Status/Orientation: Oriented to:, Person, Place, Time  Crisis Triggers: Living situation, Finances, Isolation, Emotions  Emotional Concerns/Mood/Affect: Calm, Cooperative  Hearing: Adequate  Memory:  (decreased attention vs decreased memory)  Motivation Level: Moderate encouragement needed (pt not participating in group at this time)  Negative Coping Skills: Substance use  Speech/Communication/Socialization: Verbal, Understands spoken word, Responds when approached  Vision: Adequate  Additional Comments: OT initial assessment complete and POC established    Pt is a 25 YOM currently homeless and staying in storage unit. Pt  has history of SI, SA, depression, anxiety and ETOH use. He presented to ED expressing he is \"done with life\". 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 Pt does receive services through [facility] however, social support system is limited. Pt calm and cooperative and familiar with inpt behavior services however limited participation on unit since admission.  Pt encouraged to increase interactions to benefit himself. Pt receptive. OT to follow acute LOS 5x/week.     Encounter Problems       Encounter Problems (Active)       OT Goals       Goal Identification and Planning       Start:  03/23/25    Expected End:  04/06/25       Pt will ID 2 STGs and 2 LTGs including methods to achieve goals after discharge to increase goal identification and planning skills.          Affirmations/Self-esteem       Start:  03/23/25    Expected End:  04/06/25       Pt will ID 3 personal strengths and be able to state 2-3 positive affirmations to promote a healthy self-esteem.          Performance/Routine       Start:  03/23/25    Expected End:  04/06/25       Pt will demo increased activity " level by attending 5-8 groups per week.  Pt will ID/ utilize 1-2 ways to increase balance of activity/ re-involve self in functional daily routines/roles prior to discharge.           Community Resources       Start:  03/23/25    Expected End:  04/06/25       Pt will ID 2 community resources/programs to attend/join after discharge to improve support system.          Stress Management       Start:  03/23/25    Expected End:  04/06/25       Pt will ID 2 stressors and 2 stress management techniques to employ for improving coping with daily life tasks.

## 2025-03-23 NOTE — SIGNIFICANT EVENT
03/23/25 1254   Discharge Planning   Living Arrangements Alone   Support Systems None   Assistance Needed Independent   Type of Residence Homeless   Do you have animals or pets at home? No   Who is requesting discharge planning? Provider   Home or Post Acute Services Community services   Expected Discharge Disposition Othe   Does the patient need discharge transport arranged? Yes   RoundTrip coordination needed? Yes   Has discharge transport been arranged? No   Financial Resource Strain   How hard is it for you to pay for the very basics like food, housing, medical care, and heating? Hard   Housing Stability   In the last 12 months, was there a time when you were not able to pay the mortgage or rent on time? Y   At any time in the past 12 months, were you homeless or living in a shelter (including now)? Y   Transportation Needs   In the past 12 months, has lack of transportation kept you from medical appointments or from getting medications? yes   In the past 12 months, has lack of transportation kept you from meetings, work, or from getting things needed for daily living? Yes   Stroke Family Assessment   Stroke Family Assessment Needed No   Intensity of Service   Intensity of Service >30 min     Pt is homeless and has been noncompliant with OP treatment. DC plan TBD.

## 2025-03-23 NOTE — PROGRESS NOTES
REHAB Therapy Assessment & Treatment    Patient Name: Jonny Valenzuela  MRN: [id number]  Today's Date: 3/23/2025      Activity Assessment:  Initial Assessment  Attention Span: 15-30 Miutes  Cognitive Behavior Status/Orientation: Person, Place, Time, Attentive, [place]  Crisis Triggers: Mood, Living situation, Other (Comment) (living situation, finances, isolation, wife passed 9 years ago, groups/crowds)  Emotional Concerns/Mood/Affect: Calm, Cooperative  Hearing: Adequate  Memory: Short term (poor concentration and recall)  Motivation Level: Moderate encouragement needed  Negative Coping Skills: Substance use (alcohol)  Speech/Communication/Socialization: Verbal  Vision: Adequate    Leisure Survey:  [facility]  Activity Preference: Group, Independent  Activity Tolerance: Fair 15-30 minutes  At Home ADL Deficits: Managing Money, Grocery Shopping  Barriers to Leisure Participation: Cognition, Emotions, Lack of finances/money, Living situation, Mood/affect, Lack of motivation, No one to participate with  Community Resources: Other (Comment) ([place])  Education/School: 11th grade  Following Directions: Able to follow simple commands  Leisure Interests: Needs to expand leisure interests  Living Arrangement: Other (Comment) (staying in a rented storage unit)  Motivators for Recreation/Leisure Involvement: Sense of well being/contentment, Fun/entertainment  Outdoor Activities: Fishing, Camping  Patient/Family Education Needs: safety awareness, healthy coping skills  Patient Weakness: pt reports being unable to read/write  Social/Group Activities: Other (Comment) (uncomfortable in groups/crowds)  Solitary Activities: Watch/listen television, Music  Special Hobbies: riding his E-Bike  Transportation: Public transportation, Other (Comment) (E-Bike)  Work/Volunteer: unemployed, past ([employer])  Additional Comments: Pt is a 54 year old M with a history of depression, anxiety, Schizophrenia, and alcohol use,  "admitted due to suicidal ideation and feeling \"done with life\". Pt is currently homeless, but staying in a storage unit and has been off medications for months. Pt was on the [facility] in 10/24 and is relieved to be back. Information to complete this assessment was obtained via pt as well as past RT assessment. Pt has a limited leisure lifestyle and would benefit from improved coping skills and outpatient support. Pt informed of daily programming schedule and independent leisure materials. He will be encouraged to attend a variety of groups during admission.         Therapeutic Recreation:         Encounter Problems       Encounter Problems (Active)       Emotional  RT       Mood       Start:  03/23/25    Expected End:  03/30/25               Recreation  RT       Awareness       Start:  03/23/25    Expected End:  03/30/25               Social       Stimulation       Start:  03/23/25    Expected End:  03/30/25                     Education Documentation  No documentation found.  Education Comments  No comments found.                    "

## 2025-03-23 NOTE — SIGNIFICANT EVENT
03/23/25 1251   Able to Complete Psychiatric Screening   Were you able to complete all the behavioral health screenings? Yes   Abuse Screen   Abuse Screen Adult   Have you had any thoughts of harming anyone else? No   Are you or have you been threatened or abused physically, emotionally, or sexually by anyone? No   Has anyone ever threatened to hurt your family or your pets? No   Does anyone try to keep you from having/contacting other friends or doing things outside your home? No   Do you feel UNSAFE going back to the place where you are living? Yes   Do you feel anyone has exploited or taken advantage of you financially or of your personal property? No   Are there any apparent signs of injuries/behaviors that could be related to abuse/neglect? No   Trauma/Abuse Assessment   Physical Abuse Denies, provider concerned (Comment)   Verbal Abuse Denies, provider concerned (Comment)   Experienced Any of the Following Life Events Death of family/friend;Social loss (Bankruptcy, divorce, work-related stress)   Drug Screening   Have you used any substances (canabis, cocaine, heroin, hallucinogens, inhalants, etc.) in the past 12 months? Yes   Have you used any prescription drugs other than prescribed in the past 12 months? No   Is a toxicology screen needed? Yes   Audit Alcohol Screening   Q1: How often do you have a drink containing alcohol? 4 or more ti   Q2: How many drinks containing alcohol do you have on a typical day when you are drinking? 5 or 6   Q3: How often do you have six or more drinks on one occasion? Daily   Audit-C Score (!) 10   Over the past 2 weeks, how often have you been bothered by any of the following problems?   Little interest or pleasure in doing things Over half   Feeling down, depressed, or hopeless Over half   Have you had thoughts of harming anyone else? No   Values/Beliefs   Cultural Requests During Hospitalization na   Spiritual Requests During Hospitalization na   Patient  Strengths/Barriers   Strengths (Must Choose Two) Motivation level for treatment;Support from organized community   Barriers Technical/vocational;Support from family;Support from friends;Independent living   Consults    Consult Needed Yes (Comment)   Spiritual Care Consult Needed No     Inpatient Social Work Psychiatric Assessment     Arrival Details  Mode of Arrival: Ambulatory  Admission Source: Emergency department  Admission Type: Voluntary       History of Present Illness  Admission Reason: Suicidal Ideation  HPI (Per EPAT):  Patient Jonny Valenzuela, is a 25 year old male with history of suicide ideations, suicide attempt, depression, anxiety, and alcohol abuse. Patient presented to ED by EMS with complaint of suicidal ideation. Patient denied homicidal ideation, and hallucinations to ED provider upon ED arrival. Patient notably had BAL of 173  around an hour after arrival to ED.  Patient, Jonny Valenzuela is a 54  year old male with history of anxiety, depression, and possibly PTSD. Patient presented to ED by EMS with complaint of suicidal ideation. Patient discussed reason for ED visit stating he had been feeling as if “life is done” for him and that he is worn down by homelessness and managing significant grief/loss. Patient most recent hospitalization 10/16/2024 at [facility]. Patient shared that the medications he was on following this inpatient stay made him feel better and when he had them changed he noticed that he began to feel worse. He is requesting that his past mediation list be considered in his ongoing care plan at this time. Patient lost his wife a few years ago and reported that he found his father  about 30 years ago. Patient reported that he attempted suicide shortly after his father's passing by attempting to shoot himself with a shot gun.  Patient discussed patient's use of alcohol- says he drinks alcohol once a week. Patient's initial BAL was 173. Patient denied any recent self-harm  or suicide attempts. Patient's C-SSRS scored at imminent risk due to active ideation when clinically sober. Patient's overall lifetime risk remains elevated at moderate risk due to history of suicide attempt and recent suicidal statements. Patient denied homicidal ideation and hallucinations. Patient endorsed many acute changes to sleeping, eating, anxiety, or depression symptoms recently. Patient discussed feeling as if “life is done for him”.  Patient reported having consistent appointments without patient  at [place]. Patient reported that his  Carmen” is supposed to be assisting him with finding housing.. Patient was unable to identify supportive people in his social network. Patient able to identify reason for living being his niece and nephew- he believes that they would be hurt if he were to complete suicide. Patient does meet criteria for inpatient hospitalization due to high risk of harm to self, severe depression, and current lack of outpatient management of symptoms and medications. Patient encouraged to follow up with current providers, call 9-1-1, and return to ED if symptoms worsen following inpatient care.. Plan for care discussed with and approved by Dr. Josie Bergman.      Readmission Information   Readmission within 30 Days: No     Psychiatric Symptoms  Anxiety Symptoms: Generalized, Palpitations, Panic attack, Social phobias, Shortness of breath, unexplained fears  Depression Symptoms: Change in energy level, Sleep disturbance, Crying, Feelings of helplessness, Feelings of hopelessness, Feelings of worthlessness, Impaired concentration, Increased irritability, Isolative, Loss of interest, Psychomotor retardation  Gloria Symptoms: Labile     Psychosis Symptoms  Hallucination Type: No problems reported or observed.  Delusion Type: No problems reported or observed.     Additional Symptoms - Adult  Generalized Anxiety Disorder: Irritability, Restlessness, Sleep  disturbance, Excessive anxiety/worry, Easily fatigued, Difficulty concentrating, Difficult to control worry  Obsessive Compulsive Disorder: No problems reported or observed.  Panic Attack: Shortness of breath  Post Traumatic Stress Disorder: Traumatic event, Re-living event, Hypervigilance, Exaggerated startle response, Avoidance of stimuli associated w/ event, Irritability  Delirium: No problems reported or observed.     Past Psychiatric History/Meds/Treatments  Past Psychiatric History: Attends [facility] for psychiatric supports, [place] [place]  Past Psychiatric Meds/Treatments: Seroquel (25mg), fluvoxamine (100mg)     Current Mental Health Contacts   Name/Phone Number: Rose Marie at [facility]   Last Appointment Date: Telehealth visit about a week ago        Living Arrangement: Homeless     Home Safety  Feels Safe Living in Home: Other (Comment) (Patient is homeless.)     Income Information  Employment Status for: Patient  Employment Status: Unemployed  Income Source: Social Security  Current/Previous Occupation: Construction      Service/Education History  Current or Previous  Service: None  Education Level: Less than high school  History of Learning Problems: Yes  History of School Behavior Problems: No     Social/Cultural History  Social History: Patient is a 54 year old  male who is currently homeless. Experiencing significant isolation and severely depressed. Receives SSA and is not currently employed. Does not have financial concerns regarding income. Small social network- does not believe anyone cares for him. Highest level of education completed was 11th grade. Patient reported having a history of learning issues. Patient has experienced significant grief/loss and appears to have suffered subsequent trauma as a result of these losses.  Cultural Requests During Hospitalization: None reported  Spiritual Requests During Hospitalization:  None reported     Legal  Legal Comments: No legal concerns reported or observed.     Drug Screening  Have you used any substances (canabis, cocaine, heroin, hallucinogens, inhalants, etc.) in the past 12 months?: Yes (Patient reports alcohol use but no additional substances)  Have you used any prescription drugs other than prescribed in the past 12 months?: No  Is a toxicology screen needed?: No     Stage of Change  Stage of Change: Contemplation  History of Treatment: Inpatient  Type of Treatment Offered: Inpatient  Treatment Offered: Resources/education provided  Frequency of Substance Use: Once a week  Age of First Substance Use: 16     Behavioral Health  Behavioral Health(WDL): Exceptions to WDL  Behaviors/Mood: Calm, Cooperative, Sad  Affect: Appropriate to circumstances  Parent/Guardian/Significant Other Involvement: No involvement  Family Behaviors: Unable to assess  Visitor Behaviors: Unable to assess  Needs Expressed: Emotional  Emotional Support Given: Reassure     Orientation  Orientation Level: Oriented X4     General Appearance  Motor Activity: Unable to assess  General Attitude: Cooperative  Appearance/Hygiene: Unable to assess     Thought Process  Coherency: [place] thinking  Content: Unremarkable  Perception: Not altered  Hallucination: None  Judgment/Insight: Limited  Confusion: None  Cognition: Poor judgement     Sleep Pattern  Sleep Pattern: Unable to assess     Risk Factors  Self Harm/Suicidal Ideation Plan: Patient describes wanting to be hit by a car however denies any active plan during assessment.  Previous Self Harm/Suicidal Plans: Patient reported that he attempted to shoot himself when he was in his 20s.  Risk Factors: Lower socioeconomic status, Major mental illness, Male, Unemployment  Description of Thoughts/Ideas Leaving Unit Now: Patient is still feeling suicidal but would be interested in intervention.     Violence Risk Assessment  Assessment of Violence: None noted  Thoughts of  "Harm to Others: No     Ability to Assess Risk Screen  Risk Screen - Ability to Assess: Able to be screened  Ask Suicide-Screening Questions  1. In the past few weeks, have you wished you were dead?: Yes  2. In the past few weeks, have you felt that you or your family would be better off if you were dead?: Yes  3. In the past week, have you been having thoughts about killing yourself?: Yes  4. Have you ever tried to kill yourself?: Yes  How did you try to kill yourself?: Shotgun- patient was hospitalized for attempting to shoot himself.  When did you try to kill yourself?: when he was about 20 years  5. Are you having thoughts of killing yourself right now?: Yes  Describe your thoughts of killing yourself right now:: \"Life is pretty much done.\"  Calculated Risk Score: Imminent Risk  [place] Suicide Severity Rating Scale (Screener/Recent Self-Report)  1. Wish to be Dead (Past 1 Month): Yes  2. Non-Specific Active Suicidal Thoughts (Past 1 Month): Yes  3. Active Suicidal Ideation with any Methods (Not Plan) Without Intent to Act (Past 1 Month): Yes  4. Active Suicidal Ideation with Some Intent to Act, Without Specific Plan (Past 1 Month): No  5. Active Suicidal Ideation with Specific Plan and Intent (Past 1 Month): No  6. Suicidal Behavior (Lifetime): Yes  6. Suicidal Behavior (3 Months): No  6. Suicidal Behavior (Description): Patent describes wanting to be hit by a car.  Calculated C-SSRS Risk Score (Lifetime/Recent): Moderate Risk  Step 1: Risk Factors  Current & Past Psychiatric Dx: Mood disorder  Presenting Symptoms: Anhedonia, Hopelessness or despair, Anxiety and/or panic  Family History: Suicide  Precipitants/Stressors: Triggering events leading to humiliation, shame, and/or despair (e.g. loss of relationship, financial or health status) (real or anticipated), Inadequate social supports, Social isolation, Pending incarceration or homelessness  Change in Treatment: Non-compliant or not receiving treatment, " Change in provider or treament (i.e., medications, psychotherapy, milieu)  Access to Lethal Methods : Yes  Step 2: Protective Factors   Protective Factors Internal: Identifies reasons for living  Protective Factors External: Positive therapeutic relationships, Responsibility to children  Step 3: Suicidal Ideation Intensity  Most Severe Suicidal Ideation Identified: Patient describes that his most severe ideation was following the passing of his father about 15 years ago- he found him .  How Many Times Have You Had These Thoughts: Many times each day  When You Have the Thoughts How Long do They Last : More than 8 hours/persistent or continuous  Could/Can You Stop Thinking About Killing Yourself or Wanting to Die if You Want to: Unable to control thoughts  Are There Things - Anyone or Anything - That Stopped You From Wanting to Die or Acting on: Deterrents definitely stopped you from attempting suicide  What Sort of Reasons Did You Have For Thinking About Wanting to Die or Killing Yourself: Completely to end or stop the pain (you couldn't go on living with the pain or how you were feeling)  Total Score: 21  Step 5: Documentation  Risk Level: Moderate suicide risk     Psychiatric Impression and Plan of Care:  Jonny is a 54 y.o. CM admitted to [facility] for increased depression and SI in context of chronic homelessness and alcohol abuse. He was last hospitalized here last October. He went to [facility] after discharge and followed up with Steven. He reports the provider changed his medications OP and he did not feel that change was effective so he stopped taking the medication and stopped going to his appointments. Prior to assessment, pt's provider notes, triage notes, and community record were reviewed. Met with pt today in his room. He minimizes his ETOH use and is focused on needing housing. He reports he has been living out of his storage unit in [place] ([address]). He reports homeless for about two years  "when he was asked to leave [facility] housing. He cannot identify why they kicked him out, but states that they took him to court and he cannot live in any of their properties anymore. He reports he is working with Rose Marie at [place] on finding housing. He denies having had any counseling. He denies having any social support and identifies as a \"loner\". Pt did sign a voluntary application for admission and the IMM. No SHAAN.     Stabilize; medications per MD order  Milieu therapy  OT consult  Obtain collateral  Link with community resources/mental health  DC disposition to be determined.  "

## 2025-03-23 NOTE — GROUP NOTE
"Group Topic: Team Building   Group Date: 3/23/2025  Start Time: 0930  End Time: 1030  Facilitators: LOPEZ Santacruz   Department: [facility] REHAB THERAPY VIRTUAL    Number of Participants: 6   Group Focus: goals, problem solving, and social skills  Treatment Modality: Recreational Therapy   Interventions utilized were Match up the Sayings, exploration, mental fitness, problem solving, story telling, and support  Purpose: maladaptive thinking, feelings, and insight or knowledge    Name: Jonny Valenzuela YOB: 1970   MR: [id number]      Facilitator: Recreational Therapist  Level of Participation: did not attend  Progress: None  Comments: In this group session, \"Match up the Sayings\", patients collaborated to reconstruct famous sayings. This activity promoted teamwork and cognitive stimulation. Following the completion of the task, the group engaged in conversation, discussing the meanings of the sayings and collectively exploring their practical application in a recovery process.    Patient declined invitation to group activity at this time. Patient will continue to be provided with opportunities to enhance leisure skills and/or coping mechanisms.    Plan: continue with services      "

## 2025-03-24 LAB
ATRIAL RATE: 78 BPM
P AXIS: 25 DEGREES
P OFFSET: 202 MS
P ONSET: 153 MS
PR INTERVAL: 148 MS
Q ONSET: 227 MS
QRS COUNT: 13 BEATS
QRS DURATION: 130 MS
QT INTERVAL: 404 MS
QTC CALCULATION(BAZETT): 460 MS
QTC FREDERICIA: 441 MS
R AXIS: -46 DEGREES
T AXIS: 24 DEGREES
T OFFSET: 429 MS
VENTRICULAR RATE: 78 BPM

## 2025-03-24 PROCEDURE — 2500000002 HC RX 250 W HCPCS SELF ADMINISTERED DRUGS (ALT 637 FOR MEDICARE OP, ALT 636 FOR OP/ED): Performed by: PSYCHIATRY & NEUROLOGY

## 2025-03-24 PROCEDURE — 2500000002 HC RX 250 W HCPCS SELF ADMINISTERED DRUGS (ALT 637 FOR MEDICARE OP, ALT 636 FOR OP/ED): Performed by: NURSE PRACTITIONER

## 2025-03-24 PROCEDURE — 99233 SBSQ HOSP IP/OBS HIGH 50: CPT | Performed by: PSYCHIATRY & NEUROLOGY

## 2025-03-24 PROCEDURE — 2500000001 HC RX 250 WO HCPCS SELF ADMINISTERED DRUGS (ALT 637 FOR MEDICARE OP): Performed by: NURSE PRACTITIONER

## 2025-03-24 PROCEDURE — 2500000001 HC RX 250 WO HCPCS SELF ADMINISTERED DRUGS (ALT 637 FOR MEDICARE OP): Performed by: PSYCHIATRY & NEUROLOGY

## 2025-03-24 PROCEDURE — S4991 NICOTINE PATCH NONLEGEND: HCPCS | Performed by: PSYCHIATRY & NEUROLOGY

## 2025-03-24 PROCEDURE — 1240000001 HC SEMI-PRIVATE BH ROOM DAILY

## 2025-03-24 RX ADMIN — IBUPROFEN 600 MG: 600 TABLET, FILM COATED ORAL at 08:44

## 2025-03-24 RX ADMIN — THIAMINE HCL TAB 100 MG 100 MG: 100 TAB at 08:45

## 2025-03-24 RX ADMIN — Medication 5 MG: at 21:14

## 2025-03-24 RX ADMIN — NICOTINE 1 PATCH: 21 PATCH, EXTENDED RELEASE TRANSDERMAL at 08:44

## 2025-03-24 RX ADMIN — HYDROXYZINE PAMOATE 50 MG: 50 CAPSULE ORAL at 13:30

## 2025-03-24 RX ADMIN — DIAZEPAM 10 MG: 5 TABLET ORAL at 09:13

## 2025-03-24 RX ADMIN — FOLIC ACID 1 MG: 1 TABLET ORAL at 08:45

## 2025-03-24 RX ADMIN — QUETIAPINE FUMARATE 50 MG: 50 TABLET ORAL at 20:50

## 2025-03-24 RX ADMIN — FLUVOXAMINE MALEATE 25 MG: 50 TABLET ORAL at 20:49

## 2025-03-24 RX ADMIN — Medication 1 TABLET: at 08:45

## 2025-03-24 ASSESSMENT — LIFESTYLE VARIABLES
TREMOR: NO TREMOR
AUDITORY DISTURBANCES: NOT PRESENT
PAROXYSMAL SWEATS: NO SWEAT VISIBLE
BLOOD PRESSURE: 130/87
AUDITORY DISTURBANCES: NOT PRESENT
AUDITORY DISTURBANCES: NOT PRESENT
ORIENTATION AND CLOUDING OF SENSORIUM: ORIENTED AND CAN DO SERIAL ADDITIONS
TOTAL SCORE: 4
PAROXYSMAL SWEATS: NO SWEAT VISIBLE
AGITATION: NORMAL ACTIVITY
HEADACHE, FULLNESS IN HEAD: NOT PRESENT
ORIENTATION AND CLOUDING OF SENSORIUM: ORIENTED AND CAN DO SERIAL ADDITIONS
TREMOR: NO TREMOR
HEADACHE, FULLNESS IN HEAD: NOT PRESENT
TOTAL SCORE: 0
ORIENTATION AND CLOUDING OF SENSORIUM: ORIENTED AND CAN DO SERIAL ADDITIONS
PAROXYSMAL SWEATS: NO SWEAT VISIBLE
AGITATION: NORMAL ACTIVITY
VISUAL DISTURBANCES: NOT PRESENT
ANXIETY: 3
AUDITORY DISTURBANCES: NOT PRESENT
AUDITORY DISTURBANCES: NOT PRESENT
ANXIETY: 2
ANXIETY: 6
HEADACHE, FULLNESS IN HEAD: NOT PRESENT
TREMOR: NO TREMOR
NAUSEA AND VOMITING: NO NAUSEA AND NO VOMITING
TREMOR: NO TREMOR
PAROXYSMAL SWEATS: NO SWEAT VISIBLE
PAROXYSMAL SWEATS: NO SWEAT VISIBLE
ANXIETY: NO ANXIETY, AT EASE
TOTAL SCORE: 7
TOTAL SCORE: 0
NAUSEA AND VOMITING: NO NAUSEA AND NO VOMITING
ANXIETY: NO ANXIETY, AT EASE
AGITATION: NORMAL ACTIVITY
NAUSEA AND VOMITING: NO NAUSEA AND NO VOMITING
NAUSEA AND VOMITING: NO NAUSEA AND NO VOMITING
AGITATION: SOMEWHAT MORE THAN NORMAL ACTIVITY
AGITATION: NORMAL ACTIVITY
HEADACHE, FULLNESS IN HEAD: NOT PRESENT
ANXIETY: MODERATELY ANXIOUS, OR GUARDED, SO ANXIETY IS INFERRED
NAUSEA AND VOMITING: NO NAUSEA AND NO VOMITING
VISUAL DISTURBANCES: NOT PRESENT
VISUAL DISTURBANCES: NOT PRESENT
TREMOR: NO TREMOR
VISUAL DISTURBANCES: NOT PRESENT
BLOOD PRESSURE: 134/93
HEADACHE, FULLNESS IN HEAD: NOT PRESENT
PULSE: 84
PULSE: 80
ORIENTATION AND CLOUDING OF SENSORIUM: ORIENTED AND CAN DO SERIAL ADDITIONS
VISUAL DISTURBANCES: NOT PRESENT
TOTAL SCORE: 3
VISUAL DISTURBANCES: NOT PRESENT
HEADACHE, FULLNESS IN HEAD: NOT PRESENT
PAROXYSMAL SWEATS: NO SWEAT VISIBLE
AUDITORY DISTURBANCES: NOT PRESENT
ORIENTATION AND CLOUDING OF SENSORIUM: ORIENTED AND CAN DO SERIAL ADDITIONS
ORIENTATION AND CLOUDING OF SENSORIUM: ORIENTED AND CAN DO SERIAL ADDITIONS
TREMOR: NO TREMOR
AGITATION: NORMAL ACTIVITY
NAUSEA AND VOMITING: NO NAUSEA AND NO VOMITING
TOTAL SCORE: 2

## 2025-03-24 ASSESSMENT — COLUMBIA-SUICIDE SEVERITY RATING SCALE - C-SSRS
2. HAVE YOU ACTUALLY HAD ANY THOUGHTS OF KILLING YOURSELF?: NO
6. HAVE YOU EVER DONE ANYTHING, STARTED TO DO ANYTHING, OR PREPARED TO DO ANYTHING TO END YOUR LIFE?: NO
6. HAVE YOU EVER DONE ANYTHING, STARTED TO DO ANYTHING, OR PREPARED TO DO ANYTHING TO END YOUR LIFE?: NO
1. SINCE LAST CONTACT, HAVE YOU WISHED YOU WERE DEAD OR WISHED YOU COULD GO TO SLEEP AND NOT WAKE UP?: NO
2. HAVE YOU ACTUALLY HAD ANY THOUGHTS OF KILLING YOURSELF?: NO
1. SINCE LAST CONTACT, HAVE YOU WISHED YOU WERE DEAD OR WISHED YOU COULD GO TO SLEEP AND NOT WAKE UP?: NO

## 2025-03-24 ASSESSMENT — PAIN SCALES - GENERAL
PAINLEVEL_OUTOF10: 6
PAINLEVEL_OUTOF10: 5 - MODERATE PAIN
PAINLEVEL_OUTOF10: 10 - WORST POSSIBLE PAIN
PAINLEVEL_OUTOF10: 6

## 2025-03-24 ASSESSMENT — PAIN - FUNCTIONAL ASSESSMENT
PAIN_FUNCTIONAL_ASSESSMENT: 0-10

## 2025-03-24 ASSESSMENT — PAIN DESCRIPTION - DESCRIPTORS: DESCRIPTORS: ACHING

## 2025-03-24 ASSESSMENT — PAIN SCALES - WONG BAKER: WONGBAKER_NUMERICALRESPONSE: NO HURT

## 2025-03-24 ASSESSMENT — PAIN DESCRIPTION - LOCATION: LOCATION: TEETH

## 2025-03-24 NOTE — GROUP NOTE
"Group Topic: Gross Motor/Balance Skills   Group Date: 3/24/2025  Start Time: 1600  End Time: 1645  Facilitators: SINDHU BloomS   Department: [facility] REHAB THERAPY VIRTUAL    Number of Participants: 6   Group Focus: Physical/movement, leisure skills  Treatment Modality: Recreation Therapy  Interventions utilized were: Pop Darts (game), leisure development  Purpose: Leisure Awareness, Physical Leisure Activity, Social Stimulation, Pleasurable Activity     Name: Jonny Valenzuela YOB: 1970   MR: [id number]      Facilitator: Recreational Therapist  Level of Participation: did not attend  Progress: None  Comments: This physical activity \"Pop Darts\" involved coordinating movements, social interactions, healthy competition, leisure awareness, and a pleasurable experience.    Patient remained in their room throughout the session for unknown reasons.     Plan: continue with services      "

## 2025-03-24 NOTE — NURSING NOTE
"Met with patient in room to provide patient privacy, patient rates anxiety \"10\" depression \"10\" patient does not show physical signs of anxiety. Patient rates pain 10/10 tooth pain PRN ibuprofen 600 mg given for pain 0844. CIWA q4hrs, 0811 7, PRN Valium 10 mg given. Patient refuses seroquel today due to side effects while taking seroquel yesterday. Dr. Alvarado aware. Patient is not social with peers.     1233 Patient declines need for groups and refuses to go to them.     1254 Patient score 4 on CIWA, has had decrease in anxiety and irritability per patient.     Patient secludes self from milieu during this shift, he is pleasant with staff but not social with peers or staff, no new orders, 15 minute safety checks maintained.   "

## 2025-03-24 NOTE — GROUP NOTE
Group Topic: Dialectical Behavioral Therapy - Emotional Regulation   Group Date: 3/24/2025  Start Time: 1400  End Time: 1500  Facilitators: LOPEZ Bloom   Department: [facility] REHAB THERAPY VIRTUAL    Number of Participants: 9   Group Focus: coping skills, feeling awareness/expression, problem solving, psychiatric education, self-awareness, and self-esteem  Treatment Modality: Recreation Therapy  Interventions utilized were: DBT-Emotion Regulation-A.B.C. PLEASE with additional Building Mastery and Coping ahead of time,  clarification, exploration, patient education, and support  Purpose: coping skills, maladaptive thinking, feelings, insight or knowledge, self-worth, and self-care    Name: Jonny Valenzuela YOB: 1970   MR: [id number]      Facilitator: Recreational Therapist  Level of Participation: did not attend  Progress: None  Comments: Group involved education around the A.B.C. P.L.E.A.S.E. acronym (accumulate short and long term positive emotions, build mastery, cope ahead of time, and please take care of mind by taking care of body (treat physical illness, no drugs, get sleep, eat well, and get exercise).    Patient remained in their room throughout the session for unknown reasons.     Plan: continue with services

## 2025-03-24 NOTE — NURSING NOTE
Patient assessed while out on the unit.  Patient continues to rate anxiety and depression both 10/10.  Denies any suicidal ideations and hallucinations.  Complains of a toothache, 10/10, and received Ibuprofen 600 mg.  His coping skill is fishing, his strength is his ability to put up with a lot of crap, and his goal is to get on the right meds so he can feel better.  Patient does not attend groups as he has difficulty being around the other patients.  Patient expressed the need to get his E Bike charged prior to discharge so he will be able to ride it home.  His  is in his belongings bag.  Patient also received Vistaril 50 mg for his anxiety and Melatonin 5 mg as a sleep aid.  No further PRN medications were administered at this time.  Will continue to monitor.

## 2025-03-24 NOTE — PROGRESS NOTES
Occupational Therapy     REHAB Therapy Assessment & Treatment    Patient Name: Jonny Valenzuela  MRN: [id number]  Today's Date: 3/24/2025      Activity Assessment:   Music and Emotional Release Group: [id number]  Meaningful Sharing Through Leisure Group: [phone]    0/2 Groups attended     Despite encouragement and personal invite, pt declines to attend group and remains in bed during both above groups. No PACHECO groups this date. Pt would benefit from continued OT services in order to improve overall self-esteem, personal confidence and positive supports for safe transition at discharge.      Encounter Problems       Encounter Problems (Active)       OT Goals       Goal Identification and Planning (Progressing)       Start:  03/23/25    Expected End:  04/06/25       Pt will ID 2 STGs and 2 LTGs including methods to achieve goals after discharge to increase goal identification and planning skills.          Affirmations/Self-esteem (Progressing)       Start:  03/23/25    Expected End:  04/06/25       Pt will ID 3 personal strengths and be able to state 2-3 positive affirmations to promote a healthy self-esteem.          Performance/Routine (Progressing)       Start:  03/23/25    Expected End:  04/06/25       Pt will demo increased activity level by attending 5-8 groups per week.  Pt will ID/ utilize 1-2 ways to increase balance of activity/ re-involve self in functional daily routines/roles prior to discharge.           Community Resources (Progressing)       Start:  03/23/25    Expected End:  04/06/25       Pt will ID 2 community resources/programs to attend/join after discharge to improve support system.          Stress Management (Progressing)       Start:  03/23/25    Expected End:  04/06/25       Pt will ID 2 stressors and 2 stress management techniques to employ for improving coping with daily life tasks.                      Additional Comments: PACHECO collaborated with patients nurse and charge nurse throughout the day  to provide appropriate support and encouragement to attend groups. Pt up on unit when PACHECO left last group of the day. All needs met.

## 2025-03-24 NOTE — GROUP NOTE
Group Topic: Music Therapy   Group Date: 3/24/2025  Start Time: 0930  End Time: 1030  Facilitators: Adela Arroyo   Department: [facility] EXPRESSIVE THER VIRTUAL    Number of Participants: 7   Group Focus: coping skills/planning, expressive outlet, feeling awareness/expression, music therapy, and opportunity for choice/control  Treatment Modality: Music Therapy  Interventions Utilized were: active music engagement, empathic listening/validating emotions, passive music engagement, and sharing/discussion    Participants were invited to share preferred music and artists and discuss their impact. MT provided some education on music for coping skills and mood modification and introduced song menu. Pts invited to choose songs to listen to or sing and given opportunity to discuss lyrics or music as a group.    Name: Jonny Valenzuela YOB: 1970   MR: [id number]      Level of Participation: did not attend  Plan: patient will be encouraged to attend future groups

## 2025-03-24 NOTE — CARE PLAN
"The patient's goals for the shift include \"Get on the right meds\"    The clinical goals for the shift include Medication compliance and Safety    Over the shift, the patient did not make progress toward the following goals. Barriers to progression include acuity of illness. Recommendations to address these barriers include medication compliance, safety, and education.    Problem: Sensory Perceptual Alteration as Evidenced by  Goal: Participates in unit activities  Outcome: Not Progressing     Problem: Potential for Harm to Self or Others  Goal: Participates in unit activities  Outcome: Not Progressing     Problem: Educational/Scholastic Disruption  Goal: Attends class without disruptive behavior  Outcome: Not Progressing     Problem: Ineffective Coping  Goal: Participates in unit activities  Outcome: Not Progressing     Problem: Self Care Deficit  Goal: Increase group attendance  Outcome: Not Progressing     "

## 2025-03-24 NOTE — GROUP NOTE
Group Topic: Community   Group Date: 3/24/2025  Start Time: 0730  End Time: 0810  Facilitators: LOPEZ Bloom   Department: [facility] REHAB THERAPY VIRTUAL    Number of Participants: 8   Group Focus: check in, community group, daily focus, and goals  Treatment Modality: Recreation Therapy  Interventions utilized were: Community Reintegration, TINA Support Group Information, Goals, exploration, patient education, story telling, and support  Purpose: coping skills, insight or knowledge, self-care, and relapse prevention strategies    Name: Jonny Valenzuela YOB: 1970   MR: [id number]      Facilitator: Recreational Therapist  Level of Participation: did not attend  Progress: None  Comments: The group provided some education on TINA services in addition to being a support group for those to share their experiences dealing with mental health, being hospitalized, and any additional feedback. We discussed the benefits of community based support groups. Patients also had an opportunity to share some goals that they wish to focus on for today/near future.     Plan: continue with services

## 2025-03-24 NOTE — DISCHARGE INSTR - APPOINTMENTS
Steven   [phone] X [phone] (Rose Marie)     Steven ( Fozia , intake )  Fax: [phone]  Phone : [phone]   March 28 at 10:30am , Fozia with emergency service   [facility]   Medication management   Therapy  CM   Transport ?     Meet with Rose Marie  after appointment with [facility].   Rose Marie will be at [facility] , post your meeting       [facility]   [phone]    Rose Marie Nixon ,  with [facility]       [facility]   [phone]

## 2025-03-24 NOTE — PROGRESS NOTES
MAXIMO met with treatment team this am to discuss patient status and disposition planning. Patient indicating his anxiety and depression rated at 10 . Reports to be irritable and CIWA at a 7.   SW left message for [facility] staff in order to make follow up appointment for medication management, counseling and CM. Provided contact number in office ( [employer] phone ) . Maximo also left message for Ammy  with this MAXIMO cell and office contact number, regarding status of housing application. Rose Mraie [phone]   MAXIMO met with patient in private, he is sitting in bed. His provides SHAAN to discuss housing application with Rose Marie torres [facility]. Patient states after his stay at [facility] he went to  a home in [place] where there was some type of dispute with another gentleman, and he left , again homeless.   His plan is to return to the [place] somewhere?  MAXIMO did speak with Fozia of [facility] whom SW to call  to discharge [phone] for appointments ,Medication management , therapy and CM ( reported to be Juan J Cardona ) . MAXIMO explained patient does not have transport to [facility] , thinking his location will be in [place]. Fozia indicated she will research transportation issue to understand what may be available. Fozia report that to her knowledge patient has difficulty reading.   Fax : [phone]

## 2025-03-24 NOTE — CARE PLAN
"The patient's goals for the shift include \"get on right pills\"    The clinical goals for the shift include maintain safety    Over the shift, the patient did not make progress toward the following goals. Barriers to progression include acute illness. Recommendations to address these barriers include participation in plan of care  Problem: Sensory Perceptual Alteration as Evidenced by  Goal: Participates in unit activities  Outcome: Not Progressing     Problem: Potential for Harm to Self or Others  Goal: Participates in unit activities  Outcome: Not Progressing     Problem: Ineffective Coping  Goal: Participates in unit activities  Outcome: Not Progressing   .    Problem: Sensory Perceptual Alteration as Evidenced by  Goal: Participates in unit activities  Outcome: Not Progressing     Problem: Potential for Harm to Self or Others  Goal: Participates in unit activities  Outcome: Not Progressing     Problem: Ineffective Coping  Goal: Participates in unit activities  Outcome: Not Progressing     "

## 2025-03-25 PROCEDURE — 2500000002 HC RX 250 W HCPCS SELF ADMINISTERED DRUGS (ALT 637 FOR MEDICARE OP, ALT 636 FOR OP/ED): Performed by: PSYCHIATRY & NEUROLOGY

## 2025-03-25 PROCEDURE — 2500000001 HC RX 250 WO HCPCS SELF ADMINISTERED DRUGS (ALT 637 FOR MEDICARE OP): Performed by: PSYCHIATRY & NEUROLOGY

## 2025-03-25 PROCEDURE — 1240000001 HC SEMI-PRIVATE BH ROOM DAILY

## 2025-03-25 PROCEDURE — 99233 SBSQ HOSP IP/OBS HIGH 50: CPT | Performed by: PSYCHIATRY & NEUROLOGY

## 2025-03-25 PROCEDURE — S4991 NICOTINE PATCH NONLEGEND: HCPCS | Performed by: PSYCHIATRY & NEUROLOGY

## 2025-03-25 PROCEDURE — 97530 THERAPEUTIC ACTIVITIES: CPT | Mod: GO,CO

## 2025-03-25 PROCEDURE — 2500000001 HC RX 250 WO HCPCS SELF ADMINISTERED DRUGS (ALT 637 FOR MEDICARE OP): Performed by: NURSE PRACTITIONER

## 2025-03-25 RX ORDER — FLUVOXAMINE MALEATE 50 MG/1
50 TABLET, FILM COATED ORAL NIGHTLY
Status: DISCONTINUED | OUTPATIENT
Start: 2025-03-25 | End: 2025-03-26

## 2025-03-25 RX ADMIN — THIAMINE HCL TAB 100 MG 100 MG: 100 TAB at 08:16

## 2025-03-25 RX ADMIN — QUETIAPINE FUMARATE 12.5 MG: 25 TABLET ORAL at 08:16

## 2025-03-25 RX ADMIN — Medication 1 TABLET: at 08:16

## 2025-03-25 RX ADMIN — HYDROXYZINE PAMOATE 50 MG: 50 CAPSULE ORAL at 17:22

## 2025-03-25 RX ADMIN — IBUPROFEN 600 MG: 600 TABLET, FILM COATED ORAL at 08:23

## 2025-03-25 RX ADMIN — FLUVOXAMINE MALEATE 50 MG: 50 TABLET ORAL at 20:41

## 2025-03-25 RX ADMIN — QUETIAPINE FUMARATE 75 MG: 50 TABLET ORAL at 20:42

## 2025-03-25 RX ADMIN — HYDROXYZINE PAMOATE 50 MG: 50 CAPSULE ORAL at 08:21

## 2025-03-25 RX ADMIN — NICOTINE 1 PATCH: 21 PATCH, EXTENDED RELEASE TRANSDERMAL at 08:16

## 2025-03-25 RX ADMIN — FOLIC ACID 1 MG: 1 TABLET ORAL at 08:16

## 2025-03-25 RX ADMIN — Medication 5 MG: at 20:41

## 2025-03-25 ASSESSMENT — PAIN SCALES - GENERAL
PAINLEVEL_OUTOF10: 10 - WORST POSSIBLE PAIN
PAINLEVEL_OUTOF10: 6
PAINLEVEL_OUTOF10: 10 - WORST POSSIBLE PAIN

## 2025-03-25 ASSESSMENT — LIFESTYLE VARIABLES
VISUAL DISTURBANCES: NOT PRESENT
TOTAL SCORE: 2
VISUAL DISTURBANCES: NOT PRESENT
PAROXYSMAL SWEATS: NO SWEAT VISIBLE
PAROXYSMAL SWEATS: NO SWEAT VISIBLE
HEADACHE, FULLNESS IN HEAD: NOT PRESENT
VISUAL DISTURBANCES: NOT PRESENT
ANXIETY: 5
ORIENTATION AND CLOUDING OF SENSORIUM: ORIENTED AND CAN DO SERIAL ADDITIONS
BLOOD PRESSURE: 155/98
HEADACHE, FULLNESS IN HEAD: NOT PRESENT
TREMOR: NO TREMOR
TREMOR: NO TREMOR
AUDITORY DISTURBANCES: NOT PRESENT
AGITATION: NORMAL ACTIVITY
AGITATION: NORMAL ACTIVITY
VISUAL DISTURBANCES: NOT PRESENT
TOTAL SCORE: 0
AUDITORY DISTURBANCES: NOT PRESENT
AGITATION: NORMAL ACTIVITY
ANXIETY: NO ANXIETY, AT EASE
AUDITORY DISTURBANCES: NOT PRESENT
PULSE: 69
ANXIETY: 5
TOTAL SCORE: 6
NAUSEA AND VOMITING: NO NAUSEA AND NO VOMITING
ANXIETY: 2
NAUSEA AND VOMITING: NO NAUSEA AND NO VOMITING
TOTAL SCORE: 5
HEADACHE, FULLNESS IN HEAD: NOT PRESENT
ORIENTATION AND CLOUDING OF SENSORIUM: ORIENTED AND CAN DO SERIAL ADDITIONS
NAUSEA AND VOMITING: NO NAUSEA AND NO VOMITING
PAROXYSMAL SWEATS: NO SWEAT VISIBLE
TREMOR: NO TREMOR
PAROXYSMAL SWEATS: NO SWEAT VISIBLE
AGITATION: SOMEWHAT MORE THAN NORMAL ACTIVITY
NAUSEA AND VOMITING: NO NAUSEA AND NO VOMITING
TREMOR: NO TREMOR
AUDITORY DISTURBANCES: NOT PRESENT
ORIENTATION AND CLOUDING OF SENSORIUM: ORIENTED AND CAN DO SERIAL ADDITIONS
ORIENTATION AND CLOUDING OF SENSORIUM: ORIENTED AND CAN DO SERIAL ADDITIONS
HEADACHE, FULLNESS IN HEAD: NOT PRESENT

## 2025-03-25 ASSESSMENT — PAIN - FUNCTIONAL ASSESSMENT
PAIN_FUNCTIONAL_ASSESSMENT: 0-10
PAIN_FUNCTIONAL_ASSESSMENT: 0-10

## 2025-03-25 ASSESSMENT — PAIN DESCRIPTION - LOCATION: LOCATION: TEETH

## 2025-03-25 ASSESSMENT — COLUMBIA-SUICIDE SEVERITY RATING SCALE - C-SSRS
2. HAVE YOU ACTUALLY HAD ANY THOUGHTS OF KILLING YOURSELF?: NO
6. HAVE YOU EVER DONE ANYTHING, STARTED TO DO ANYTHING, OR PREPARED TO DO ANYTHING TO END YOUR LIFE?: NO
1. SINCE LAST CONTACT, HAVE YOU WISHED YOU WERE DEAD OR WISHED YOU COULD GO TO SLEEP AND NOT WAKE UP?: NO
6. HAVE YOU EVER DONE ANYTHING, STARTED TO DO ANYTHING, OR PREPARED TO DO ANYTHING TO END YOUR LIFE?: NO
1. SINCE LAST CONTACT, HAVE YOU WISHED YOU WERE DEAD OR WISHED YOU COULD GO TO SLEEP AND NOT WAKE UP?: NO
2. HAVE YOU ACTUALLY HAD ANY THOUGHTS OF KILLING YOURSELF?: NO

## 2025-03-25 NOTE — NURSING NOTE
"Pt was assessed in hallway away from others for privacy. Pt presents as anxious, calm, and cooperative. Rated both anxiety and depression \"8/10.\" Denied SI/HI and AVH. Rated pain in teeth \"5/10\" and declined pain interventions. Pt's goal for the shift was to \"see if my medicine gets a little better\" and strength is \"ignoring.\" Pt listed coping skill of \"riding my e-bike.\" PRN melatonin given at 2114. Pt was medication compliant and cooperative with staff.    "

## 2025-03-25 NOTE — ASSESSMENT & PLAN NOTE
Plan: *1) re-trial Luvox 25 -> 50 mg QHS           *2) re-trial Seroquel 75 mg QHS           3) Individual therapy by OT.  Discussed potential risks, benefits, and alternatives to medications with patient, who consented to the above medications.

## 2025-03-25 NOTE — PROGRESS NOTES
"Occupational Therapy     REHAB Therapy Assessment & Treatment    Patient Name: Jonny Valenzuela  MRN: [id number]  Today's Date: 3/25/2025      Activity Assessment:     Self-Compassion and Understanding Group: [id number]  Letter to Myself/ Self Reflection Group: [phone]  Cognitive Task and Team Collaboration Group: [id number]    0/3 Groups attended/ 1:1 completed 1422-1442pm    Pt continues to decline to attend any above groups however, is willing to complete a 1:1 with this writer and the [employer] student present to discuss current stressors, barriers/obstacles to improving quality of life and community resources. Pt is adamant that \"groups wont help me, I have enough on my plate I dont want to hear other peoples stories, too\" Pt with intermittent hopeless comments stating \"you ever just feel like you go to a job and you can't wait to be done with it? Well that's how I feel about life. Things get better, than way worse and Im just over it\" Pt does share \"having a brother as a possible support but he is always asking me for help, so not really someone I can have a support right now\" Pt open to discussing outside sources such as \"211\" and stated \"Yea I will for sure call them and see if they have any other options for me\" Pt is able to share X2 things he can look forward to \"getting a gym membership that my insurance will cover so I can work out and actually shower\" Pt with gradual progress toward OT goals as evidenced by hopelessness and difficulty with future thoughts. Pt would benefit from continued OT services in order to improve overall self-esteem, personal confidence and positive supports for safe transition at discharge.        Encounter Problems       Encounter Problems (Active)       OT Goals       Goal Identification and Planning (Progressing)       Start:  03/23/25    Expected End:  04/06/25       Pt will ID 2 STGs and 2 LTGs including methods to achieve goals after discharge to increase goal identification and " planning skills.          Affirmations/Self-esteem (Progressing)       Start:  03/23/25    Expected End:  04/06/25       Pt will ID 3 personal strengths and be able to state 2-3 positive affirmations to promote a healthy self-esteem.          Performance/Routine (Progressing)       Start:  03/23/25    Expected End:  04/06/25       Pt will demo increased activity level by attending 5-8 groups per week.  Pt will ID/ utilize 1-2 ways to increase balance of activity/ re-involve self in functional daily routines/roles prior to discharge.           Community Resources (Progressing)       Start:  03/23/25    Expected End:  04/06/25       Pt will ID 2 community resources/programs to attend/join after discharge to improve support system.          Stress Management (Progressing)       Start:  03/23/25    Expected End:  04/06/25       Pt will ID 2 stressors and 2 stress management techniques to employ for improving coping with daily life tasks.                        Additional Comments:    PACHECO collaborated with patients nurse and charge nurse throughout the day to provide appropriate support and encouragement to attend groups. Pt up on unit when PACHECO left last group of the day. All needs met.

## 2025-03-25 NOTE — GROUP NOTE
"Group Topic: Chemical Dependency - Dual Diagnosis   Group Date: 3/25/2025  Start Time: 1400  End Time: 1530  Facilitators: LOPEZ Bloom   Department: [facility] REHAB THERAPY VIRTUAL    Number of Participants: 9   Group Focus: chemical dependency education, chemical dependency issues, dual diagnosis, and psychiatric education  Treatment Modality: Recreation Therapy  Interventions utilized were: Dual Recovery-Name that Stage,  confrontation, exploration, mental fitness, patient education, problem solving, story telling, and support  Purpose: communication skills, insight or knowledge, self-worth, self-care, and relapse prevention strategies    Name: Jonny Valenzuela YOB: 1970   MR: [id number]      Facilitator: Recreational Therapist  Level of Participation: did not attend, invited/encouraged and refused   Progress: None  Comments: This session involved education on the stages of recovery related to mental health and addiction.  Patients were asked to problem solve examples into the appropriate stage (5 stages: before bottom, bottom, on the fence, commitment, and life goes on).  Patients were also provided a resource of \"other areas of recovery\" that may be of assistance during a recovery process.     Mr. Valenzuela was met with in his room 1:1 prior to the session started. He was encouraged to attend the session and was explained what would be discussed. Patient was also encouraged to complete a future 1:1 RT session. He identified that he \"didn't need that\". Patient again was reminded that staff is available if he needs anything.      Plan: continue with services      "

## 2025-03-25 NOTE — NURSING NOTE
Pt rested quietly through the night. No changes from previous assessment and no PRNs administered. Q15 safety checks maintained.

## 2025-03-25 NOTE — CARE PLAN
"The patient's goals for the shift include \"get on right meds\"    The clinical goals for the shift include maintain safety    Over the shift, the patient did not make progress toward the following goals. Barriers to progression include acute illness. Recommendations to address these barriers include Participation in plan of care  Problem: Sensory Perceptual Alteration as Evidenced by  Goal: Participates in unit activities  Outcome: Not Progressing     Problem: Ineffective Coping  Goal: Participates in unit activities  Outcome: Not Progressing   .    Problem: Sensory Perceptual Alteration as Evidenced by  Goal: Participates in unit activities  Outcome: Not Progressing     Problem: Ineffective Coping  Goal: Participates in unit activities  Outcome: Not Progressing     "

## 2025-03-25 NOTE — PROGRESS NOTES
MAXIMO spoke with Rose Marie Nixon , [phone] ,  . She will try to schedule a zoom meeting with patient today approx noon or tomorrow to facilitate completing his housing application.

## 2025-03-25 NOTE — CARE PLAN
"The patient's goals for the shift include \"see if my medicine gets a little better\"    The clinical goals for the shift include maintain safety    Over the shift, the patient did make progress toward the following goals. Barriers to progression include illness acuity. Recommendations to address these barriers include continue with treatment plan and encourage use of healthy coping skills.      Problem: Sensory Perceptual Alteration as Evidenced by  Goal: Patient/Family participate in treatment and discharge plans  Outcome: Progressing  Goal: Patient/Family verbalizes awareness of resources  Outcome: Progressing  Goal: Discusses signs/symptoms of illness/treatment options  Outcome: Progressing  Goal: Initiates reality-based interactions  Outcome: Progressing  Goal: Will not act on psychotic perception  Outcome: Progressing  Goal: Understands least restrictive measures  Outcome: Progressing  Goal: Free from restraint events  Outcome: Progressing     Problem: Altered Thought Processes as Evidenced by  Goal: STG - Desires improvement in ability to think and concentrate  Outcome: Progressing  Goal: STG - Participates in Occupational Therapy and other cognitive assessments  Outcome: Progressing     Problem: Potential for Harm to Self or Others  Goal: Patient/Family participate in treatment and discharge plans  Outcome: Progressing  Goal: Identifies deescalation techniques  Outcome: Progressing  Goal: Understands least restrictive measures  Outcome: Progressing  Goal: Identifies stressors that lead to harmful behaviors  Outcome: Progressing  Goal: Notifies staff when experiencing harmful thoughts toward self/others  Outcome: Progressing  Goal: Denies harm toward self or others  Outcome: Progressing  Goal: Free from restraint events  Outcome: Progressing     Problem: Ineffective Coping  Goal: Identifies ineffective coping skills  Outcome: Progressing  Goal: Identifies healthy coping skills  Outcome: Progressing  Goal: " Demonstrates healthy coping skills  Outcome: Progressing  Goal: Patient/Family participate in treatment and discharge plans  Outcome: Progressing  Goal: Patient/Family verbalizes awareness of resources  Outcome: Progressing  Goal: Understands least restrictive measures  Outcome: Progressing  Goal: Free from restraint events  Outcome: Progressing     Problem: Alteration in Sleep  Goal: STG - Reports nightly sleep, duration, and quality  Outcome: Progressing  Goal: STG - Identifies sleep hygiene aids  Outcome: Progressing  Goal: STG - Informs staff if unable to sleep  Outcome: Progressing     Problem: Potential for Substance Withdrawal  Goal: Verbalizes signs/symptoms of withdrawal  Outcome: Progressing  Goal: Reports signs/symptoms of withdrawal  Outcome: Progressing  Goal: Free of withdrawal symptoms  Outcome: Progressing     Problem: Anxiety  Goal: Attempts to manage anxiety with help  Outcome: Progressing  Goal: Verbalizes ways to manage anxiety  Outcome: Progressing  Goal: Implements measures to reduce anxiety  Outcome: Progressing  Goal: Free from restraint events  Outcome: Progressing     Problem: Self Care Deficit  Goal: STG - Patient completes hygiene  Outcome: Progressing  Goal: Accepts need for medications  Outcome: Progressing  Goal: STG - Goes to and eats meals independently in dining room 100% of time  Outcome: Progressing     Problem: Defensive Coping  Goal: Identifies reckless/dangerous behavior  Outcome: Progressing  Goal: Identifies stressors that lead to reckless/dangerous behavior  Outcome: Progressing  Goal: Discusses and identifies healthy coping skills  Outcome: Progressing  Goal: Demonstrates healthy coping skills  Outcome: Progressing  Goal: Identifies appropriate social interaction  Outcome: Progressing  Goal: Demonstrates appropriate social interactions  Outcome: Progressing  Goal: Patient/Family verbalizes awareness of resources  Outcome: Progressing  Goal: Discusses signs/symptoms of  illness/treatment options  Outcome: Progressing  Goal: Patient/Family participate in treatment and discharge plans  Outcome: Progressing  Goal: Understands least restrictive measures  Outcome: Progressing  Goal: Free from restraint events  Outcome: Progressing     Problem: Community resource needs  Goal: Patient is receiving increased resource support to enhance ability to remain at home  Outcome: Progressing

## 2025-03-25 NOTE — GROUP NOTE
Group Topic: Goals   Group Date: 3/25/2025  Start Time: 0730  End Time: 0810  Facilitators: LOPEZ Bloom   Department: [facility] REHAB THERAPY VIRTUAL    Number of Participants: 7   Group Focus: check in, daily focus, and goals  Treatment Modality: Recreation Therapy  Interventions utilized were: Bullet Journaling ([place]ball/Habits, 4 Goals for the Month), exploration, leisure development, patient education, and support  Purpose: Goal Identification, Changing Habits, coping skills, self-worth, and self-care    Name: Jonny Valenzuela YOB: 1970   MR: [id number]      Facilitator: Recreational Therapist  Level of Participation: did not attend  Progress: None  Comments: The group discussed habits and utilized a journaling style to record habit productivity over a period of time.  Patients were provided an opportunity to share habits that they would like to improve or ones in which they want to start incorporating into a daily routine more frequently. Participants were provided information on bullet journaling and also encouraged to identify four goals to work on in the upcoming month.     Patient came out briefly towards the end of the session appearing to want some coffee.     Plan: continue with services

## 2025-03-25 NOTE — GROUP NOTE
Group Topic: Excercise/Physical    Group Date: 3/25/2025  Start Time: 1630  End Time: 1715  Facilitators: SINDHU BloomS   Department: [facility] REHAB THERAPY VIRTUAL    Number of Participants: 9   Group Focus: Exercise, Physical Activity  Treatment Modality: Recreation Therapy  Interventions utilized were: Therabands and Stretching, Music (song choices), group exercise and leisure development  Purpose: coping skills, self-worth, and self-care    Name: Jonny Valenzuela YOB: 1970   MR: [id number]      Facilitator: Recreational Therapist  Level of Participation: did not attend  Progress: None  Comments: Group involved upper/lower extremity stretching, shifting of weight/coordinating movements, and UE thera-band exercises.      Patient remained in their room throughout the session for unknown reasons.     Plan: continue with services

## 2025-03-25 NOTE — NURSING NOTE
"0830 Met with patient at round table away from peers to provide patient privacy, he rates anxiety \"10\", depression \"10\" denies SI/HI and AH/VH. Pain 10/10 tooth pain, Ibuprofen given 0823. Patient did not warrant medication for CIWA treatment but requested PRN for anxiety PRN Vistaril 0821.     1107 Patient has not attended groups so far this shift.     1724 Patient score 6 on CIWA this afternoon Vistaril 50 mg given, patient denies effectiveness from medication, he is aware to speak with psychiatrist tomorrow about medication changes.     15 minute safety checks maintained.   "

## 2025-03-25 NOTE — PROGRESS NOTES
"Jonny Valenzuela is a 54 y.o. year old male patient who is on U admission day 3.      Subjective   Jonny Valenzuela is a 54 y.o. year old male patient who was personally seen and interviewed, and discussed in morning team rounds. The patient was interviewed alone in his room (interviewed while partially sitting up in bed, wake and alert), and was easily engaged and cooperative. This afternoon, Jonny reports feeling \"depressed\" and currently rates his depression at a 10 out of 10. He also reports experiencing intermittent suicidal thoughts, but no suicide plans. He rates his anxiety at a 10 out of 10. No hallucinations or paranoia were endorsed or noted. No worsening anxiety with Seroquel was reported by him today.        Jonny slept 8.25 hours last night (broken).           Objective   Mental Status Exam:   General: Appropriately groomed and dressed in casual/hospital attire, partially sitting up in bed.   Appearance: Appears stated age.   Attitude: Calm, cooperative.   Behavior: Appropriate eye contact.   Motor Activity: No agitation or retardation. No EPS/TD. Normal gait and station. Normal muscle tone and bulk.   Speech: Regular rate, rhythm, volume and tone, spontaneous, fluent. Non-pressured.   Mood: \"Depressed\"   Affect: Neutral.   Thought Process: Organized, and goal directed.   Thought Content: Does endorse intermittent suicidal ideation. Bit mp suicide plans.   Does not endorse homicidal ideation.  No overt delusions or paranoia elicited.    Thought Perception: Does not endorse auditory or visual hallucinations, does not appear to be responding to hallucinatory stimuli.   Cognition: Alert, oriented x 3. No deficits noted. Adequate fund of knowledge. No deficit in recent and remote memory. No deficits in attention, concentration or language.   Insight: Poor-to-Fair, as patient recognizes symptoms of illness and need for recommended treatments.    Judgment: Impaired, as patient can not make reasonable decisions about " ordinary activities of daily living and necessary medical care recommendations.           LABS:  Results for orders placed or performed during the hospital encounter of 03/22/25 (from the past 96 hours)   Glucose, Fasting   Result Value Ref Range    Glucose, Fasting 99 74 - 99 mg/dL   Lipid Panel   Result Value Ref Range    Cholesterol 157 0 - 199 mg/dL    HDL-Cholesterol 57.8 mg/dL    Cholesterol/HDL Ratio 2.7     LDL Calculated 81 <=99 mg/dL    VLDL 19 0 - 40 mg/dL    Triglycerides 93 0 - 149 mg/dL    Non HDL Cholesterol 99 0 - 149 mg/dL   Vitamin D 25-Hydroxy,Total (for eval of Vitamin D levels)   Result Value Ref Range    Vitamin D, 25-Hydroxy, Total 30 30 - 100 ng/mL        Last Recorded Vitals  Visit Vitals  BP (!) 115/95 (BP Location: Right arm, Patient Position: Standing)   Pulse 83   Temp 36.4 °C (97.5 °F) (Temporal)   Resp 18        Intake/Output last 3 Shifts:  No intake/output data recorded.    Relevant Results  Scheduled medications  fLuvoxaMINE, 25 mg, oral, Nightly  folic acid, 1 mg, oral, Daily  multivitamin with minerals, 1 tablet, oral, Daily  nicotine, 1 patch, transdermal, Daily  QUEtiapine, 12.5 mg, oral, q AM  QUEtiapine, 50 mg, oral, Nightly  thiamine, 100 mg, oral, Daily      Continuous medications     PRN medications  PRN medications: alum-mag hydroxide-simeth, benzocaine, diazePAM, diphenhydrAMINE **OR** diphenhydrAMINE, famotidine, haloperidol **OR** haloperidol lactate, hydrOXYzine pamoate, ibuprofen, LORazepam **OR** LORazepam, melatonin, nicotine polacrilex, psyllium               Assessment/Plan   Assessment & Plan  Severe episode of recurrent major depressive disorder, without psychotic features (Multi)  Plan: *1) re-trial Luvox 25 -> 50 mg QHS           *2) re-trial Seroquel 75 mg QHS           3) Individual therapy by OT.  Discussed potential risks, benefits, and alternatives to medications with patient, who consented to the above medications.    SHOSHANA (generalized anxiety  disorder)  Plan: 1) see above    Paranoia (psychosis) (Multi)  Plan: 1) see above    Alcohol use disorder, severe, dependence (Multi)  Plan: 1) Refer to outpatient treatment program.    Tobacco dependence  Plan: 1) Nicotine Patch Qdaily    Gastroesophageal reflux disease without esophagitis  Plan: 1) IM service to follow and manage.    Tooth pain  Plan: 1) Follow up with dentist outpatient    Cataract, right eye  Plan: 1) IM service to follow and manage.        KARLIE Alvarado MD

## 2025-03-26 PROCEDURE — 2500000002 HC RX 250 W HCPCS SELF ADMINISTERED DRUGS (ALT 637 FOR MEDICARE OP, ALT 636 FOR OP/ED): Performed by: PSYCHIATRY & NEUROLOGY

## 2025-03-26 PROCEDURE — S4991 NICOTINE PATCH NONLEGEND: HCPCS | Performed by: PSYCHIATRY & NEUROLOGY

## 2025-03-26 PROCEDURE — 1240000001 HC SEMI-PRIVATE BH ROOM DAILY

## 2025-03-26 PROCEDURE — 2500000001 HC RX 250 WO HCPCS SELF ADMINISTERED DRUGS (ALT 637 FOR MEDICARE OP): Performed by: NURSE PRACTITIONER

## 2025-03-26 PROCEDURE — 2500000001 HC RX 250 WO HCPCS SELF ADMINISTERED DRUGS (ALT 637 FOR MEDICARE OP): Performed by: PSYCHIATRY & NEUROLOGY

## 2025-03-26 PROCEDURE — 99233 SBSQ HOSP IP/OBS HIGH 50: CPT | Performed by: PSYCHIATRY & NEUROLOGY

## 2025-03-26 RX ORDER — FLUVOXAMINE MALEATE 50 MG/1
75 TABLET, FILM COATED ORAL NIGHTLY
Status: DISCONTINUED | OUTPATIENT
Start: 2025-03-26 | End: 2025-03-28

## 2025-03-26 RX ADMIN — THIAMINE HCL TAB 100 MG 100 MG: 100 TAB at 09:29

## 2025-03-26 RX ADMIN — Medication 1 TABLET: at 09:29

## 2025-03-26 RX ADMIN — QUETIAPINE FUMARATE 150 MG: 100 TABLET ORAL at 21:29

## 2025-03-26 RX ADMIN — QUETIAPINE FUMARATE 12.5 MG: 25 TABLET ORAL at 09:29

## 2025-03-26 RX ADMIN — Medication 5 MG: at 21:29

## 2025-03-26 RX ADMIN — FOLIC ACID 1 MG: 1 TABLET ORAL at 09:29

## 2025-03-26 RX ADMIN — FLUVOXAMINE MALEATE 75 MG: 50 TABLET ORAL at 21:29

## 2025-03-26 RX ADMIN — NICOTINE 1 PATCH: 21 PATCH, EXTENDED RELEASE TRANSDERMAL at 09:29

## 2025-03-26 ASSESSMENT — LIFESTYLE VARIABLES
AUDITORY DISTURBANCES: NOT PRESENT
VISUAL DISTURBANCES: NOT PRESENT
AGITATION: NORMAL ACTIVITY
HEADACHE, FULLNESS IN HEAD: NOT PRESENT
PAROXYSMAL SWEATS: NO SWEAT VISIBLE
VISUAL DISTURBANCES: NOT PRESENT
ORIENTATION AND CLOUDING OF SENSORIUM: ORIENTED AND CAN DO SERIAL ADDITIONS
TOTAL SCORE: 0
PAROXYSMAL SWEATS: NO SWEAT VISIBLE
ANXIETY: NO ANXIETY, AT EASE
TREMOR: NO TREMOR
NAUSEA AND VOMITING: NO NAUSEA AND NO VOMITING
HEADACHE, FULLNESS IN HEAD: NOT PRESENT
TOTAL SCORE: 0
ANXIETY: NO ANXIETY, AT EASE
NAUSEA AND VOMITING: NO NAUSEA AND NO VOMITING
TREMOR: NO TREMOR
AUDITORY DISTURBANCES: NOT PRESENT
ORIENTATION AND CLOUDING OF SENSORIUM: ORIENTED AND CAN DO SERIAL ADDITIONS
AGITATION: NORMAL ACTIVITY

## 2025-03-26 ASSESSMENT — COLUMBIA-SUICIDE SEVERITY RATING SCALE - C-SSRS
2. HAVE YOU ACTUALLY HAD ANY THOUGHTS OF KILLING YOURSELF?: NO
5. HAVE YOU STARTED TO WORK OUT OR WORKED OUT THE DETAILS OF HOW TO KILL YOURSELF? DO YOU INTEND TO CARRY OUT THIS PLAN?: NO
6. HAVE YOU EVER DONE ANYTHING, STARTED TO DO ANYTHING, OR PREPARED TO DO ANYTHING TO END YOUR LIFE?: NO
6. HAVE YOU EVER DONE ANYTHING, STARTED TO DO ANYTHING, OR PREPARED TO DO ANYTHING TO END YOUR LIFE?: NO
1. SINCE LAST CONTACT, HAVE YOU WISHED YOU WERE DEAD OR WISHED YOU COULD GO TO SLEEP AND NOT WAKE UP?: NO
1. IN THE PAST MONTH, HAVE YOU WISHED YOU WERE DEAD OR WISHED YOU COULD GO TO SLEEP AND NOT WAKE UP?: YES
4. HAVE YOU HAD THESE THOUGHTS AND HAD SOME INTENTION OF ACTING ON THEM?: NO
6. HAVE YOU EVER DONE ANYTHING, STARTED TO DO ANYTHING, OR PREPARED TO DO ANYTHING TO END YOUR LIFE?: NO
2. HAVE YOU ACTUALLY HAD ANY THOUGHTS OF KILLING YOURSELF?: NO
2. HAVE YOU ACTUALLY HAD ANY THOUGHTS OF KILLING YOURSELF?: NO
6. HAVE YOU EVER DONE ANYTHING, STARTED TO DO ANYTHING, OR PREPARED TO DO ANYTHING TO END YOUR LIFE?: NO
1. SINCE LAST CONTACT, HAVE YOU WISHED YOU WERE DEAD OR WISHED YOU COULD GO TO SLEEP AND NOT WAKE UP?: NO

## 2025-03-26 ASSESSMENT — PAIN - FUNCTIONAL ASSESSMENT
PAIN_FUNCTIONAL_ASSESSMENT: 0-10
PAIN_FUNCTIONAL_ASSESSMENT: 0-10

## 2025-03-26 ASSESSMENT — PAIN SCALES - GENERAL
PAINLEVEL_OUTOF10: 10 - WORST POSSIBLE PAIN
PAINLEVEL_OUTOF10: 0 - NO PAIN

## 2025-03-26 NOTE — PROGRESS NOTES
"Occupational Therapy     REHAB Therapy Assessment & Treatment    Patient Name: Jonny Valenzuela  MRN: [id number]  Today's Date: 3/26/2025      Activity Assessment:  Coping with Anxiety Group: [id number]  Journaling and Self Expression Group:   Self Esteem/Humor as a Coping Tool Group: [id number]    0/3 Groups attended     Pt remains in his room during all above groups with no attempts to participate despite encouragement. Pt continues to report \"Im not a group person\" no [facility] groups this date. Pt would benefit from continued OT services in order to improve overall self-esteem, personal confidence and positive supports for safe transition at discharge.      Encounter Problems       Encounter Problems (Active)       OT Goals       Goal Identification and Planning (Progressing)       Start:  03/23/25    Expected End:  04/06/25       Pt will ID 2 STGs and 2 LTGs including methods to achieve goals after discharge to increase goal identification and planning skills.          Affirmations/Self-esteem (Progressing)       Start:  03/23/25    Expected End:  04/06/25       Pt will ID 3 personal strengths and be able to state 2-3 positive affirmations to promote a healthy self-esteem.          Performance/Routine (Progressing)       Start:  03/23/25    Expected End:  04/06/25       Pt will demo increased activity level by attending 5-8 groups per week.  Pt will ID/ utilize 1-2 ways to increase balance of activity/ re-involve self in functional daily routines/roles prior to discharge.           Community Resources (Progressing)       Start:  03/23/25    Expected End:  04/06/25       Pt will ID 2 community resources/programs to attend/join after discharge to improve support system.          Stress Management (Progressing)       Start:  03/23/25    Expected End:  04/06/25       Pt will ID 2 stressors and 2 stress management techniques to employ for improving coping with daily life tasks.                    Additional " Comments:    P[facility] collaborated with patients nurse and charge nurse throughout the day to provide appropriate support and encouragement to attend groups. Pt up on unit when PACHE[facility] left last group of the day. All needs met.

## 2025-03-26 NOTE — NURSING NOTE
Pt rested quietly through the night. No PRNs administered. No changes from previous assessment. Q15 safety checks maintained.

## 2025-03-26 NOTE — CARE PLAN
"  Problem: Sensory Perceptual Alteration as Evidenced by  Goal: Patient/Family participate in treatment and discharge plans  Outcome: Progressing  Goal: Patient/Family verbalizes awareness of resources  Outcome: Progressing  Goal: Participates in unit activities  Outcome: Progressing  Goal: Discusses signs/symptoms of illness/treatment options  Outcome: Progressing  Goal: Initiates reality-based interactions  Outcome: Progressing  Goal: Able to discuss content of hallucinations/delusions  Outcome: Progressing  Goal: Notifies staff when experiencing hallucinations/delusions  Outcome: Progressing  Goal: Verbalizes reduction in hallucinations/delusions  Outcome: Progressing  Goal: Will not act on psychotic perception  Outcome: Progressing  Goal: Understands least restrictive measures  Outcome: Progressing  Goal: Free from restraint events  Outcome: Progressing   The patient's goals for the shift include \" get some sleep\"    The clinical goals for the shift include maintain safety    Over the shift, the patient did not make progress toward the following goals. Barriers to progression include acute illness. Recommendations to address these barriers include continue medications as ordered.    "

## 2025-03-26 NOTE — NURSING NOTE
"Patient was assessed this morning in his room for privacy. He presents as cooperative but slightly irritable . Patient states he did not sleep well last night and had trouble falling asleep. He rates anxiety and depression 10/10 , he reports passive SI that \" comes and goes\". Patient states he does not have a plan or intent. He is able to contract for safety. He endorses discomfort in in a tooth on the lower left side but he declined intervention. Patients goal for today was \" get some sleep\", his strengths are identified as \" I'm generous and I care about others\". His coping skill is identified as \" walking\". Patient did not attend groups and tells this nurse \" I can't listen to other peoples problems, I have my own\". He was educated on the importance of attending groups in order to learn coping and other skills to improve his mental well being. New order for Seroquel 150mg @ hs. Will continue to monitor for safety and encourage group participation.  "

## 2025-03-26 NOTE — CARE PLAN
"The patient's goals for the shift include \"get some more sleep\"    The clinical goals for the shift include maintain safety    Over the shift, the patient did make progress toward the following goals. Barriers to progression include illness acuity. Recommendations to address these barriers include continue with treatment plan.      Problem: Sensory Perceptual Alteration as Evidenced by  Goal: Patient/Family participate in treatment and discharge plans  Outcome: Progressing  Goal: Patient/Family verbalizes awareness of resources  Outcome: Progressing  Goal: Discusses signs/symptoms of illness/treatment options  Outcome: Progressing  Goal: Initiates reality-based interactions  Outcome: Progressing  Goal: Will not act on psychotic perception  Outcome: Progressing  Goal: Understands least restrictive measures  Outcome: Progressing  Goal: Free from restraint events  Outcome: Progressing     Problem: Altered Thought Processes as Evidenced by  Goal: STG - Desires improvement in ability to think and concentrate  Outcome: Progressing  Goal: STG - Participates in Occupational Therapy and other cognitive assessments  Outcome: Progressing     Problem: Potential for Harm to Self or Others  Goal: Patient/Family participate in treatment and discharge plans  Outcome: Progressing  Goal: Identifies deescalation techniques  Outcome: Progressing  Goal: Understands least restrictive measures  Outcome: Progressing  Goal: Identifies stressors that lead to harmful behaviors  Outcome: Progressing  Goal: Notifies staff when experiencing harmful thoughts toward self/others  Outcome: Progressing  Goal: Denies harm toward self or others  Outcome: Progressing  Goal: Free from restraint events  Outcome: Progressing     Problem: Ineffective Coping  Goal: Identifies ineffective coping skills  Outcome: Progressing  Goal: Identifies healthy coping skills  Outcome: Progressing  Goal: Demonstrates healthy coping skills  Outcome: Progressing  Goal: " Patient/Family participate in treatment and discharge plans  Outcome: Progressing  Goal: Patient/Family verbalizes awareness of resources  Outcome: Progressing  Goal: Understands least restrictive measures  Outcome: Progressing  Goal: Free from restraint events  Outcome: Progressing     Problem: Alteration in Sleep  Goal: STG - Reports nightly sleep, duration, and quality  Outcome: Progressing  Goal: STG - Identifies sleep hygiene aids  Outcome: Progressing  Goal: STG - Informs staff if unable to sleep  Outcome: Progressing     Problem: Potential for Substance Withdrawal  Goal: Verbalizes signs/symptoms of withdrawal  Outcome: Progressing  Goal: Reports signs/symptoms of withdrawal  Outcome: Progressing  Goal: Free of withdrawal symptoms  Outcome: Progressing     Problem: Anxiety  Goal: Attempts to manage anxiety with help  Outcome: Progressing  Goal: Verbalizes ways to manage anxiety  Outcome: Progressing  Goal: Implements measures to reduce anxiety  Outcome: Progressing  Goal: Free from restraint events  Outcome: Progressing     Problem: Self Care Deficit  Goal: STG - Patient completes hygiene  Outcome: Progressing  Goal: Accepts need for medications  Outcome: Progressing  Goal: STG - Goes to and eats meals independently in dining room 100% of time  Outcome: Progressing     Problem: Defensive Coping  Goal: Identifies reckless/dangerous behavior  Outcome: Progressing  Goal: Identifies stressors that lead to reckless/dangerous behavior  Outcome: Progressing  Goal: Discusses and identifies healthy coping skills  Outcome: Progressing  Goal: Demonstrates healthy coping skills  Outcome: Progressing  Goal: Identifies appropriate social interaction  Outcome: Progressing  Goal: Demonstrates appropriate social interactions  Outcome: Progressing  Goal: Patient/Family verbalizes awareness of resources  Outcome: Progressing  Goal: Discusses signs/symptoms of illness/treatment options  Outcome: Progressing  Goal: Patient/Family  participate in treatment and discharge plans  Outcome: Progressing  Goal: Understands least restrictive measures  Outcome: Progressing  Goal: Free from restraint events  Outcome: Progressing

## 2025-03-26 NOTE — GROUP NOTE
"Group Topic: Spiritual/Devotional/Thought of the Day   Group Date: 3/26/2025  Start Time: 0740  End Time: 0815  Facilitators: LOPEZ Bloom   Department: [facility] REHAB THERAPY VIRTUAL    Number of Participants: 8   Group Focus: check in, daily focus, and goals  Treatment Modality: Recreation Therapy  Interventions utilized were: Thought for the Day \"The Next Step\", Goal discussion, exploration, orientation, and support  Purpose: insight or knowledge and self-worth    Name: Jonny Valenzuela YOB: 1970   MR: [id number]      Facilitator: Recreational Therapist  Level of Participation: did not attend  Progress: None  Comments: Morning group included reviewing a Thought for the Day and discussing the topic of \"The Next Step\".  The quote related to the reading was \"It's much easier and less stressful to take things one step at a time\". Patient participants were encouraged to reflect on the reading, share thoughts/opinions, and establish a goal connected to the concept being discussed.    Patient remained in their room throughout the session for unknown reasons.     Plan: continue with services      "

## 2025-03-26 NOTE — PROGRESS NOTES
"Jonny Valenzuela is a 54 y.o. year old male patient who is on U admission day 4.      Subjective   Jonny Valenzuela is a 54 y.o. year old male patient who was personally seen and interviewed, and discussed in morning team rounds. The patient was interviewed alone at the table in the hallway (interviewed after finishing breakfast), and was easily engaged and cooperative. This morning, Jonny reports feeling \"tired\" (from 2 recent unit room moves) and currently rates his depression at a 10 out of 10. He does deny experiencing any suicidal thoughts or suicide plans. He rates his anxiety at a 10 out of 10. No hallucinations or paranoia were endorsed or noted.         Jonny reportedly slept 8.25 hours last night (broken), but he reports it was much worse than reported.          Objective   Mental Status Exam:   General: Appropriately groomed and dressed in casual/hospital attire.   Appearance: Appears stated age.   Attitude: Calm, cooperative.   Behavior: Appropriate eye contact.   Motor Activity: No agitation or retardation. No EPS/TD. Normal gait and station. Normal muscle tone and bulk.   Speech: Regular rate, rhythm, volume and tone, spontaneous, fluent. Non-pressured.   Mood: \"Tired\"   Affect: Neutral.   Thought Process: Organized, and goal directed.   Thought Content: Does not currently endorse suicidal ideation nor any suicide plans.   Does not endorse homicidal ideation.  No overt delusions or paranoia elicited.    Thought Perception: Does not endorse auditory or visual hallucinations, does not appear to be responding to hallucinatory stimuli.   Cognition: Alert, oriented x 3. No deficits noted. Adequate fund of knowledge. No deficit in recent and remote memory. No deficits in attention, concentration or language.   Insight: Poor-to-Fair, as patient recognizes symptoms of illness and need for recommended treatments.    Judgment: Poor-to-Fair, as patient can make reasonable decisions about ordinary activities of daily living " and necessary medical care recommendations.           LABS:  Results for orders placed or performed during the hospital encounter of 03/22/25 (from the past 96 hours)   Glucose, Fasting   Result Value Ref Range    Glucose, Fasting 99 74 - 99 mg/dL   Lipid Panel   Result Value Ref Range    Cholesterol 157 0 - 199 mg/dL    HDL-Cholesterol 57.8 mg/dL    Cholesterol/HDL Ratio 2.7     LDL Calculated 81 <=99 mg/dL    VLDL 19 0 - 40 mg/dL    Triglycerides 93 0 - 149 mg/dL    Non HDL Cholesterol 99 0 - 149 mg/dL   Vitamin D 25-Hydroxy,Total (for eval of Vitamin D levels)   Result Value Ref Range    Vitamin D, 25-Hydroxy, Total 30 30 - 100 ng/mL        Last Recorded Vitals  Visit Vitals  /84 (Patient Position: Standing)   Pulse 90   Temp 36.6 °C (97.9 °F) (Temporal)   Resp 16        Intake/Output last 3 Shifts:  No intake/output data recorded.    Relevant Results  Scheduled medications  fLuvoxaMINE, 50 mg, oral, Nightly  folic acid, 1 mg, oral, Daily  multivitamin with minerals, 1 tablet, oral, Daily  nicotine, 1 patch, transdermal, Daily  QUEtiapine, 12.5 mg, oral, q AM  QUEtiapine, 75 mg, oral, Nightly  thiamine, 100 mg, oral, Daily      Continuous medications     PRN medications  PRN medications: alum-mag hydroxide-simeth, benzocaine, diazePAM, diphenhydrAMINE **OR** diphenhydrAMINE, famotidine, haloperidol **OR** haloperidol lactate, hydrOXYzine pamoate, ibuprofen, LORazepam **OR** LORazepam, melatonin, nicotine polacrilex, psyllium               Assessment/Plan   Assessment & Plan  Severe episode of recurrent major depressive disorder, without psychotic features (Multi)  Plan: *1) re-trial Luvox 25 -> 50 -> 75 mg QHS           *2) re-trial Seroquel 75 -> 150 mg QHS            3) re-trial Seroquel 12.5 mg QAM            4) Individual therapy by OT.  Discussed potential risks, benefits, and alternatives to medications with patient, who consented to the above medications.    SHOSHANA (generalized anxiety disorder)  Plan:  1) see above    Paranoia (psychosis) (Multi)  Plan: 1) see above    Alcohol use disorder, severe, dependence (Multi)  Plan: 1) Refer to outpatient treatment program.    Tobacco dependence  Plan: 1) Nicotine Patch Qdaily    Gastroesophageal reflux disease without esophagitis  Plan: 1) IM service to follow and manage.    Tooth pain  Plan: 1) Follow up with dentist outpatient    Cataract, right eye  Plan: 1) IM service to follow and manage.        KARLIE Alvarado MD

## 2025-03-26 NOTE — NURSING NOTE
"Pt was assessed in hallway away from others for privacy. Pt presents as anxious, depressed, calm, and incongruent. Rated both anxiety and depression \"10/10.\" Denied SI/HI and AVH. Rated pain in teeth \"10/10\" but declined any pain interventions. Pt's goal for the shift was to \"get some more sleep\" and strength is \"funny.\" Pt listed coping skills of \"watching TV\" and \"riding my bike.\" PRN melatonin given at 2041. Pt was medication compliant and cooperative with staff.   "

## 2025-03-26 NOTE — PROGRESS NOTES
SW met with treatment team this am to discuss patient status and disposition planning.   ADOD is planned for this Friday or Monday.   MAXIMO contacted  Nicole BRASHER to ensure electric bike and  on site for patient transport , which they confirmed, both items in security. Will need to be charged before discharge , approx 5 hours.  SW in process of trying to coordinate housing assessment with [facility] while patient IP. Call placed to [place] [facility] coordinators ,mental health to obtain appointments , medication management follow up , message left for return call . Coordinator aware that patient is with transport barrier to travel to [place]. Perhaps, [place] a better location for patient ?   Call back from [facility] [place] , she asks that a call be place to [place] day before patient discharge to set up appointment to coordinate for same day appointment ( not usually done) ,considering patient transport issues. First appointment must be done at [facility] ,then can be set up to [place] ( [phone]).  SW to further discuss with patient.   Coordination of care in process.

## 2025-03-26 NOTE — ASSESSMENT & PLAN NOTE
Plan: *1) re-trial Luvox 25 -> 50 -> 75 mg QHS           *2) re-trial Seroquel 75 -> 150 mg QHS            3) re-trial Seroquel 12.5 mg QAM            4) Individual therapy by OT.  Discussed potential risks, benefits, and alternatives to medications with patient, who consented to the above medications.

## 2025-03-26 NOTE — CARE PLAN
"The patient's goals for the shift include \" get some sleep\"    The clinical goals for the shift include maintain safety    Over the shift, the patient made progress toward the following goals of maintaining safety.  Barriers to progression include acute illness. Recommendations to address these barriers include continue medications as ordered and educate patient on the importance of attending groups and participating in his plan of care.    "

## 2025-03-27 PROBLEM — F33.2 SEVERE EPISODE OF RECURRENT MAJOR DEPRESSIVE DISORDER, WITHOUT PSYCHOTIC FEATURES (MULTI): Status: RESOLVED | Noted: 2024-10-21 | Resolved: 2025-03-27

## 2025-03-27 PROBLEM — F22 PARANOIA (PSYCHOSIS) (MULTI): Status: RESOLVED | Noted: 2025-03-23 | Resolved: 2025-03-27

## 2025-03-27 PROCEDURE — 99233 SBSQ HOSP IP/OBS HIGH 50: CPT | Performed by: PSYCHIATRY & NEUROLOGY

## 2025-03-27 PROCEDURE — 2500000001 HC RX 250 WO HCPCS SELF ADMINISTERED DRUGS (ALT 637 FOR MEDICARE OP): Performed by: PSYCHIATRY & NEUROLOGY

## 2025-03-27 PROCEDURE — 2500000002 HC RX 250 W HCPCS SELF ADMINISTERED DRUGS (ALT 637 FOR MEDICARE OP, ALT 636 FOR OP/ED): Performed by: PSYCHIATRY & NEUROLOGY

## 2025-03-27 PROCEDURE — 2500000001 HC RX 250 WO HCPCS SELF ADMINISTERED DRUGS (ALT 637 FOR MEDICARE OP): Performed by: NURSE PRACTITIONER

## 2025-03-27 PROCEDURE — S4991 NICOTINE PATCH NONLEGEND: HCPCS | Performed by: PSYCHIATRY & NEUROLOGY

## 2025-03-27 PROCEDURE — 1240000001 HC SEMI-PRIVATE BH ROOM DAILY

## 2025-03-27 RX ORDER — FLUVOXAMINE MALEATE 25 MG/1
75 TABLET ORAL NIGHTLY
Qty: 90 TABLET | Refills: 0 | Status: SHIPPED | OUTPATIENT
Start: 2025-03-27 | End: 2025-04-26

## 2025-03-27 RX ORDER — HYDROXYZINE PAMOATE 50 MG/1
50 CAPSULE ORAL EVERY 4 HOURS PRN
Qty: 30 CAPSULE | Refills: 0 | Status: SHIPPED | OUTPATIENT
Start: 2025-03-27

## 2025-03-27 RX ORDER — LISINOPRIL 5 MG/1
5 TABLET ORAL DAILY
Status: DISCONTINUED | OUTPATIENT
Start: 2025-03-27 | End: 2025-03-28 | Stop reason: HOSPADM

## 2025-03-27 RX ORDER — ACETAMINOPHEN 500 MG
5 TABLET ORAL NIGHTLY PRN
Qty: 30 TABLET | Refills: 0 | Status: SHIPPED | OUTPATIENT
Start: 2025-03-27 | End: 2025-04-26

## 2025-03-27 RX ORDER — FAMOTIDINE 20 MG/1
20 TABLET, FILM COATED ORAL 2 TIMES DAILY PRN
Qty: 60 TABLET | Refills: 0 | Status: SHIPPED | OUTPATIENT
Start: 2025-03-27 | End: 2025-04-26

## 2025-03-27 RX ORDER — QUETIAPINE FUMARATE 25 MG/1
12.5 TABLET, FILM COATED ORAL EVERY MORNING
Qty: 15 TABLET | Refills: 0 | Status: SHIPPED | OUTPATIENT
Start: 2025-03-28

## 2025-03-27 RX ORDER — MULTIVIT-MIN/IRON FUM/FOLIC AC 7.5 MG-4
1 TABLET ORAL DAILY
Qty: 30 TABLET | Refills: 0 | Status: SHIPPED | OUTPATIENT
Start: 2025-03-28 | End: 2025-04-27

## 2025-03-27 RX ORDER — QUETIAPINE FUMARATE 150 MG/1
150 TABLET, FILM COATED ORAL NIGHTLY
Qty: 30 TABLET | Refills: 0 | Status: SHIPPED | OUTPATIENT
Start: 2025-03-27 | End: 2025-04-26

## 2025-03-27 RX ORDER — LANOLIN ALCOHOL/MO/W.PET/CERES
100 CREAM (GRAM) TOPICAL DAILY
Qty: 30 TABLET | Refills: 0 | Status: SHIPPED | OUTPATIENT
Start: 2025-03-28 | End: 2025-04-27

## 2025-03-27 RX ORDER — FOLIC ACID 1 MG/1
1 TABLET ORAL DAILY
Qty: 30 TABLET | Refills: 0 | Status: SHIPPED | OUTPATIENT
Start: 2025-03-28 | End: 2025-04-27

## 2025-03-27 RX ORDER — IBUPROFEN 200 MG
1 TABLET ORAL DAILY
Qty: 30 PATCH | Refills: 0 | Status: SHIPPED | OUTPATIENT
Start: 2025-03-28 | End: 2025-04-27

## 2025-03-27 RX ORDER — LISINOPRIL 5 MG/1
5 TABLET ORAL DAILY
Qty: 30 TABLET | Refills: 0 | Status: SHIPPED | OUTPATIENT
Start: 2025-03-28 | End: 2025-04-27

## 2025-03-27 RX ADMIN — HYDROXYZINE PAMOATE 50 MG: 50 CAPSULE ORAL at 20:44

## 2025-03-27 RX ADMIN — IBUPROFEN 600 MG: 600 TABLET, FILM COATED ORAL at 20:43

## 2025-03-27 RX ADMIN — NICOTINE 1 PATCH: 21 PATCH, EXTENDED RELEASE TRANSDERMAL at 08:57

## 2025-03-27 RX ADMIN — LISINOPRIL 5 MG: 5 TABLET ORAL at 08:56

## 2025-03-27 RX ADMIN — QUETIAPINE FUMARATE 12.5 MG: 25 TABLET ORAL at 08:57

## 2025-03-27 RX ADMIN — Medication 5 MG: at 20:44

## 2025-03-27 RX ADMIN — FOLIC ACID 1 MG: 1 TABLET ORAL at 08:56

## 2025-03-27 RX ADMIN — Medication 1 TABLET: at 08:56

## 2025-03-27 RX ADMIN — THIAMINE HCL TAB 100 MG 100 MG: 100 TAB at 08:57

## 2025-03-27 RX ADMIN — FLUVOXAMINE MALEATE 75 MG: 50 TABLET ORAL at 20:42

## 2025-03-27 ASSESSMENT — COLUMBIA-SUICIDE SEVERITY RATING SCALE - C-SSRS
2. HAVE YOU ACTUALLY HAD ANY THOUGHTS OF KILLING YOURSELF?: NO
1. SINCE LAST CONTACT, HAVE YOU WISHED YOU WERE DEAD OR WISHED YOU COULD GO TO SLEEP AND NOT WAKE UP?: NO
2. HAVE YOU ACTUALLY HAD ANY THOUGHTS OF KILLING YOURSELF?: NO
6. HAVE YOU EVER DONE ANYTHING, STARTED TO DO ANYTHING, OR PREPARED TO DO ANYTHING TO END YOUR LIFE?: NO
1. IN THE PAST MONTH, HAVE YOU WISHED YOU WERE DEAD OR WISHED YOU COULD GO TO SLEEP AND NOT WAKE UP?: YES
1. IN THE PAST MONTH, HAVE YOU WISHED YOU WERE DEAD OR WISHED YOU COULD GO TO SLEEP AND NOT WAKE UP?: YES
6. HAVE YOU EVER DONE ANYTHING, STARTED TO DO ANYTHING, OR PREPARED TO DO ANYTHING TO END YOUR LIFE?: NO
6. HAVE YOU EVER DONE ANYTHING, STARTED TO DO ANYTHING, OR PREPARED TO DO ANYTHING TO END YOUR LIFE?: NO
2. HAVE YOU ACTUALLY HAD ANY THOUGHTS OF KILLING YOURSELF?: NO

## 2025-03-27 ASSESSMENT — PAIN - FUNCTIONAL ASSESSMENT
PAIN_FUNCTIONAL_ASSESSMENT: 0-10
PAIN_FUNCTIONAL_ASSESSMENT: 0-10

## 2025-03-27 ASSESSMENT — PAIN DESCRIPTION - LOCATION: LOCATION: TEETH

## 2025-03-27 ASSESSMENT — PAIN SCALES - GENERAL
PAINLEVEL_OUTOF10: 0 - NO PAIN
PAINLEVEL_OUTOF10: 3
PAINLEVEL_OUTOF10: 0 - NO PAIN
PAINLEVEL_OUTOF10: 0 - NO PAIN

## 2025-03-27 NOTE — CARE PLAN
"The patient's goals for the shift include \"talk to a  about housing\"    The clinical goals for the shift include treatment compliant    Over the shift, the patient did not make progress toward the following goals. Barriers to progression include acutness of illness . Recommendations to address these barriers include continue care plan  Problem: Sensory Perceptual Alteration as Evidenced by  Goal: Patient/Family participate in treatment and discharge plans  Outcome: Progressing  Goal: Patient/Family verbalizes awareness of resources  Outcome: Progressing  Goal: Participates in unit activities  Outcome: Progressing  Goal: Discusses signs/symptoms of illness/treatment options  Outcome: Progressing  Goal: Initiates reality-based interactions  Outcome: Progressing  Goal: Able to discuss content of hallucinations/delusions  Outcome: Progressing  Goal: Notifies staff when experiencing hallucinations/delusions  Outcome: Progressing  Goal: Verbalizes reduction in hallucinations/delusions  Outcome: Progressing  Goal: Will not act on psychotic perception  Outcome: Progressing  Goal: Understands least restrictive measures  Outcome: Progressing  Goal: Free from restraint events  Outcome: Progressing     Problem: Altered Thought Processes as Evidenced by  Goal: STG - Desires improvement in ability to think and concentrate  Outcome: Progressing  Goal: STG - Participates in Occupational Therapy and other cognitive assessments  Outcome: Progressing     Problem: Potential for Harm to Self or Others  Goal: Participates in unit activities  Outcome: Progressing  Goal: Patient/Family participate in treatment and discharge plans  Outcome: Progressing  Goal: Identifies deescalation techniques  Outcome: Progressing  Goal: Understands least restrictive measures  Outcome: Progressing  Goal: Identifies stressors that lead to harmful behaviors  Outcome: Progressing  Goal: Notifies staff when experiencing harmful thoughts toward " self/others  Outcome: Progressing  Goal: Denies harm toward self or others  Outcome: Progressing  Goal: Free from restraint events  Outcome: Progressing     Problem: Educational/Scholastic Disruption  Goal: Meets educational requirements during hospitalization  Outcome: Progressing  Goal: Attends class without disruptive behavior  Outcome: Progressing  Goal: Completes daily assignments  Outcome: Progressing     Problem: Ineffective Coping  Goal: Identifies ineffective coping skills  Outcome: Progressing  Goal: Identifies healthy coping skills  Outcome: Progressing  Goal: Demonstrates healthy coping skills  Outcome: Progressing  Goal: Participates in unit activities  Outcome: Progressing  Goal: Patient/Family participate in treatment and discharge plans  Outcome: Progressing  Goal: Patient/Family verbalizes awareness of resources  Outcome: Progressing  Goal: Understands least restrictive measures  Outcome: Progressing  Goal: Free from restraint events  Outcome: Progressing     Problem: Alteration in Sleep  Goal: STG - Reports nightly sleep, duration, and quality  Outcome: Progressing  Goal: STG - Identifies sleep hygiene aids  Outcome: Progressing  Goal: STG - Informs staff if unable to sleep  Outcome: Progressing  Goal: STG - Attends breathing and relaxation group  Outcome: Progressing     Problem: Potential for Substance Withdrawal  Goal: Verbalizes signs/symptoms of withdrawal  Outcome: Progressing  Goal: Reports signs/symptoms of withdrawal  Outcome: Progressing  Goal: Free of withdrawal symptoms  Outcome: Progressing     Problem: Anxiety  Goal: Attempts to manage anxiety with help  Outcome: Progressing  Goal: Verbalizes ways to manage anxiety  Outcome: Progressing  Goal: Implements measures to reduce anxiety  Outcome: Progressing  Goal: Free from restraint events  Outcome: Progressing     Problem: Self Care Deficit  Goal: STG - Patient completes hygiene  Outcome: Progressing  Goal: Increase group  attendance  Outcome: Progressing  Goal: Accepts need for medications  Outcome: Progressing  Goal: STG - Goes to and eats meals independently in dining room 100% of time  Outcome: Progressing     Problem: Defensive Coping  Goal: Identifies reckless/dangerous behavior  Outcome: Progressing  Goal: Identifies stressors that lead to reckless/dangerous behavior  Outcome: Progressing  Goal: Discusses and identifies healthy coping skills  Outcome: Progressing  Goal: Demonstrates healthy coping skills  Outcome: Progressing  Goal: Identifies appropriate social interaction  Outcome: Progressing  Goal: Demonstrates appropriate social interactions  Outcome: Progressing  Goal: Patient/Family verbalizes awareness of resources  Outcome: Progressing  Goal: Discusses signs/symptoms of illness/treatment options  Outcome: Progressing  Goal: Patient/Family participate in treatment and discharge plans  Outcome: Progressing  Goal: Understands least restrictive measures  Outcome: Progressing  Goal: Free from restraint events  Outcome: Progressing     Problem: Community resource needs  Goal: Patient is receiving increased resource support to enhance ability to remain at home  Outcome: Progressing     Problem: Mental health issues  Goal: Stabilize adverse mental health factors affecting plan of care  Outcome: Progressing     Problem: Access to Care Issue  Goal: Assess and Address Access Barriers  Outcome: Progressing     Problem: Assessment of Caregiver  Goal: Caregiver Demonstrates Personal Health and Wellbeing; as well as Ability to Safely and Effectively Care for Patient  Outcome: Progressing     Problem: Assistance Required for Daily Activities  Goal: Develop a Plan to Assist Patient with Activites of Daily Living  Outcome: Progressing     Problem: Coordination of Community Resources Needed  Goal: Coordination of Services will be Obtained  Outcome: Progressing     Problem: Coordination of Psychosocial Support Services Needed  Goal:  Coordination of Services will be Obtained  Outcome: Progressing     Problem: Medication Adherence  Goal: Adherence to Medication Regimen  Outcome: Progressing     Problem: Financial Problem  Goal: Assess and Address Specific Financial Obstacles Affecting Patient's Adderence to Plan of Care  Outcome: Progressing     Problem: Risk of Exacerbation, or Readmission to Hospital Related to Symptoms of Comorbidities  Goal: Knowledge and Symptom Management of Chronic Disease will be Documented  Outcome: Progressing     Problem: Risk of Uncoordinated Care  Goal: Care will be Coordinated and Supported by a Multidisciplinary Team of Providers  Outcome: Progressing     Problem: Negative Experience, Conflict with, or Distrust of Providers and/or Health System  Goal: Plan to Address Patient Specific Negative Experience, Distrust, or Conflict with Providers and/or Health System  Outcome: Progressing     Problem: Risk for Suicide  Goal: Accepts medications as prescribed/needed this shift  Outcome: Progressing  Goal: Identifies supports this shift  Outcome: Progressing  Goal: Makes needs known through verbalization or behaviors this shift  Outcome: Progressing  Goal: No self harm this shift  Outcome: Progressing  Goal: Read Safety Guidelines this shift  Outcome: Progressing  Goal: Complete Mental Health Safety Plan (psychiatry only) this shift  Outcome: Progressing   .

## 2025-03-27 NOTE — NURSING NOTE
Patient is out in the lounge sitting at a table watching tv and ate his snack, seclusive to self. Pt appears blunted and flat. Pt rated anxiety 5, rated depression 5, denied si/hi, denied a/v hallucinations. Patient is medication compliant and asked for PRN melatonin 5mg (given) with scheduled meds.

## 2025-03-27 NOTE — NURSING NOTE
"Pt was out in lodge for breakfast. I interviewed him in his room for privacy. Pt was pleasant and cooperative . Pt stated \" I need the battery for my bike charged. So the battery is charged for when I am discharged\" Had [facility] [employer] get his battery and bring it to the unit and I plugged the battery in so it will be charged for tomorrow. . Anxiety 5/10 depression 5/10.  Pt denied SI/HI and A/V/H. Safety maintained. Med compliant. Pt contracted for safety with this staff at this time.    "

## 2025-03-27 NOTE — CARE PLAN
"The patient's goals for the shift include \"Getting out of here soon\"    The clinical goals for the shift include medication compliance    Over the shift, the patient did make progress toward the following goals    Problem: Sensory Perceptual Alteration as Evidenced by  Goal: Initiates reality-based interactions  Outcome: Progressing     Problem: Altered Thought Processes as Evidenced by  Goal: STG - Desires improvement in ability to think and concentrate  Outcome: Progressing     Problem: Potential for Harm to Self or Others  Goal: Denies harm toward self or others  Outcome: Progressing     Problem: Alteration in Sleep  Goal: STG - Reports nightly sleep, duration, and quality  Outcome: Progressing     Problem: Alteration in Sleep  Goal: STG - Identifies sleep hygiene aids  Outcome: Progressing     Problem: Alteration in Sleep  Goal: STG - Informs staff if unable to sleep  Outcome: Progressing     Problem: Self Care Deficit  Goal: Accepts need for medications  Outcome: Progressing     Problem: Self Care Deficit  Goal: STG - Goes to and eats meals independently in dining room 100% of time  Outcome: Progressing     "

## 2025-03-27 NOTE — GROUP NOTE
"Group Topic: Leisure Skills   Group Date: 3/27/2025  Start Time: 1400  End Time: 1500  Facilitators: LOPEZ Santacruz   Department: [facility] REHAB THERAPY VIRTUAL    Number of Participants: 9   Group Focus: leisure skills, problem solving, and social skills  Treatment Modality: Recreational Therapy   Interventions utilized were Linkee, exploration and mental fitness  Purpose: other: cognitive skills, teamwork/healthy competition, leisure awareness     Name: Jonny Valenzuela YOB: 1970   MR: [id number]      Facilitator: Recreational Therapist  Level of Participation: active  Quality of Participation: appropriate/pleasant, cooperative, and engaged  Interactions with others: appropriate  Mood/Affect: appropriate and positive  Triggers (if applicable): n/a  Cognition: coherent/clear and goal directed  Progress: Moderate  Comments: Patients were gathered to learn and participate in a game called \"Linkee\". This activity works on cognitive skills, following directions, positive social interaction/teamwork, and promotes leisure awareness.    Pt was calm, pleasant, and engaged appropriately in group task.     Plan: continue with services      "

## 2025-03-27 NOTE — PROGRESS NOTES
"   MAXIMO scheduled appointment for patient intake at [facility] for tomorrow @ 10:30am with [facility] , Emergency Services. Patient bike will need to be charged.   MAXIMO notified provider and Charge RN.    MAXIMO to fax discharge summary and AVS to [facility].  MAXIMO continues efforts to connect  , Rose Marie Santos with patient      4:42pm : SW met with patient to discuss disposition planning as ADOD for tomorrow.   Patient is calm , states \"feels so much better\" and cooperative. He was attending groups today, eating , denies SI .   Per Rose Marie Santos will set up video conference with this SW and patient for 8:30am in morning to complete Housing Intake. Patient verbalizes understand to all information given , questions answered.   Address in [place] where patient plans to stay (a friends home) temporarily :[id number]  [place], [place] [address]    "

## 2025-03-27 NOTE — ASSESSMENT & PLAN NOTE
Plan: 1) re-trial Luvox 25 -> 50 -> 75 mg QHS           2) re-trial Seroquel 75 -> 150 mg QHS           3) re-trial Seroquel 12.5 mg QAM           4) Individual therapy by OT.  Discussed potential risks, benefits, and alternatives to medications with patient, who consented to the above medications.

## 2025-03-27 NOTE — GROUP NOTE
"Group Topic: Dialectical Behavioral Therapy - Mindfulness   Group Date: 3/27/2025  Start Time: 1600  End Time: 1700  Facilitators: LOPEZ Santacruz   Department: [facility] REHAB THERAPY VIRTUAL    Number of Participants: 10   Group Focus: mindfulness, psychiatric education, and relaxation  Treatment Modality: Recreational Therapy   Interventions utilized were DBT - States of Mind, What is Mindfulness?, Guided Mediation, exploration, patient education, and support  Purpose: coping skills, maladaptive thinking, and insight or knowledge    Name: Jonny Valenzuela YOB: 1970   MR: [id number]      Facilitator: Recreational Therapist  Level of Participation: moderate  Quality of Participation: appropriate/pleasant, cooperative, passive, and quiet  Interactions with others: appropriate  Mood/Affect: appropriate and brightens with interaction  Triggers (if applicable): n/a  Cognition: coherent/clear  Progress: Moderate  Comments: Patients were provided with the \"What is Mindfulness?\" Handout. We discussed the definition of mindfulness, key elements/benefits of the skill, and a variety ways to practice mindfulness including (mindfulness meditation, mindfulness walk, body scan, and five senses). Patients were also provided with the \"Wise Mind\" handout, which outlines the 3 states of mind (emotional, reasonable and wise). We had discussion and patients were given an opportunity to share experiences they've had with each state.  Following discussion, we listened to a 10 minute guided meditation as a group and patients were encouraged to share their thoughts on the experience.    Pt was calm, cooperative, and pleasant this afternoon. He was quiet overall, but appeared to be listening during discussion and took part in guided meditation.     Plan: continue with services      "

## 2025-03-27 NOTE — GROUP NOTE
"Group Topic: Coping Skills   Group Date: 3/27/2025  Start Time: 0740  End Time: 0815  Facilitators: LOPEZ Santacruz   Department: [facility] REHAB THERAPY VIRTUAL    Number of Participants: 8   Group Focus: anxiety, coping skills, depression, dual diagnosis, and goals  Treatment Modality: Recreational Therapy   Interventions utilized were \"Managing Your Depression\", exploration, patient education, story telling, and support  Purpose: coping skills, insight or knowledge, and self-care    Name: Jonny Valenzuela YOB: 1970   MR: [id number]      Facilitator: Recreational Therapist  Level of Participation: minimal  Quality of Participation: cooperative, passive, and quiet  Interactions with others: appropriate  Mood/Affect: appropriate and brightens with interaction  Triggers (if applicable): n/a  Cognition: concrete  Progress: Moderate  Comments: Patients were provided with the “Managing Your Depression” handout. We discussed six main areas which include (stay physically active, make time for pleasurable activities, spend time with people who can support you, practice relaxing, simple goals and small steps, and eat balanced and nutritious meals). We brainstormed ideas as a group and Patients were given space to create personal goals for the upcoming week in each area.    Pt was calm, cooperative, and quiet this morning. He arrived late and did not engage in conversation, but appeared to be actively listening.     Plan: continue with services      "

## 2025-03-27 NOTE — PROGRESS NOTES
"Jonny Valenzuela is a 54 y.o. year old male patient who is on [facility] admission day 5.      Subjective   Jonny Valenzuela is a 54 y.o. year old male patient who was personally seen and interviewed, and discussed in morning team rounds. The patient was interviewed alone at the end of the hallway (interviewed while sitting in a chair), and was easily engaged and cooperative. This morning, Jonny reports feeling \"a lot better\" and currently rates his depression at a 5 out of 10. He denies experiencing any suicidal thoughts or having any suicide plans. He rates his anxiety at a 5 out of 10. No hallucinations or paranoia were endorsed or noted.         Jonny reportedly slept 8.25 hours last night (broken).        Objective   Mental Status Exam:   General: Appropriately groomed and dressed in casual/hospital attire.   Appearance: Appears stated age.   Attitude: Calm, cooperative.   Behavior: Appropriate eye contact.   Motor Activity: No agitation or retardation. No EPS/TD. Normal gait and station. Normal muscle tone and bulk.   Speech: Regular rate, rhythm, volume and tone, spontaneous, fluent. Non-pressured.   Mood: \"A lot better\"   Affect: Neutral.   Thought Process: Organized, and goal directed.   Thought Content: Does not currently endorse suicidal ideation or any suicide plans.   Does not endorse homicidal ideation.  No overt delusions or paranoia elicited.    Thought Perception: Does not endorse auditory or visual hallucinations, does not appear to be responding to hallucinatory stimuli.   Cognition: Alert, oriented x 3. No deficits noted. Adequate fund of knowledge. No deficit in recent and remote memory. No deficits in attention, concentration or language.   Insight: Fair, as patient recognizes symptoms of illness and need for recommended treatments.    Judgment: Fair, as patient can make reasonable decisions about ordinary activities of daily living and necessary medical care recommendations.           LABS:  No results found for " "this or any previous visit (from the past 96 hours).       Last Recorded Vitals  Visit Vitals  /78 (Patient Position: Standing) Comment: pt symptomatic with dizziness and states he drinks \"a lot of water\"   Pulse 80   Temp 36.3 °C (97.3 °F) (Temporal)   Resp 18        Intake/Output last 3 Shifts:  No intake/output data recorded.    Relevant Results  Scheduled medications  fLuvoxaMINE, 75 mg, oral, Nightly  folic acid, 1 mg, oral, Daily  lisinopril, 5 mg, oral, Daily  multivitamin with minerals, 1 tablet, oral, Daily  nicotine, 1 patch, transdermal, Daily  QUEtiapine, 12.5 mg, oral, q AM  QUEtiapine, 150 mg, oral, Nightly  thiamine, 100 mg, oral, Daily      Continuous medications     PRN medications  PRN medications: alum-mag hydroxide-simeth, benzocaine, diazePAM, diphenhydrAMINE **OR** diphenhydrAMINE, famotidine, haloperidol **OR** haloperidol lactate, hydrOXYzine pamoate, ibuprofen, LORazepam **OR** LORazepam, melatonin, nicotine polacrilex, psyllium               Assessment/Plan   Assessment & Plan  Severe episode of recurrent major depressive disorder, without psychotic features (Multi)  Plan: 1) re-trial Luvox 25 -> 50 -> 75 mg QHS           2) re-trial Seroquel 75 -> 150 mg QHS           3) re-trial Seroquel 12.5 mg QAM           4) Individual therapy by OT.  Discussed potential risks, benefits, and alternatives to medications with patient, who consented to the above medications.    SHOSHANA (generalized anxiety disorder)  Plan: 1) see above    Paranoia (psychosis) (Multi)  Plan: 1) see above    Alcohol use disorder, severe, dependence (Multi)  Plan: 1) Refer to outpatient treatment program.    Tobacco dependence  Plan: 1) Nicotine Patch Qdaily    Gastroesophageal reflux disease without esophagitis  Plan: 1) IM service to follow and manage.    Tooth pain  Plan: 1) Follow up with dentist outpatient    Cataract, right eye  Plan: 1) IM service to follow and manage.        KARLIE Alvarado MD  "

## 2025-03-28 VITALS
DIASTOLIC BLOOD PRESSURE: 78 MMHG | RESPIRATION RATE: 18 BRPM | WEIGHT: 198.19 LBS | SYSTOLIC BLOOD PRESSURE: 129 MMHG | HEIGHT: 64 IN | BODY MASS INDEX: 33.84 KG/M2 | HEART RATE: 74 BPM | OXYGEN SATURATION: 98 % | TEMPERATURE: 98.2 F

## 2025-03-28 PROCEDURE — 2500000001 HC RX 250 WO HCPCS SELF ADMINISTERED DRUGS (ALT 637 FOR MEDICARE OP): Performed by: NURSE PRACTITIONER

## 2025-03-28 PROCEDURE — 99239 HOSP IP/OBS DSCHRG MGMT >30: CPT | Performed by: PSYCHIATRY & NEUROLOGY

## 2025-03-28 RX ORDER — FLUVOXAMINE MALEATE 50 MG/1
100 TABLET, FILM COATED ORAL NIGHTLY
Status: DISCONTINUED | OUTPATIENT
Start: 2025-03-28 | End: 2025-03-28 | Stop reason: HOSPADM

## 2025-03-28 RX ORDER — FLUVOXAMINE MALEATE 100 MG/1
100 TABLET, COATED ORAL NIGHTLY
Qty: 30 TABLET | Refills: 0 | Status: SHIPPED | OUTPATIENT
Start: 2025-03-28 | End: 2025-04-27

## 2025-03-28 RX ADMIN — FOLIC ACID 1 MG: 1 TABLET ORAL at 08:37

## 2025-03-28 RX ADMIN — THIAMINE HCL TAB 100 MG 100 MG: 100 TAB at 08:41

## 2025-03-28 RX ADMIN — LISINOPRIL 5 MG: 5 TABLET ORAL at 08:37

## 2025-03-28 RX ADMIN — Medication 1 TABLET: at 08:37

## 2025-03-28 ASSESSMENT — COLUMBIA-SUICIDE SEVERITY RATING SCALE - C-SSRS
2. HAVE YOU ACTUALLY HAD ANY THOUGHTS OF KILLING YOURSELF?: NO
1. IN THE PAST MONTH, HAVE YOU WISHED YOU WERE DEAD OR WISHED YOU COULD GO TO SLEEP AND NOT WAKE UP?: NO
6. HAVE YOU EVER DONE ANYTHING, STARTED TO DO ANYTHING, OR PREPARED TO DO ANYTHING TO END YOUR LIFE?: NO

## 2025-03-28 ASSESSMENT — PAIN - FUNCTIONAL ASSESSMENT: PAIN_FUNCTIONAL_ASSESSMENT: 0-10

## 2025-03-28 ASSESSMENT — PAIN SCALES - GENERAL: PAINLEVEL_OUTOF10: 0 - NO PAIN

## 2025-03-28 NOTE — PROGRESS NOTES
SW met with patient this morning briefly to explain that Rose Marie will meet him after appointment at [place] today , as some IT issue with facilitating teams meeting at 8:30am today. He verbalizes understanding.   Housing intake assessment to be complete later today.    All questions answers . Patient verbalizes understanding .

## 2025-03-28 NOTE — CARE PLAN
Problem: Sensory Perceptual Alteration as Evidenced by  Goal: Patient/Family participate in treatment and discharge plans  Outcome: Progressing  Goal: Patient/Family verbalizes awareness of resources  Outcome: Progressing  Goal: Participates in unit activities  Outcome: Progressing  Goal: Discusses signs/symptoms of illness/treatment options  Outcome: Progressing  Goal: Initiates reality-based interactions  Outcome: Progressing  Goal: Able to discuss content of hallucinations/delusions  Outcome: Progressing  Goal: Notifies staff when experiencing hallucinations/delusions  Outcome: Progressing  Goal: Verbalizes reduction in hallucinations/delusions  Outcome: Progressing  Goal: Will not act on psychotic perception  Outcome: Progressing  Goal: Understands least restrictive measures  Outcome: Progressing  Goal: Free from restraint events  Outcome: Progressing     Problem: Altered Thought Processes as Evidenced by  Goal: STG - Desires improvement in ability to think and concentrate  Outcome: Progressing  Goal: STG - Participates in Occupational Therapy and other cognitive assessments  Outcome: Progressing     Problem: Potential for Harm to Self or Others  Goal: Participates in unit activities  Outcome: Progressing  Goal: Patient/Family participate in treatment and discharge plans  Outcome: Progressing  Goal: Identifies deescalation techniques  Outcome: Progressing  Goal: Understands least restrictive measures  Outcome: Progressing  Goal: Identifies stressors that lead to harmful behaviors  Outcome: Progressing  Goal: Notifies staff when experiencing harmful thoughts toward self/others  Outcome: Progressing  Goal: Denies harm toward self or others  Outcome: Progressing  Goal: Free from restraint events  Outcome: Progressing     Problem: Educational/Scholastic Disruption  Goal: Meets educational requirements during hospitalization  Outcome: Progressing  Goal: Attends class without disruptive behavior  Outcome:  Progressing  Goal: Completes daily assignments  Outcome: Progressing     Problem: Ineffective Coping  Goal: Identifies ineffective coping skills  Outcome: Progressing  Goal: Identifies healthy coping skills  Outcome: Progressing  Goal: Demonstrates healthy coping skills  Outcome: Progressing  Goal: Participates in unit activities  Outcome: Progressing  Goal: Patient/Family participate in treatment and discharge plans  Outcome: Progressing  Goal: Patient/Family verbalizes awareness of resources  Outcome: Progressing  Goal: Understands least restrictive measures  Outcome: Progressing  Goal: Free from restraint events  Outcome: Progressing     Problem: Alteration in Sleep  Goal: STG - Reports nightly sleep, duration, and quality  Outcome: Progressing  Goal: STG - Identifies sleep hygiene aids  Outcome: Progressing  Goal: STG - Informs staff if unable to sleep  Outcome: Progressing  Goal: STG - Attends breathing and relaxation group  Outcome: Progressing     Problem: Potential for Substance Withdrawal  Goal: Verbalizes signs/symptoms of withdrawal  Outcome: Progressing  Goal: Reports signs/symptoms of withdrawal  Outcome: Progressing  Goal: Free of withdrawal symptoms  Outcome: Progressing     Problem: Anxiety  Goal: Attempts to manage anxiety with help  Outcome: Progressing  Goal: Verbalizes ways to manage anxiety  Outcome: Progressing  Goal: Implements measures to reduce anxiety  Outcome: Progressing  Goal: Free from restraint events  Outcome: Progressing     Problem: Self Care Deficit  Goal: STG - Patient completes hygiene  Outcome: Progressing  Goal: Increase group attendance  Outcome: Progressing  Goal: Accepts need for medications  Outcome: Progressing  Goal: STG - Goes to and eats meals independently in dining room 100% of time  Outcome: Progressing     Problem: Defensive Coping  Goal: Identifies reckless/dangerous behavior  Outcome: Progressing  Goal: Identifies stressors that lead to reckless/dangerous  "behavior  Outcome: Progressing  Goal: Discusses and identifies healthy coping skills  Outcome: Progressing  Goal: Demonstrates healthy coping skills  Outcome: Progressing  Goal: Identifies appropriate social interaction  Outcome: Progressing  Goal: Demonstrates appropriate social interactions  Outcome: Progressing  Goal: Patient/Family verbalizes awareness of resources  Outcome: Progressing  Goal: Discusses signs/symptoms of illness/treatment options  Outcome: Progressing  Goal: Patient/Family participate in treatment and discharge plans  Outcome: Progressing  Goal: Understands least restrictive measures  Outcome: Progressing  Goal: Free from restraint events  Outcome: Progressing   The patient's goals for the shift include \"go home\"    The clinical goals for the shift include maintain safety    Over the shift, the patient did not make progress toward the following goals. Barriers to progression include acute illness. Recommendations to address these barriers include participation in plan of care.    "

## 2025-03-28 NOTE — CARE PLAN
"The patient's goals for the shift include \"Find an apartment\"    The clinical goals for the shift include Medication compliance and Safety    Over the shift, the patient did not make progress toward the following goals. Barriers to progression include acuity of illness. Recommendations to address these barriers include medication compliance, safety, and education.    "

## 2025-03-28 NOTE — DISCHARGE SUMMARY
"Admission Date: 3-  Discharge Date: 3-      Reason For Admission: Suicidal thoughts       Discharge Diagnosis  1) Major Depressive Disorder, recurrent, severe without psychotic features  2) Generalized Anxiety Disorder  3) Paranoia  4) Alcohol Use Disorder, severe, dependence  5) Tobacco Dependence  6) GERD  7) Tooth pain  8) Cataract, right eye          Initial Admission :   Jonny Valenzuela is a 54 y.o. year old male patient who presented to the Emergency Department intoxicated and with suicidal thoughts (see EPAT note below). On admission, Jonny reports experiencing worsening feelings of depression for the past 2 weeks, along with decreased sleep, decreased energy, decreased concentration, increased feelings of hopelessness and helplessness and worthlessness, and anhedonia, all for the past 2 months. He also reports experiencing worsening, chronic, intermittent suicidal ideation for the past 2 months, but denies having any suicide plans. Jonny reports experiencing prior depressive episodes, but not manic symptoms, in the past. No hallucinations were endorsed or noted.       Jonny also reports experiencing excessive worries about everyday activities that he can't control, and result in problems sleeping, concentrating, decreased energy, irritability, and restlessness.       Jonny further reports experiencing chronic feelings (for years) that \"they're (other people) talking about me all the time.\"       Jonny also reports drinking an average of 6 cans of Ice House (16 oz size - and 5.5% alcohol) per day.        Per EPAT Assessment of 3-:  Patient Jonny Valenzuela, is a 25 year old male with history of suicide ideations, suicide attempt, depression, anxiety, and alcohol abuse. Patient presented to ED by EMS with complaint of suicidal ideation. Patient denied homicidal ideation, and hallucinations to ED provider upon ED arrival. Patient notably had BAL of 173 around an hour after arrival to ED.      PatientJonny " Hissa is a 54 year old male with history of anxiety, depression, and possibly PTSD. Patient presented to ED by EMS with complaint of suicidal ideation. Patient discussed reason for ED visit stating he had been feeling as if “life is done” for him and that he is worn down by homelessness and managing significant grief/loss. Patient most recent hospitalization 10/16/2024 at [facility]. Patient shared that the medications he was on following this inpatient stay made him feel better and when he had them changed he noticed that he began to feel worse. He is requesting that his past mediation list be considered in his ongoing care plan at this time. Patient lost his wife a few years ago and reported that he found his father  about 30 years ago. Patient reported that he attempted suicide shortly after his father’s passing by attempting to shoot himself with a shot gun. Patient discussed patient’s use of alcohol- says he drinks alcohol once a week. Patient’s initial BAL was 173. Patient denied any recent self-harm or suicide attempts. Patient’s C-SSRS scored at imminent risk due to active ideation when clinically sober. Patient’s overall lifetime risk remains elevated at moderate risk due to history of suicide attempt and recent suicidal statements. Patient denied homicidal ideation and hallucinations. Patient endorsed many acute changes to sleeping, eating, anxiety, or depression symptoms recently. Patient discussed feeling as if “life is done for him”. Patient reported having consistent appointments without patient  at [place]. Patient reported that his  “Rose Marie” is supposed to be assisting him with finding housing.. Patient was unable to identify supportive people in his social network. Patient able to identify reason for living being his niece and nephew- he believes that they would be hurt if he were to complete suicide. Patient does meet criteria for inpatient hospitalization due to high  "risk of harm to self, severe depression, and current lack of outpatient management of symptoms and medications. Patient encouraged to follow up with current providers, call 9-1-1, and return to ED if symptoms worsen following inpatient care.. Plan for care discussed with and approved by Dr. Josie Bergman.          Hospital Course:       Following admission to the U at [facility], Jonny was re-started on Luvox and Seroquel, to help treat his depressive symptoms. Jonny also attended three unit groups to help with coping skills, stating \"they were okay.\" At the time of discharge, Jonny denied experiencing any hallucinations, paranoia, significant anxiety, manic symptoms, or symptoms of Major Depressive Disorder.        Jonny signed in voluntarily onto the unit during his hospital stay.            Mental Status Exam:   General: Appropriately groomed and dressed in casual/hospital attire.   Appearance: Appears stated age.   Attitude: Calm, cooperative.   Behavior: Appropriate eye contact.   Motor Activity: No agitation or retardation. No EPS/TD. Normal gait and station. Normal muscle tone and bulk.   Speech: Regular rate, rhythm, volume and tone, spontaneous, fluent. Non-pressured.   Mood: \"Okay\"   Affect: Neutral.   Thought Process: Organized, and goal directed.   Thought Content: Does not endorse suicidal ideation or any suicide plans.   Does not endorse homicidal ideation.  No overt delusions or paranoia elicited.   Thought Perception: Does not endorse auditory or visual hallucinations, does not appear to be responding to hallucinatory stimuli.   Cognition: Alert, oriented x 3. No deficits noted. Adequate fund of knowledge. No deficit in recent and remote memory. No deficits in attention, concentration or language.   Insight: Fair-to-good, as patient recognizes symptoms of  illness and need for recommended treatments.    Judgment: Intact, as patient can make reasonable decisions about ordinary activities of daily " "living and necessary medical care recommendations.            Risk Assessment at Discharge:  Jonny is judged a minimal suicide risk due to: 1) Denies gun ownership and denies having any guns at his place of residence, 2) Denies any prior suicide attempts, 3) Denies any current suicidal ideation or suicide plans, 4) +plans for the future: \"... Try to get an apartment... get my life straightened out... and try to save some money...,\" and 5) No symptoms of Major Depressive Disorder elicited at discharge.            Discharge Medications:  Scheduled medications  fLuvoxaMINE, 100 mg, oral, Nightly  folic acid, 1 mg, oral, Daily  lisinopril, 5 mg, oral, Daily  multivitamin with minerals, 1 tablet, oral, Daily  nicotine, 1 patch, transdermal, Daily  QUEtiapine, 12.5 mg, oral, q AM  QUEtiapine, 150 mg, oral, Nightly  thiamine, 100 mg, oral, Daily      Continuous medications     PRN medications  PRN medications: alum-mag hydroxide-simeth, benzocaine, diazePAM, diphenhydrAMINE **OR** diphenhydrAMINE, famotidine, haloperidol **OR** haloperidol lactate, hydrOXYzine pamoate, ibuprofen, LORazepam **OR** LORazepam, melatonin, nicotine polacrilex, psyllium         I spent over 30 minutes in the preparation of this summary. All 11 elements of the transition record were discussed with the patient and or caregiver and the receiving inpatient facility (if applicable).  A copy of the transition record was given to the patient and was transmitted to the outpatient provider accepting the patient's care following  discharge.    Patient's illness, medication side effects, benefits and risk were reviewed with the patient prior to discharge. The patient voiced understanding of their diagnosis, the medications recommended along with the importance of mediation compliance.  The patient was counseled not to stop medications without the supervision of a psychiatrist.  The patient was counseled to follow-up with their outpatient medical provider as " indicated. The patient was counseled that if there was an increase in mental health issues, depression, anxiety, medication side effects, self harming thoughts or thoughts to harm others, to call [employer] or 911 and come to the nearest emergency room. The patient also received information regarding advanced mental and medical health directives during this hospitalization which they could discuss with their outpatient provider. The plan was discussed with the patient, the nurse and the social work department. The patient voiced understanding and agreement with the plan.  ------------------------------------------------------------------------------------------------------------------------------------------------------------------------------------------------------  Substance Use Risk Assessment:    Nicotine: Risks of continued tobacco use was addressed with the patient which included: inpatient education and counseling of the risks of oral, esophageal as well as other organ cancers (including oral, dermatological, gastric, pancreatic, respiratory) along with the ongoing risk of neurological and cardiovascular disease/events (strokes, angina). Treatment options for cessation were offered to include: alternate tobacco products, both inpatient/outpatient counseling. Replacement products were offered during this admission and prescribed at the time of discharge along with a referral to outpatient cessation counseling.    Alcohol: The increase in morbidity and mortality, financial, both interpersonal and physical health risk in direct relationship to the use of alcohol ( in either a binge pattern or a sustained use over time) was discussed with the patient. Risks of intoxication, disinhibition, legal and interpersonal issues as well as abuse and dependence, along with the    increased risks of organ damage (cardiac, neurological, esophageal, gastric, liver, pancreatic, renal dysfunction among others) was discussed.  The risks of decreased hepatic clearance and increased medication serum drug levels along with increase in potential medication side effects, was also discussed.   Options for treatment: Discussed was reduction in alcohol consumption, referral to dual diagnosis program, residential rehabilitation programs, AA, NA, TINA, gabapentin and oral naltrexone, if meets criteria as a candidate for these medications.    Street Drugs: Street drug use was addressed on admission, including both physical, mental, financial and psychological risk factors of ongoing use. There are no FDA prescribed treatment medications for cannabis, stimulants use/abuse (cocaine, PCP) or hallucinogens.  Patient was screened for concomitant other drugs used (tobacco, alcohol). Treatment options available were discussed ( if applicable) AA, NA, TINA, and outpatient dual diagnosis therapy, treatment programs. Patient voiced understanding of their treatment options.          Plan:  1) Continue current medications as prescribed at discharge.  2) Follow-up:    Schedule an appointment with dentist as soon as possible for a visit in 3 days  Needs a dental f/u on dc for loose tooth        Additional Information  [place]   [phone] (Rose Marie)      Steven ( Fozia , intake )  Fax: [phone]  Phone : [phone]   March 28 at 10:30am , [place] with emergency service   [facility]   Medication management   Therapy  CM   Transport ?      Meet with Rose Marie  after appointment with [facility].   Rose Marie will be at [facility] , post your meeting         [facility] [place]   [phone]     Rose Marie Nixon ,  with [place]         [facility]   [phone]            Laci Alvarado MD

## 2025-03-28 NOTE — NURSING NOTE
"Patient is pleasant and cooperative so far this shift. He has order for discharge later this morning, appointment at [place] 1030. He came out for breakfast, med compliant. Discharge instructions reviewed by this nurse with patient, patient verbalized understanding of information. No questions at this time. Anxiety \"5\", Depression \"2\", denies SI/HI and AH/VH.     0956 Patient discharged at this time, staff escorted patient off unit and down to  police,  police holding patients electric bike for discharge.   "

## 2025-03-28 NOTE — NURSING NOTE
Patient assessed while out on the unit.  Patient eager to discuss his upcoming discharge tomorrow morning and an appointment he must attend at [place] after discharge in the morning.  Rates anxiety and depression both 5/10, denies any suicidal ideations and hallucinations.  States coping skills of camping and drinking beer, his strength includes not giving up, and his goal is to find an apartment.  Patient received Ibuprofen 600 mg for his tooth pain, a Vistaril 50 mg for his anxiety, and a Melatonin 5 mg as a sleep aid.  No further PRN medications were administered.  Will continue to monitor.      3/28/25  0550  Patient able to sleep well through the night.  No further PRN medications were administered.  No changes from previous assessment.

## 2025-03-28 NOTE — GROUP NOTE
"Group Topic: Spiritual/Devotional/Thought of the Day   Group Date: 3/28/2025  Start Time: 0730  End Time: 0820  Facilitators: LOPEZ Santacruz   Department: [facility] REHAB THERAPY VIRTUAL    Number of Participants: 6   Group Focus: dual diagnosis, goals, and personal responsibility, healthy habits  Treatment Modality: Recreational Therapy   Interventions utilized were Thought - \"The power is mine to create positive habits\", Focus on the Journey, exploration, story telling, and support  Purpose: coping skills, insight or knowledge, self-care, and trigger / craving management    Name: Jonny Valenzuela YOB: 1970   MR: [id number]      Facilitator: Recreational Therapist  Level of Participation: moderate  Quality of Participation: appropriate/pleasant, cooperative, and engaged  Interactions with others: appropriate and gave feedback  Mood/Affect: appropriate and brightens with interaction  Triggers (if applicable): n/a  Cognition: coherent/clear  Progress: Moderate  Comments: Patients were provided with the Thought of the Day reading - “The power is mine to create positive habits.” Patients were given an opportunity to share their thoughts and encouraged to create a personal goal based on the reading. Patients were also provided with the \"Focus on the Journey\" goal worksheet which includes on a variety of healthy habit prompts for participants to complete.    Pt was calm, pleasant, and engaged this morning. He appeared to be actively listening during discussion, shared responses aloud when prompted, and was supportive of peers when sharing.     Plan: continue with services      "

## 2025-03-31 NOTE — SIGNIFICANT EVENT
Follow Up Phone Call    Outgoing phone call    Spoke to: Jonny Valenzuela Relationship:self   Phone number: [phone]      Outcome: contacted patient/ family   No chief complaint on file.         Diagnosis:Not applicable    States he is feeling better. Denies suicidal ideation at this time. Has emergency contact numbers on his person. Encouraged compliance with medications and appointments. Voiced understanding. No further questions or concerns.

## 2025-05-30 NOTE — PROGRESS NOTES
"Jonny Valenzuela is a 54 y.o. year old male patient who is on U admission day 2.      Subjective   Jonny Valenzuela is a 54 y.o. year old male patient who was personally seen and interviewed, and discussed in morning team rounds. The patient was interviewed alone in his room (interviewed while sitting up in bed, wake and alert), and was easily engaged and cooperative. This afternoon, Jonny reports feeling \"okay\" and currently rates his depression at an 8-9 out of 10. He reports experiencing intermittent suicidal thoughts, but no suicide plans. He also rates his anxiety at an 8-9 out of 10. No hallucinations or paranoia were endorsed or noted. He does report feeling \"better than I was\" when he first arrived on the unit from his home.        Jonny slept 8.25 hours last night (broken).           Objective   Mental Status Exam:   General: Appropriately groomed and dressed in casual/hospital attire, sitting up in bed.   Appearance: Appears stated age.   Attitude: Calm, cooperative.   Behavior: Appropriate eye contact.   Motor Activity: No agitation or retardation. No EPS/TD. Normal gait and station. Normal muscle tone and bulk.   Speech: Regular rate, rhythm, volume and tone, spontaneous, fluent. Non-pressured.   Mood: \"Okay\"   Affect: Neutral.   Thought Process: Organized, and goal directed.   Thought Content: Does not endorse suicidal ideation or any suicide plans.   Does not endorse homicidal ideation.  No overt delusions or paranoia elicited.    Thought Perception: Does not endorse auditory or visual hallucinations, does not appear to be responding to hallucinatory stimuli.   Cognition: Alert, oriented x 3. No deficits noted. Adequate fund of knowledge. No deficit in recent and remote memory. No deficits in attention, concentration or language.   Insight: Poor-to-Fair, as patient recognizes symptoms of illness and need for recommended treatments.    Judgment: Impaired, as patient can not make reasonable decisions about ordinary " activities of daily living and necessary medical care recommendations.           LABS:  Results for orders placed or performed during the hospital encounter of 03/22/25 (from the past 96 hours)   Glucose, Fasting   Result Value Ref Range    Glucose, Fasting 99 74 - 99 mg/dL   Lipid Panel   Result Value Ref Range    Cholesterol 157 0 - 199 mg/dL    HDL-Cholesterol 57.8 mg/dL    Cholesterol/HDL Ratio 2.7     LDL Calculated 81 <=99 mg/dL    VLDL 19 0 - 40 mg/dL    Triglycerides 93 0 - 149 mg/dL    Non HDL Cholesterol 99 0 - 149 mg/dL   Vitamin D 25-Hydroxy,Total (for eval of Vitamin D levels)   Result Value Ref Range    Vitamin D, 25-Hydroxy, Total 30 30 - 100 ng/mL        Last Recorded Vitals  Visit Vitals  /87 (BP Location: Right arm, Patient Position: Standing)   Pulse 90   Temp 36.7 °C (98.1 °F) (Temporal)   Resp 18        Intake/Output last 3 Shifts:  No intake/output data recorded.    Relevant Results  Scheduled medications  fLuvoxaMINE, 25 mg, oral, Nightly  folic acid, 1 mg, oral, Daily  multivitamin with minerals, 1 tablet, oral, Daily  nicotine, 1 patch, transdermal, Daily  QUEtiapine, 12.5 mg, oral, q AM  QUEtiapine, 50 mg, oral, Nightly  thiamine, 100 mg, oral, Daily      Continuous medications     PRN medications  PRN medications: alum-mag hydroxide-simeth, benzocaine, diazePAM, diphenhydrAMINE **OR** diphenhydrAMINE, famotidine, haloperidol **OR** haloperidol lactate, hydrOXYzine pamoate, ibuprofen, LORazepam **OR** LORazepam, melatonin, nicotine polacrilex, psyllium               Assessment/Plan   Assessment & Plan  Severe episode of recurrent major depressive disorder, without psychotic features (Multi)  Plan: 1) re-trial Luvox 25 mg QHS           2) re-trial Seroquel 50 mg QHS           3) Individual therapy by OT.  Discussed potential risks, benefits, and alternatives to medications with patient, who consented to the above medications.    SHOSHANA (generalized anxiety disorder)  Plan: 1) see  above    Paranoia (psychosis) (Multi)  Plan: 1) see above    Alcohol use disorder, severe, dependence (Multi)  Plan: 1) Refer to outpatient treatment program.    Tobacco dependence  Plan: 1) Nicotine Patch Qdaily    Gastroesophageal reflux disease without esophagitis  Plan: 1) IM service to follow and manage.    Tooth pain  Plan: 1) Follow up with dentist outpatient    Cataract, right eye  Plan: 1) IM service to follow and manage.        KARLIE Alvarado MD   done